# Patient Record
Sex: FEMALE | Race: WHITE | Employment: UNEMPLOYED | URBAN - METROPOLITAN AREA
[De-identification: names, ages, dates, MRNs, and addresses within clinical notes are randomized per-mention and may not be internally consistent; named-entity substitution may affect disease eponyms.]

---

## 2017-01-16 ENCOUNTER — HOSPITAL ENCOUNTER (OUTPATIENT)
Dept: GENERAL RADIOLOGY | Age: 67
Discharge: HOME | End: 2017-01-16
Payer: MEDICARE

## 2017-01-16 DIAGNOSIS — R06.2 WHEEZING: ICD-10-CM

## 2017-01-16 PROCEDURE — 71020 XR CHEST PA LAT: CPT

## 2018-03-03 ENCOUNTER — APPOINTMENT (OUTPATIENT)
Dept: CT IMAGING | Age: 68
DRG: 871 | End: 2018-03-03
Attending: EMERGENCY MEDICINE
Payer: MEDICARE

## 2018-03-03 ENCOUNTER — APPOINTMENT (OUTPATIENT)
Dept: GENERAL RADIOLOGY | Age: 68
DRG: 871 | End: 2018-03-03
Attending: EMERGENCY MEDICINE
Payer: MEDICARE

## 2018-03-03 ENCOUNTER — HOSPITAL ENCOUNTER (INPATIENT)
Age: 68
LOS: 5 days | Discharge: HOME HEALTH | DRG: 871 | End: 2018-03-08
Attending: EMERGENCY MEDICINE | Admitting: HOSPITALIST
Payer: MEDICARE

## 2018-03-03 DIAGNOSIS — J96.22 ACUTE ON CHRONIC RESPIRATORY FAILURE WITH HYPERCAPNIA (HCC): Primary | ICD-10-CM

## 2018-03-03 DIAGNOSIS — G93.40 ACUTE ENCEPHALOPATHY: ICD-10-CM

## 2018-03-03 LAB
ALBUMIN SERPL-MCNC: 3.4 G/DL (ref 3.5–5)
ALBUMIN/GLOB SERPL: 0.7 {RATIO} (ref 1.1–2.2)
ALP SERPL-CCNC: 128 U/L (ref 45–117)
ALT SERPL-CCNC: 16 U/L (ref 12–78)
ANION GAP SERPL CALC-SCNC: 4 MMOL/L (ref 5–15)
ANION GAP SERPL CALC-SCNC: 6 MMOL/L (ref 5–15)
APPEARANCE UR: CLEAR
ARTERIAL PATENCY WRIST A: YES
AST SERPL-CCNC: 15 U/L (ref 15–37)
BACTERIA URNS QL MICRO: ABNORMAL /HPF
BASE DEFICIT BLDA-SCNC: 0.6 MMOL/L
BASE DEFICIT BLDA-SCNC: 5.5 MMOL/L
BASE EXCESS BLDA CALC-SCNC: 2.1 MMOL/L
BASOPHILS # BLD: 0.1 K/UL (ref 0–0.1)
BASOPHILS NFR BLD: 1 % (ref 0–1)
BDY SITE: ABNORMAL
BILIRUB SERPL-MCNC: 0.4 MG/DL (ref 0.2–1)
BILIRUB UR QL: NEGATIVE
BNP SERPL-MCNC: 234 PG/ML (ref 0–125)
BUN SERPL-MCNC: 10 MG/DL (ref 6–20)
BUN SERPL-MCNC: 7 MG/DL (ref 6–20)
BUN/CREAT SERPL: 11 (ref 12–20)
BUN/CREAT SERPL: 14 (ref 12–20)
CALCIUM SERPL-MCNC: 8.3 MG/DL (ref 8.5–10.1)
CALCIUM SERPL-MCNC: 8.9 MG/DL (ref 8.5–10.1)
CHLORIDE SERPL-SCNC: 95 MMOL/L (ref 97–108)
CHLORIDE SERPL-SCNC: 98 MMOL/L (ref 97–108)
CK MB CFR SERPL CALC: 2.6 % (ref 0–2.5)
CK MB SERPL-MCNC: 2.8 NG/ML (ref 5–25)
CK SERPL-CCNC: 106 U/L (ref 26–192)
CO2 SERPL-SCNC: 30 MMOL/L (ref 21–32)
CO2 SERPL-SCNC: 31 MMOL/L (ref 21–32)
COLOR UR: ABNORMAL
CREAT SERPL-MCNC: 0.66 MG/DL (ref 0.55–1.02)
CREAT SERPL-MCNC: 0.7 MG/DL (ref 0.55–1.02)
DIFFERENTIAL METHOD BLD: ABNORMAL
EOSINOPHIL # BLD: 0.2 K/UL (ref 0–0.4)
EOSINOPHIL NFR BLD: 1 % (ref 0–7)
EPAP/CPAP/PEEP, PAPEEP: 6
EPAP/CPAP/PEEP, PAPEEP: 7
EPAP/CPAP/PEEP, PAPEEP: 7
EPITH CASTS URNS QL MICRO: ABNORMAL /LPF
ERYTHROCYTE [DISTWIDTH] IN BLOOD BY AUTOMATED COUNT: 13 % (ref 11.5–14.5)
FIO2 ON VENT: 60 %
FIO2 ON VENT: 60 %
FIO2 ON VENT: 80 %
FLUAV AG NPH QL IA: NEGATIVE
FLUBV AG NOSE QL IA: NEGATIVE
GAS FLOW.O2 SETTING OXYMISER: 16 L/MIN
GLOBULIN SER CALC-MCNC: 5 G/DL (ref 2–4)
GLUCOSE BLD STRIP.AUTO-MCNC: 181 MG/DL (ref 65–100)
GLUCOSE SERPL-MCNC: 132 MG/DL (ref 65–100)
GLUCOSE SERPL-MCNC: 181 MG/DL (ref 65–100)
GLUCOSE UR STRIP.AUTO-MCNC: 250 MG/DL
HCO3 BLDA-SCNC: 26 MMOL/L (ref 22–26)
HCO3 BLDA-SCNC: 30 MMOL/L (ref 22–26)
HCO3 BLDA-SCNC: 31 MMOL/L (ref 22–26)
HCT VFR BLD AUTO: 38.9 % (ref 35–47)
HGB BLD-MCNC: 11.9 G/DL (ref 11.5–16)
HGB UR QL STRIP: NEGATIVE
HYALINE CASTS URNS QL MICRO: ABNORMAL /LPF (ref 0–5)
IMM GRANULOCYTES # BLD: 0.1 K/UL (ref 0–0.04)
IMM GRANULOCYTES NFR BLD AUTO: 1 % (ref 0–0.5)
IPAP/PIP, IPAPIP: 14
IPAP/PIP, IPAPIP: 14
KETONES UR QL STRIP.AUTO: NEGATIVE MG/DL
LACTATE SERPL-SCNC: 1.9 MMOL/L (ref 0.4–2)
LEUKOCYTE ESTERASE UR QL STRIP.AUTO: NEGATIVE
LYMPHOCYTES # BLD: 1.4 K/UL (ref 0.8–3.5)
LYMPHOCYTES NFR BLD: 9 % (ref 12–49)
MCH RBC QN AUTO: 30.1 PG (ref 26–34)
MCHC RBC AUTO-ENTMCNC: 30.6 G/DL (ref 30–36.5)
MCV RBC AUTO: 98.2 FL (ref 80–99)
MONOCYTES # BLD: 1.4 K/UL (ref 0–1)
MONOCYTES NFR BLD: 9 % (ref 5–13)
NEUTS SEG # BLD: 12.5 K/UL (ref 1.8–8)
NEUTS SEG NFR BLD: 80 % (ref 32–75)
NITRITE UR QL STRIP.AUTO: NEGATIVE
NRBC # BLD: 0 K/UL (ref 0–0.01)
NRBC BLD-RTO: 0 PER 100 WBC
OSMOLALITY SERPL: 298 MOSM/KG H2O
OSMOLALITY UR: 321 MOSM/KG H2O
PCO2 BLDA: 58 MMHG (ref 35–45)
PCO2 BLDA: 82 MMHG (ref 35–45)
PCO2 BLDA: 89 MMHG (ref 35–45)
PH BLDA: 7.12 [PH] (ref 7.35–7.45)
PH BLDA: 7.16 [PH] (ref 7.35–7.45)
PH BLDA: 7.33 [PH] (ref 7.35–7.45)
PH UR STRIP: 6 [PH] (ref 5–8)
PLATELET # BLD AUTO: 423 K/UL (ref 150–400)
PMV BLD AUTO: 10 FL (ref 8.9–12.9)
PO2 BLDA: 242 MMHG (ref 80–100)
PO2 BLDA: 264 MMHG (ref 80–100)
PO2 BLDA: 428 MMHG (ref 80–100)
POTASSIUM SERPL-SCNC: 3.7 MMOL/L (ref 3.5–5.1)
POTASSIUM SERPL-SCNC: 4.3 MMOL/L (ref 3.5–5.1)
PROT SERPL-MCNC: 8.4 G/DL (ref 6.4–8.2)
PROT UR STRIP-MCNC: 100 MG/DL
RBC # BLD AUTO: 3.96 M/UL (ref 3.8–5.2)
RBC #/AREA URNS HPF: ABNORMAL /HPF (ref 0–5)
SAO2 % BLD: 100 % (ref 92–97)
SAO2 % BLD: 100 % (ref 92–97)
SAO2 % BLD: 99 % (ref 92–97)
SAO2% DEVICE SAO2% SENSOR NAME: ABNORMAL
SERVICE CMNT-IMP: ABNORMAL
SODIUM SERPL-SCNC: 130 MMOL/L (ref 136–145)
SODIUM SERPL-SCNC: 134 MMOL/L (ref 136–145)
SODIUM UR-SCNC: 66 MMOL/L
SP GR UR REFRACTOMETRY: 1.02 (ref 1–1.03)
SPECIMEN SITE: ABNORMAL
TROPONIN I SERPL-MCNC: <0.04 NG/ML
TROPONIN I SERPL-MCNC: <0.04 NG/ML
UA: UC IF INDICATED,UAUC: ABNORMAL
UROBILINOGEN UR QL STRIP.AUTO: 0.2 EU/DL (ref 0.2–1)
VENTILATION MODE VENT: ABNORMAL
VENTILATION MODE VENT: ABNORMAL
VT SETTING VENT: 450 ML
WBC # BLD AUTO: 15.6 K/UL (ref 3.6–11)
WBC URNS QL MICRO: ABNORMAL /HPF (ref 0–4)

## 2018-03-03 PROCEDURE — 94640 AIRWAY INHALATION TREATMENT: CPT

## 2018-03-03 PROCEDURE — 82962 GLUCOSE BLOOD TEST: CPT

## 2018-03-03 PROCEDURE — C1751 CATH, INF, PER/CENT/MIDLINE: HCPCS

## 2018-03-03 PROCEDURE — 77030029684 HC NEB SM VOL KT MONA -A

## 2018-03-03 PROCEDURE — 99285 EMERGENCY DEPT VISIT HI MDM: CPT

## 2018-03-03 PROCEDURE — 96375 TX/PRO/DX INJ NEW DRUG ADDON: CPT

## 2018-03-03 PROCEDURE — 83605 ASSAY OF LACTIC ACID: CPT | Performed by: EMERGENCY MEDICINE

## 2018-03-03 PROCEDURE — 77030013140 HC MSK NEB VYRM -A

## 2018-03-03 PROCEDURE — 74011250637 HC RX REV CODE- 250/637: Performed by: INTERNAL MEDICINE

## 2018-03-03 PROCEDURE — 80048 BASIC METABOLIC PNL TOTAL CA: CPT | Performed by: HOSPITALIST

## 2018-03-03 PROCEDURE — 87040 BLOOD CULTURE FOR BACTERIA: CPT | Performed by: EMERGENCY MEDICINE

## 2018-03-03 PROCEDURE — 71045 X-RAY EXAM CHEST 1 VIEW: CPT

## 2018-03-03 PROCEDURE — 36415 COLL VENOUS BLD VENIPUNCTURE: CPT | Performed by: EMERGENCY MEDICINE

## 2018-03-03 PROCEDURE — 87186 SC STD MICRODIL/AGAR DIL: CPT | Performed by: INTERNAL MEDICINE

## 2018-03-03 PROCEDURE — 5A1945Z RESPIRATORY VENTILATION, 24-96 CONSECUTIVE HOURS: ICD-10-PCS | Performed by: HOSPITALIST

## 2018-03-03 PROCEDURE — 94660 CPAP INITIATION&MGMT: CPT

## 2018-03-03 PROCEDURE — 74011250636 HC RX REV CODE- 250/636: Performed by: HOSPITALIST

## 2018-03-03 PROCEDURE — 84300 ASSAY OF URINE SODIUM: CPT | Performed by: HOSPITALIST

## 2018-03-03 PROCEDURE — 74011000250 HC RX REV CODE- 250: Performed by: HOSPITALIST

## 2018-03-03 PROCEDURE — 74011000250 HC RX REV CODE- 250: Performed by: EMERGENCY MEDICINE

## 2018-03-03 PROCEDURE — 82553 CREATINE MB FRACTION: CPT | Performed by: HOSPITALIST

## 2018-03-03 PROCEDURE — 36600 WITHDRAWAL OF ARTERIAL BLOOD: CPT | Performed by: EMERGENCY MEDICINE

## 2018-03-03 PROCEDURE — 94761 N-INVAS EAR/PLS OXIMETRY MLT: CPT

## 2018-03-03 PROCEDURE — 82803 BLOOD GASES ANY COMBINATION: CPT | Performed by: EMERGENCY MEDICINE

## 2018-03-03 PROCEDURE — 74011636320 HC RX REV CODE- 636/320: Performed by: EMERGENCY MEDICINE

## 2018-03-03 PROCEDURE — 0BH17EZ INSERTION OF ENDOTRACHEAL AIRWAY INTO TRACHEA, VIA NATURAL OR ARTIFICIAL OPENING: ICD-10-PCS | Performed by: EMERGENCY MEDICINE

## 2018-03-03 PROCEDURE — 87086 URINE CULTURE/COLONY COUNT: CPT | Performed by: EMERGENCY MEDICINE

## 2018-03-03 PROCEDURE — 87077 CULTURE AEROBIC IDENTIFY: CPT | Performed by: INTERNAL MEDICINE

## 2018-03-03 PROCEDURE — 77030013033 HC MSK BPAP/CPAP MMKA -B

## 2018-03-03 PROCEDURE — 83880 ASSAY OF NATRIURETIC PEPTIDE: CPT | Performed by: HOSPITALIST

## 2018-03-03 PROCEDURE — 74011250637 HC RX REV CODE- 250/637: Performed by: HOSPITALIST

## 2018-03-03 PROCEDURE — 77030010547 HC BG URIN W/UMETER -A

## 2018-03-03 PROCEDURE — 77030005514 HC CATH URETH FOL14 BARD -A

## 2018-03-03 PROCEDURE — 77030008683 HC TU ET CUF COVD -A

## 2018-03-03 PROCEDURE — 75810000137 HC PLCMT CENT VENOUS CATH

## 2018-03-03 PROCEDURE — 71275 CT ANGIOGRAPHY CHEST: CPT

## 2018-03-03 PROCEDURE — 93005 ELECTROCARDIOGRAM TRACING: CPT

## 2018-03-03 PROCEDURE — 85025 COMPLETE CBC W/AUTO DIFF WBC: CPT | Performed by: EMERGENCY MEDICINE

## 2018-03-03 PROCEDURE — 83930 ASSAY OF BLOOD OSMOLALITY: CPT | Performed by: HOSPITALIST

## 2018-03-03 PROCEDURE — 74011250636 HC RX REV CODE- 250/636: Performed by: EMERGENCY MEDICINE

## 2018-03-03 PROCEDURE — 94002 VENT MGMT INPAT INIT DAY: CPT

## 2018-03-03 PROCEDURE — 65620000000 HC RM CCU GENERAL

## 2018-03-03 PROCEDURE — 74011250637 HC RX REV CODE- 250/637: Performed by: EMERGENCY MEDICINE

## 2018-03-03 PROCEDURE — 83935 ASSAY OF URINE OSMOLALITY: CPT | Performed by: HOSPITALIST

## 2018-03-03 PROCEDURE — 96376 TX/PRO/DX INJ SAME DRUG ADON: CPT

## 2018-03-03 PROCEDURE — 87804 INFLUENZA ASSAY W/OPTIC: CPT | Performed by: HOSPITALIST

## 2018-03-03 PROCEDURE — 96367 TX/PROPH/DG ADDL SEQ IV INF: CPT

## 2018-03-03 PROCEDURE — 84484 ASSAY OF TROPONIN QUANT: CPT | Performed by: EMERGENCY MEDICINE

## 2018-03-03 PROCEDURE — 81001 URINALYSIS AUTO W/SCOPE: CPT | Performed by: EMERGENCY MEDICINE

## 2018-03-03 PROCEDURE — 36600 WITHDRAWAL OF ARTERIAL BLOOD: CPT | Performed by: INTERNAL MEDICINE

## 2018-03-03 PROCEDURE — 96365 THER/PROPH/DIAG IV INF INIT: CPT

## 2018-03-03 PROCEDURE — 87070 CULTURE OTHR SPECIMN AEROBIC: CPT | Performed by: INTERNAL MEDICINE

## 2018-03-03 PROCEDURE — 02HV33Z INSERTION OF INFUSION DEVICE INTO SUPERIOR VENA CAVA, PERCUTANEOUS APPROACH: ICD-10-PCS | Performed by: EMERGENCY MEDICINE

## 2018-03-03 PROCEDURE — 31500 INSERT EMERGENCY AIRWAY: CPT

## 2018-03-03 PROCEDURE — 80053 COMPREHEN METABOLIC PANEL: CPT | Performed by: EMERGENCY MEDICINE

## 2018-03-03 RX ORDER — LORAZEPAM 2 MG/ML
2 INJECTION INTRAMUSCULAR
Status: COMPLETED | OUTPATIENT
Start: 2018-03-03 | End: 2018-03-03

## 2018-03-03 RX ORDER — SODIUM CHLORIDE 9 MG/ML
100 INJECTION, SOLUTION INTRAVENOUS CONTINUOUS
Status: DISCONTINUED | OUTPATIENT
Start: 2018-03-03 | End: 2018-03-04

## 2018-03-03 RX ORDER — ALBUTEROL SULFATE 2.5 MG/.5ML
10 SOLUTION RESPIRATORY (INHALATION)
Status: DISCONTINUED | OUTPATIENT
Start: 2018-03-03 | End: 2018-03-03

## 2018-03-03 RX ORDER — METHOCARBAMOL 750 MG/1
750 TABLET, FILM COATED ORAL 4 TIMES DAILY
Status: DISCONTINUED | OUTPATIENT
Start: 2018-03-03 | End: 2018-03-03

## 2018-03-03 RX ORDER — SODIUM CHLORIDE 9 MG/ML
50 INJECTION, SOLUTION INTRAVENOUS
Status: COMPLETED | OUTPATIENT
Start: 2018-03-03 | End: 2018-03-03

## 2018-03-03 RX ORDER — FAMOTIDINE 10 MG/ML
20 INJECTION INTRAVENOUS EVERY 12 HOURS
Status: DISCONTINUED | OUTPATIENT
Start: 2018-03-03 | End: 2018-03-05

## 2018-03-03 RX ORDER — IPRATROPIUM BROMIDE AND ALBUTEROL SULFATE 2.5; .5 MG/3ML; MG/3ML
3 SOLUTION RESPIRATORY (INHALATION)
COMMUNITY

## 2018-03-03 RX ORDER — OMEPRAZOLE 40 MG/1
40 CAPSULE, DELAYED RELEASE ORAL DAILY
COMMUNITY

## 2018-03-03 RX ORDER — CHLORHEXIDINE GLUCONATE 1.2 MG/ML
15 RINSE ORAL EVERY 12 HOURS
Status: DISCONTINUED | OUTPATIENT
Start: 2018-03-03 | End: 2018-03-05

## 2018-03-03 RX ORDER — SUCCINYLCHOLINE CHLORIDE 20 MG/ML
100 INJECTION INTRAMUSCULAR; INTRAVENOUS
Status: COMPLETED | OUTPATIENT
Start: 2018-03-03 | End: 2018-03-03

## 2018-03-03 RX ORDER — LORAZEPAM 2 MG/ML
1 INJECTION INTRAMUSCULAR ONCE
Status: COMPLETED | OUTPATIENT
Start: 2018-03-03 | End: 2018-03-03

## 2018-03-03 RX ORDER — SODIUM CHLORIDE 9 MG/ML
1000 INJECTION, SOLUTION INTRAVENOUS ONCE
Status: COMPLETED | OUTPATIENT
Start: 2018-03-03 | End: 2018-03-03

## 2018-03-03 RX ORDER — METHOCARBAMOL 750 MG/1
750 TABLET, FILM COATED ORAL 4 TIMES DAILY
COMMUNITY

## 2018-03-03 RX ORDER — AZITHROMYCIN 250 MG/1
500 TABLET, FILM COATED ORAL
Status: COMPLETED | OUTPATIENT
Start: 2018-03-03 | End: 2018-03-03

## 2018-03-03 RX ORDER — ENOXAPARIN SODIUM 100 MG/ML
40 INJECTION SUBCUTANEOUS EVERY 24 HOURS
Status: DISCONTINUED | OUTPATIENT
Start: 2018-03-03 | End: 2018-03-08 | Stop reason: HOSPADM

## 2018-03-03 RX ORDER — PANTOPRAZOLE SODIUM 40 MG/1
40 TABLET, DELAYED RELEASE ORAL
Status: DISCONTINUED | OUTPATIENT
Start: 2018-03-04 | End: 2018-03-03

## 2018-03-03 RX ORDER — SODIUM CHLORIDE 0.9 % (FLUSH) 0.9 %
5-10 SYRINGE (ML) INJECTION AS NEEDED
Status: DISCONTINUED | OUTPATIENT
Start: 2018-03-03 | End: 2018-03-08 | Stop reason: HOSPADM

## 2018-03-03 RX ORDER — INSULIN LISPRO 100 [IU]/ML
INJECTION, SOLUTION INTRAVENOUS; SUBCUTANEOUS EVERY 6 HOURS
Status: DISCONTINUED | OUTPATIENT
Start: 2018-03-03 | End: 2018-03-06

## 2018-03-03 RX ORDER — LEVETIRACETAM 5 MG/ML
500 INJECTION INTRAVASCULAR EVERY 12 HOURS
Status: DISCONTINUED | OUTPATIENT
Start: 2018-03-03 | End: 2018-03-05

## 2018-03-03 RX ORDER — SODIUM CHLORIDE 0.9 % (FLUSH) 0.9 %
10 SYRINGE (ML) INJECTION
Status: COMPLETED | OUTPATIENT
Start: 2018-03-03 | End: 2018-03-03

## 2018-03-03 RX ORDER — MAGNESIUM SULFATE 100 %
4 CRYSTALS MISCELLANEOUS AS NEEDED
Status: DISCONTINUED | OUTPATIENT
Start: 2018-03-03 | End: 2018-03-06

## 2018-03-03 RX ORDER — IBUPROFEN 200 MG
1 TABLET ORAL EVERY 24 HOURS
Status: DISCONTINUED | OUTPATIENT
Start: 2018-03-03 | End: 2018-03-08 | Stop reason: HOSPADM

## 2018-03-03 RX ORDER — ETOMIDATE 2 MG/ML
20 INJECTION INTRAVENOUS ONCE
Status: COMPLETED | OUTPATIENT
Start: 2018-03-03 | End: 2018-03-03

## 2018-03-03 RX ORDER — AMLODIPINE BESYLATE 5 MG/1
10 TABLET ORAL DAILY
Status: DISCONTINUED | OUTPATIENT
Start: 2018-03-04 | End: 2018-03-03

## 2018-03-03 RX ORDER — AMLODIPINE BESYLATE 10 MG/1
10 TABLET ORAL DAILY
COMMUNITY

## 2018-03-03 RX ORDER — FACIAL-BODY WIPES
10 EACH TOPICAL DAILY PRN
Status: DISCONTINUED | OUTPATIENT
Start: 2018-03-03 | End: 2018-03-08 | Stop reason: HOSPADM

## 2018-03-03 RX ORDER — PROPOFOL 10 MG/ML
0-50 VIAL (ML) INTRAVENOUS
Status: DISCONTINUED | OUTPATIENT
Start: 2018-03-03 | End: 2018-03-05

## 2018-03-03 RX ORDER — KETAMINE HYDROCHLORIDE 50 MG/ML
2 INJECTION, SOLUTION INTRAMUSCULAR; INTRAVENOUS
Status: DISCONTINUED | OUTPATIENT
Start: 2018-03-03 | End: 2018-03-03

## 2018-03-03 RX ORDER — SODIUM CHLORIDE 0.9 % (FLUSH) 0.9 %
5-10 SYRINGE (ML) INJECTION EVERY 8 HOURS
Status: DISCONTINUED | OUTPATIENT
Start: 2018-03-03 | End: 2018-03-08 | Stop reason: HOSPADM

## 2018-03-03 RX ORDER — ACETAMINOPHEN 650 MG/1
650 SUPPOSITORY RECTAL
Status: DISCONTINUED | OUTPATIENT
Start: 2018-03-03 | End: 2018-03-05

## 2018-03-03 RX ORDER — DEXTROSE 50 % IN WATER (D50W) INTRAVENOUS SYRINGE
12.5-25 AS NEEDED
Status: DISCONTINUED | OUTPATIENT
Start: 2018-03-03 | End: 2018-03-06

## 2018-03-03 RX ORDER — ASPIRIN 300 MG/1
300 SUPPOSITORY RECTAL DAILY
Status: DISCONTINUED | OUTPATIENT
Start: 2018-03-03 | End: 2018-03-05

## 2018-03-03 RX ORDER — MUPIROCIN 20 MG/G
OINTMENT TOPICAL EVERY 12 HOURS
Status: DISCONTINUED | OUTPATIENT
Start: 2018-03-03 | End: 2018-03-08 | Stop reason: HOSPADM

## 2018-03-03 RX ORDER — IPRATROPIUM BROMIDE AND ALBUTEROL SULFATE 2.5; .5 MG/3ML; MG/3ML
3 SOLUTION RESPIRATORY (INHALATION)
Status: COMPLETED | OUTPATIENT
Start: 2018-03-03 | End: 2018-03-03

## 2018-03-03 RX ORDER — LEVALBUTEROL INHALATION SOLUTION 0.63 MG/3ML
0.63 SOLUTION RESPIRATORY (INHALATION)
Status: COMPLETED | OUTPATIENT
Start: 2018-03-03 | End: 2018-03-03

## 2018-03-03 RX ORDER — MAGNESIUM SULFATE HEPTAHYDRATE 40 MG/ML
2 INJECTION, SOLUTION INTRAVENOUS ONCE
Status: COMPLETED | OUTPATIENT
Start: 2018-03-03 | End: 2018-03-03

## 2018-03-03 RX ORDER — IPRATROPIUM BROMIDE AND ALBUTEROL SULFATE 2.5; .5 MG/3ML; MG/3ML
3 SOLUTION RESPIRATORY (INHALATION)
Status: DISCONTINUED | OUTPATIENT
Start: 2018-03-03 | End: 2018-03-07

## 2018-03-03 RX ORDER — DULOXETIN HYDROCHLORIDE 20 MG/1
40 CAPSULE, DELAYED RELEASE ORAL DAILY
Status: DISCONTINUED | OUTPATIENT
Start: 2018-03-04 | End: 2018-03-03

## 2018-03-03 RX ORDER — ONDANSETRON 2 MG/ML
4 INJECTION INTRAMUSCULAR; INTRAVENOUS
Status: DISCONTINUED | OUTPATIENT
Start: 2018-03-03 | End: 2018-03-06

## 2018-03-03 RX ORDER — IPRATROPIUM BROMIDE AND ALBUTEROL SULFATE 2.5; .5 MG/3ML; MG/3ML
SOLUTION RESPIRATORY (INHALATION)
Status: DISCONTINUED
Start: 2018-03-03 | End: 2018-03-03

## 2018-03-03 RX ADMIN — IPRATROPIUM BROMIDE AND ALBUTEROL SULFATE 3 ML: .5; 3 SOLUTION RESPIRATORY (INHALATION) at 19:46

## 2018-03-03 RX ADMIN — PROPOFOL 10 MCG/KG/MIN: 10 INJECTION, EMULSION INTRAVENOUS at 17:31

## 2018-03-03 RX ADMIN — SODIUM CHLORIDE 50 ML/HR: 900 INJECTION, SOLUTION INTRAVENOUS at 19:03

## 2018-03-03 RX ADMIN — Medication 10 ML: at 23:48

## 2018-03-03 RX ADMIN — PROPOFOL 50 MCG/KG/MIN: 10 INJECTION, EMULSION INTRAVENOUS at 21:16

## 2018-03-03 RX ADMIN — Medication 10 ML: at 19:03

## 2018-03-03 RX ADMIN — SUCCINYLCHOLINE CHLORIDE 100 MG: 20 INJECTION, SOLUTION INTRAMUSCULAR; INTRAVENOUS at 16:44

## 2018-03-03 RX ADMIN — ETOMIDATE 20 MG: 40 INJECTION, SOLUTION INTRAVENOUS at 17:34

## 2018-03-03 RX ADMIN — LORAZEPAM 2 MG: 2 INJECTION INTRAMUSCULAR; INTRAVENOUS at 13:42

## 2018-03-03 RX ADMIN — IPRATROPIUM BROMIDE AND ALBUTEROL SULFATE 3 ML: .5; 3 SOLUTION RESPIRATORY (INHALATION) at 16:45

## 2018-03-03 RX ADMIN — AZITHROMYCIN 500 MG: 250 TABLET, FILM COATED ORAL at 15:07

## 2018-03-03 RX ADMIN — Medication 10 ML: at 13:50

## 2018-03-03 RX ADMIN — IPRATROPIUM BROMIDE AND ALBUTEROL SULFATE 3 ML: .5; 3 SOLUTION RESPIRATORY (INHALATION) at 12:45

## 2018-03-03 RX ADMIN — MUPIROCIN: 20 OINTMENT TOPICAL at 20:18

## 2018-03-03 RX ADMIN — CHLORHEXIDINE GLUCONATE 15 ML: 1.2 RINSE ORAL at 20:18

## 2018-03-03 RX ADMIN — ASPIRIN 300 MG: 300 SUPPOSITORY RECTAL at 16:45

## 2018-03-03 RX ADMIN — IPRATROPIUM BROMIDE AND ALBUTEROL SULFATE 3 ML: .5; 3 SOLUTION RESPIRATORY (INHALATION) at 23:38

## 2018-03-03 RX ADMIN — LEVALBUTEROL HYDROCHLORIDE 0.63 MG: 0.63 SOLUTION RESPIRATORY (INHALATION) at 14:44

## 2018-03-03 RX ADMIN — MAGNESIUM SULFATE HEPTAHYDRATE 2 G: 40 INJECTION, SOLUTION INTRAVENOUS at 12:56

## 2018-03-03 RX ADMIN — Medication 10 ML: at 20:19

## 2018-03-03 RX ADMIN — IOPAMIDOL 80 ML: 755 INJECTION, SOLUTION INTRAVENOUS at 19:03

## 2018-03-03 RX ADMIN — FAMOTIDINE 20 MG: 10 INJECTION INTRAVENOUS at 20:17

## 2018-03-03 RX ADMIN — SODIUM CHLORIDE 1000 ML: 900 INJECTION, SOLUTION INTRAVENOUS at 12:56

## 2018-03-03 RX ADMIN — LEVETIRACETAM 500 MG: 5 INJECTION INTRAVENOUS at 18:42

## 2018-03-03 RX ADMIN — Medication 10 ML: at 15:53

## 2018-03-03 RX ADMIN — ENOXAPARIN SODIUM 40 MG: 40 INJECTION SUBCUTANEOUS at 16:45

## 2018-03-03 RX ADMIN — LORAZEPAM 1 MG: 2 INJECTION INTRAMUSCULAR; INTRAVENOUS at 12:48

## 2018-03-03 RX ADMIN — SODIUM CHLORIDE 100 ML/HR: 900 INJECTION, SOLUTION INTRAVENOUS at 18:42

## 2018-03-03 RX ADMIN — METHYLPREDNISOLONE SODIUM SUCCINATE 40 MG: 40 INJECTION, POWDER, FOR SOLUTION INTRAMUSCULAR; INTRAVENOUS at 17:30

## 2018-03-03 RX ADMIN — CEFTRIAXONE SODIUM 1 G: 1 INJECTION, POWDER, FOR SOLUTION INTRAMUSCULAR; INTRAVENOUS at 15:01

## 2018-03-03 NOTE — ED NOTES
MD Wilson made aware of patient's ABG results. pH 7.12  pC02 81.9  HCO3 26    MD Wilson states repeat ABGs will be done in about 1 hour.

## 2018-03-03 NOTE — ED NOTES
Respiratory paged for abg and bipap    1410 MD requesting that prior to putting patient on BiPap, patient get a continuous nebulizer and have ABGs done.

## 2018-03-03 NOTE — ED NOTES
MD At bedside to place central line. Propofol increased to 40mcg/kg/min at the request of MD Wilson.

## 2018-03-03 NOTE — ED NOTES
Patient's saturations maintaining in the mid 80's after deep breathing techniques and placing patient on nonrebreather. MD Wilson made aware.

## 2018-03-03 NOTE — ED NOTES
TRANSFER - OUT REPORT:    Verbal report given to Gloria Gray RN (name) on Cheryl Slot  being transferred to CCU (unit) for routine progression of care       Report consisted of patients Situation, Background, Assessment and   Recommendations(SBAR). Information from the following report(s) SBAR, Kardex, ED Summary, Intake/Output, Recent Results, Med Rec Status and Cardiac Rhythm SVT was reviewed with the receiving nurse. Lines:   Peripheral IV 03/03/18 Left Hand (Active)   Site Assessment Clean, dry, & intact 3/3/2018  5:59 PM   Phlebitis Assessment 0 3/3/2018  5:59 PM   Infiltration Assessment 0 3/3/2018  5:59 PM   Dressing Status Clean, dry, & intact 3/3/2018  5:59 PM   Dressing Type Tape;Transparent 3/3/2018  5:59 PM   Hub Color/Line Status Blue;Flushed 3/3/2018  5:59 PM        Opportunity for questions and clarification was provided. Patient transported with:   Monitor  Patients medications from home  Registered Nurse  Tech (Respiratory)  Patient intubated.

## 2018-03-03 NOTE — ED NOTES
RN called CT to have patient's CT completed, however, both CT tables are being used at this time. Celeste Lance, CCU RN called and updated her on patient's current status. RN states she is in route to get the patient.

## 2018-03-03 NOTE — IP AVS SNAPSHOT
[address] 
[phone] Patient: Ary Cee MRN: [id number] KI About your hospitalization You were admitted on:  March 3, 2018 You last received care in the:  [facility] 2 GENERAL SURGERY You were discharged on:  2018 Why you were hospitalized Your primary diagnosis was:  Copd Exacerbation (Hcc) Your diagnoses also included:  Acute On Chronic Respiratory Failure With Hypercapnia (Hcc), Acute Encephalopathy, Hyponatremia Follow-up Information Follow up With Details Comments Contact Info King Rob MD Schedule an appointment as soon as possible for a visit in 1 week Call Sooner, As needed, If symptoms worsen [place] [address] [address] [place] [address] 
[phone] 
  
 your psychiatrist - you call to set up appointment Schedule an appointment as soon as possible for a visit in 1 week Call Sooner, As needed, If symptoms worsen Alejo Le NP On 3/16/2018 3;00pm   Please arrive 15min ealry. Call Sooner, As needed, If symptoms worsen [address] [facility] 
[phone] [facility]  This is your home health care provider. If you dont hear from them within 24hrs of discharge, please call them [address] [facility] [address] [phone] Discharge Orders None A check sherman indicates which time of day the medication should be taken. My Medications START taking these medications Instructions Each Dose to Equal  
 Morning Noon Evening Bedtime  
 predniSONE 20 mg tablet Commonly known as:  Collette Shaper Your last dose was: Your next dose is: Take 2 tablets (40mg) daily for 3 days, then 20mg (1 tab) daily for 3 days, then 10mg (1/2 tab) daily for 2 days CHANGE how you take these medications Instructions Each Dose to Equal  
 Morning Noon Evening Bedtime  
 ondansetron 4 mg disintegrating tablet Commonly known as:  ZOFRAN ODT What changed:  when to take this Your last dose was: Your next dose is: Take 1 Tab by mouth every eight (8) hours as needed. 4 mg CONTINUE taking these medications Instructions Each Dose to Equal  
 Morning Noon Evening Bedtime  
 albuterol 2.5 mg /3 mL (0.083 %) nebulizer solution Commonly known as:  PROVENTIL VENTOLIN Your last dose was: Your next dose is:    
   
   
 3 mL by Nebulization route every four (4) hours as needed. 2.5 mg  
    
   
   
   
  
 albuterol-ipratropium 2.5 mg-0.5 mg/3 ml Nebu Commonly known as:  Lenon Saint Your last dose was: Your next dose is:    
   
   
 3 mL by Nebulization route four (4) times daily as needed. 3 mL  
    
   
   
   
  
 amLODIPine 10 mg tablet Commonly known as:  Fanta Hair Your last dose was: Your next dose is: Take 10 mg by mouth daily. 10 mg DULoxetine 20 mg capsule Commonly known as:  CYMBALTA Your last dose was: Your next dose is: Take 40 mg by mouth daily. 40 mg  
    
   
   
   
  
 fluticasone-salmeterol 250-50 mcg/dose diskus inhaler Commonly known as:  ADVAIR Your last dose was: Your next dose is: Take 1 Puff by inhalation two (2) times a day. 1 Puff  
    
   
   
   
  
 levETIRAcetam 500 mg tablet Commonly known as:  KEPPRA Your last dose was: Your next dose is: Take 1 Tab by mouth two (2) times a day. 500 mg  
    
   
   
   
  
 methocarbamol 750 mg tablet Commonly known as:  ROBAXIN Your last dose was: Your next dose is: Take 750-1,500 mg by mouth two (2) times a day.   
 750-1500 mg  
    
   
   
 metoprolol tartrate 50 mg tablet Commonly known as:  LOPRESSOR Your last dose was: Your next dose is: Take 1 Tab by mouth two (2) times a day. 50 mg  
    
   
   
   
  
 omeprazole 40 mg capsule Commonly known as:  PRILOSEC Your last dose was: Your next dose is: Take 40 mg by mouth daily. 40 mg  
    
   
   
   
  
 tiotropium 18 mcg inhalation capsule Commonly known as:  Deannaella Howard Your last dose was: Your next dose is: Take 1 Cap by inhalation daily. Indications: BRONCHIAL ASTHMA  
 1 Cap Where to Get Your Medications Information on where to get these meds will be given to you by the nurse or doctor. ! Ask your nurse or doctor about these medications  
  predniSONE 20 mg tablet Discharge Instructions DISCHARGE DIAGNOSIS: 
Acute on chronic hypercapnic respiratory failure s/p extubation today Acute COPD exacerbation Hyponatremia, resolving Severe anxiety/depression Leukocytosis, resolved Acute encephalopathy likely related to hypercapnia resolved Hypertension History of seizure disorder Hypophosphatemia MEDICATIONS: 
· It is important that you take the medication exactly as they are prescribed. · Keep your medication in the bottles provided by the pharmacist and keep a list of the medication names, dosages, and times to be taken in your wallet. · Do not take other medications without consulting your doctor. Pain Management: per above medications What to do at [facility] Recommended diet:  Resume previous diet Recommended activity: Activity as tolerated If you have questions regarding the hospital related prescriptions or hospital related issues please call [address] [place] [address] 190 at .  You can always direct your questions to your primary care doctor if you are unable to reach your hospital physician; your PCP works as an extension of your hospital doctor just like your hospital doctor is an extension of your PCP for your time at the hospital [facility], [facility]). If you experience any of the following symptoms then please call your primary care physician or return to the emergency room if you cannot get hold of your doctor: 
Fever, chills, nausea, vomiting, diarrhea, change in mentation, falling, bleeding, shortness of breath Primary care doctor: Dr Sanjiv Prater [employer] Announcement We are excited to announce that we are making your provider's discharge notes available to you in [employer]. You will see these notes when they are completed and signed by the physician that discharged you from your recent hospital stay. If you have any questions or concerns about any information you see in [employer], please call the Health Information Department where you were seen or reach out to your Primary Care Provider for more information about your plan of care. Introducing [facility]! [facility] introduces [employer] patient portal. Now you can access parts of your medical record, email your doctor's office, and request medication refills online. 1. In your internet browser, go to [email or web address] 2. Click on the First Time User? Click Here link in the Sign In box. You will see the New Member Sign Up page. 3. Enter your [employer] Access Code exactly as it appears below. You will not need to use this code after youve completed the sign-up process. If you do not sign up before the expiration date, you must request a new code. · [employer] Access Code: [email or web address] Expires: 6/6/2018  1:45 PM 
 
4. Enter the last four digits of your Social Security Number (xxxx) and Date of Birth (mm/dd/yyyy) as indicated and click Submit. You will be taken to the next sign-up page. 5. Create a [employer] ID. This will be your [employer] login ID and cannot be changed, so think of one that is secure and easy to remember. 6. Create a [employer] password. You can change your password at any time. 7. Enter your Password Reset Question and Answer. This can be used at a later time if you forget your password. 8. Enter your e-mail address. You will receive e-mail notification when new information is available in [address] 9. Click Sign Up. You can now view and download portions of your medical record. 10. Click the Download Summary menu link to download a portable copy of your medical information. If you have questions, please visit the Frequently Asked Questions section of the [employer] website. Remember, [employer] is NOT to be used for urgent needs. For medical emergencies, dial 911. Now available from your iPhone and Android! Providers Seen During Your Hospitalization Provider Specialty Primary office phone Chapo Tavarez DO Emergency Medicine [phone] Bea Hough MD Internal Medicine [phone] Leonardo Steen MD Internal Medicine [phone] Myriam Quick MD Internal Medicine [phone] Your Primary Care Physician (PCP) Primary Care Physician Office Phone Office Fax Raciel Carvajal [phone] You are allergic to the following Allergen Reactions Codeine Other (comments) Erythromycin Nausea Only Other Medication Other (comments) No sedative or narcotic per son due to hx addiction Recent Documentation Height Weight Breastfeeding? BMI OB Status Smoking Status 1.575 m 71.1 kg No 28.67 kg/m2 Hysterectomy Current Every Day Smoker Emergency Contacts Name Discharge Info Relation Home Work Mobile [address] [facility] CAREGIVER [3] Son [22] [phone] Patient Belongings The following personal items are in your possession at time of discharge: Dental Appliances: None  Visual Aid: None      Home Medications: Kept at bedside   Jewelry: None  Clothing: None    Other Valuables: None Please provide this summary of care documentation to your next provider. Signatures-by signing, you are acknowledging that this After Visit Summary has been reviewed with you and you have received a copy. Patient Signature:  ____________________________________________________________ Date:  ____________________________________________________________  
  
Shea Moritz Provider Signature:  ____________________________________________________________ Date:  ____________________________________________________________

## 2018-03-03 NOTE — ED NOTES
Patient reports to ED with complaints of SOB that is worse during exertion which started today. When it first began, patient began self medicating with nebulizer treatments and reports little relief. Patient had 1 duoneb, one albuterol nebulizer treatment, 125mg of solumedrol in route by EMS. When EMS arrived, patient was in a tripod position and was unable to catch her breath. Patient has a hx of COPD and HTN. Patient on the monitor x3, call bell within reach. Side rails x2.

## 2018-03-03 NOTE — PROGRESS NOTES
TRANSFER - IN REPORT:    Verbal report received from King (name) on Ary Cee  being received from ED (unit) for routine progression of care      Report consisted of patients Situation, Background, Assessment and   Recommendations(SBAR). Information from the following report(s) SBAR, Kardex and MAR was reviewed with the receiving nurse. Opportunity for questions and clarification was provided. Assessment completed upon patients arrival to unit and care assumed.

## 2018-03-03 NOTE — ED NOTES
Patient's son, Ian Richard called, and at patient's request, son was updated on patient's status. Patient lives with son, who takes care of her medical visits and information. Son states he has a sick 2yo at home and is attempting to avoid the hospital.    Son states to call him for anything at [phone]. Son requesting that patient is not given any opioids because of a past addiction.

## 2018-03-03 NOTE — PROGRESS NOTES
[facility]  Pulmonary, Critical Care, and Sleep Medicine    Name: Aide Arevalo MRN: [id number]   : 1950 Hospital: [facility]   Date: 3/3/2018        Critical Care Initial Patient Consult    PCP: Aquiles Lundberg  Pulm: Giulia Marks    Pt was seen in ER at 615pm.   IMPRESSION:   · Acute Hypercarbic and Hypoxic Respiratory Failure, on 2L oxygen at home. Was tried on 10%NRB, BIPAP in ER, despite these measures pt seemed to worsen. About 210pm pt was place on bipap and continuous albuterol nebs. Appears to have very limited cardiopulmonary reserve. · COPD with acute exacerbation, required intubation for refractory hypercarbia. · Tachycardia with HR in 140s. · Acute Encephalopathy, may be due to increase co2 and meds, Was given 3 mg of ativan in ER and became lethargic. Now on sedation. · Acute Hyponatremia, may be hypovolemia. Was given 1L of NS in ER. Will need to follow. · Risk of harming herself, Will need restraints to prevent accidental extubation or removal of CVC. · Anxiety, has anxiety at her baseline. · Depression  · Baseline Hypertension, on amlodipine, holding beta blocker for now. · Hx of seizures. · Hx of ischemic and C Diff colitis. · Hx of prior opioid addiction and to avoid opioids if at all possible. · Has ET tube, CVC placed in ER of her Right groin. · Her son Adia is her surrogate decision Maker: pH: [phone]  · Pt is critically ill, moderate to high risk of decompensation. Multiple organ failure. Will need ICU care and monitoring. Monitoring of heart rhythm. 40 min CC, EOP. RECOMMENDATIONS:   · Vent support, will need to recheck a post intubation ABG  · ON empiric Abx with Ceftriaxone and Azithromycin  · Solumedrol IV. · Frequent Nebs  · Will need ongoing ABGs to assess oxygen and co2 elimination.    · Flu test  · For Ct of chest, will eval for PE and pneumonia  · Sedation, target RASS of zero to minus 1  · GI proph  · DVT prophylaxis  · CHG for oral care  · DVT prophylaxis  · Will need an OG tube. Subjective/History: This patient has been seen and evaluated at the request of Dr. Ton Starkey for above. Patient is a 74 y.o. female who presents for increased shortness of breath, severe dyspnea with any exertion. She was taking her nebs without any relief. ON arrival to ER pt was in tripod position, unable to catch her breath. Was given nebs and solumedrol in transit by EMS, (12noon today). Pt is on home oxygen. Was seen in office by Dr. Richard Shore was week and appeared to be doing pretty well. She has increased shortness of breath for the last several days. Even at rest today she was unable to catch her breath. Pt has a chronic dry cough. NO fevers, Denies sinus disease, no recent travels. Last hospitalization was in . Pt was given ativan for anxiety and placed on bipap, then was given additional ativan. Pt was seen in ER,  with heart rate in 130s. BP: 183/90,   On Vent: rate 16, vt: 450, fio2 of 60 and PEEP of 6. The patient is critically ill and can not provide additional history due to Unconsciousness, Ventilated and Unable to speak. Past Medical History:   Diagnosis Date    Arthritis     Asthma     COPD     2 L NC    Hypertension     [facility]schemic colitis ([facility] [address] 2012    Migraines     Neurological disorder     migraines    Psychiatric disorder     depression    Seizures ([facility] [address]     Last seizure 4 years ago    Vaginal candidiasis 2012      Past Surgical History:   Procedure Laterality Date    HX APPENDECTOMY      HX  SECTION      HX CHOLECYSTECTOMY      HX HYSTERECTOMY      HX ORTHOPAEDIC      lumbar disc sx    HX ORTHOPAEDIC      left knee sx    HX OTHER SURGICAL      colonoscopy-recent colitis    [facility] COLONOSCOPY W/BIOPSY SINGLE/MULTIPLE  3/1/2012           Prior to Admission medications    Medication Sig Start Date End Date Taking?  Authorizing Provider   albuterol-ipratropium (DUO-NEB) 2.5 mg-0.5 mg/3 ml nebu 3 mL by Nebulization route four (4) times daily as needed. Yes Heri Rosenberg MD   methocarbamol (ROBAXIN) 750 mg tablet Take 750-1,500 mg by mouth two (2) times a day. Yes Heri Rosenberg MD   amLODIPine (NORVASC) 10 mg tablet Take 10 mg by mouth daily. Yes Heri Rosenberg MD   omeprazole (PRILOSEC) 40 mg capsule Take 40 mg by mouth daily. Yes Historical Provider   ondansetron (ZOFRAN ODT) 4 mg disintegrating tablet Take 1 Tab by mouth every eight (8) hours as needed. Patient taking differently: Take 4 mg by mouth three (3) times daily. 6/30/16  Yes Timo Roberts MD   albuterol (PROVENTIL VENTOLIN) 2.5 mg /3 mL (0.083 %) nebulizer solution 3 mL by Nebulization route every four (4) hours as needed. 12/23/15  Yes Glo Carrington MD   tiotropium [facility]) 18 mcg inhalation capsule Take 1 Cap by inhalation daily. Indications: BRONCHIAL ASTHMA 12/23/15  Yes Glo Carrington MD   levETIRAcetam (KEPPRA) 500 mg tablet Take 1 Tab by mouth two (2) times a day. 12/23/15  Yes lGo Carrington MD   metoprolol (LOPRESSOR) 50 mg tablet Take 1 Tab by mouth two (2) times a day. 12/23/15  Yes Glo Carrington MD   DULoxetine (CYMBALTA) 20 mg capsule Take 40 mg by mouth daily. Yes Heri Rosenberg MD   fluticasone-salmeterol (ADVAIR) 250-50 mcg/dose diskus inhaler Take 1 Puff by inhalation two (2) times a day.  12/23/15   Glo Carrington MD     Current Facility-Administered Medications   Medication Dose Route Frequency    albuterol-ipratropium (DUO-NEB) 2.5 mg-0.5 mg/3 ml nebulizer solution        0.9% sodium chloride infusion  50 mL/hr IntraVENous RAD ONCE    iopamidol (ISOVUE-370) 76 % injection 100 mL  100 mL IntraVENous RAD ONCE    sodium chloride (NS) flush 10 mL  10 mL IntraVENous RAD ONCE    albuterol CONCENTRATE 2.5mg/0.5 mL neb soln  10 mg/hr Nebulization NOW    sodium chloride (NS) flush 5-10 mL  5-10 mL IntraVENous Q8H    0.9% sodium chloride infusion  100 mL/hr IntraVENous CONTINUOUS    nicotine (NICODERM CQ) 14 mg/24 hr patch 1 Patch  1 Patch TransDERmal Q24H    enoxaparin (LOVENOX) injection 40 mg  40 mg SubCUTAneous Q24H    albuterol-ipratropium (DUO-NEB) 2.5 MG-0.5 MG/3 ML  3 mL Nebulization Q4H RT    methylPREDNISolone (PF) (SOLU-MEDROL) injection 40 mg  40 mg IntraVENous Q6H    [START ON 3/4/2018] cefTRIAXone (ROCEPHIN) 1 g/50 mL NS IVPB  1 g IntraVENous Q24H    [START ON 3/4/2018] azithromycin (ZITHROMAX) 500 mg in 0.9% sodium chloride 250 mL IVPB  500 mg IntraVENous Q24H    aspirin (ASA) suppository 300 mg  300 mg Rectal DAILY    [START ON 3/4/2018] umeclidinium (INCRUSE ELLIPTA) 62.5 mcg/actuation  1 Puff Inhalation DAILY    ketamine (KETALAR) 50 mg/mL injection 152.5 mg  2 mg/kg IntraVENous NOW    succinylcholine (ANECTINE) injection 100 mg  100 mg IntraVENous NOW    levETIRAcetam (KEPPRA) 500 mg in saline (iso-osm) 100 ml IVPB  500 mg IntraVENous Q12H     Allergies   Allergen Reactions    Codeine Other (comments)    Erythromycin Nausea Only    Other Medication Other (comments)     No sedative or narcotic per son due to hx addiction      Social History   Substance Use Topics    Smoking status: Current Every Day Smoker     Packs/day: 0.25     Years: 30.00    Smokeless tobacco: Never Used    Alcohol use No      Family History   Problem Relation Age of Onset    Stroke Mother     Heart Disease Mother     Lung Disease Father      copd        Review of Systems:  Review of systems not obtained due to patient factors.     Objective:   Vital Signs:    Visit Vitals    /84    Pulse (!) 140    Temp 98.4 °F (36.9 °C)    Resp 27    Ht 5' 2\" (1.575 m)    Wt 76.2 kg (168 lb)    SpO2 99%    BMI 30.73 kg/m2       O2 Device: BIPAP       Temp (24hrs), Av.4 °F (36.9 °C), Min:98.4 °F (36.9 °C), Max:98.4 °F (36.9 °C)       Intake/Output:   Last shift:         Last 3 shifts:    No intake or output data in the 24 hours ending 18 1714  Hemodynamics:   PAP:   CO:     Wedge:   CI: CVP:    SVR:       PVR:       Ventilator Settings:  Mode Rate Tidal Volume Pressure FiO2 PEEP            60 %       Peak airway pressure:      Minute ventilation: 12.9 l/min      Physical Exam:    General:  Intubated and sedated, on mechanical vent support. no distress, appears stated age. Head:  Normocephalic, without obvious abnormality, atraumatic. Eyes:  Conjunctivae/corneas clear. PERRL, EOMs intact. Nose: Nares normal. Septum midline. Mucosa normal. No drainage or sinus tenderness. Throat: Lips, mucosa, and tongue normal. Teeth and gums normal.   Neck: Supple, symmetrical, trachea midline, no adenopathy, thyroid: no enlargment/tenderness/nodules, no carotid bruit and no JVD. Back:   Not able to assess due to her medical condition. Lungs:   Decreased air movement. Wheezing bilaterally, no crackles. Some diminished BS in bases. Chest wall:  No tenderness or deformity. Heart:  Regular rate and rhythm, S1, S2 normal, no murmur, click, rub or gallop. Abdomen:   Soft, non-tender. Bowel sounds normal. No masses,  No organomegaly. Extremities: Extremities normal, atraumatic, no cyanosis or edema. Pulses: 2+ and symmetric all extremities. Skin: Skin color, texture, turgor normal. No rashes or lesions   Lymph nodes: Cervical, supraclavicular nodes are normal.   Neurologic: Pt is intubated and sedated. Psych: not able to be assessed due to on sedation. STEVEN has a singh.         Data:     Recent Results (from the past 24 hour(s))   EKG, 12 LEAD, INITIAL    Collection Time: 03/03/18 12:41 PM   Result Value Ref Range    Ventricular Rate 151 BPM    Atrial Rate 151 BPM    P-R Interval 124 ms    QRS Duration 80 ms    Q-T Interval 266 ms    QTC Calculation (Bezet) 421 ms    Calculated P Axis 83 degrees    Calculated R Axis 63 degrees    Calculated T Axis 56 degrees    Diagnosis       Sinus tachycardia  Inferior infarct (cited on or before 17-DEC-2015)  Cannot rule out Anterior infarct , age undetermined  Abnormal ECG  When compared with ECG of 10-JUL-2016 11:22,  Questionable change in QRS axis     CBC WITH AUTOMATED DIFF    Collection Time: 03/03/18 12:51 PM   Result Value Ref Range    WBC 15.6 (H) 3.6 - 11.0 K/uL    RBC 3.96 3.80 - 5.20 M/uL    HGB 11.9 11.5 - 16.0 g/dL    HCT 38.9 35.0 - 47.0 %    MCV 98.2 80.0 - 99.0 FL    MCH 30.1 26.0 - 34.0 PG    MCHC 30.6 30.0 - 36.5 g/dL    RDW 13.0 11.5 - 14.5 %    PLATELET 188 (H) 732 - 400 K/uL    MPV 10.0 8.9 - 12.9 FL    NRBC 0.0 0  WBC    ABSOLUTE NRBC 0.00 0.00 - 0.01 K/uL    NEUTROPHILS 80 (H) 32 - 75 %    LYMPHOCYTES 9 (L) 12 - 49 %    MONOCYTES 9 5 - 13 %    EOSINOPHILS 1 0 - 7 %    BASOPHILS 1 0 - 1 %    IMMATURE GRANULOCYTES 1 (H) 0.0 - 0.5 %    ABS. NEUTROPHILS 12.5 (H) 1.8 - 8.0 K/UL    ABS. LYMPHOCYTES 1.4 0.8 - 3.5 K/UL    ABS. MONOCYTES 1.4 (H) 0.0 - 1.0 K/UL    ABS. EOSINOPHILS 0.2 0.0 - 0.4 K/UL    ABS. BASOPHILS 0.1 0.0 - 0.1 K/UL    ABS. IMM. GRANS. 0.1 (H) 0.00 - 0.04 K/UL    DF AUTOMATED     METABOLIC PANEL, COMPREHENSIVE    Collection Time: 03/03/18 12:51 PM   Result Value Ref Range    Sodium 130 (L) 136 - 145 mmol/L    Potassium 3.7 3.5 - 5.1 mmol/L    Chloride 95 (L) 97 - 108 mmol/L    CO2 31 21 - 32 mmol/L    Anion gap 4 (L) 5 - 15 mmol/L    Glucose 132 (H) 65 - 100 mg/dL    BUN 7 6 - 20 MG/DL    Creatinine 0.66 0.55 - 1.02 MG/DL    BUN/Creatinine ratio 11 (L) 12 - 20      GFR est AA >60 >60 ml/min/1.73m2    GFR est non-AA >60 >60 ml/min/1.73m2    Calcium 8.9 8.5 - 10.1 MG/DL    Bilirubin, total 0.4 0.2 - 1.0 MG/DL    ALT (SGPT) 16 12 - 78 U/L    AST (SGOT) 15 15 - 37 U/L    Alk.  phosphatase 128 (H) 45 - 117 U/L    Protein, total 8.4 (H) 6.4 - 8.2 g/dL    Albumin 3.4 (L) 3.5 - 5.0 g/dL    Globulin 5.0 (H) 2.0 - 4.0 g/dL    A-G Ratio 0.7 (L) 1.1 - 2.2     TROPONIN I    Collection Time: 03/03/18 12:51 PM   Result Value Ref Range    Troponin-I, Qt. <0.04 <0.05 ng/mL   NT-PRO BNP    Collection Time: 03/03/18 12:51 PM   Result Value Ref Range    NT pro- (H) 0 - 125 PG/ML   LACTIC ACID    Collection Time: 03/03/18  1:05 PM   Result Value Ref Range    Lactic acid 1.9 0.4 - 2.0 MMOL/L   BLOOD GAS, ARTERIAL    Collection Time: 03/03/18  2:19 PM   Result Value Ref Range    pH 7.12 (LL) 7.35 - 7.45      PCO2 82 (H) 35.0 - 45.0 mmHg    PO2 428 (H) 80 - 100 mmHg    O2  (H) 92 - 97 %    BICARBONATE 26 22 - 26 mmol/L    BASE DEFICIT 5.5 mmol/L    O2 METHOD BIPAP      FIO2 80 %    IPAP/PIP 14.0      EPAP/CPAP/PEEP 7.0      Sample source ARTERIAL      SITE RIGHT RADIAL      SKYE'S TEST YES      Critical value read back Troy RN    URINALYSIS W/ REFLEX CULTURE    Collection Time: 03/03/18  3:13 PM   Result Value Ref Range    Color YELLOW/STRAW      Appearance CLEAR CLEAR      Specific gravity 1.020 1.003 - 1.030      pH (UA) 6.0 5.0 - 8.0      Protein 100 (A) NEG mg/dL    Glucose 250 (A) NEG mg/dL    Ketone NEGATIVE  NEG mg/dL    Bilirubin NEGATIVE  NEG      Blood NEGATIVE  NEG      Urobilinogen 0.2 0.2 - 1.0 EU/dL    Nitrites NEGATIVE  NEG      Leukocyte Esterase NEGATIVE  NEG      WBC 5-10 0 - 4 /hpf    RBC 0-5 0 - 5 /hpf    Epithelial cells FEW FEW /lpf    Bacteria 1+ (A) NEG /hpf    UA:UC IF INDICATED URINE CULTURE ORDERED (A) CNI      Hyaline cast 2-5 0 - 5 /lpf   INFLUENZA A & B AG (RAPID TEST)    Collection Time: 03/03/18  3:45 PM   Result Value Ref Range    Influenza A Antigen NEGATIVE  NEG      Influenza B Antigen NEGATIVE  NEG     BLOOD GAS, ARTERIAL    Collection Time: 03/03/18  3:55 PM   Result Value Ref Range    pH 7.16 (LL) 7.35 - 7.45      PCO2 89 (H) 35.0 - 45.0 mmHg    PO2 242 (H) 80 - 100 mmHg    O2 SAT 99 (H) 92 - 97 %    BICARBONATE 31 (H) 22 - 26 mmol/L    BASE DEFICIT 0.6 mmol/L    O2 METHOD BIPAP      FIO2 60 %    MODE BIPAP      IPAP/PIP 14.0      EPAP/CPAP/PEEP 7.0      Sample source ARTERIAL      SITE RIGHT RADIAL      SKYE'S TEST YES      Critical value read back Troy RN Telemetry:ST: Hr in 130s. Sinus Tach, Right atrial enlargement. Has QTc of 436. Imaging:  I have personally reviewed the patients radiographs and have reviewed the reports:  3-3-18: CXR; FINDINGS: A single frontal view of the chest at 1322 hours shows mild  interstitial prominence, increased since the prior study, but with no handy CHF  pattern, focal infiltrate or effusion. .  Lung volumes are slightly less than on  the prior study. The heart, mediastinum and pulmonary vasculature are stable .    The bony thorax is unremarkable for age            Total critical care time exclusive of procedures: 40 minutes  Sandra Vidal MD

## 2018-03-03 NOTE — ED NOTES
Patient calling out to get on bedpan. Patient assisted on bedpan by staff. After getting patient on bedpan and removing her, patient became increasingly SOB and RR increased to 36. Primary RN went to patient's bedside with other staff and patient was gray and becoming disoriented. Respiratory at bedside. MD Arlene Markham called by primary RN to inform him that patient was going on BiPap and the continuous neb could not be done on BiPap. Albuterol not given because of patient's heart rate. MD states medication can be switched to xopenex.

## 2018-03-03 NOTE — ED PROVIDER NOTES
EMERGENCY DEPARTMENT HISTORY AND PHYSICAL EXAM      Date: 3/3/2018  Patient Name: Anali Fields    History of Presenting Illness     Chief Complaint   Patient presents with    Respiratory Distress     Patient tachypneic and extremely SOB. x2 days. History Provided By: Patient    HPI: Anali Fields, 74 y.o. female with PMHx significant for HTN, asthma, depression, COPD, migraines, seizures, and ischemic colitis, presents via EMS to the ED with cc of an acute on chronic exacerbation of SOB progressively worsening since yesterday. The pt reports associated sx of anxiety and right upper back pain x yesterday as well. She expresses that she is on 2L of oxygen via nasal cannula at home and at the onset of her sx she endorses taking 4 breathing treatments at home [facility] leading her to call EMS. Upon EMS arrival the pt was given 125mg Solumedrol, a duonebulizer, and albuterol inhaler treatment with only minimal relief in her sx. The pt denies any recent hospitalizations or any h/o DVT/PE. She denies any fevers, chills, abdominal pain, nausea, vomiting, or diarrhea. PCP: Charly Jeffrey MD    There are no other complaints, changes, or physical findings at this time.     Current Facility-Administered Medications   Medication Dose Route Frequency Provider Last Rate Last Dose    dexmedeTOMidine (PRECEDEX) 400 mcg in 0.9% sodium chloride 100 mL infusion  0.2-1.4 mcg/kg/hr IntraVENous TITRATE Leslee Mcardle, MD 11.5 mL/hr at 03/04/18 1837 0.6 mcg/kg/hr at 03/04/18 1837    0.9% sodium chloride infusion  100 mL/hr IntraVENous CONTINUOUS Gem Mosher  mL/hr at 03/04/18 1509 100 mL/hr at 03/04/18 1509    sodium chloride (NS) flush 5-10 mL  5-10 mL IntraVENous Q8H Gem Mosher MD   10 mL at 03/04/18 2100    sodium chloride (NS) flush 5-10 mL  5-10 mL IntraVENous PRN Gem Mosher MD   10 mL at 03/03/18 2019    acetaminophen (TYLENOL) suppository 650 mg  650 mg Rectal Q6H PRN MD Jenniffer Argueta ondansetron (ZOFRAN) injection 4 mg  4 mg IntraVENous Q6H PRN Monalisa Whaley MD        bisacodyl (DULCOLAX) suppository 10 mg  10 mg Rectal DAILY PRN Monalisa Whaley MD        nicotine (NICODERM CQ) 14 mg/24 hr patch 1 Patch  1 Patch TransDERmal Q24H Monalisa Whaley MD   1 Patch at 03/04/18 1508    enoxaparin (LOVENOX) injection 40 mg  40 mg SubCUTAneous Q24H Monalisa Whaley MD   40 mg at 03/04/18 1510    albuterol-ipratropium (DUO-NEB) 2.5 MG-0.5 MG/3 ML  3 mL Nebulization Q4H RT Monalisa Whaley MD   3 mL at 03/04/18 1927    methylPREDNISolone (PF) (SOLU-MEDROL) injection 40 mg  40 mg IntraVENous Q6H Monalisa Whaley MD   40 mg at 03/04/18 1856    cefTRIAXone (ROCEPHIN) 1 g/50 mL NS IVPB  1 g IntraVENous Q24H Monalisa Whaley MD   1 g at 03/04/18 1510    aspirin (ASA) suppository 300 mg  300 mg Rectal DAILY Monalisa Whaley MD   300 mg at 03/04/18 0850    levETIRAcetam (KEPPRA) 500 mg in saline (iso-osm) 100 ml IVPB  500 mg IntraVENous Q12H Monalisa Whaley MD   500 mg at 03/04/18 1605    famotidine (PF) (PEPCID) injection 20 mg  20 mg IntraVENous Q12H Monalisa Whaley MD   20 mg at 03/04/18 2100    insulin lispro (HUMALOG) injection   SubCUTAneous Q6H Monalisa Whaley MD   Stopped at 03/03/18 1800    glucose chewable tablet 16 g  4 Tab Oral PRN Monalisa Whaley MD        dextrose (D50W) injection syrg 12.5-25 g  12.5-25 g IntraVENous PRN Monalisa Whaley MD        glucagon [facility]) injection 1 mg  1 mg IntraMUSCular PRN Monalisa Whaley MD        propofol (DIPRIVAN) infusion  0-50 mcg/kg/min IntraVENous TITRATE Mimi Boss, DO 22.9 mL/hr at 03/04/18 1807 50 mcg/kg/min at 03/04/18 1807    mupirocin (BACTROBAN) 2 % ointment   Both Nostrils Q12H Joselyn Duverney, MD        chlorhexidine (PERIDEX) 0.12 % mouthwash 15 mL  15 mL Oral Q12H Joselyn Duverney, MD   15 mL at 03/04/18 2100    azithromycin (ZITHROMAX) 500 mg in 0.9% sodium chloride (MBP/ADV) 250 mL  500 mg IntraVENous Q24H Neda Camargo  mL/hr at 03/04/18 0811 103 mg at 18 1653       Past History     Past Medical History:  Past Medical History:   Diagnosis Date    Arthritis     Asthma     COPD     2 L NC    Hypertension     Ischemic colitis ([facility] [address] 2012    Migraines     Neurological disorder     migraines    Psychiatric disorder     depression    Seizures ([facility] [address]     Last seizure 4 years ago    Vaginal candidiasis 2012       Past Surgical History:  Past Surgical History:   Procedure Laterality Date    HX APPENDECTOMY      HX  SECTION      HX CHOLECYSTECTOMY      HX HYSTERECTOMY      HX ORTHOPAEDIC      lumbar disc sx    HX ORTHOPAEDIC      left knee sx    HX OTHER SURGICAL      colonoscopy-recent colitis    [place] W/BIOPSY SINGLE/MULTIPLE  3/1/2012            Family History:  Family History   Problem Relation Age of Onset    Stroke Mother     Heart Disease Mother     Lung Disease Father      copd       Social History:  Social History   Substance Use Topics    Smoking status: Current Every Day Smoker     Packs/day: 0.25     Years: 30.00    Smokeless tobacco: Never Used    Alcohol use No       Allergies: Allergies   Allergen Reactions    Codeine Other (comments)    Erythromycin Nausea Only    Other Medication Other (comments)     No sedative or narcotic per son due to hx addiction         Review of Systems   Review of Systems   Constitutional: Negative for chills and fever. HENT: Negative for congestion and sore throat. Eyes: Negative for visual disturbance. Respiratory: Positive for shortness of breath. Negative for cough. Cardiovascular: Negative for chest pain and leg swelling. Gastrointestinal: Negative for abdominal pain, blood in stool, diarrhea, nausea and vomiting. Endocrine: Negative for polyuria. Genitourinary: Negative for dysuria, flank pain, vaginal bleeding and vaginal discharge. Musculoskeletal: Positive for back pain (right upper). Negative for myalgias. Skin: Negative for rash. Allergic/Immunologic: Negative for immunocompromised state. Neurological: Negative for weakness and headaches. Psychiatric/Behavioral: Negative for confusion. The patient is nervous/anxious. Physical Exam   Physical Exam   Constitutional: She is oriented to person, place, and time. She appears well-developed and well-nourished. HENT:   Head: Normocephalic and atraumatic. Mouth/Throat: Mucous membranes are dry. Eyes: Conjunctivae are normal. Pupils are equal, round, and reactive to light. Right eye exhibits no discharge. Left eye exhibits no discharge. Neck: Normal range of motion. Neck supple. No tracheal deviation present. Cardiovascular: Regular rhythm and normal heart sounds. Tachycardia present. No murmur heard. Pulmonary/Chest: Tachypnea noted. She is in respiratory distress (mild ). She has wheezes (BL ). She has no rales. Abdominal: Soft. Bowel sounds are normal. There is no tenderness. There is no rebound and no guarding. Musculoskeletal: Normal range of motion. She exhibits no edema, tenderness or deformity. Neurological: She is alert and oriented to person, place, and time. Skin: Skin is warm and dry. No rash noted. No erythema. Psychiatric: Her behavior is normal. Her mood appears anxious. Nursing note and vitals reviewed.         Diagnostic Study Results     Labs -     Recent Results (from the past 12 hour(s))   GLUCOSE, POC    Collection Time: 03/04/18 11:35 AM   Result Value Ref Range    Glucose (POC) 154 (H) 65 - 100 mg/dL    Performed by [employer], URINE    Collection Time: 03/04/18 11:47 AM   Result Value Ref Range    AMPHETAMINES NEGATIVE  NEG      BARBITURATES NEGATIVE  NEG      BENZODIAZEPINES NEGATIVE  NEG      COCAINE NEGATIVE  NEG      METHADONE NEGATIVE  NEG      OPIATES NEGATIVE  NEG      PCP(PHENCYCLIDINE) NEGATIVE  NEG      THC (TH-CANNABINOL) NEGATIVE  NEG      Drug screen comment (NOTE)    GLUCOSE, POC    Collection Time: 03/04/18 5:35 PM   Result Value Ref Range    Glucose (POC) 158 (H) 65 - 100 mg/dL    Performed by Lela Gary        Radiologic Studies -   CT Results  (Last 48 hours)               03/03/18 1911  CTA CHEST W OR W WO CONT Final result    Impression:  IMPRESSION:    1. No Pulmonary Embolus. 2. Emphysema. 3. Coronary artery calcification. Narrative:  INDICATION: Chest pain, acute, pulmonary embolism (PE) suspected        EXAM: CT Angio Chest:       TECHNIQUE: Unenhanced localizing CT imaging of the pulmonary arteries is   followed by bolus injection of 80 mL Isovue 370 contrast IV, with thin section   axial Chest CT obtained and 3D image post processing performed including coronal   MIPS. CT dose reduction was achieved through use of a standardized protocol   tailored for this examination and automatic exposure control for dose   modulation. FINDINGS: There is no pulmonary embolism. There is no apparent aortic dissection   or aneurysm. Lungs are emphysematous but free of infiltrates, edema, nodules   and masses. There is no pneumothorax. There is no pleural or significant   pericardial effusion. There is dense coronary artery calcification. There is no   significant adenopathy. CXR Results  (Last 48 hours)               03/04/18 0431  XR CHEST PORT Final result    Impression:  Impression: Stable left basilar atelectasis. Narrative: Indication: Follow-up abnormal chest x-ray       Comparison to 3/3/2018. Portable exam obtained at 427 demonstrates little change   in the left basilar atelectasis compared to prior examination. ET tube is in   satisfactory position. NG tube tip is not visualized. 03/03/18 1841  XR CHEST PORT Final result    Narrative:  Medical indication: ET tube placement. Portable supine view of the chest obtained compared to earlier examination same   day. The ET tube tip is above the daniel. No other changes.  INTERPRETATION   PROVIDED FOR COMPLIANCE ONLY AT NO CHARGE        03/03/18 1326  XR CHEST SNGL V Final result    Impression:  IMPRESSION:   Mild interstitial prominence. .  . Narrative:  INDICATION:  dyspnea        EXAM: Chest single view. COMPARISON: 1/16/2017. FINDINGS: A single frontal view of the chest at 1322 hours shows mild   interstitial prominence, increased since the prior study, but with no handy CHF   pattern, focal infiltrate or effusion. .  Lung volumes are slightly less than on   the prior study. The heart, mediastinum and pulmonary vasculature are stable . The bony thorax is unremarkable for age. .                   Medical Decision Making   I am the first provider for this patient. I reviewed the vital signs, available nursing notes, past medical history, past surgical history, family history and social history. Vital Signs-Reviewed the patient's vital signs. Patient Vitals for the past 12 hrs:   Temp Pulse Resp BP SpO2   03/04/18 2100 - (!) 108 16 118/59 97 %   03/04/18 2000 98.1 °F (36.7 °C) (!) 113 16 113/52 97 %   03/04/18 1927 - (!) 104 16 - 98 %   03/04/18 1900 - (!) 107 16 107/52 98 %   03/04/18 1800 - 95 16 122/85 98 %   03/04/18 1750 - - - - 98 %   03/04/18 1700 - (!) 101 16 124/65 98 %   03/04/18 1655 - (!) 101 16 - 98 %   03/04/18 1600 97.8 °F (36.6 °C) (!) 101 16 131/67 98 %   03/04/18 1500 - (!) 103 16 129/65 97 %   03/04/18 1400 - (!) 108 16 127/62 97 %   03/04/18 1315 - - - - 96 %   03/04/18 1300 - (!) 108 16 132/64 97 %   03/04/18 1250 - - - - 96 %   03/04/18 1242 - (!) 107 16 - 96 %   03/04/18 1200 98.6 °F (37 °C) (!) 109 16 126/61 96 %   03/04/18 1100 - (!) 120 18 131/67 96 %   03/04/18 1000 - (!) 130 16 116/49 97 %       Pulse Oximetry Analysis - 92% on 2L via nasal cannula    Cardiac Monitor:   Rate: 150 bpm  Rhythm: Sinus Tachycardia      EKG interpretation: (Preliminary)  1257  Rhythm: sinus tachycardia; and regular .  Rate (approx.): 144; Axis: normal; [place] interval: normal; QRS interval: 86; ST/T wave: normal; QT/QTc: 282/436; Other findings: significant artifact, non-ischemic. Written by Eder Freeman, ED Scribe, as dictated by Rafi Del Valle DO. Records Reviewed: Nursing Notes, Old Medical Records, Previous electrocardiograms, Ambulance Run Sheet, Previous Radiology Studies and Previous Laboratory Studies    Provider Notes (Medical Decision Making):     DDx: COPD, PNA, Pneumothorax. Doubt PE.     ED Course:   Initial assessment performed. The patients presenting problems have been discussed, and they are in agreement with the care plan formulated and outlined with them. I have encouraged them to ask questions as they arise throughout their visit. Progress Notes:    1:27 PM  The pt has been re-evaluated. She is requesting more pain medication at this time but the pt has lost her IV. Will replace IV and medicate pt.     2:02 PM  The pt has been re-evaluated. She has become more SOB will place pt on BiPAP and continue to monitor. 2:33 PM  The pt has become more confused and agitated. She was placed on BiPAP and ABG is pending. 2:38 PM  The pt has been re-evaluated. The pt is now talking and following commands. She appears to be improving. Will admit the pt to the hospitalist.     CONSULT NOTE:   2:58 PM  Rafi Del Valle DO spoke with Dr. Kacie Sykes,   Specialty: Hospitalist  Discussed pt's hx, disposition, and available diagnostic and imaging results. Reviewed care plans. Consultant will evaluate pt for admission. Written by Eder Freeman, ERINN Scribe, as dictated by Rafi Del Valle DO. Procedure Note - Orotracheal Intubation:   5:12 PM  Performed by: Rafi Del Valle DO   Indication for procedure: respiratory failure and airway compromise  RSI performed. The patient was sedated with 20mg of etomidate and 100 mg of Succinylcholine and orotracheally intubated with a 7.5 cuffed [place]ian endotracheal tube using a glidescope blade with direct visualization. Tube was advanced to 22 cm at the lips. ETT location confirmed by bilateral, symmetric breath sounds. Number of attempts: 1  Complications: none  The procedure took 16-30 minutes, and pt tolerated well. Written by Brit Vasquez ED Scribe, as dictated by Jean-Claude Isbell DO. Procedure Note - Central Line Placement:   6:14 PM  Performed by: Jean-Claude Isbell DO    Immediately prior to the procedure, the patient was reevaluated and found suitable for the planned procedure and any planned medications. Immediately prior to the procedure a time out was called to verify the correct patient, procedure, equipment, staff, and marking as appropriate. Area was cleansed with Chlorprep and anesthetized with 5mLs of 1% lidocaine. Prepped and draped in sterile fashion. Landmarks identified. 18 gauge needle with triple lumen catheter was inserted into pt's Right, Femoral Vein with ultrasound guidance. Line sutured in place; sterile dressing applied. Position: Trendelenburg  Number of attempts: 1  Estimated blood loss: less than 5mL  The procedure took 16-30 minutes, and pt tolerated well. Tobacco Counseling  Discussed the risks of smoking and the benefits of smoking cessation as well as the long term sequelae of smoking with the patient. The patient verbalized their understanding. Critical Care Time:   CRITICAL CARE NOTE :    2:01 PM  IMPENDING DETERIORATION -Airway, Respiratory and Metabolic  ASSOCIATED RISK FACTORS - Hypoxia, Metabolic changes and CNS Decompensation  MANAGEMENT- Bedside Assessment and Supervision of Care  INTERPRETATION -  Xrays, Blood Gases and Blood Pressure  INTERVENTIONS - hemodynamic mngmt, vent mngmt and Metobolic interventions  CASE REVIEW - Hospitalist  TREATMENT RESPONSE -Stable  PERFORMED BY - Self  NOTES   :  I have spent 73 minutes of critical care time involved in lab review, consultations with specialist, family decision- making, bedside attention and documentation.  During this entire length of time I was immediately available to the patient . Written by Jim Chopra, ED Scribe as dictated by Lisa Carver DO    Disposition:  Admit Note:  2:59 PM  Patient is being admitted to the hospital by Dr. Misty Irving. The results of their tests and reasons for their admission have been discussed with the patient and/or available family. They convey their agreement and understanding for the need to be admitted and for their admission diagnosis. Written by Jim Chopra, ED Scribe, as dictated by Lisa Carver DO. PLAN:  1. Admission     Diagnosis     Clinical Impression:   1. Acute on chronic respiratory failure with hypercapnia (HCC)    2. Acute encephalopathy        Attestations:    Attestation: This note is prepared by Kade Patton. Nav, acting as Scribe for Lisa Carver DO. Lisa Carver DO: The scribe's documentation has been prepared under my direction and personally reviewed by me in its entirety. I confirm that the note above accurately reflects all work, treatment, procedures, and medical decision making performed by me.

## 2018-03-03 NOTE — H&P
Hospitalist Admission Note    NAME: Dwaine Palomo   :  1950   MRN:  [id number]     Date/Time:  3/3/2018 3:15 PM    Patient PCP: Melanie Millan MD   Pulmonologist:  Dr. Tirso Qureshi  ______________________________________________________________________   Assessment & Plan:  Acute on chronic respiratory failure with hypercapnia  COPD with acute exacerbation  Sepsis, POA   --WBC 15.6, tachycardic HR 140s  --on 2L O2 at home, in respiratory distress RR 32 in ER, started on bipap. Hypercapnia may partly be iatrogenic (given ativan total 3mg in ER and became lethargic). ABG 7.12/82/428/26 99% on bipap FIO2 80%. FIO2 decreased to 60% and awaiting repeat ABG. Clinically suspect will need intubation since she is unable to tolerate bipap, trying to pull off mask and partly lucid. --continue with empiric abx rocephin and azithromycin. Solumedrol 40mg q6h, duoneb  --CXR with mild interstitial prominence likely from low lung volume, not clinically fluid overload. ER ordered CTA chest  --check for flu, blood cultures, UA  --lungs tight and diffuse wheezing.    --npo for now    Acute encephalopathy  --due to hypercapnia. Per son, baseline, oriented x 3, anxious, frequent complaint of pain but that she is NOT to have any addictive meds ie. Klonopin, lorazepam or narcotics as has hx of abuse. --patient oriented to self, place, year, follows commands, can be oriented, no focal deficit. But when left unstimulated ambling speech to herself, pulling off bipap mask. Acute hyponatremia Na 130  --Suspect hypovolemia. NS 1L given in ER. Continue with IVF NS @100ml/hr  --check urine osmol, urine Na, serum osmol. HTN  --continue amlodipine. Hold metoprolol due to wheezing    Hx seizure  --continue keppra    Depression  --continue cymbalta    Hx ischemic colitis and C.  Diff colitis  Hx opiate and psychiatric med abuse causing recurrent fall and encephalopathy  Hx E. Coli bacteremia 12/15. Body mass index is 30.73 kg/(m^2). Code: discussed with patient and luciano Cheek, Full  DVT prophylaxis: lovenox  Surrogate decision maker:  Luciano Cheek [phone]    Addendum:  Repeat ABG no better:  7.16//242/31 FIO2 60%. Discussed with Dr. Christiano Bailey about intubation. Discontinue PO meds. Change Keppra to IV. UA c/w with possible uti, POA. Urine culture pending. Subjective:   CHIEF COMPLAINT:  SOB    HISTORY OF PRESENT ILLNESS:     Joaquim Ramirez is a 74 y.o.  female with copd on 2L, hx of depression with psych meds/opiate abuse, HTN, ischemic colitis, seizure brought to ER by EMS for respiratory distress. Patient reports SOB for a few days, unable to catch her breath today, no relief with neb. Per son has chronic cough that is unchanged, no fever, no travel. No hospitalization since 2016. Just saw Dr. Zachery Eli last week and was told her breathing was doing well. She was given flu vaccine (which son was not happy about). Per ER physician, patient initially alert, given ativan for anxiety, became confused and desatted, placed on bipap, given additional ativan. We were asked to admit for work up and evaluation of the above problems.      Past Medical History:   Diagnosis Date    Arthritis     Asthma     COPD     2 L NC    Hypertension     Ischemic colitis ([facility] [address] 2012    Migraines     Neurological disorder     migraines    Psychiatric disorder     depression    Seizures ([facility] [address]     Last seizure 4 years ago    Vaginal candidiasis 2012      Past Surgical History:   Procedure Laterality Date    HX APPENDECTOMY      HX  SECTION      HX CHOLECYSTECTOMY      HX HYSTERECTOMY      HX ORTHOPAEDIC      lumbar disc sx    HX ORTHOPAEDIC      left knee sx    HX OTHER SURGICAL      colonoscopy-recent colitis    RI COLONOSCOPY W/BIOPSY SINGLE/MULTIPLE  3/1/2012          Social History   Substance Use Topics    Smoking status: Current Every Day Smoker Packs/day: 0.25     Years: 30.00    Smokeless tobacco: Never Used    Alcohol use No    Drug use:  None  Lives with son Adia    Family History   Problem Relation Age of Onset    Stroke Mother     Heart Disease Mother     Lung Disease Father      copd     Allergies   Allergen Reactions    Codeine Other (comments)    Erythromycin Nausea Only        Prior to Admission medications    Medication Sig Start Date End Date Taking? Authorizing Provider   albuterol-ipratropium (DUO-NEB) 2.5 mg-0.5 mg/3 ml nebu 3 mL by Nebulization route four (4) times daily as needed. Yes Heri Rosenberg MD   methocarbamol (ROBAXIN) 750 mg tablet Take 750-1,500 mg by mouth two (2) times a day. Yes Heri Rosenberg MD   amLODIPine (NORVASC) 10 mg tablet Take 10 mg by mouth daily. Yes Heri Rosenberg MD   omeprazole (PRILOSEC) 40 mg capsule Take 40 mg by mouth daily. Yes Historical Provider   ondansetron (ZOFRAN ODT) 4 mg disintegrating tablet Take 1 Tab by mouth every eight (8) hours as needed. Patient taking differently: Take 4 mg by mouth three (3) times daily. 6/30/16  Yes Nato Mulligan MD   albuterol (PROVENTIL VENTOLIN) 2.5 mg /3 mL (0.083 %) nebulizer solution 3 mL by Nebulization route every four (4) hours as needed. 12/23/15  Yes Noemi Sanchez MD   tiotropium [facility]) 18 mcg inhalation capsule Take 1 Cap by inhalation daily. Indications: BRONCHIAL ASTHMA 12/23/15  Yes Noemi Sanchez MD   levETIRAcetam (KEPPRA) 500 mg tablet Take 1 Tab by mouth two (2) times a day. 12/23/15  Yes Noemi Sanchez MD   metoprolol (LOPRESSOR) 50 mg tablet Take 1 Tab by mouth two (2) times a day. 12/23/15  Yes Noemi Sanchez MD   DULoxetine (CYMBALTA) 20 mg capsule Take 40 mg by mouth daily. Yes Heri Rosenberg MD   fluticasone-salmeterol (ADVAIR) 250-50 mcg/dose diskus inhaler Take 1 Puff by inhalation two (2) times a day. 12/23/15   Noemi Sanchez MD     REVIEW OF SYSTEMS:  POSITIVE= Bold.   Negative = normal text  General:  fever, chills, sweats, generalized weakness, weight loss/gain, loss of appetite  Eyes:  blurred vision, eye pain, loss of vision, diplopia  Ear Nose and Throat:  rhinorrhea, pharyngitis  Respiratory:   cough, sputum production, SOB, wheezing, DÍAZ, pleuritic pain  Cardiology:  chest pain, palpitations, orthopnea, PND, edema, syncope   Gastrointestinal:  abdominal pain, N/V, dysphagia, diarrhea, constipation, bleeding  Genitourinary:  frequency, urgency, dysuria, hematuria, incontinence  Muskuloskeletal :  arthralgia, myalgia--right shoulder pain  Hematology:  easy bruising, bleeding, lymphadenopathy  Dermatological:  rash, ulceration, pruritis  Endocrine:  hot flashes or polydipsia  Neurological:  headache, dizziness, confusion, focal weakness, paresthesia, memory loss, gait disturbance  Psychological: anxiety, depression, agitation      Objective:   VITALS:    Visit Vitals    /89    Pulse (!) 139    Temp 98.4 °F (36.9 °C)    Resp 30    Ht 5' 2\" (1.575 m)    Wt 76.2 kg (168 lb)    SpO2 91%    BMI 30.73 kg/m2     Temp (24hrs), Av.4 °F (36.9 °C), Min:98.4 °F (36.9 °C), Max:98.4 °F (36.9 °C)    Body mass index is 30.73 kg/(m^2). PHYSICAL EXAM:    General:    Lethargic but arousable, oriented to self, year. On bipap but still in respiratory distress RR 28-30 with abdominal thoraco dysynchrony, appears older than stated age. HEENT: Atraumatic, anicteric sclerae, pink conjunctivae     No oral ulcers, mucosa dry, throat clear. Hearing intact. Neck:  Supple, symmetrical,  thyroid: non tender. No JVD  Lungs:   Tight, decreased BS throughout with mild wheezing, some rhonchi. No rales   Chest wall:  No tenderness  No Accessory muscle use. Heart:   Regular  rhythm,  Tachycardic HR 140s on tele, No  murmur   No gallop. No edema. Abdomen:   Soft, non-tender. Not distended. Bowel sounds normal. No masses  Extremities: No cyanosis. No clubbing  Skin:     Not pale Not Jaundiced  No rashes   Psych:  Not depressed.   Not anxious or agitated. Neurologic: EOMs intact. No aphasia or slurred speech. Symmetrical strength 5/5 arms, 4/5 legs,  oriented X self, place, year but not month. Peripheral pulse: Right, Radial, 1+  Capillary refill:  normal    IMAGING RESULTS:   [x]       I have personally reviewed the actual   [x]     CXR  []     CT scan  CXR: normal heart size, no focal infiltrate, mild b/l interstitial prominence  CT :  EKG:  Sinus tach 144, peak T waves anteriorly   ________________________________________________________________________  Care Plan discussed with:    Comments   Patient y    Family  y Son Adia over phone   RN y    Care Manager                    Consultant:  dayanara Corey Lopez   ________________________________________________________________________  Prophylaxis:  GI pepcid   DVT lovenox   ________________________________________________________________________  Recommended Disposition: TBD  ________________________________________________________________________  Code Status:  Full Code y   DNR/DNI    ________________________________________________________________________  TOTAL TIME: 40 minutes critical care      Comments    y Reviewed previous records   >50% of visit spent in counseling and coordination of care y Discussion with patient and/or family and questions answered         ______________________________________________________________________  Rafat Saenz MD      Procedures: see electronic medical records for all procedures/Xrays and details which were not copied into this note but were reviewed prior to creation of Plan.     LAB DATA REVIEWED:    Recent Results (from the past 24 hour(s))   EKG, 12 LEAD, INITIAL    Collection Time: 03/03/18 12:41 PM   Result Value Ref Range    Ventricular Rate 151 BPM    Atrial Rate 151 BPM    P-R Interval 124 ms    QRS Duration 80 ms    Q-T Interval 266 ms    QTC Calculation (Bezet) 421 ms    Calculated P Axis 83 degrees    Calculated R Axis 63 degrees    Calculated T Axis 56 degrees    Diagnosis       Sinus tachycardia  Inferior infarct (cited on or before 17-DEC-2015)  Cannot rule out Anterior infarct , age undetermined  Abnormal ECG  When compared with ECG of 10-JUL-2016 11:22,  Questionable change in QRS axis     CBC WITH AUTOMATED DIFF    Collection Time: 03/03/18 12:51 PM   Result Value Ref Range    WBC 15.6 (H) 3.6 - 11.0 K/uL    RBC 3.96 3.80 - 5.20 M/uL    HGB 11.9 11.5 - 16.0 g/dL    HCT 38.9 35.0 - 47.0 %    MCV 98.2 80.0 - 99.0 FL    MCH 30.1 26.0 - 34.0 PG    MCHC 30.6 30.0 - 36.5 g/dL    RDW 13.0 11.5 - 14.5 %    PLATELET 723 (H) 977 - 400 K/uL    MPV 10.0 8.9 - 12.9 FL    NRBC 0.0 0  WBC    ABSOLUTE NRBC 0.00 0.00 - 0.01 K/uL    NEUTROPHILS 80 (H) 32 - 75 %    LYMPHOCYTES 9 (L) 12 - 49 %    MONOCYTES 9 5 - 13 %    EOSINOPHILS 1 0 - 7 %    BASOPHILS 1 0 - 1 %    IMMATURE GRANULOCYTES 1 (H) 0.0 - 0.5 %    ABS. NEUTROPHILS 12.5 (H) 1.8 - 8.0 K/UL    ABS. LYMPHOCYTES 1.4 0.8 - 3.5 K/UL    ABS. MONOCYTES 1.4 (H) 0.0 - 1.0 K/UL    ABS. EOSINOPHILS 0.2 0.0 - 0.4 K/UL    ABS. BASOPHILS 0.1 0.0 - 0.1 K/UL    ABS. IMM. GRANS. 0.1 (H) 0.00 - 0.04 K/UL    DF AUTOMATED     METABOLIC PANEL, COMPREHENSIVE    Collection Time: 03/03/18 12:51 PM   Result Value Ref Range    Sodium 130 (L) 136 - 145 mmol/L    Potassium 3.7 3.5 - 5.1 mmol/L    Chloride 95 (L) 97 - 108 mmol/L    CO2 31 21 - 32 mmol/L    Anion gap 4 (L) 5 - 15 mmol/L    Glucose 132 (H) 65 - 100 mg/dL    BUN 7 6 - 20 MG/DL    Creatinine 0.66 0.55 - 1.02 MG/DL    BUN/Creatinine ratio 11 (L) 12 - 20      GFR est AA >60 >60 ml/min/1.73m2    GFR est non-AA >60 >60 ml/min/1.73m2    Calcium 8.9 8.5 - 10.1 MG/DL    Bilirubin, total 0.4 0.2 - 1.0 MG/DL    ALT (SGPT) 16 12 - 78 U/L    AST (SGOT) 15 15 - 37 U/L    Alk.  phosphatase 128 (H) 45 - 117 U/L    Protein, total 8.4 (H) 6.4 - 8.2 g/dL    Albumin 3.4 (L) 3.5 - 5.0 g/dL    Globulin 5.0 (H) 2.0 - 4.0 g/dL    A-G Ratio 0.7 (L) 1.1 - 2.2     TROPONIN I    Collection Time: 03/03/18 12:51 PM   Result Value Ref Range    Troponin-I, Qt. <0.04 <0.05 ng/mL   LACTIC ACID    Collection Time: 03/03/18  1:05 PM   Result Value Ref Range    Lactic acid 1.9 0.4 - 2.0 MMOL/L

## 2018-03-03 NOTE — ED NOTES
MD & Respiratory at bedside to evaluate patient. Respiratory states that patient needs to be intubated because of snoring respirations. MD States that he wants to place patient on BiPap prior to intubation. After placing patient on BiPap, she began to become alert and color returned to baseline.

## 2018-03-03 NOTE — ED NOTES
1742 Patient's Right Arm PIV blew while in CT. PCT from ED in route to CT to attempt at new access. 1750 Patient's new PIV access blew. MD Wilson aware and will place central line.

## 2018-03-04 ENCOUNTER — APPOINTMENT (OUTPATIENT)
Dept: GENERAL RADIOLOGY | Age: 68
DRG: 871 | End: 2018-03-04
Attending: INTERNAL MEDICINE
Payer: MEDICARE

## 2018-03-04 PROBLEM — G93.40 ACUTE ENCEPHALOPATHY: Status: ACTIVE | Noted: 2018-03-04

## 2018-03-04 LAB
AMPHET UR QL SCN: NEGATIVE
ANION GAP SERPL CALC-SCNC: 9 MMOL/L (ref 5–15)
ATRIAL RATE: 144 BPM
BARBITURATES UR QL SCN: NEGATIVE
BASOPHILS # BLD: 0 K/UL (ref 0–0.1)
BASOPHILS NFR BLD: 0 % (ref 0–1)
BENZODIAZ UR QL: NEGATIVE
BUN SERPL-MCNC: 9 MG/DL (ref 6–20)
BUN/CREAT SERPL: 18 (ref 12–20)
CALCIUM SERPL-MCNC: 8.5 MG/DL (ref 8.5–10.1)
CALCULATED P AXIS, ECG09: 85 DEGREES
CALCULATED R AXIS, ECG10: 68 DEGREES
CALCULATED T AXIS, ECG11: 57 DEGREES
CANNABINOIDS UR QL SCN: NEGATIVE
CHLORIDE SERPL-SCNC: 100 MMOL/L (ref 97–108)
CK MB CFR SERPL CALC: 2.1 % (ref 0–2.5)
CK MB SERPL-MCNC: 2.2 NG/ML (ref 5–25)
CK SERPL-CCNC: 107 U/L (ref 26–192)
CO2 SERPL-SCNC: 27 MMOL/L (ref 21–32)
COCAINE UR QL SCN: NEGATIVE
CREAT SERPL-MCNC: 0.49 MG/DL (ref 0.55–1.02)
DIAGNOSIS, 93000: NORMAL
DIFFERENTIAL METHOD BLD: ABNORMAL
DRUG SCRN COMMENT,DRGCM: NORMAL
EOSINOPHIL # BLD: 0 K/UL (ref 0–0.4)
EOSINOPHIL NFR BLD: 0 % (ref 0–7)
ERYTHROCYTE [DISTWIDTH] IN BLOOD BY AUTOMATED COUNT: 13.1 % (ref 11.5–14.5)
GLUCOSE BLD STRIP.AUTO-MCNC: 154 MG/DL (ref 65–100)
GLUCOSE BLD STRIP.AUTO-MCNC: 154 MG/DL (ref 65–100)
GLUCOSE BLD STRIP.AUTO-MCNC: 158 MG/DL (ref 65–100)
GLUCOSE BLD STRIP.AUTO-MCNC: 161 MG/DL (ref 65–100)
GLUCOSE BLD STRIP.AUTO-MCNC: 174 MG/DL (ref 65–100)
GLUCOSE SERPL-MCNC: 152 MG/DL (ref 65–100)
HCT VFR BLD AUTO: 31 % (ref 35–47)
HGB BLD-MCNC: 9.6 G/DL (ref 11.5–16)
IMM GRANULOCYTES # BLD: 0.1 K/UL (ref 0–0.04)
IMM GRANULOCYTES NFR BLD AUTO: 1 % (ref 0–0.5)
LYMPHOCYTES # BLD: 0.6 K/UL (ref 0.8–3.5)
LYMPHOCYTES NFR BLD: 5 % (ref 12–49)
MAGNESIUM SERPL-MCNC: 2.4 MG/DL (ref 1.6–2.4)
MCH RBC QN AUTO: 30.4 PG (ref 26–34)
MCHC RBC AUTO-ENTMCNC: 31 G/DL (ref 30–36.5)
MCV RBC AUTO: 98.1 FL (ref 80–99)
METHADONE UR QL: NEGATIVE
MONOCYTES # BLD: 0.4 K/UL (ref 0–1)
MONOCYTES NFR BLD: 3 % (ref 5–13)
NEUTS SEG # BLD: 10.9 K/UL (ref 1.8–8)
NEUTS SEG NFR BLD: 91 % (ref 32–75)
NRBC # BLD: 0 K/UL (ref 0–0.01)
NRBC BLD-RTO: 0 PER 100 WBC
OPIATES UR QL: NEGATIVE
P-R INTERVAL, ECG05: 140 MS
PCP UR QL: NEGATIVE
PHOSPHATE SERPL-MCNC: 1.7 MG/DL (ref 2.6–4.7)
PLATELET # BLD AUTO: 316 K/UL (ref 150–400)
PMV BLD AUTO: 10.1 FL (ref 8.9–12.9)
POTASSIUM SERPL-SCNC: 4.2 MMOL/L (ref 3.5–5.1)
Q-T INTERVAL, ECG07: 282 MS
QRS DURATION, ECG06: 86 MS
QTC CALCULATION (BEZET), ECG08: 436 MS
RBC # BLD AUTO: 3.16 M/UL (ref 3.8–5.2)
RBC MORPH BLD: ABNORMAL
SERVICE CMNT-IMP: ABNORMAL
SODIUM SERPL-SCNC: 136 MMOL/L (ref 136–145)
TROPONIN I SERPL-MCNC: <0.04 NG/ML
VENTRICULAR RATE, ECG03: 144 BPM
WBC # BLD AUTO: 12 K/UL (ref 3.6–11)

## 2018-03-04 PROCEDURE — 36415 COLL VENOUS BLD VENIPUNCTURE: CPT | Performed by: HOSPITALIST

## 2018-03-04 PROCEDURE — 74011250636 HC RX REV CODE- 250/636: Performed by: HOSPITALIST

## 2018-03-04 PROCEDURE — 80048 BASIC METABOLIC PNL TOTAL CA: CPT | Performed by: HOSPITALIST

## 2018-03-04 PROCEDURE — 94640 AIRWAY INHALATION TREATMENT: CPT

## 2018-03-04 PROCEDURE — 74011250637 HC RX REV CODE- 250/637: Performed by: HOSPITALIST

## 2018-03-04 PROCEDURE — 74011000250 HC RX REV CODE- 250: Performed by: HOSPITALIST

## 2018-03-04 PROCEDURE — 84100 ASSAY OF PHOSPHORUS: CPT | Performed by: HOSPITALIST

## 2018-03-04 PROCEDURE — 80307 DRUG TEST PRSMV CHEM ANLYZR: CPT | Performed by: INTERNAL MEDICINE

## 2018-03-04 PROCEDURE — 83735 ASSAY OF MAGNESIUM: CPT | Performed by: HOSPITALIST

## 2018-03-04 PROCEDURE — 74011250636 HC RX REV CODE- 250/636: Performed by: INTERNAL MEDICINE

## 2018-03-04 PROCEDURE — 74011000258 HC RX REV CODE- 258: Performed by: INTERNAL MEDICINE

## 2018-03-04 PROCEDURE — 84484 ASSAY OF TROPONIN QUANT: CPT | Performed by: HOSPITALIST

## 2018-03-04 PROCEDURE — 94003 VENT MGMT INPAT SUBQ DAY: CPT

## 2018-03-04 PROCEDURE — 71045 X-RAY EXAM CHEST 1 VIEW: CPT

## 2018-03-04 PROCEDURE — 65620000000 HC RM CCU GENERAL

## 2018-03-04 PROCEDURE — 74018 RADEX ABDOMEN 1 VIEW: CPT

## 2018-03-04 PROCEDURE — 85025 COMPLETE CBC W/AUTO DIFF WBC: CPT | Performed by: HOSPITALIST

## 2018-03-04 PROCEDURE — 82550 ASSAY OF CK (CPK): CPT | Performed by: HOSPITALIST

## 2018-03-04 PROCEDURE — 74011000250 HC RX REV CODE- 250: Performed by: INTERNAL MEDICINE

## 2018-03-04 PROCEDURE — 74011250636 HC RX REV CODE- 250/636: Performed by: EMERGENCY MEDICINE

## 2018-03-04 RX ORDER — SODIUM CHLORIDE 9 MG/ML
50 INJECTION, SOLUTION INTRAVENOUS CONTINUOUS
Status: DISCONTINUED | OUTPATIENT
Start: 2018-03-04 | End: 2018-03-05

## 2018-03-04 RX ORDER — SODIUM CHLORIDE 450 MG/100ML
100 INJECTION, SOLUTION INTRAVENOUS CONTINUOUS
Status: DISCONTINUED | OUTPATIENT
Start: 2018-03-04 | End: 2018-03-04

## 2018-03-04 RX ADMIN — CHLORHEXIDINE GLUCONATE 15 ML: 1.2 RINSE ORAL at 08:50

## 2018-03-04 RX ADMIN — CHLORHEXIDINE GLUCONATE 15 ML: 1.2 RINSE ORAL at 21:00

## 2018-03-04 RX ADMIN — SODIUM CHLORIDE 100 ML/HR: 900 INJECTION, SOLUTION INTRAVENOUS at 04:45

## 2018-03-04 RX ADMIN — METHYLPREDNISOLONE SODIUM SUCCINATE 40 MG: 40 INJECTION, POWDER, FOR SOLUTION INTRAMUSCULAR; INTRAVENOUS at 00:00

## 2018-03-04 RX ADMIN — IPRATROPIUM BROMIDE AND ALBUTEROL SULFATE 3 ML: .5; 3 SOLUTION RESPIRATORY (INHALATION) at 08:00

## 2018-03-04 RX ADMIN — Medication 10 ML: at 05:56

## 2018-03-04 RX ADMIN — SODIUM CHLORIDE 0.2 MCG/KG/HR: 900 INJECTION, SOLUTION INTRAVENOUS at 08:46

## 2018-03-04 RX ADMIN — Medication 10 ML: at 13:24

## 2018-03-04 RX ADMIN — METHYLPREDNISOLONE SODIUM SUCCINATE 40 MG: 40 INJECTION, POWDER, FOR SOLUTION INTRAMUSCULAR; INTRAVENOUS at 23:53

## 2018-03-04 RX ADMIN — IPRATROPIUM BROMIDE AND ALBUTEROL SULFATE 3 ML: .5; 3 SOLUTION RESPIRATORY (INHALATION) at 19:27

## 2018-03-04 RX ADMIN — PROPOFOL 50 MCG/KG/MIN: 10 INJECTION, EMULSION INTRAVENOUS at 22:56

## 2018-03-04 RX ADMIN — FAMOTIDINE 20 MG: 10 INJECTION INTRAVENOUS at 08:50

## 2018-03-04 RX ADMIN — LEVETIRACETAM 500 MG: 5 INJECTION INTRAVENOUS at 16:05

## 2018-03-04 RX ADMIN — ENOXAPARIN SODIUM 40 MG: 40 INJECTION SUBCUTANEOUS at 15:10

## 2018-03-04 RX ADMIN — IPRATROPIUM BROMIDE AND ALBUTEROL SULFATE 3 ML: .5; 3 SOLUTION RESPIRATORY (INHALATION) at 23:15

## 2018-03-04 RX ADMIN — PROPOFOL 50 MCG/KG/MIN: 10 INJECTION, EMULSION INTRAVENOUS at 04:14

## 2018-03-04 RX ADMIN — METHYLPREDNISOLONE SODIUM SUCCINATE 40 MG: 40 INJECTION, POWDER, FOR SOLUTION INTRAMUSCULAR; INTRAVENOUS at 05:40

## 2018-03-04 RX ADMIN — PROPOFOL 50 MCG/KG/MIN: 10 INJECTION, EMULSION INTRAVENOUS at 10:46

## 2018-03-04 RX ADMIN — CEFTRIAXONE SODIUM 1 G: 1 INJECTION, POWDER, FOR SOLUTION INTRAMUSCULAR; INTRAVENOUS at 15:10

## 2018-03-04 RX ADMIN — IPRATROPIUM BROMIDE AND ALBUTEROL SULFATE 3 ML: .5; 3 SOLUTION RESPIRATORY (INHALATION) at 03:43

## 2018-03-04 RX ADMIN — MUPIROCIN: 20 OINTMENT TOPICAL at 21:00

## 2018-03-04 RX ADMIN — SODIUM CHLORIDE 100 ML/HR: 900 INJECTION, SOLUTION INTRAVENOUS at 15:09

## 2018-03-04 RX ADMIN — PROPOFOL 50 MCG/KG/MIN: 10 INJECTION, EMULSION INTRAVENOUS at 01:13

## 2018-03-04 RX ADMIN — SODIUM CHLORIDE 0.4 MCG/KG/HR: 900 INJECTION, SOLUTION INTRAVENOUS at 18:08

## 2018-03-04 RX ADMIN — PROPOFOL 50 MCG/KG/MIN: 10 INJECTION, EMULSION INTRAVENOUS at 14:01

## 2018-03-04 RX ADMIN — ASPIRIN 300 MG: 300 SUPPOSITORY RECTAL at 08:50

## 2018-03-04 RX ADMIN — LEVETIRACETAM 500 MG: 5 INJECTION INTRAVENOUS at 04:39

## 2018-03-04 RX ADMIN — Medication 10 ML: at 21:00

## 2018-03-04 RX ADMIN — IPRATROPIUM BROMIDE AND ALBUTEROL SULFATE 3 ML: .5; 3 SOLUTION RESPIRATORY (INHALATION) at 12:50

## 2018-03-04 RX ADMIN — PROPOFOL 50 MCG/KG/MIN: 10 INJECTION, EMULSION INTRAVENOUS at 06:58

## 2018-03-04 RX ADMIN — FAMOTIDINE 20 MG: 10 INJECTION INTRAVENOUS at 21:00

## 2018-03-04 RX ADMIN — SODIUM CHLORIDE 100 ML/HR: 900 INJECTION, SOLUTION INTRAVENOUS at 08:48

## 2018-03-04 RX ADMIN — PROPOFOL 50 MCG/KG/MIN: 10 INJECTION, EMULSION INTRAVENOUS at 18:07

## 2018-03-04 RX ADMIN — METHYLPREDNISOLONE SODIUM SUCCINATE 40 MG: 40 INJECTION, POWDER, FOR SOLUTION INTRAMUSCULAR; INTRAVENOUS at 18:56

## 2018-03-04 RX ADMIN — METHYLPREDNISOLONE SODIUM SUCCINATE 40 MG: 40 INJECTION, POWDER, FOR SOLUTION INTRAMUSCULAR; INTRAVENOUS at 11:41

## 2018-03-04 RX ADMIN — AZITHROMYCIN MONOHYDRATE 500 MG: 500 INJECTION, POWDER, LYOPHILIZED, FOR SOLUTION INTRAVENOUS at 16:53

## 2018-03-04 RX ADMIN — MUPIROCIN: 20 OINTMENT TOPICAL at 08:50

## 2018-03-04 NOTE — PROGRESS NOTES
Hospitalist Progress Note    NAME: Dwaine Palomo   :  1950   MRN:  [id number]       Assessment / Plan:  Acute on chronic hypercapnic respiratory failure  Acute COPD exacerbation  Patient had to be intubated in the emergency room secondary to severe respiratory distress and no improvement on BiPAP. Continue mechanical ventilation.  On ventilator bundle. Daily ABGs.  Chest x-ray in a.m. Cont solumedrol, Duo neb. Appreciate help from pulmonary. Continue Precedex and propofol for sedation. Daily weaning trials. Continue empiric ceftriaxone and azithromycin. CT chest does not show definite evidence of pneumonia. Acute encephalopathy likely related to hypercapnia  Currently patient is intubated and sedated. She withdraws to pain. We will need detailed evaluation once she is extubated. UA appears clean. Hyponatremia  Sodium is currently improved and it is 136. Hypertension  On home amlodipine.  Metoprolol held due to wheezing. History of seizure disorder on Keppra    History of depression  Cymbalta    History of ischemic and C. difficile colitis    History of past opiate psychiatric medication abuse causing encephalopathy. Body mass index is 30.89 kg/(m^2). Code status: Full  Prophylaxis: Lovenox  Recommended Disposition: SNF/LTC     Subjective:     Chief Complaint / Reason for Physician Visit  Remains intubated and sedated. On propofol and precedex due to agitation. No fever    Review of Systems:  Symptom Y/N Comments  Symptom Y/N Comments   Fever/Chills    Chest Pain     Poor Appetite    Edema     Cough    Abdominal Pain     Sputum    Joint Pain     SOB/DÍAZ    Pruritis/Rash     Nausea/vomit    Tolerating PT/OT     Diarrhea    Tolerating Diet     Constipation    Other       Could NOT obtain due to: intubated     Objective:     VITALS:   Last 24hrs VS reviewed since prior progress note.  Most recent are:  Patient Vitals for the past 24 hrs:   Temp Pulse Resp BP SpO2   18 1100 - (!) 120 18 131/67 96 %   03/04/18 1000 - (!) 130 16 116/49 97 %   03/04/18 0900 - (!) 143 23 124/56 95 %   03/04/18 0800 100 °F (37.8 °C) (!) 134 23 147/63 97 %   03/04/18 0700 - (!) 133 18 147/64 96 %   03/04/18 0600 - (!) 136 20 152/60 96 %   03/04/18 0500 - (!) 138 22 142/62 96 %   03/04/18 0400 97 °F (36.1 °C) (!) 136 21 136/49 96 %   03/04/18 0343 - (!) 131 20 - 98 %   03/04/18 0300 - (!) 128 19 146/59 96 %   03/04/18 0200 - (!) 128 23 147/66 95 %   03/04/18 0100 - (!) 134 17 147/64 99 %   03/04/18 0000 97.8 °F (36.6 °C) (!) 129 19 151/67 99 %   03/03/18 2338 - (!) 129 18 - 99 %   03/03/18 2300 - (!) 111 16 125/61 99 %   03/03/18 2200 - (!) 108 16 115/56 97 %   03/03/18 2100 - (!) 112 17 106/50 96 %   03/03/18 2000 98.1 °F (36.7 °C) (!) 107 16 106/47 99 %   03/03/18 1946 - (!) 108 16 - 99 %   03/03/18 1923 - (!) 113 25 140/68 100 %   03/03/18 1802 - - - 183/90 -   03/03/18 1745 - (!) 140 25 - 99 %   03/03/18 1630 - (!) 140 27 170/84 -   03/03/18 1627 - (!) 139 (!) 31 - 99 %   03/03/18 1600 - (!) 142 25 (!) 149/96 -   03/03/18 1530 - (!) 139 29 (!) 141/95 -   03/03/18 1529 - (!) 138 27 - 92 %   03/03/18 1447 - - - - 91 %   03/03/18 1444 - (!) 139 - 175/89 -   03/03/18 1430 - (!) 143 30 175/89 99 %   03/03/18 1342 - (!) 143 30 150/70 91 %   03/03/18 1300 - (!) 145 30 145/50 95 %   03/03/18 1246 98.4 °F (36.9 °C) (!) 150 (!) 32 150/79 92 %       Intake/Output Summary (Last 24 hours) at 03/04/18 1220  Last data filed at 03/04/18 1100   Gross per 24 hour   Intake          1744.42 ml   Output             1510 ml   Net           234.42 ml        PHYSICAL EXAM:  General: no acute distress    EENT:  Anicteric sclerae. MMM  Resp:  Poor air entry, wheezing +, no crackles  CV:  Regular  rhythm,  No edema  GI:  Soft, Non distended, Non tender.  +Bowel sounds  Neurologic:  intubated  Psych:   deferred  Skin:  No rashes.   No jaundice    Reviewed most current lab test results and cultures  YES  Reviewed most current radiology test results   YES  Review and summation of old records today    NO  Reviewed patient's current orders and MAR    YES  PMH/SH reviewed - no change compared to H&P          Current Facility-Administered Medications:     dexmedeTOMidine (PRECEDEX) 400 mcg in 0.9% sodium chloride 100 mL infusion, 0.2-1.4 mcg/kg/hr, IntraVENous, TITRATE, Celsa Sands MD, Last Rate: 11.5 mL/hr at 03/04/18 0935, 0.6 mcg/kg/hr at 03/04/18 0935    0.9% sodium chloride infusion, 100 mL/hr, IntraVENous, CONTINUOUS, Teresita Gonzalez MD, Last Rate: 100 mL/hr at 03/04/18 0848, 100 mL/hr at 03/04/18 0848    sodium chloride (NS) flush 5-10 mL, 5-10 mL, IntraVENous, Q8H, Teresita Gonzalez MD, 10 mL at 03/04/18 0556    sodium chloride (NS) flush 5-10 mL, 5-10 mL, IntraVENous, PRN, Teresita Gonzalez MD, 10 mL at 03/03/18 2019    acetaminophen (TYLENOL) suppository 650 mg, 650 mg, Rectal, Q6H PRN, Teresita Gonzalez MD    ondansetron [facility]) injection 4 mg, 4 mg, IntraVENous, Q6H PRN, Teresita Gonzalez MD    bisacodyl (DULCOLAX) suppository 10 mg, 10 mg, Rectal, DAILY PRN, Teresita Gonzalez MD    nicotine (NICODERM CQ) 14 mg/24 hr patch 1 Patch, 1 Patch, TransDERmal, Q24H, Teresita Gonzalez MD, 1 Patch at 03/03/18 1645    enoxaparin (LOVENOX) injection 40 mg, 40 mg, SubCUTAneous, Q24H, Teresita Gonzalez MD, 40 mg at 03/03/18 1645    albuterol-ipratropium (DUO-NEB) 2.5 MG-0.5 MG/3 ML, 3 mL, Nebulization, Q4H RT, Teresita Gonzalez MD, 3 mL at 03/04/18 0800    methylPREDNISolone (PF) (SOLU-MEDROL) injection 40 mg, 40 mg, IntraVENous, Q6H, Teresita Gonzalez MD, 40 mg at 03/04/18 1141    cefTRIAXone (ROCEPHIN) 1 g/50 mL NS IVPB, 1 g, IntraVENous, Q24H, Teresita Gonzalez MD    aspirin (ASA) suppository 300 mg, 300 mg, Rectal, DAILY, Teresita Gonzalez MD, 300 mg at 03/04/18 0850    levETIRAcetam (KEPPRA) 500 mg in saline (iso-osm) 100 ml IVPB, 500 mg, IntraVENous, Q12H, Teresita Gonzalez MD, 500 mg at 03/04/18 0439    famotidine (PF) (PEPCID) injection 20 mg, 20 mg, IntraVENous, Q12H, Norbert Mejias MD, 20 mg at 03/04/18 0850    insulin lispro (HUMALOG) injection, , SubCUTAneous, Q6H, Norbert Mejias MD, Stopped at 03/03/18 1800    glucose chewable tablet 16 g, 4 Tab, Oral, PRN, Norebrt Mejias MD    dextrose (D50W) injection syrg 12.5-25 g, 12.5-25 g, IntraVENous, PRN, Norbert Mejias MD    glucagon [facility]) injection 1 mg, 1 mg, IntraMUSCular, PRN, Norbert Mejias MD    propofol (DIPRIVAN) infusion, 0-50 mcg/kg/min, IntraVENous, TITRATE, [employer], DO, Last Rate: 22.9 mL/hr at 03/04/18 1046, 50 mcg/kg/min at 03/04/18 1046    mupirocin (BACTROBAN) 2 % ointment, , Both Nostrils, Q12H, Francis Adkins MD    chlorhexidine (PERIDEX) 0.12 % mouthwash 15 mL, 15 mL, Oral, Q12H, Francis Adkins MD, 15 mL at 03/04/18 0850    azithromycin (ZITHROMAX) 500 mg in 0.9% sodium chloride (MBP/ADV) 250 mL, 500 mg, IntraVENous, Q24H, Iza Virgen MD    [START ON 3/6/2018] influenza vaccine 2017-18 (3 yrs+)(PF) (FLUZONE QUAD/FLUARIX QUAD) injection 0.5 mL, 0.5 mL, IntraMUSCular, PRIOR TO DISCHARGE, Iza Virgen MD  ________________________________________________________________________  Care Plan discussed with:    Comments   Patient y    Family      RN y    Care Manager     Consultant  y                      Multidiciplinary team rounds were held today with , nursing, pharmacist and clinical coordinator. Patient's plan of care was discussed; medications were reviewed and discharge planning was addressed.      ________________________________________________________________________  Total NON critical care TIME:  35   Minutes    Total CRITICAL CARE TIME Spent:   Minutes non procedure based      Comments   >50% of visit spent in counseling and coordination of care     ________________________________________________________________________  Iza Virgen MD     Procedures: see electronic medical records for all procedures/Xrays and details which were not copied into this note but were reviewed prior to creation of Plan. LABS:  I reviewed today's most current labs and imaging studies.   Pertinent labs include:  Recent Labs      03/04/18 0334 03/03/18   1251   WBC  12.0*  15.6*   HGB  9.6*  11.9   HCT  31.0*  38.9   PLT  316  423*     Recent Labs      03/04/18   0334 03/03/18 2033 03/03/18   1251   NA  136  134*  130*   K  4.2  4.3  3.7   CL  100  98  95*   CO2  27  30  31   GLU  152*  181*  132*   BUN  9  10  7   CREA  0.49*  0.70  0.66   CA  8.5  8.3*  8.9   MG  2.4   --    --    PHOS  1.7*   --    --    ALB   --    --   3.4*   TBILI   --    --   0.4   SGOT   --    --   15   ALT   --    --   16       Signed: Amanda Card MD

## 2018-03-04 NOTE — PROGRESS NOTES
SAT failed due to very agitated, HR, BP elevated. Patient needs more sedation due to Propofol tolerance and has history of Opioid abuse.

## 2018-03-04 NOTE — PROGRESS NOTES
Problem: Falls - Risk of  Goal: *Absence of Falls  Document Mayco Fall Risk and appropriate interventions in the flowsheet.    Outcome: Progressing Towards Goal  Fall Risk Interventions:  Mobility Interventions: Bed/chair exit alarm, Communicate number of staff needed for ambulation/transfer    Medication Interventions: Bed/chair exit alarm, Evaluate medications/consider consulting pharmacy    Elimination Interventions: Bed/chair exit alarm, Call light in reach

## 2018-03-04 NOTE — PROGRESS NOTES
Problem: Falls - Risk of  Goal: *Absence of Falls  Document Mayco Fall Risk and appropriate interventions in the flowsheet.    Outcome: Progressing Towards Goal  Fall Risk Interventions:                               Comments: Bed alarm

## 2018-03-04 NOTE — PROGRESS NOTES
03/04/18 0636   ABCDEF Bundle   SBT Safety Screen Passed No   SBT Screen Reason for Failure Agitation

## 2018-03-04 NOTE — PROGRESS NOTES
200- Received call from [facility], [address] [place] [address]. Patient is ready to come to the CCU. Update on report provided earlier was received. 1850- Arrived to ED to transport the patient. Patient transported to CT for a chest CT.  1911- CT scan completed. Patient transported upstairs. 0667- Arrived to CCU Bed 2548. Patient placed on the cardiac monitor. Primary Nurse Jose Daniel Dawson RN and Nirali Samuels RN performed a dual skin assessment on this patient No impairment noted. 1940- Report given to Nirali Samuels RN.

## 2018-03-04 NOTE — PROGRESS NOTES
[facility]  Pulmonary, Critical Care, and Sleep Medicine    Name: Prosper Constantino MRN: [id number]   : 1950 Hospital: [facility]   Date: 3/4/2018        Critical Care Patient Consult    PCP: Rohan Maza  Pulm: [facility]      IMPRESSION:   · Acute Hypercarbic and Hypoxic Respiratory Failure, on 2L oxygen at home. Pt required intubation and vent support on 3/3/18 in ER. Appears to have very limited cardiopulmonary reserve. Was seen in office on Wednesday and then on Thursday became ill, Had a flu shot in office. Still has moderate wheezing. · COPD with acute exacerbation, required intubation for refractory hypercarbia. · Tachycardia with HR in 140s. · Acute Encephalopathy, seems better on sedation. · Acute Hyponatremia, may be hypovolemia. Was given 1L of NS in ER. Will need to follow. · Risk of harming herself, Will need restraints to prevent accidental extubation or removal of CVC. · Anxiety, has anxiety at her baseline. · Depression  · Baseline Hypertension, on amlodipine, holding beta blocker for now. · Hx of seizures. · Hx of ischemic and C Diff colitis. · Hx of prior opioid addiction and to avoid opioids if at all possible per her sons request. She has a chronic opioid addiction and still seeks opioids per son. · Has ET tube, CVC placed in ER of her Right groin on 3/4/18. · Her son Adia is her surrogate decision Maker: pH: [phone], I met with him and his wife this am. He desires to be here in am and further discuss his mother's care. Spent 15 min discussing care with her son. We discussed adva  · Pt is critically ill, moderate to high risk of decompensation. Multiple organ failure. Will need ICU care and monitoring. Monitoring of heart rhythm. Over 35 min CC, EOP. Discussed advanced directives, at this point he desires his mother to be full code.        RECOMMENDATIONS:   · Vent support, did not passed SAT, SBT this am.   · Sedation, target RASS of zero to minus 1; Moderate to severe anxiety despite sedation with diprivan. Will add precedex. · Can use tylenol for pain. · ON empiric Abx with Ceftriaxone and Azithromycin  · Solumedrol IV. · Frequent Nebs  · Will need ongoing ABGs to assess oxygen and co2 elimination. Will recheck ABG in am.   · GI proph  · DVT prophylaxis  · CHG for oral care  · DVT prophylaxis  · Will recheck labs, CXR in am.      Subjective/History:     Last 24 hrs: Events in ER noted. I had opp to talk to her son and daughter in law. Pt was doing well on WEdnesday, Saw Celia Fraser had the flu shot then on Thursday started feeling worse, increased coughing, wheezing, dyspnea. Friday was much worse and the presented to ER yesterday am because she could not breathe at rest. Denies taking anything. Her son states maybe she took some OTC med for a cold. She had an  increased cough. Per nursing she is very anxious, report she does have some pain. Not able to easily qualify where and how severe. Not having much secretions. Rest of HPI and ROS is not obtainable due to her acute medical condition. This patient has been seen and evaluated at the request of Dr. Maik Vela for above. Patient is a 74 y.o. female who presents for increased shortness of breath, severe dyspnea with any exertion. She was taking her nebs without any relief. ON arrival to ER pt was in tripod position, unable to catch her breath. Was given nebs and solumedrol in transit by EMS, (12noon today). Pt is on home oxygen. Was seen in office by Dr. Celia Fraser was week and appeared to be doing pretty well. She has increased shortness of breath for the last several days. Even at rest today she was unable to catch her breath. Pt has a chronic dry cough. NO fevers, Denies sinus disease, no recent travels. Last hospitalization was in 2016. Pt was given ativan for anxiety and placed on bipap, then was given additional ativan.      Pt was seen in ER,  with heart rate in 130s. BP: 183/90,   On Vent: rate 16, vt: 450, fio2 of 60 and PEEP of 6. The patient is critically ill and can not provide additional history due to Unconsciousness, Ventilated and Unable to speak. Past Medical History:   Diagnosis Date    Arthritis     Asthma     COPD     2 L NC    Hypertension     Ischemic colitis ([facility] [address] 2012    Migraines     Neurological disorder     migraines    Psychiatric disorder     depression    Seizures ([facility] [address]     Last seizure 4 years ago    Vaginal candidiasis 2012      Past Surgical History:   Procedure Laterality Date    HX APPENDECTOMY      HX  SECTION      HX CHOLECYSTECTOMY      HX HYSTERECTOMY      HX ORTHOPAEDIC      lumbar disc sx    HX ORTHOPAEDIC      left knee sx    HX OTHER SURGICAL      colonoscopy-recent colitis    IA COLONOSCOPY W/BIOPSY SINGLE/MULTIPLE  3/1/2012           Prior to Admission medications    Medication Sig Start Date End Date Taking? Authorizing Provider   albuterol-ipratropium (DUO-NEB) 2.5 mg-0.5 mg/3 ml nebu 3 mL by Nebulization route four (4) times daily as needed. Yes Heri Rosenberg MD   methocarbamol (ROBAXIN) 750 mg tablet Take 750-1,500 mg by mouth two (2) times a day. Yes Heri Rosenberg MD   amLODIPine (NORVASC) 10 mg tablet Take 10 mg by mouth daily. Yes Heri Rosenberg MD   omeprazole (PRILOSEC) 40 mg capsule Take 40 mg by mouth daily. Yes Historical Provider   ondansetron (ZOFRAN ODT) 4 mg disintegrating tablet Take 1 Tab by mouth every eight (8) hours as needed. Patient taking differently: Take 4 mg by mouth three (3) times daily. 16  Yes Reena Uriostegui MD   albuterol (PROVENTIL VENTOLIN) 2.5 mg /3 mL (0.083 %) nebulizer solution 3 mL by Nebulization route every four (4) hours as needed. 12/23/15  Yes Keila Pastor MD   tiotropium [facility]) 18 mcg inhalation capsule Take 1 Cap by inhalation daily.  Indications: BRONCHIAL ASTHMA 12/23/15  Yes Keila Pastor MD   levETIRAcetam (KEPPRA) 500 mg tablet Take 1 Tab by mouth two (2) times a day. 12/23/15  Yes Judith Vela MD   metoprolol (LOPRESSOR) 50 mg tablet Take 1 Tab by mouth two (2) times a day. 12/23/15  Yes Judith Vela MD   DULoxetine (CYMBALTA) 20 mg capsule Take 40 mg by mouth daily. Yes Heri Rosenberg MD   fluticasone-salmeterol (ADVAIR) 250-50 mcg/dose diskus inhaler Take 1 Puff by inhalation two (2) times a day.  12/23/15   Judith Vela MD     Current Facility-Administered Medications   Medication Dose Route Frequency    dexmedeTOMidine (PRECEDEX) 400 mcg in 0.9% sodium chloride 100 mL infusion  0.2-1.4 mcg/kg/hr IntraVENous TITRATE    0.9% sodium chloride infusion  100 mL/hr IntraVENous CONTINUOUS    sodium chloride (NS) flush 5-10 mL  5-10 mL IntraVENous Q8H    nicotine (NICODERM CQ) 14 mg/24 hr patch 1 Patch  1 Patch TransDERmal Q24H    enoxaparin (LOVENOX) injection 40 mg  40 mg SubCUTAneous Q24H    albuterol-ipratropium (DUO-NEB) 2.5 MG-0.5 MG/3 ML  3 mL Nebulization Q4H RT    methylPREDNISolone (PF) (SOLU-MEDROL) injection 40 mg  40 mg IntraVENous Q6H    cefTRIAXone (ROCEPHIN) 1 g/50 mL NS IVPB  1 g IntraVENous Q24H    aspirin (ASA) suppository 300 mg  300 mg Rectal DAILY    levETIRAcetam (KEPPRA) 500 mg in saline (iso-osm) 100 ml IVPB  500 mg IntraVENous Q12H    famotidine (PF) (PEPCID) injection 20 mg  20 mg IntraVENous Q12H    insulin lispro (HUMALOG) injection   SubCUTAneous Q6H    propofol (DIPRIVAN) infusion  0-50 mcg/kg/min IntraVENous TITRATE    mupirocin (BACTROBAN) 2 % ointment   Both Nostrils Q12H    chlorhexidine (PERIDEX) 0.12 % mouthwash 15 mL  15 mL Oral Q12H    azithromycin (ZITHROMAX) 500 mg in 0.9% sodium chloride (MBP/ADV) 250 mL  500 mg IntraVENous Q24H    [START ON 3/6/2018] influenza vaccine 2017-18 (3 yrs+)(PF) (FLUZONE QUAD/FLUARIX QUAD) injection 0.5 mL  0.5 mL IntraMUSCular PRIOR TO DISCHARGE     Allergies   Allergen Reactions    Codeine Other (comments)    Erythromycin Nausea Only    Other Medication Other (comments)     No sedative or narcotic per son due to hx addiction      Social History   Substance Use Topics    Smoking status: Current Every Day Smoker     Packs/day: 0.25     Years: 30.00    Smokeless tobacco: Never Used    Alcohol use No      Family History   Problem Relation Age of Onset    Stroke Mother     Heart Disease Mother     Lung Disease Father      copd        Review of Systems:  Review of systems not obtained due to patient factors. Objective:   Vital Signs:    Visit Vitals    /56    Pulse (!) 143    Temp 100 °F (37.8 °C)    Resp 23    Ht 5' 2\" (1.575 m)    Wt 76.6 kg (168 lb 14 oz)    SpO2 95%    Breastfeeding No    BMI 30.89 kg/m2       O2 Device: Endotracheal tube, Heated, Ventilator       Temp (24hrs), Av.3 °F (36.8 °C), Min:97 °F (36.1 °C), Max:100 °F (37.8 °C)       Intake/Output:   Last shift:         Last 3 shifts:  1901 -  0700  In: 1243.1 [I.V.:1243.1]  Out: 1080 [Urine:910]    Intake/Output Summary (Last 24 hours) at 18 0915  Last data filed at 18 0600   Gross per 24 hour   Intake           1243.1 ml   Output             1080 ml   Net            163.1 ml     Hemodynamics:   PAP:   CO:     Wedge:   CI:     CVP:    SVR:       PVR:       Ventilator Settings:  Mode Rate Tidal Volume Pressure FiO2 PEEP   Assist control   450 ml    40 % 6 cm H20     Peak airway pressure: 45 cm H2O    Minute ventilation: 10.3 l/min      Physical Exam:    General:  Intubated and sedated, on mechanical vent support. no distress, appears stated age. Able to interact. Has some pain in throat. Head:  Normocephalic, without obvious abnormality, atraumatic. Eyes:  Conjunctivae/corneas clear. PERRL, EOMs intact. Nose: Nares normal. Septum midline. Mucosa normal. No drainage or sinus tenderness.    Throat: Lips, mucosa, and tongue normal. Teeth and gums normal.   Neck: Supple, symmetrical, trachea midline, no adenopathy, thyroid: no enlargment/tenderness/nodules, no carotid bruit and no JVD. Back:   Not able to assess due to her medical condition. Lungs:   Decreased air movement. Wheezing bilaterally, has a prolonged expiratory phase when seen this am. Coughing. Chest wall:  No tenderness or deformity. Heart:  Regular rate and rhythm, S1, S2 normal, no murmur, click, rub or gallop. Abdomen:   Soft, non-tender. Bowel sounds normal. No masses,  No organomegaly. Extremities: Extremities normal, atraumatic, no cyanosis or edema. Pulses: 2+ and symmetric all extremities. Skin: Skin color, texture, turgor normal. No rashes or lesions   Lymph nodes: Cervical, supraclavicular nodes are normal.   Neurologic: Pt is intubated and sedated, she is moving all extremities,  Psych: not able to be assessed due to on sedation.  has a singh.         Data:     Recent Results (from the past 24 hour(s))   EKG, 12 LEAD, INITIAL    Collection Time: 03/03/18 12:41 PM   Result Value Ref Range    Ventricular Rate 151 BPM    Atrial Rate 151 BPM    P-R Interval 124 ms    QRS Duration 80 ms    Q-T Interval 266 ms    QTC Calculation (Bezet) 421 ms    Calculated P Axis 83 degrees    Calculated R Axis 63 degrees    Calculated T Axis 56 degrees    Diagnosis       Sinus tachycardia  Inferior infarct (cited on or before 17-DEC-2015)  Cannot rule out Anterior infarct , age undetermined  Abnormal ECG  When compared with ECG of 10-JUL-2016 11:22,  Questionable change in QRS axis     CBC WITH AUTOMATED DIFF    Collection Time: 03/03/18 12:51 PM   Result Value Ref Range    WBC 15.6 (H) 3.6 - 11.0 K/uL    RBC 3.96 3.80 - 5.20 M/uL    HGB 11.9 11.5 - 16.0 g/dL    HCT 38.9 35.0 - 47.0 %    MCV 98.2 80.0 - 99.0 FL    MCH 30.1 26.0 - 34.0 PG    MCHC 30.6 30.0 - 36.5 g/dL    RDW 13.0 11.5 - 14.5 %    PLATELET 930 (H) 273 - 400 K/uL    MPV 10.0 8.9 - 12.9 FL    NRBC 0.0 0  WBC    ABSOLUTE NRBC 0.00 0.00 - 0.01 K/uL    NEUTROPHILS 80 (H) 32 - 75 %    LYMPHOCYTES 9 (L) 12 - 49 %    MONOCYTES 9 5 - 13 %    EOSINOPHILS 1 0 - 7 %    BASOPHILS 1 0 - 1 %    IMMATURE GRANULOCYTES 1 (H) 0.0 - 0.5 %    ABS. NEUTROPHILS 12.5 (H) 1.8 - 8.0 K/UL    ABS. LYMPHOCYTES 1.4 0.8 - 3.5 K/UL    ABS. MONOCYTES 1.4 (H) 0.0 - 1.0 K/UL    ABS. EOSINOPHILS 0.2 0.0 - 0.4 K/UL    ABS. BASOPHILS 0.1 0.0 - 0.1 K/UL    ABS. IMM. GRANS. 0.1 (H) 0.00 - 0.04 K/UL    DF AUTOMATED     METABOLIC PANEL, COMPREHENSIVE    Collection Time: 03/03/18 12:51 PM   Result Value Ref Range    Sodium 130 (L) 136 - 145 mmol/L    Potassium 3.7 3.5 - 5.1 mmol/L    Chloride 95 (L) 97 - 108 mmol/L    CO2 31 21 - 32 mmol/L    Anion gap 4 (L) 5 - 15 mmol/L    Glucose 132 (H) 65 - 100 mg/dL    BUN 7 6 - 20 MG/DL    Creatinine 0.66 0.55 - 1.02 MG/DL    BUN/Creatinine ratio 11 (L) 12 - 20      GFR est AA >60 >60 ml/min/1.73m2    GFR est non-AA >60 >60 ml/min/1.73m2    Calcium 8.9 8.5 - 10.1 MG/DL    Bilirubin, total 0.4 0.2 - 1.0 MG/DL    ALT (SGPT) 16 12 - 78 U/L    AST (SGOT) 15 15 - 37 U/L    Alk.  phosphatase 128 (H) 45 - 117 U/L    Protein, total 8.4 (H) 6.4 - 8.2 g/dL    Albumin 3.4 (L) 3.5 - 5.0 g/dL    Globulin 5.0 (H) 2.0 - 4.0 g/dL    A-G Ratio 0.7 (L) 1.1 - 2.2     TROPONIN I    Collection Time: 03/03/18 12:51 PM   Result Value Ref Range    Troponin-I, Qt. <0.04 <0.05 ng/mL   NT-PRO BNP    Collection Time: 03/03/18 12:51 PM   Result Value Ref Range    NT pro- (H) 0 - 125 PG/ML   CULTURE, BLOOD, PAIRED    Collection Time: 03/03/18  1:05 PM   Result Value Ref Range    Special Requests: NO SPECIAL REQUESTS      Culture result: NO GROWTH AFTER 17 HOURS     LACTIC ACID    Collection Time: 03/03/18  1:05 PM   Result Value Ref Range    Lactic acid 1.9 0.4 - 2.0 MMOL/L   BLOOD GAS, ARTERIAL    Collection Time: 03/03/18  2:19 PM   Result Value Ref Range    pH 7.12 (LL) 7.35 - 7.45      PCO2 82 (H) 35.0 - 45.0 mmHg    PO2 428 (H) 80 - 100 mmHg    O2  (H) 92 - 97 %    BICARBONATE 26 22 - 26 mmol/L    BASE DEFICIT 5.5 mmol/L    O2 METHOD BIPAP      FIO2 80 %    IPAP/PIP 14.0      EPAP/CPAP/PEEP 7.0      Sample source ARTERIAL      SITE RIGHT RADIAL      SKYE'S TEST YES      Critical value read back Troy RN    URINALYSIS W/ REFLEX CULTURE    Collection Time: 03/03/18  3:13 PM   Result Value Ref Range    Color YELLOW/STRAW      Appearance CLEAR CLEAR      Specific gravity 1.020 1.003 - 1.030      pH (UA) 6.0 5.0 - 8.0      Protein 100 (A) NEG mg/dL    Glucose 250 (A) NEG mg/dL    Ketone NEGATIVE  NEG mg/dL    Bilirubin NEGATIVE  NEG      Blood NEGATIVE  NEG      Urobilinogen 0.2 0.2 - 1.0 EU/dL    Nitrites NEGATIVE  NEG      Leukocyte Esterase NEGATIVE  NEG      WBC 5-10 0 - 4 /hpf    RBC 0-5 0 - 5 /hpf    Epithelial cells FEW FEW /lpf    Bacteria 1+ (A) NEG /hpf    UA:UC IF INDICATED URINE CULTURE ORDERED (A) CNI      Hyaline cast 2-5 0 - 5 /lpf   INFLUENZA A & B AG (RAPID TEST)    Collection Time: 03/03/18  3:45 PM   Result Value Ref Range    Influenza A Antigen NEGATIVE  NEG      Influenza B Antigen NEGATIVE  NEG     OSMOLALITY, UR    Collection Time: 03/03/18  3:45 PM   Result Value Ref Range    Osmolality,urine 321 MOSM/kg H2O   SODIUM, UR, RANDOM    Collection Time: 03/03/18  3:45 PM   Result Value Ref Range    Sodium urine, random 66 MMOL/L   BLOOD GAS, ARTERIAL    Collection Time: 03/03/18  3:55 PM   Result Value Ref Range    pH 7.16 (LL) 7.35 - 7.45      PCO2 89 (H) 35.0 - 45.0 mmHg    PO2 242 (H) 80 - 100 mmHg    O2 SAT 99 (H) 92 - 97 %    BICARBONATE 31 (H) 22 - 26 mmol/L    BASE DEFICIT 0.6 mmol/L    O2 METHOD BIPAP      FIO2 60 %    MODE BIPAP      IPAP/PIP 14.0      EPAP/CPAP/PEEP 7.0      Sample source ARTERIAL      SITE RIGHT RADIAL      SKYE'S TEST YES      Critical value read back Troy RN    OSMOLALITY, SERUM/PLASMA    Collection Time: 03/03/18  4:48 PM   Result Value Ref Range    Osmolality, serum/plasma 298 mOsm/kg H2O   GLUCOSE, POC    Collection Time: 03/03/18  6:48 PM   Result Value Ref Range    Glucose (POC) 181 (H) 65 - 100 mg/dL    Performed by Mayur Senegageetha    BLOOD GAS, ARTERIAL    Collection Time: 03/03/18  7:55 PM   Result Value Ref Range    pH 7.33 (L) 7.35 - 7.45      PCO2 58 (H) 35.0 - 45.0 mmHg    PO2 264 (H) 80 - 100 mmHg    O2  (H) 92 - 97 %    BICARBONATE 30 (H) 22 - 26 mmol/L    BASE EXCESS 2.1 mmol/L    O2 METHOD VENTILATOR      FIO2 60 %    MODE A/C      Tidal volume 450      SET RATE 16      EPAP/CPAP/PEEP 6.0      Sample source ARTERIAL      SITE RIGHT RADIAL      SKYE'S TEST YES     CK W/ CKMB & INDEX    Collection Time: 03/03/18  8:33 PM   Result Value Ref Range     26 - 192 U/L    CK - MB 2.8 <3.6 NG/ML    CK-MB Index 2.6 (H) 0 - 2.5     TROPONIN I    Collection Time: 03/03/18  8:33 PM   Result Value Ref Range    Troponin-I, Qt. <0.04 <2.90 ng/mL   METABOLIC PANEL, BASIC    Collection Time: 03/03/18  8:33 PM   Result Value Ref Range    Sodium 134 (L) 136 - 145 mmol/L    Potassium 4.3 3.5 - 5.1 mmol/L    Chloride 98 97 - 108 mmol/L    CO2 30 21 - 32 mmol/L    Anion gap 6 5 - 15 mmol/L    Glucose 181 (H) 65 - 100 mg/dL    BUN 10 6 - 20 MG/DL    Creatinine 0.70 0.55 - 1.02 MG/DL    BUN/Creatinine ratio 14 12 - 20      GFR est AA >60 >60 ml/min/1.73m2    GFR est non-AA >60 >60 ml/min/1.73m2    Calcium 8.3 (L) 8.5 - 10.1 MG/DL   CULTURE, RESPIRATORY/SPUTUM/BRONCH W GRAM STAIN    Collection Time: 03/03/18  8:39 PM   Result Value Ref Range    Special Requests: NO SPECIAL REQUESTS      GRAM STAIN 2+ WBCS SEEN      GRAM STAIN RARE EPITHELIAL CELLS SEEN      GRAM STAIN RARE GRAM NEGATIVE DIPLOCOCCI      GRAM STAIN RARE GRAM POSITIVE COCCI IN PAIRS      Culture result: PENDING    GLUCOSE, POC    Collection Time: 03/03/18 11:54 PM   Result Value Ref Range    Glucose (POC) 154 (H) 65 - 100 mg/dL    Performed by Deonte Plasencia    METABOLIC PANEL, BASIC    Collection Time: 03/04/18  3:34 AM   Result Value Ref Range    Sodium 136 136 - 145 mmol/L    Potassium 4.2 3.5 - 5.1 mmol/L Chloride 100 97 - 108 mmol/L    CO2 27 21 - 32 mmol/L    Anion gap 9 5 - 15 mmol/L    Glucose 152 (H) 65 - 100 mg/dL    BUN 9 6 - 20 MG/DL    Creatinine 0.49 (L) 0.55 - 1.02 MG/DL    BUN/Creatinine ratio 18 12 - 20      GFR est AA >60 >60 ml/min/1.73m2    GFR est non-AA >60 >60 ml/min/1.73m2    Calcium 8.5 8.5 - 10.1 MG/DL   CBC WITH AUTOMATED DIFF    Collection Time: 03/04/18  3:34 AM   Result Value Ref Range    WBC 12.0 (H) 3.6 - 11.0 K/uL    RBC 3.16 (L) 3.80 - 5.20 M/uL    HGB 9.6 (L) 11.5 - 16.0 g/dL    HCT 31.0 (L) 35.0 - 47.0 %    MCV 98.1 80.0 - 99.0 FL    MCH 30.4 26.0 - 34.0 PG    MCHC 31.0 30.0 - 36.5 g/dL    RDW 13.1 11.5 - 14.5 %    PLATELET 684 448 - 671 K/uL    MPV 10.1 8.9 - 12.9 FL    NRBC 0.0 0  WBC    ABSOLUTE NRBC 0.00 0.00 - 0.01 K/uL    NEUTROPHILS 91 (H) 32 - 75 %    LYMPHOCYTES 5 (L) 12 - 49 %    MONOCYTES 3 (L) 5 - 13 %    EOSINOPHILS 0 0 - 7 %    BASOPHILS 0 0 - 1 %    IMMATURE GRANULOCYTES 1 (H) 0.0 - 0.5 %    ABS. NEUTROPHILS 10.9 (H) 1.8 - 8.0 K/UL    ABS. LYMPHOCYTES 0.6 (L) 0.8 - 3.5 K/UL    ABS. MONOCYTES 0.4 0.0 - 1.0 K/UL    ABS. EOSINOPHILS 0.0 0.0 - 0.4 K/UL    ABS. BASOPHILS 0.0 0.0 - 0.1 K/UL    ABS. IMM. GRANS. 0.1 (H) 0.00 - 0.04 K/UL    DF AUTOMATED      RBC COMMENTS NORMOCYTIC, NORMOCHROMIC     MAGNESIUM    Collection Time: 03/04/18  3:34 AM   Result Value Ref Range    Magnesium 2.4 1.6 - 2.4 mg/dL   PHOSPHORUS    Collection Time: 03/04/18  3:34 AM   Result Value Ref Range    Phosphorus 1.7 (L) 2.6 - 4.7 MG/DL   CK W/ CKMB & INDEX    Collection Time: 03/04/18  3:34 AM   Result Value Ref Range     26 - 192 U/L    CK - MB 2.2 <3.6 NG/ML    CK-MB Index 2.1 0 - 2.5     TROPONIN I    Collection Time: 03/04/18  3:34 AM   Result Value Ref Range    Troponin-I, Qt. <0.04 <0.05 ng/mL   GLUCOSE, POC    Collection Time: 03/04/18  5:37 AM   Result Value Ref Range    Glucose (POC) 161 (H) 65 - 100 mg/dL    Performed by Malvin Newberry              Telemetry:ST: Hr in 130s. Sinus Tach, Right atrial enlargement. Has QTc of 436. Imaging:  I have personally reviewed the patients radiographs and have reviewed the reports:  3-3-18: CXR; FINDINGS: A single frontal view of the chest at 1322 hours shows mild  interstitial prominence, increased since the prior study, but with no handy CHF  pattern, focal infiltrate or effusion. .  Lung volumes are slightly less than on  the prior study. The heart, mediastinum and pulmonary vasculature are stable .    The bony thorax is unremarkable for age            Total critical care time exclusive of procedures: 40 minutes  Amalia Hauser MD

## 2018-03-04 NOTE — PROGRESS NOTES
0700 Bedside and Verbal shift change report received from [employer], [place] (offgoing nurse). Report included the following information SBAR, Kardex, Intake/Output, MAR, Accordion and Recent Results. 0725 Patient continues to be restless and agitated even with propofol gtt maxed out. HR sustaining in 130s-140s. Dr. Morris García made aware. New orders received. 0800 Assessment complete. See flowsheet for details. 5204 Patient remains maxed out on propofol gtt. Precedex started per MD order. Patient much calmer, VSS. Will monitor closely. 1200 Reassessment complete. See flowsheet for details. Patient resting comfortably. Will continue to monitor. 1500 Patient's son, Adia, calling for an update. Patient's son stating that he does not want ANY visitors to come through until he returns in the morning to speak with the doctor. 1600 Reassessment unchanged. Will continue to monitor. [address] Patient's sister calling for an update. 1900 Bedside and Verbal shift change report given to Mary Cummings, RN (oncoming nurse). Report included the following information SBAR, Kardex, Intake/Output, MAR, Accordion and Recent Results.

## 2018-03-05 ENCOUNTER — APPOINTMENT (OUTPATIENT)
Dept: GENERAL RADIOLOGY | Age: 68
DRG: 871 | End: 2018-03-05
Attending: INTERNAL MEDICINE
Payer: MEDICARE

## 2018-03-05 LAB
ALBUMIN SERPL-MCNC: 2.6 G/DL (ref 3.5–5)
ALBUMIN/GLOB SERPL: 0.7 {RATIO} (ref 1.1–2.2)
ALP SERPL-CCNC: 85 U/L (ref 45–117)
ALT SERPL-CCNC: 16 U/L (ref 12–78)
ANION GAP SERPL CALC-SCNC: 6 MMOL/L (ref 5–15)
ARTERIAL PATENCY WRIST A: YES
AST SERPL-CCNC: 15 U/L (ref 15–37)
BACTERIA SPEC CULT: NORMAL
BASE EXCESS BLDA CALC-SCNC: 5.3 MMOL/L
BASOPHILS # BLD: 0 K/UL (ref 0–0.1)
BASOPHILS NFR BLD: 0 % (ref 0–1)
BDY SITE: ABNORMAL
BILIRUB SERPL-MCNC: 0.4 MG/DL (ref 0.2–1)
BUN SERPL-MCNC: 10 MG/DL (ref 6–20)
BUN/CREAT SERPL: 28 (ref 12–20)
CALCIUM SERPL-MCNC: 8.3 MG/DL (ref 8.5–10.1)
CC UR VC: NORMAL
CHLORIDE SERPL-SCNC: 101 MMOL/L (ref 97–108)
CO2 SERPL-SCNC: 31 MMOL/L (ref 21–32)
CREAT SERPL-MCNC: 0.36 MG/DL (ref 0.55–1.02)
DIFFERENTIAL METHOD BLD: ABNORMAL
EOSINOPHIL # BLD: 0 K/UL (ref 0–0.4)
EOSINOPHIL NFR BLD: 0 % (ref 0–7)
EPAP/CPAP/PEEP, PAPEEP: 6
ERYTHROCYTE [DISTWIDTH] IN BLOOD BY AUTOMATED COUNT: 13.2 % (ref 11.5–14.5)
FIO2 ON VENT: 40 %
GAS FLOW.O2 SETTING OXYMISER: 16 L/MIN
GLOBULIN SER CALC-MCNC: 3.5 G/DL (ref 2–4)
GLUCOSE BLD STRIP.AUTO-MCNC: 144 MG/DL (ref 65–100)
GLUCOSE BLD STRIP.AUTO-MCNC: 156 MG/DL (ref 65–100)
GLUCOSE BLD STRIP.AUTO-MCNC: 167 MG/DL (ref 65–100)
GLUCOSE BLD STRIP.AUTO-MCNC: 193 MG/DL (ref 65–100)
GLUCOSE SERPL-MCNC: 155 MG/DL (ref 65–100)
HCO3 BLDA-SCNC: 30 MMOL/L (ref 22–26)
HCT VFR BLD AUTO: 29.1 % (ref 35–47)
HGB BLD-MCNC: 9.2 G/DL (ref 11.5–16)
IMM GRANULOCYTES # BLD: 0.1 K/UL (ref 0–0.04)
IMM GRANULOCYTES NFR BLD AUTO: 1 % (ref 0–0.5)
LYMPHOCYTES # BLD: 0.4 K/UL (ref 0.8–3.5)
LYMPHOCYTES NFR BLD: 4 % (ref 12–49)
MAGNESIUM SERPL-MCNC: 2.2 MG/DL (ref 1.6–2.4)
MCH RBC QN AUTO: 30.7 PG (ref 26–34)
MCHC RBC AUTO-ENTMCNC: 31.6 G/DL (ref 30–36.5)
MCV RBC AUTO: 97 FL (ref 80–99)
MONOCYTES # BLD: 0.4 K/UL (ref 0–1)
MONOCYTES NFR BLD: 4 % (ref 5–13)
NEUTS SEG # BLD: 8.5 K/UL (ref 1.8–8)
NEUTS SEG NFR BLD: 91 % (ref 32–75)
NRBC # BLD: 0 K/UL (ref 0–0.01)
NRBC BLD-RTO: 0 PER 100 WBC
PCO2 BLDA: 45 MMHG (ref 35–45)
PH BLDA: 7.45 [PH] (ref 7.35–7.45)
PHOSPHATE SERPL-MCNC: 1.6 MG/DL (ref 2.6–4.7)
PLATELET # BLD AUTO: 306 K/UL (ref 150–400)
PMV BLD AUTO: 10 FL (ref 8.9–12.9)
PO2 BLDA: 104 MMHG (ref 80–100)
POTASSIUM SERPL-SCNC: 3.8 MMOL/L (ref 3.5–5.1)
PROT SERPL-MCNC: 6.1 G/DL (ref 6.4–8.2)
RBC # BLD AUTO: 3 M/UL (ref 3.8–5.2)
RBC MORPH BLD: ABNORMAL
SAO2 % BLD: 98 % (ref 92–97)
SAO2% DEVICE SAO2% SENSOR NAME: ABNORMAL
SERVICE CMNT-IMP: ABNORMAL
SERVICE CMNT-IMP: NORMAL
SODIUM SERPL-SCNC: 138 MMOL/L (ref 136–145)
SPECIMEN SITE: ABNORMAL
VENTILATION MODE VENT: ABNORMAL
VT SETTING VENT: 450 ML
WBC # BLD AUTO: 9.4 K/UL (ref 3.6–11)

## 2018-03-05 PROCEDURE — 80053 COMPREHEN METABOLIC PANEL: CPT | Performed by: INTERNAL MEDICINE

## 2018-03-05 PROCEDURE — 74011250636 HC RX REV CODE- 250/636: Performed by: HOSPITALIST

## 2018-03-05 PROCEDURE — 74011250636 HC RX REV CODE- 250/636: Performed by: EMERGENCY MEDICINE

## 2018-03-05 PROCEDURE — 94640 AIRWAY INHALATION TREATMENT: CPT

## 2018-03-05 PROCEDURE — 82962 GLUCOSE BLOOD TEST: CPT

## 2018-03-05 PROCEDURE — 74011250636 HC RX REV CODE- 250/636: Performed by: INTERNAL MEDICINE

## 2018-03-05 PROCEDURE — 83735 ASSAY OF MAGNESIUM: CPT | Performed by: INTERNAL MEDICINE

## 2018-03-05 PROCEDURE — 77010033678 HC OXYGEN DAILY

## 2018-03-05 PROCEDURE — 65620000000 HC RM CCU GENERAL

## 2018-03-05 PROCEDURE — 36415 COLL VENOUS BLD VENIPUNCTURE: CPT | Performed by: INTERNAL MEDICINE

## 2018-03-05 PROCEDURE — 74011250637 HC RX REV CODE- 250/637: Performed by: HOSPITALIST

## 2018-03-05 PROCEDURE — 71045 X-RAY EXAM CHEST 1 VIEW: CPT

## 2018-03-05 PROCEDURE — 74011000250 HC RX REV CODE- 250: Performed by: INTERNAL MEDICINE

## 2018-03-05 PROCEDURE — 74011000258 HC RX REV CODE- 258: Performed by: INTERNAL MEDICINE

## 2018-03-05 PROCEDURE — 85025 COMPLETE CBC W/AUTO DIFF WBC: CPT | Performed by: INTERNAL MEDICINE

## 2018-03-05 PROCEDURE — 82803 BLOOD GASES ANY COMBINATION: CPT | Performed by: INTERNAL MEDICINE

## 2018-03-05 PROCEDURE — 74011000250 HC RX REV CODE- 250: Performed by: HOSPITALIST

## 2018-03-05 PROCEDURE — 36600 WITHDRAWAL OF ARTERIAL BLOOD: CPT | Performed by: INTERNAL MEDICINE

## 2018-03-05 PROCEDURE — 74011250637 HC RX REV CODE- 250/637: Performed by: INTERNAL MEDICINE

## 2018-03-05 PROCEDURE — 84100 ASSAY OF PHOSPHORUS: CPT | Performed by: INTERNAL MEDICINE

## 2018-03-05 PROCEDURE — 94003 VENT MGMT INPAT SUBQ DAY: CPT

## 2018-03-05 PROCEDURE — 77030038269 HC DRN EXT URIN PURWCK BARD -A

## 2018-03-05 RX ORDER — ACETAMINOPHEN 325 MG/1
650 TABLET ORAL
Status: DISCONTINUED | OUTPATIENT
Start: 2018-03-05 | End: 2018-03-08 | Stop reason: HOSPADM

## 2018-03-05 RX ORDER — IBUPROFEN 400 MG/1
800 TABLET ORAL
Status: DISCONTINUED | OUTPATIENT
Start: 2018-03-05 | End: 2018-03-08 | Stop reason: HOSPADM

## 2018-03-05 RX ORDER — SODIUM,POTASSIUM PHOSPHATES 280-250MG
1 POWDER IN PACKET (EA) ORAL 2 TIMES DAILY
Status: DISCONTINUED | OUTPATIENT
Start: 2018-03-05 | End: 2018-03-08 | Stop reason: HOSPADM

## 2018-03-05 RX ORDER — DULOXETIN HYDROCHLORIDE 20 MG/1
40 CAPSULE, DELAYED RELEASE ORAL DAILY
Status: DISCONTINUED | OUTPATIENT
Start: 2018-03-05 | End: 2018-03-08 | Stop reason: HOSPADM

## 2018-03-05 RX ORDER — METOPROLOL TARTRATE 25 MG/1
12.5 TABLET, FILM COATED ORAL EVERY 12 HOURS
Status: DISCONTINUED | OUTPATIENT
Start: 2018-03-05 | End: 2018-03-05

## 2018-03-05 RX ORDER — METOPROLOL TARTRATE 25 MG/1
25 TABLET, FILM COATED ORAL EVERY 12 HOURS
Status: DISCONTINUED | OUTPATIENT
Start: 2018-03-05 | End: 2018-03-06

## 2018-03-05 RX ORDER — LANOLIN ALCOHOL/MO/W.PET/CERES
9 CREAM (GRAM) TOPICAL
Status: DISCONTINUED | OUTPATIENT
Start: 2018-03-05 | End: 2018-03-08 | Stop reason: HOSPADM

## 2018-03-05 RX ORDER — PANTOPRAZOLE SODIUM 40 MG/1
40 TABLET, DELAYED RELEASE ORAL
Status: DISCONTINUED | OUTPATIENT
Start: 2018-03-06 | End: 2018-03-08 | Stop reason: HOSPADM

## 2018-03-05 RX ORDER — AMLODIPINE BESYLATE 5 MG/1
10 TABLET ORAL DAILY
Status: DISCONTINUED | OUTPATIENT
Start: 2018-03-05 | End: 2018-03-08 | Stop reason: HOSPADM

## 2018-03-05 RX ORDER — PANTOPRAZOLE SODIUM 40 MG/1
40 TABLET, DELAYED RELEASE ORAL
Status: DISCONTINUED | OUTPATIENT
Start: 2018-03-05 | End: 2018-03-05

## 2018-03-05 RX ORDER — GUAIFENESIN 100 MG/5ML
81 LIQUID (ML) ORAL DAILY
Status: DISCONTINUED | OUTPATIENT
Start: 2018-03-05 | End: 2018-03-08 | Stop reason: HOSPADM

## 2018-03-05 RX ORDER — IBUPROFEN 400 MG/1
800 TABLET ORAL
Status: DISCONTINUED | OUTPATIENT
Start: 2018-03-05 | End: 2018-03-05 | Stop reason: SDUPTHER

## 2018-03-05 RX ORDER — LEVETIRACETAM 500 MG/1
500 TABLET ORAL 2 TIMES DAILY
Status: DISCONTINUED | OUTPATIENT
Start: 2018-03-05 | End: 2018-03-08 | Stop reason: HOSPADM

## 2018-03-05 RX ORDER — FLUTICASONE FUROATE AND VILANTEROL 100; 25 UG/1; UG/1
1 POWDER RESPIRATORY (INHALATION) DAILY
Status: DISCONTINUED | OUTPATIENT
Start: 2018-03-05 | End: 2018-03-08 | Stop reason: HOSPADM

## 2018-03-05 RX ADMIN — Medication 10 ML: at 21:12

## 2018-03-05 RX ADMIN — ONDANSETRON HYDROCHLORIDE 4 MG: 2 INJECTION, SOLUTION INTRAMUSCULAR; INTRAVENOUS at 22:17

## 2018-03-05 RX ADMIN — UMECLIDINIUM 1 PUFF: 62.5 AEROSOL, POWDER ORAL at 09:09

## 2018-03-05 RX ADMIN — IPRATROPIUM BROMIDE AND ALBUTEROL SULFATE 3 ML: .5; 3 SOLUTION RESPIRATORY (INHALATION) at 15:53

## 2018-03-05 RX ADMIN — SODIUM CHLORIDE 1 MCG/KG/HR: 900 INJECTION, SOLUTION INTRAVENOUS at 07:07

## 2018-03-05 RX ADMIN — POTASSIUM & SODIUM PHOSPHATES POWDER PACK 280-160-250 MG 1 PACKET: 280-160-250 PACK at 10:08

## 2018-03-05 RX ADMIN — ONDANSETRON HYDROCHLORIDE 4 MG: 2 INJECTION, SOLUTION INTRAMUSCULAR; INTRAVENOUS at 16:55

## 2018-03-05 RX ADMIN — ONDANSETRON HYDROCHLORIDE 4 MG: 2 INJECTION, SOLUTION INTRAMUSCULAR; INTRAVENOUS at 08:53

## 2018-03-05 RX ADMIN — AZITHROMYCIN MONOHYDRATE 500 MG: 500 INJECTION, POWDER, LYOPHILIZED, FOR SOLUTION INTRAVENOUS at 16:49

## 2018-03-05 RX ADMIN — BISACODYL 10 MG: 10 SUPPOSITORY RECTAL at 11:52

## 2018-03-05 RX ADMIN — AMLODIPINE BESYLATE 10 MG: 5 TABLET ORAL at 16:05

## 2018-03-05 RX ADMIN — IPRATROPIUM BROMIDE AND ALBUTEROL SULFATE 3 ML: .5; 3 SOLUTION RESPIRATORY (INHALATION) at 19:23

## 2018-03-05 RX ADMIN — METHYLPREDNISOLONE SODIUM SUCCINATE 40 MG: 40 INJECTION, POWDER, FOR SOLUTION INTRAMUSCULAR; INTRAVENOUS at 15:06

## 2018-03-05 RX ADMIN — IBUPROFEN 800 MG: 400 TABLET, FILM COATED ORAL at 19:50

## 2018-03-05 RX ADMIN — LEVETIRACETAM 500 MG: 5 INJECTION INTRAVENOUS at 05:07

## 2018-03-05 RX ADMIN — IBUPROFEN 800 MG: 400 TABLET, FILM COATED ORAL at 10:36

## 2018-03-05 RX ADMIN — IPRATROPIUM BROMIDE AND ALBUTEROL SULFATE 3 ML: .5; 3 SOLUTION RESPIRATORY (INHALATION) at 23:57

## 2018-03-05 RX ADMIN — FLUTICASONE FUROATE AND VILANTEROL TRIFENATATE 1 PUFF: 100; 25 POWDER RESPIRATORY (INHALATION) at 09:09

## 2018-03-05 RX ADMIN — ACETAMINOPHEN 650 MG: 325 TABLET ORAL at 08:51

## 2018-03-05 RX ADMIN — PROPOFOL 50 MCG/KG/MIN: 10 INJECTION, EMULSION INTRAVENOUS at 04:08

## 2018-03-05 RX ADMIN — ACETAMINOPHEN 650 MG: 325 TABLET ORAL at 16:05

## 2018-03-05 RX ADMIN — LEVETIRACETAM 500 MG: 500 TABLET ORAL at 18:15

## 2018-03-05 RX ADMIN — MELATONIN 3 MG ORAL TABLET 9 MG: 3 TABLET ORAL at 21:12

## 2018-03-05 RX ADMIN — METOPROLOL TARTRATE 25 MG: 25 TABLET ORAL at 10:07

## 2018-03-05 RX ADMIN — IPRATROPIUM BROMIDE AND ALBUTEROL SULFATE 3 ML: .5; 3 SOLUTION RESPIRATORY (INHALATION) at 08:19

## 2018-03-05 RX ADMIN — ENOXAPARIN SODIUM 40 MG: 40 INJECTION SUBCUTANEOUS at 16:05

## 2018-03-05 RX ADMIN — SODIUM CHLORIDE 0.8 MCG/KG/HR: 900 INJECTION, SOLUTION INTRAVENOUS at 02:04

## 2018-03-05 RX ADMIN — DULOXETINE HYDROCHLORIDE 40 MG: 20 CAPSULE, DELAYED RELEASE ORAL at 10:37

## 2018-03-05 RX ADMIN — Medication 10 ML: at 06:23

## 2018-03-05 RX ADMIN — POTASSIUM & SODIUM PHOSPHATES POWDER PACK 280-160-250 MG 1 PACKET: 280-160-250 PACK at 18:15

## 2018-03-05 RX ADMIN — METHYLPREDNISOLONE SODIUM SUCCINATE 40 MG: 40 INJECTION, POWDER, FOR SOLUTION INTRAMUSCULAR; INTRAVENOUS at 06:25

## 2018-03-05 RX ADMIN — METOPROLOL TARTRATE 25 MG: 25 TABLET ORAL at 21:12

## 2018-03-05 RX ADMIN — SODIUM CHLORIDE 100 ML/HR: 900 INJECTION, SOLUTION INTRAVENOUS at 02:51

## 2018-03-05 RX ADMIN — IPRATROPIUM BROMIDE AND ALBUTEROL SULFATE 3 ML: .5; 3 SOLUTION RESPIRATORY (INHALATION) at 03:34

## 2018-03-05 RX ADMIN — ASPIRIN 81 MG 81 MG: 81 TABLET ORAL at 08:53

## 2018-03-05 RX ADMIN — Medication 10 ML: at 13:01

## 2018-03-05 RX ADMIN — FAMOTIDINE 20 MG: 10 INJECTION INTRAVENOUS at 09:10

## 2018-03-05 RX ADMIN — MUPIROCIN: 20 OINTMENT TOPICAL at 21:15

## 2018-03-05 RX ADMIN — IPRATROPIUM BROMIDE AND ALBUTEROL SULFATE 3 ML: .5; 3 SOLUTION RESPIRATORY (INHALATION) at 11:21

## 2018-03-05 RX ADMIN — SODIUM CHLORIDE 100 ML/HR: 900 INJECTION, SOLUTION INTRAVENOUS at 13:02

## 2018-03-05 RX ADMIN — METHYLPREDNISOLONE SODIUM SUCCINATE 40 MG: 40 INJECTION, POWDER, FOR SOLUTION INTRAMUSCULAR; INTRAVENOUS at 21:12

## 2018-03-05 RX ADMIN — MUPIROCIN: 20 OINTMENT TOPICAL at 08:54

## 2018-03-05 NOTE — PROGRESS NOTES
0700 Bedside and Verbal shift change report received from [place], [address] [facility] (offgoing nurse). Report included the following information SBAR, Kardex, Intake/Output, MAR, Accordion and Recent Results. 0730 Patient's son at bedside. 0800 Assessment complete. See flowsheet for details. 4415 Dr. Miracle Wiseman at bedside, planning to extubate patient today. 8659 Patient extubated with RT and RN at bedside. Patient placed on 4L. VSS. Will monitor closely. 0830 STAND tool complete. Patient displaying no swallowing difficulties. Clear liquid diet ordered. 1030 Patient's head continuing to hurt despite being given Tylenol. Patient specifically requesting Fioricet, but given patient's history of drug abuse this RN contacting son to explore options for pain management. Patient's son would like to avoid Fioricet as it \"makes patient loopy and not right\". Patient's son stating that the patient has taken hydrocodone in the past for pain but still would like to avoid narcotics if at all possible. MD notified. New orders received. 1200 Reassessment complete. See flowsheet for details. Will continue to monitor. 1330 Spoke with PICC team regarding insertion of a midline. 1515 Moon catheter removed. Patient understands she will need to void within 6 hours to avoid reinsertion of catheter.  MD aware of patient's elevated BP. New orders received. 1545 PICC team at bedside. 1600 Reassessment unchanged. Will continue to monitor closely. 1900 Bedside and Verbal shift change report given to Rick Salomon RN (oncoming nurse). Report included the following information SBAR, Kardex, Intake/Output, MAR, Accordion and Recent Results.

## 2018-03-05 NOTE — PROGRESS NOTES
03/05/18 0616   ABCDEF Bundle   SBT Safety Screen Passed No   SBT Screen Reason for Failure Agitation

## 2018-03-05 NOTE — PROGRESS NOTES
Hospitalist Progress Note    NAME: Dwaine Palomo   :  1950   MRN:  [id number]       Assessment / Plan:  Acute on chronic hypercapnic respiratory failure s/p extubation today  Acute COPD exacerbation  Extubated today and doing well on nasal cannula  Good cough reflex and is wide awake and alert  Cont solumedrol, Duo neb. Continue empiric  Azithromycin. Cont Breo  CT chest does not show definite evidence of pneumonia. Stop iv fluids once oral intake is better  Consult pt/ot    Acute encephalopathy likely related to hypercapnia resolved  She is back to her base line  UA appears clean. Hyponatremia  Sodium is currently improved and it is 138 today     Hypertension  On home amlodipine and metoprolol    History of seizure disorder  on Keppra    Hypophosphatemia  Replaced and recheck    History of depression  Cymbalta    History of ischemic and C. difficile colitis    History of past opiate psychiatric medication abuse causing encephalopathy. Body mass index is 30.89 kg/(m^2). Code status: Full  Prophylaxis: Lovenox  Recommended Disposition: SNF/LTC     Subjective:     Chief Complaint / Reason for Physician Visit  Extubated this am and doing well on nasal cannula  Reports headache now  Mild cough with small amount of phlegm. No chest pain. Review of Systems:  Symptom Y/N Comments  Symptom Y/N Comments   Fever/Chills n   Chest Pain n    Poor Appetite    Edema     Cough y   Abdominal Pain n    Sputum y   Joint Pain     SOB/DÍAZ y   Pruritis/Rash     Nausea/vomit    Tolerating PT/OT     Diarrhea    Tolerating Diet     Constipation    Other       Could NOT obtain due to:      Objective:     VITALS:   Last 24hrs VS reviewed since prior progress note.  Most recent are:  Patient Vitals for the past 24 hrs:   Temp Pulse Resp BP SpO2   18 1300 - (!) 102 26 165/83 99 %   18 1200 98 °F (36.7 °C) (!) 104 26 150/79 98 %   18 1121 - - - - 100 %   18 1100 - 97 23 148/76 99 %   18 1000 - (!) 107 25 131/73 91 %   03/05/18 0900 - (!) 106 29 161/69 96 %   03/05/18 0823 - - - - 97 %   03/05/18 0819 - - - - 96 %   03/05/18 0800 99.2 °F (37.3 °C) (!) 105 21 154/73 98 %   03/05/18 0700 - (!) 104 17 163/78 98 %   03/05/18 0600 - (!) 110 17 160/78 98 %   03/05/18 0500 - (!) 116 17 158/73 98 %   03/05/18 0400 98.4 °F (36.9 °C) (!) 118 17 148/65 99 %   03/05/18 0334 - (!) 107 16 - 99 %   03/05/18 0300 - (!) 109 16 155/71 98 %   03/05/18 0200 - (!) 111 17 154/70 98 %   03/05/18 0100 - (!) 114 19 145/63 98 %   03/05/18 0000 98 °F (36.7 °C) (!) 116 18 132/54 98 %   03/04/18 2315 - (!) 106 16 - 100 %   03/04/18 2300 - (!) 107 17 141/65 100 %   03/04/18 2200 - (!) 107 17 125/57 96 %   03/04/18 2100 - (!) 108 16 118/59 97 %   03/04/18 2000 98.1 °F (36.7 °C) (!) 113 16 113/52 97 %   03/04/18 1927 - (!) 104 16 - 98 %   03/04/18 1900 - (!) 107 16 107/52 98 %   03/04/18 1800 - 95 16 122/85 98 %   03/04/18 1750 - - - - 98 %   03/04/18 1700 - (!) 101 16 124/65 98 %   03/04/18 1655 - (!) 101 16 - 98 %   03/04/18 1600 97.8 °F (36.6 °C) (!) 101 16 131/67 98 %   03/04/18 1500 - (!) 103 16 129/65 97 %   03/04/18 1400 - (!) 108 16 127/62 97 %       Intake/Output Summary (Last 24 hours) at 03/05/18 1322  Last data filed at 03/05/18 1300   Gross per 24 hour   Intake          3890.15 ml   Output             1630 ml   Net          2260.15 ml        PHYSICAL EXAM:  General: no acute distress    EENT:  Anicteric sclerae. MMM  Resp:  Poor air entry, wheezing +, no crackles  CV:  Regular  rhythm,  No edema  GI:  Soft, Non distended, Non tender.  +Bowel sounds  Neurologic:  Alert and awake  Psych:   deferred  Skin:  No rashes.   No jaundice    Reviewed most current lab test results and cultures  YES  Reviewed most current radiology test results   YES  Review and summation of old records today    NO  Reviewed patient's current orders and MAR    YES  PMH/SH reviewed - no change compared to H&P          Current Facility-Administered Medications:     methylPREDNISolone (PF) (SOLU-MEDROL) injection 40 mg, 40 mg, IntraVENous, Q8H, Saniya Macdonald MD    umeclidinium (INCRUSE ELLIPTA) 62.5 mcg/actuation, 1 Puff, Inhalation, DAILY, Saniya Macdonald MD, 1 Puff at 03/05/18 0909    fluticasone-vilanterol (BREO ELLIPTA) 100mcg-25mcg/puff, 1 Puff, Inhalation, DAILY, Saniya Macdonald MD, 1 Puff at 03/05/18 0909    aspirin chewable tablet 81 mg, 81 mg, Oral, DAILY, Saniya Macdonald MD, 81 mg at 03/05/18 0853    acetaminophen (TYLENOL) tablet 650 mg, 650 mg, Oral, Q4H PRN, Saniya Macdonald MD, 650 mg at 03/05/18 0851    metoprolol tartrate (LOPRESSOR) tablet 25 mg, 25 mg, Oral, Q12H, Margarita Ramirez MD, 25 mg at 03/05/18 1007    levETIRAcetam (KEPPRA) tablet 500 mg, 500 mg, Oral, BID, Margarita Ramirez MD    DULoxetine (CYMBALTA) capsule 40 mg, 40 mg, Oral, DAILY, Margarita Ramirez MD, 40 mg at 03/05/18 1037    potassium, sodium phosphates (NEUTRA-PHOS) packet 1 Packet, 1 Packet, Oral, BID, Margarita Ramirez MD, 1 Packet at 03/05/18 1008    [START ON 3/6/2018] pantoprazole (PROTONIX) tablet 40 mg, 40 mg, Oral, ACB, Margarita Ramirez MD    ibuprofen (MOTRIN) tablet 800 mg, 800 mg, Oral, Q6H PRN, Saniya Macdonald MD, 800 mg at 03/05/18 1036    0.9% sodium chloride infusion, 100 mL/hr, IntraVENous, CONTINUOUS, Gem Mosher MD, Last Rate: 100 mL/hr at 03/05/18 1302, 100 mL/hr at 03/05/18 1302    sodium chloride (NS) flush 5-10 mL, 5-10 mL, IntraVENous, Q8H, Gem Mosher MD, 10 mL at 03/05/18 1301    sodium chloride (NS) flush 5-10 mL, 5-10 mL, IntraVENous, PRN, Gem Mosher MD, 10 mL at 03/03/18 2019    ondansetron [facility]) injection 4 mg, 4 mg, IntraVENous, Q6H PRN, Gem Mosher MD, 4 mg at 03/05/18 0853    bisacodyl (DULCOLAX) suppository 10 mg, 10 mg, Rectal, DAILY PRN, Gem Mosher MD, 10 mg at 03/05/18 1152    nicotine (NICODERM CQ) 14 mg/24 hr patch 1 Patch, 1 Patch, TransDERmal, Q24H, Nasima Cooper Jatin William MD, 1 Patch at 03/04/18 1508    enoxaparin (LOVENOX) injection 40 mg, 40 mg, SubCUTAneous, Q24H, Chery Mccormick MD, 40 mg at 03/04/18 1510    albuterol-ipratropium (DUO-NEB) 2.5 MG-0.5 MG/3 ML, 3 mL, Nebulization, Q4H RT, Chery Mccormick MD, 3 mL at 03/05/18 1121    insulin lispro (HUMALOG) injection, , SubCUTAneous, Q6H, Chery Mccormick MD, Stopped at 03/03/18 1800    glucose chewable tablet 16 g, 4 Tab, Oral, PRN, Chery Mccormick MD    dextrose (D50W) injection syrg 12.5-25 g, 12.5-25 g, IntraVENous, PRN, Chery Mccormick MD    glucagon [facility]) injection 1 mg, 1 mg, IntraMUSCular, PRN, Chery Mccormick MD    [facility]) 2 % ointment, , Both Nostrils, Q12H, Carlos Eduardo Waters MD    azithromycin (ZITHROMAX) 500 mg in 0.9% sodium chloride (MBP/ADV) 250 mL, 500 mg, IntraVENous, Q24H, Reg Galeano MD, Last Rate: 250 mL/hr at 03/04/18 1653, 500 mg at 03/04/18 1653  ________________________________________________________________________  Care Plan discussed with:    Comments   Patient y    Family      RN y    Care Manager     Consultant  y                      Multidiciplinary team rounds were held today with , nursing, pharmacist and clinical coordinator. Patient's plan of care was discussed; medications were reviewed and discharge planning was addressed. ________________________________________________________________________  Total NON critical care TIME:  35   Minutes    Total CRITICAL CARE TIME Spent:   Minutes non procedure based      Comments   >50% of visit spent in counseling and coordination of care     ________________________________________________________________________  Reg Galeano MD     Procedures: see electronic medical records for all procedures/Xrays and details which were not copied into this note but were reviewed prior to creation of Plan. LABS:  I reviewed today's most current labs and imaging studies.   Pertinent labs include:  Recent Labs 03/05/18   0341 03/04/18   0334  03/03/18   1251   WBC  9.4  12.0*  15.6*   HGB  9.2*  9.6*  11.9   HCT  29.1*  31.0*  38.9   PLT  306  316  423*     Recent Labs      03/05/18   0341 03/04/18   0334  03/03/18 2033 03/03/18   1251   NA  138  136  134*  130*   K  3.8  4.2  4.3  3.7   CL  101  100  98  95*   CO2  31  27  30  31   GLU  155*  152*  181*  132*   BUN  10  9  10  7   CREA  0.36*  0.49*  0.70  0.66   CA  8.3*  8.5  8.3*  8.9   MG  2.2  2.4   --    --    PHOS  1.6*  1.7*   --    --    ALB  2.6*   --    --   3.4*   TBILI  0.4   --    --   0.4   SGOT  15   --    --   15   ALT  16   --    --   16       Signed: Angelic Andrade MD

## 2018-03-05 NOTE — INTERDISCIPLINARY ROUNDS
Interdisciplinary team rounds were held 3/5/2018 with the following team members:Care Management, Diabetes Treatment Specialist, Nursing, Nutrition, Pharmacy, Physician and Respiratory Therapy. Plan of care discussed. See clinical pathway and/or care plan for interventions and desired outcomes.

## 2018-03-05 NOTE — PROGRESS NOTES
[facility]  Pulmonary, Critical Care, and Sleep Medicine    Name: Deyanira Soto MRN: [id number]   : 1950 Hospital: [facility]   Date: 3/5/2018          PCP: Mary Ellen Buck      IMPRESSION:   · Acute Hypercarbic and Hypoxic Respiratory Failure, on 2L oxygen at home. · COPD with acute exacerbation. · Tachycardia with HR in 140s. · Acute Encephalopathy    · Acute Hyponatremia, may be hypovolemia. · Risk of harming herself, Will need restraints to prevent accidental extubation or removal of CVC. · Anxiety, has anxiety at her baseline. · Depression  · Baseline Hypertension, on amlodipine, holding beta blocker for now. · Hx of seizures. · Hx of ischemic and C Diff colitis. · Hx of prior opioid addiction and to avoid opioids if at all possible per her sons request. She has a chronic opioid addiction and still seeks opioids per son. · Has ET tube, CVC placed in ER of her Right groin on 3/4/18. · Her son Adia is her surrogate decision Maker: pH: [phone], I met with him and his wife this am. He desires to be here in am and further discuss his mother's care. Spent 15 min discussing care with her son. We discussed adva  · Pt is critically ill, moderate to high risk of decompensation. Multiple organ failure. Will need ICU care and monitoring. Monitoring of heart rhythm. Discussed advanced directives, at this point he desires his mother to be full code. RECOMMENDATIONS:   · Vent support, tolerating SBT, extubate this am   · DC Sedation. · Can use tylenol for pain.    · On empiric Abx with Ceftriaxone and Azithromycin  · Taper Solumedrol   · Jet Nebs   · GI proph  · DVT prophylaxis  · CHG for oral care  · DVT prophylaxis      Subjective/History:     Awake, alert, following commands, tolerating SBT      Current Facility-Administered Medications   Medication Dose Route Frequency    0.9% sodium chloride infusion  100 mL/hr IntraVENous CONTINUOUS    sodium chloride (NS) flush 5-10 mL  5-10 mL IntraVENous Q8H    nicotine (NICODERM CQ) 14 mg/24 hr patch 1 Patch  1 Patch TransDERmal Q24H    enoxaparin (LOVENOX) injection 40 mg  40 mg SubCUTAneous Q24H    albuterol-ipratropium (DUO-NEB) 2.5 MG-0.5 MG/3 ML  3 mL Nebulization Q4H RT    methylPREDNISolone (PF) (SOLU-MEDROL) injection 40 mg  40 mg IntraVENous Q6H    cefTRIAXone (ROCEPHIN) 1 g/50 mL NS IVPB  1 g IntraVENous Q24H    aspirin (ASA) suppository 300 mg  300 mg Rectal DAILY    levETIRAcetam (KEPPRA) 500 mg in saline (iso-osm) 100 ml IVPB  500 mg IntraVENous Q12H    famotidine (PF) (PEPCID) injection 20 mg  20 mg IntraVENous Q12H    insulin lispro (HUMALOG) injection   SubCUTAneous Q6H    propofol (DIPRIVAN) infusion  0-50 mcg/kg/min IntraVENous TITRATE    mupirocin (BACTROBAN) 2 % ointment   Both Nostrils Q12H    chlorhexidine (PERIDEX) 0.12 % mouthwash 15 mL  15 mL Oral Q12H    azithromycin (ZITHROMAX) 500 mg in 0.9% sodium chloride (MBP/ADV) 250 mL  500 mg IntraVENous Q24H          Review of Systems:  Review of systems not obtained due to patient factors.     Objective:   Vital Signs:    Visit Vitals    /73    Pulse (!) 105    Temp 98.4 °F (36.9 °C)    Resp 21    Ht 5' 2\" (1.575 m)    Wt 76.6 kg (168 lb 14 oz)    SpO2 98%    Breastfeeding No    BMI 30.89 kg/m2       O2 Device: Endotracheal tube, Ventilator       Temp (24hrs), Av.2 °F (36.8 °C), Min:97.8 °F (36.6 °C), Max:98.6 °F (37 °C)       Intake/Output:   Last shift:      701 - 1900  In: -   Out: 150   Last 3 shifts: 1901 - 700  In: 1776.0 [I.V.:4866.2]  Out: 3075 [Urine:2725]    Intake/Output Summary (Last 24 hours) at 18 0808  Last data filed at 18 0707   Gross per 24 hour   Intake          3531.51 ml   Output             2015 ml   Net          1516.51 ml     Hemodynamics:   PAP:   CO:     Wedge:   CI:     CVP:    SVR:       PVR:       Ventilator Settings:  Mode Rate Tidal Volume Pressure FiO2 PEEP   Assist control   450 ml    40 % 6 cm H20     Peak airway pressure: 25 cm H2O    Minute ventilation: 7.84 l/min      Physical Exam:    General:  Intubated and awake, on mechanical vent support. no distress, appears stated age. Able to interact. Has some pain in throat. Head:  Normocephalic, without obvious abnormality, atraumatic. Eyes:  Conjunctivae/corneas clear. PERRL, EOMs intact. Nose: Nares normal. Septum midline. Mucosa normal. No drainage or sinus tenderness. Throat: Lips, mucosa, and tongue normal. Teeth and gums normal.   Neck: Supple, symmetrical, trachea midline, no adenopathy, thyroid: no enlargment/tenderness/nodules, no carotid bruit and no JVD. Back:   Not able to assess due to her medical condition. Lungs:   Decreased air movement. No Wheezing     Chest wall:  No tenderness or deformity. Heart:  Regular rate and rhythm, S1, S2 normal, no murmur, click, rub or gallop. Abdomen:   Soft, non-tender. Bowel sounds normal. No masses,  No organomegaly. Extremities: Extremities normal, atraumatic, no cyanosis or edema. Pulses: 2+ and symmetric all extremities.    Skin: Skin color, texture, turgor normal. No rashes or lesions   Lymph nodes: Cervical, supraclavicular nodes are normal.   Neurologic: Awake, non focal       Data:     Recent Results (from the past 24 hour(s))   GLUCOSE, POC    Collection Time: 03/04/18 11:35 AM   Result Value Ref Range    Glucose (POC) 154 (H) 65 - 100 mg/dL    Performed by [address] [place], [place] [address], URINE    Collection Time: 03/04/18 11:47 AM   Result Value Ref Range    AMPHETAMINES NEGATIVE  NEG      BARBITURATES NEGATIVE  NEG      BENZODIAZEPINES NEGATIVE  NEG      COCAINE NEGATIVE  NEG      METHADONE NEGATIVE  NEG      OPIATES NEGATIVE  NEG      PCP(PHENCYCLIDINE) NEGATIVE  NEG      THC (TH-CANNABINOL) NEGATIVE  NEG      Drug screen comment (NOTE)    GLUCOSE, POC    Collection Time: 03/04/18  5:35 PM   Result Value Ref Range    Glucose (POC) 158 (H) 65 - 100 mg/dL    Performed by Savana Betts    GLUCOSE, POC    Collection Time: 03/04/18 11:50 PM   Result Value Ref Range    Glucose (POC) 174 (H) 65 - 100 mg/dL    Performed by Silvio Qureshi    CBC WITH AUTOMATED DIFF    Collection Time: 03/05/18  3:41 AM   Result Value Ref Range    WBC 9.4 3.6 - 11.0 K/uL    RBC 3.00 (L) 3.80 - 5.20 M/uL    HGB 9.2 (L) 11.5 - 16.0 g/dL    HCT 29.1 (L) 35.0 - 47.0 %    MCV 97.0 80.0 - 99.0 FL    MCH 30.7 26.0 - 34.0 PG    MCHC 31.6 30.0 - 36.5 g/dL    RDW 13.2 11.5 - 14.5 %    PLATELET 820 171 - 994 K/uL    MPV 10.0 8.9 - 12.9 FL    NRBC 0.0 0  WBC    ABSOLUTE NRBC 0.00 0.00 - 0.01 K/uL    NEUTROPHILS 91 (H) 32 - 75 %    LYMPHOCYTES 4 (L) 12 - 49 %    MONOCYTES 4 (L) 5 - 13 %    EOSINOPHILS 0 0 - 7 %    BASOPHILS 0 0 - 1 %    IMMATURE GRANULOCYTES 1 (H) 0.0 - 0.5 %    ABS. NEUTROPHILS 8.5 (H) 1.8 - 8.0 K/UL    ABS. LYMPHOCYTES 0.4 (L) 0.8 - 3.5 K/UL    ABS. MONOCYTES 0.4 0.0 - 1.0 K/UL    ABS. EOSINOPHILS 0.0 0.0 - 0.4 K/UL    ABS. BASOPHILS 0.0 0.0 - 0.1 K/UL    ABS. IMM. GRANS. 0.1 (H) 0.00 - 0.04 K/UL    DF AUTOMATED      RBC COMMENTS NORMOCYTIC, NORMOCHROMIC     METABOLIC PANEL, COMPREHENSIVE    Collection Time: 03/05/18  3:41 AM   Result Value Ref Range    Sodium 138 136 - 145 mmol/L    Potassium 3.8 3.5 - 5.1 mmol/L    Chloride 101 97 - 108 mmol/L    CO2 31 21 - 32 mmol/L    Anion gap 6 5 - 15 mmol/L    Glucose 155 (H) 65 - 100 mg/dL    BUN 10 6 - 20 MG/DL    Creatinine 0.36 (L) 0.55 - 1.02 MG/DL    BUN/Creatinine ratio 28 (H) 12 - 20      GFR est AA >60 >60 ml/min/1.73m2    GFR est non-AA >60 >60 ml/min/1.73m2    Calcium 8.3 (L) 8.5 - 10.1 MG/DL    Bilirubin, total 0.4 0.2 - 1.0 MG/DL    ALT (SGPT) 16 12 - 78 U/L    AST (SGOT) 15 15 - 37 U/L    Alk.  phosphatase 85 45 - 117 U/L    Protein, total 6.1 (L) 6.4 - 8.2 g/dL    Albumin 2.6 (L) 3.5 - 5.0 g/dL    Globulin 3.5 2.0 - 4.0 g/dL    A-G Ratio 0.7 (L) 1.1 - 2.2     MAGNESIUM Collection Time: 03/05/18  3:41 AM   Result Value Ref Range    Magnesium 2.2 1.6 - 2.4 mg/dL   PHOSPHORUS    Collection Time: 03/05/18  3:41 AM   Result Value Ref Range    Phosphorus 1.6 (L) 2.6 - 4.7 MG/DL   BLOOD GAS, ARTERIAL    Collection Time: 03/05/18  6:15 AM   Result Value Ref Range    pH 7.45 7.35 - 7.45      PCO2 45 35.0 - 45.0 mmHg    PO2 104 (H) 80 - 100 mmHg    O2 SAT 98 (H) 92 - 97 %    BICARBONATE 30 (H) 22 - 26 mmol/L    BASE EXCESS 5.3 mmol/L    O2 METHOD VENTILATOR      FIO2 40 %    MODE A/C      Tidal volume 450      SET RATE 16      EPAP/CPAP/PEEP 6.0      Sample source ARTERIAL      SITE RIGHT RADIAL      SKYE'S TEST YES     GLUCOSE, POC    Collection Time: 03/05/18  6:22 AM   Result Value Ref Range    Glucose (POC) 167 (H) 65 - 100 mg/dL    Performed by [facility]              Telemetry: NSR    Imaging:  I have personally reviewed the patients radiographs and have reviewed the reports:  CXR: clear        Total critical care time exclusive of procedures:  minutes  Radha Browne MD

## 2018-03-05 NOTE — PROGRESS NOTES
SAT failed due to severe agitation. Put back to previous sedation medication. Difficult to sedate due to history of drug abuse.

## 2018-03-06 LAB
ANION GAP SERPL CALC-SCNC: 6 MMOL/L (ref 5–15)
BACTERIA SPEC CULT: ABNORMAL
BACTERIA SPEC CULT: ABNORMAL
BUN SERPL-MCNC: 8 MG/DL (ref 6–20)
BUN/CREAT SERPL: 19 (ref 12–20)
CALCIUM SERPL-MCNC: 9.1 MG/DL (ref 8.5–10.1)
CHLORIDE SERPL-SCNC: 92 MMOL/L (ref 97–108)
CO2 SERPL-SCNC: 36 MMOL/L (ref 21–32)
CREAT SERPL-MCNC: 0.42 MG/DL (ref 0.55–1.02)
ERYTHROCYTE [DISTWIDTH] IN BLOOD BY AUTOMATED COUNT: 13 % (ref 11.5–14.5)
GLUCOSE BLD STRIP.AUTO-MCNC: 134 MG/DL (ref 65–100)
GLUCOSE SERPL-MCNC: 135 MG/DL (ref 65–100)
GRAM STN SPEC: ABNORMAL
HCT VFR BLD AUTO: 35.8 % (ref 35–47)
HGB BLD-MCNC: 11.6 G/DL (ref 11.5–16)
MCH RBC QN AUTO: 30.9 PG (ref 26–34)
MCHC RBC AUTO-ENTMCNC: 32.4 G/DL (ref 30–36.5)
MCV RBC AUTO: 95.5 FL (ref 80–99)
NRBC # BLD: 0 K/UL (ref 0–0.01)
NRBC BLD-RTO: 0 PER 100 WBC
PHOSPHATE SERPL-MCNC: 2.2 MG/DL (ref 2.6–4.7)
PLATELET # BLD AUTO: 399 K/UL (ref 150–400)
PMV BLD AUTO: 9.7 FL (ref 8.9–12.9)
POTASSIUM SERPL-SCNC: 3.3 MMOL/L (ref 3.5–5.1)
RBC # BLD AUTO: 3.75 M/UL (ref 3.8–5.2)
SERVICE CMNT-IMP: ABNORMAL
SERVICE CMNT-IMP: ABNORMAL
SODIUM SERPL-SCNC: 134 MMOL/L (ref 136–145)
WBC # BLD AUTO: 14.1 K/UL (ref 3.6–11)

## 2018-03-06 PROCEDURE — 77030032490 HC SLV COMPR SCD KNE COVD -B

## 2018-03-06 PROCEDURE — 74011250636 HC RX REV CODE- 250/636: Performed by: INTERNAL MEDICINE

## 2018-03-06 PROCEDURE — 74011250637 HC RX REV CODE- 250/637: Performed by: INTERNAL MEDICINE

## 2018-03-06 PROCEDURE — 84100 ASSAY OF PHOSPHORUS: CPT | Performed by: INTERNAL MEDICINE

## 2018-03-06 PROCEDURE — 74011000250 HC RX REV CODE- 250: Performed by: HOSPITALIST

## 2018-03-06 PROCEDURE — G8978 MOBILITY CURRENT STATUS: HCPCS

## 2018-03-06 PROCEDURE — 80048 BASIC METABOLIC PNL TOTAL CA: CPT | Performed by: INTERNAL MEDICINE

## 2018-03-06 PROCEDURE — 85027 COMPLETE CBC AUTOMATED: CPT | Performed by: INTERNAL MEDICINE

## 2018-03-06 PROCEDURE — 77010033678 HC OXYGEN DAILY

## 2018-03-06 PROCEDURE — 36415 COLL VENOUS BLD VENIPUNCTURE: CPT | Performed by: INTERNAL MEDICINE

## 2018-03-06 PROCEDURE — 74011250637 HC RX REV CODE- 250/637: Performed by: HOSPITALIST

## 2018-03-06 PROCEDURE — 77030038269 HC DRN EXT URIN PURWCK BARD -A

## 2018-03-06 PROCEDURE — G8979 MOBILITY GOAL STATUS: HCPCS

## 2018-03-06 PROCEDURE — 65270000029 HC RM PRIVATE

## 2018-03-06 PROCEDURE — 74011250636 HC RX REV CODE- 250/636: Performed by: HOSPITALIST

## 2018-03-06 PROCEDURE — 94640 AIRWAY INHALATION TREATMENT: CPT

## 2018-03-06 PROCEDURE — 97161 PT EVAL LOW COMPLEX 20 MIN: CPT

## 2018-03-06 PROCEDURE — 82962 GLUCOSE BLOOD TEST: CPT

## 2018-03-06 PROCEDURE — 74011636637 HC RX REV CODE- 636/637: Performed by: INTERNAL MEDICINE

## 2018-03-06 PROCEDURE — 97116 GAIT TRAINING THERAPY: CPT

## 2018-03-06 RX ORDER — METOPROLOL TARTRATE 50 MG/1
50 TABLET ORAL EVERY 12 HOURS
Status: DISCONTINUED | OUTPATIENT
Start: 2018-03-06 | End: 2018-03-08 | Stop reason: HOSPADM

## 2018-03-06 RX ORDER — ONDANSETRON 2 MG/ML
4 INJECTION INTRAMUSCULAR; INTRAVENOUS
Status: DISCONTINUED | OUTPATIENT
Start: 2018-03-06 | End: 2018-03-06

## 2018-03-06 RX ORDER — AZITHROMYCIN 250 MG/1
250 TABLET, FILM COATED ORAL DAILY
Status: DISCONTINUED | OUTPATIENT
Start: 2018-03-06 | End: 2018-03-08

## 2018-03-06 RX ORDER — ONDANSETRON 4 MG/1
8 TABLET, ORALLY DISINTEGRATING ORAL
Status: DISCONTINUED | OUTPATIENT
Start: 2018-03-06 | End: 2018-03-08 | Stop reason: HOSPADM

## 2018-03-06 RX ORDER — LANOLIN ALCOHOL/MO/W.PET/CERES
3 CREAM (GRAM) TOPICAL
Status: DISCONTINUED | OUTPATIENT
Start: 2018-03-06 | End: 2018-03-07

## 2018-03-06 RX ORDER — POTASSIUM CHLORIDE 750 MG/1
40 TABLET, FILM COATED, EXTENDED RELEASE ORAL
Status: COMPLETED | OUTPATIENT
Start: 2018-03-06 | End: 2018-03-06

## 2018-03-06 RX ORDER — TOPIRAMATE 25 MG/1
50 TABLET ORAL
Status: DISCONTINUED | OUTPATIENT
Start: 2018-03-06 | End: 2018-03-08 | Stop reason: HOSPADM

## 2018-03-06 RX ORDER — PREDNISONE 20 MG/1
40 TABLET ORAL
Status: DISCONTINUED | OUTPATIENT
Start: 2018-03-06 | End: 2018-03-08 | Stop reason: HOSPADM

## 2018-03-06 RX ADMIN — PANTOPRAZOLE SODIUM 40 MG: 40 TABLET, DELAYED RELEASE ORAL at 07:34

## 2018-03-06 RX ADMIN — ONDANSETRON 8 MG: 4 TABLET, ORALLY DISINTEGRATING ORAL at 11:11

## 2018-03-06 RX ADMIN — IPRATROPIUM BROMIDE AND ALBUTEROL SULFATE 3 ML: .5; 3 SOLUTION RESPIRATORY (INHALATION) at 15:18

## 2018-03-06 RX ADMIN — Medication 10 ML: at 22:02

## 2018-03-06 RX ADMIN — METHYLPREDNISOLONE SODIUM SUCCINATE 40 MG: 40 INJECTION, POWDER, FOR SOLUTION INTRAMUSCULAR; INTRAVENOUS at 05:45

## 2018-03-06 RX ADMIN — POTASSIUM & SODIUM PHOSPHATES POWDER PACK 280-160-250 MG 1 PACKET: 280-160-250 PACK at 08:17

## 2018-03-06 RX ADMIN — DULOXETINE HYDROCHLORIDE 40 MG: 20 CAPSULE, DELAYED RELEASE ORAL at 08:17

## 2018-03-06 RX ADMIN — AMLODIPINE BESYLATE 10 MG: 5 TABLET ORAL at 08:17

## 2018-03-06 RX ADMIN — ONDANSETRON HYDROCHLORIDE 4 MG: 2 INJECTION, SOLUTION INTRAMUSCULAR; INTRAVENOUS at 05:45

## 2018-03-06 RX ADMIN — UMECLIDINIUM 1 PUFF: 62.5 AEROSOL, POWDER ORAL at 08:18

## 2018-03-06 RX ADMIN — IBUPROFEN 800 MG: 400 TABLET, FILM COATED ORAL at 01:27

## 2018-03-06 RX ADMIN — Medication 10 ML: at 05:45

## 2018-03-06 RX ADMIN — POTASSIUM & SODIUM PHOSPHATES POWDER PACK 280-160-250 MG 1 PACKET: 280-160-250 PACK at 18:32

## 2018-03-06 RX ADMIN — ENOXAPARIN SODIUM 40 MG: 40 INJECTION SUBCUTANEOUS at 16:38

## 2018-03-06 RX ADMIN — MUPIROCIN: 20 OINTMENT TOPICAL at 22:04

## 2018-03-06 RX ADMIN — LEVETIRACETAM 500 MG: 500 TABLET ORAL at 18:32

## 2018-03-06 RX ADMIN — POTASSIUM CHLORIDE 40 MEQ: 750 TABLET, FILM COATED, EXTENDED RELEASE ORAL at 08:17

## 2018-03-06 RX ADMIN — ONDANSETRON 8 MG: 4 TABLET, ORALLY DISINTEGRATING ORAL at 16:38

## 2018-03-06 RX ADMIN — IBUPROFEN 800 MG: 400 TABLET, FILM COATED ORAL at 16:38

## 2018-03-06 RX ADMIN — TOPIRAMATE 50 MG: 25 TABLET, FILM COATED ORAL at 08:17

## 2018-03-06 RX ADMIN — MUPIROCIN: 20 OINTMENT TOPICAL at 08:18

## 2018-03-06 RX ADMIN — IPRATROPIUM BROMIDE AND ALBUTEROL SULFATE 3 ML: .5; 3 SOLUTION RESPIRATORY (INHALATION) at 04:47

## 2018-03-06 RX ADMIN — PREDNISONE 40 MG: 20 TABLET ORAL at 11:11

## 2018-03-06 RX ADMIN — ASPIRIN 81 MG 81 MG: 81 TABLET ORAL at 08:17

## 2018-03-06 RX ADMIN — ONDANSETRON 8 MG: 4 TABLET, ORALLY DISINTEGRATING ORAL at 22:01

## 2018-03-06 RX ADMIN — IBUPROFEN 800 MG: 400 TABLET, FILM COATED ORAL at 22:01

## 2018-03-06 RX ADMIN — FLUTICASONE FUROATE AND VILANTEROL TRIFENATATE 1 PUFF: 100; 25 POWDER RESPIRATORY (INHALATION) at 08:18

## 2018-03-06 RX ADMIN — IPRATROPIUM BROMIDE AND ALBUTEROL SULFATE 3 ML: .5; 3 SOLUTION RESPIRATORY (INHALATION) at 19:48

## 2018-03-06 RX ADMIN — METOPROLOL TARTRATE 25 MG: 25 TABLET ORAL at 08:17

## 2018-03-06 RX ADMIN — AZITHROMYCIN 250 MG: 250 TABLET, FILM COATED ORAL at 16:38

## 2018-03-06 RX ADMIN — IPRATROPIUM BROMIDE AND ALBUTEROL SULFATE 3 ML: .5; 3 SOLUTION RESPIRATORY (INHALATION) at 08:57

## 2018-03-06 RX ADMIN — LEVETIRACETAM 500 MG: 500 TABLET ORAL at 08:17

## 2018-03-06 RX ADMIN — IBUPROFEN 800 MG: 400 TABLET, FILM COATED ORAL at 11:14

## 2018-03-06 RX ADMIN — IPRATROPIUM BROMIDE AND ALBUTEROL SULFATE 3 ML: .5; 3 SOLUTION RESPIRATORY (INHALATION) at 11:26

## 2018-03-06 RX ADMIN — MELATONIN 3 MG ORAL TABLET 9 MG: 3 TABLET ORAL at 22:00

## 2018-03-06 RX ADMIN — METOPROLOL TARTRATE 50 MG: 50 TABLET ORAL at 22:01

## 2018-03-06 RX ADMIN — Medication 10 ML: at 13:27

## 2018-03-06 NOTE — PROGRESS NOTES
Hospitalist Progress Note    NAME: Kath Bowen   :  1950   MRN:  [id number]       Assessment / Plan:  Severe anxiety/depression  -do not feel it safe to give short acting or long acting benzodiazepines. Discussed with Dr Jarrod Alegria pulmonary, whom sees patietn OP and does not recommend either  -patietn refuses to use wellbutrin, paxil, celexa - says \"they dont work\". -will give melatonin for sleep tonight  -suspect the steroids are playing a role here as well    Acute on chronic hypercapnic respiratory failure s/p extubation today  Acute COPD exacerbation  leukocytosis  -remain on o2 weaned as able  -remain on steroids  -empiric azithromycin  -remain on nebs  -breo to remain as was on  -tolerated extubation  -Consult pt/ot  -steroid induced leukocytosis    Acute encephalopathy likely related to hypercapnia resolved  -resolved    Hyponatremia  -down to 134 - suspect will continue to drop - check in AM  -could be pain induced, hypovolemia - will follow    Hypertension  -remain on ccb bb for now    History of seizure disorder  -remains on Keppra    Hypophosphatemia  -replaced    History of ischemic and C. difficile colitis    History of past opiate psychiatric medication abuse causing encephalopathy. Body mass index is 30.89 kg/(m^2). Code status: Full  Prophylaxis: Lovenox  Recommended Disposition: SNF/LTC     Subjective:     Chief Complaint / Reason for Physician Visit  \"i am anxious and need a medicine to work now\" patient was not anxious until transfer out of ICU. Not hypoxic above her ICU sats, not tachycardic above her vs in ICU. Just received information her daughter may be caught doing drugs, again.      Review of Systems:  Symptom Y/N Comments  Symptom Y/N Comments   Fever/Chills n   Chest Pain n    Poor Appetite n   Edema n    Cough    Abdominal Pain n    Sputum y   Joint Pain     SOB/DÍAZ y   Pruritis/Rash     Nausea/vomit    Tolerating PT/OT     Diarrhea n   Tolerating Diet y Constipation n   Other       Could NOT obtain due to:      Objective:     VITALS:   Last 24hrs VS reviewed since prior progress note. Most recent are:  Patient Vitals for the past 24 hrs:   Temp Pulse Resp BP SpO2   03/06/18 1100 - 94 22 139/88 92 %   03/06/18 1000 - 84 26 143/81 95 %   03/06/18 0900 - (!) 115 23 (!) 147/92 94 %   03/06/18 0857 - - - - 93 %   03/06/18 0800 97.8 °F (36.6 °C) (!) 104 24 155/82 95 %   03/06/18 0700 - (!) 102 25 149/81 96 %   03/06/18 0600 - (!) 107 26 142/76 95 %   03/06/18 0500 - (!) 101 23 136/76 97 %   03/06/18 0445 - - - - 95 %   03/06/18 0400 98 °F (36.7 °C) (!) 101 25 141/71 91 %   03/06/18 0300 - 100 25 135/72 95 %   03/06/18 0200 - 100 24 162/82 93 %   03/06/18 0100 - (!) 106 21 152/80 91 %   03/06/18 0001 97.7 °F (36.5 °C) 99 23 145/76 94 %   03/05/18 2356 - - - - 96 %   03/05/18 2300 - (!) 103 25 157/86 95 %   03/05/18 2200 - (!) 117 26 158/80 93 %   03/05/18 2100 - (!) 124 23 151/76 96 %   03/05/18 2000 97.7 °F (36.5 °C) (!) 127 28 159/82 93 %   03/05/18 1921 - - - - 95 %   03/05/18 1900 - (!) 121 28 153/75 92 %   03/05/18 1800 - (!) 117 27 (!) 158/96 96 %   03/05/18 1700 - (!) 116 27 161/89 97 %   03/05/18 1600 99.6 °F (37.6 °C) (!) 107 28 160/89 97 %   03/05/18 1553 - - - - 97 %   03/05/18 1500 - (!) 109 30 162/75 98 %   03/05/18 1400 - (!) 111 26 167/89 96 %   03/05/18 1300 - (!) 102 26 165/83 99 %   03/05/18 1200 98 °F (36.7 °C) (!) 104 26 150/79 98 %   03/05/18 1121 - - - - 100 %       Intake/Output Summary (Last 24 hours) at 03/06/18 1117  Last data filed at 03/06/18 0900   Gross per 24 hour   Intake              930 ml   Output             3320 ml   Net            -2390 ml        PHYSICAL EXAM:  General: +mod resp distress. Nc/at.     EENT:  Anicteric sclerae.  MM dry  Resp:  Remains with Poor air entry, wheezing +, +mild crackles  CV:  Regular  rhythm,  No edema  GI:  Soft, Non distended, Non tender.  +Bowel sounds  Neurologic:  Alert and awake  Psych:   Depressed, anxious. No HI/SI  Skin:  No rashes. No jaundice    Reviewed most current lab test results and cultures  YES  Reviewed most current radiology test results   YES  Review and summation of old records today    NO  Reviewed patient's current orders and MAR    YES  PMH/SH reviewed - no change compared to H&P    ________________________________________________________________________  Care Plan discussed with:    Comments   Patient y discused with aptient in room. Questions answered. 13   Family      RN y 5   Care Manager     Consultant  y Pulmonary 5                     Multidiciplinary team rounds were held today with , nursing, pharmacist and clinical coordinator. Patient's plan of care was discussed; medications were reviewed and discharge planning was addressed. ________________________________________________________________________  Total NON critical care TIME:  35   Minutes    Total CRITICAL CARE TIME Spent:   Minutes non procedure based      Comments   >50% of visit spent in counseling and coordination of care x See above   ________________________________________________________________________  Myriam Quick MD     Procedures: see electronic medical records for all procedures/Xrays and details which were not copied into this note but were reviewed prior to creation of Plan. LABS:  I reviewed today's most current labs and imaging studies.   Pertinent labs include:  Recent Labs      03/06/18   0354  03/05/18   0341  03/04/18   0334   WBC  14.1*  9.4  12.0*   HGB  11.6  9.2*  9.6*   HCT  35.8  29.1*  31.0*   PLT  399  306  316     Recent Labs      03/06/18   0354  03/05/18   0341  03/04/18   0334   03/03/18   1251   NA  134*  138  136   < >  130*   K  3.3*  3.8  4.2   < >  3.7   CL  92*  101  100   < >  95*   CO2  36*  31  27   < >  31   GLU  135*  155*  152*   < >  132*   BUN  8  10  9   < >  7   CREA  0.42*  0.36*  0.49*   < >  0.66   CA  9.1  8.3*  8.5   < >  8.9   MG   --   2.2  2.4 --    --    PHOS  2.2*  1.6*  1.7*   --    --    ALB   --   2.6*   --    --   3.4*   TBILI   --   0.4   --    --   0.4   SGOT   --   15   --    --   15   ALT   --   16   --    --   16    < > = values in this interval not displayed. Signed:  Leyla Arevalo MD

## 2018-03-06 NOTE — PROGRESS NOTES
Primary Nurse Jenny Washington RN and Lynette Waters RN performed a dual skin assessment on this patient No impairment noted  Stevie score is 20.

## 2018-03-06 NOTE — PROGRESS NOTES
TRANSFER - IN REPORT:    Verbal report received from Dee Sanon RN(name) on Ary Cee  being received from CCU(unit) for routine progression of care      Report consisted of patients Situation, Background, Assessment and   Recommendations(SBAR). Information from the following report(s) SBAR, Kardex, Procedure Summary, Intake/Output and MAR was reviewed with the receiving nurse. Opportunity for questions and clarification was provided. Assessment completed upon patients arrival to unit and care assumed.

## 2018-03-06 NOTE — PROGRESS NOTES
0700 Bedside and Verbal shift change report received from [address] (offgoing nurse). Report included the following information SBAR, Kardex, Intake/Output, MAR, Accordion and Recent Results. 8712 Dr. Lonita Frankel at bedside, planning to transfer patient to floor today. 0800 Assessment complete. See flowsheet for details. 1145 PT at bedside. 1200 Reassessment unchanged. Will continue to monitor. 1240 TRANSFER - OUT REPORT:    Verbal report given to Chantel Gonzalez RN (name) on Keerthi Ch being transferred to General Surgery (unit) for routine progression of care. Report consisted of patients Situation, Background, Assessment and   Recommendations(SBAR). Information from the following report(s) SBAR, Kardex, Intake/Output, MAR, Accordion and Recent Results was reviewed with the receiving nurse. Lines:   Peripheral IV 03/05/18 Left Forearm (Active)   Site Assessment Clean, dry, & intact 3/6/2018 12:00 PM   Phlebitis Assessment 0 3/6/2018 12:00 PM   Infiltration Assessment 0 3/6/2018 12:00 PM   Dressing Status Clean, dry, & intact 3/6/2018 12:00 PM   Dressing Type Tape;Transparent 3/6/2018 12:00 PM   Hub Color/Line Status Blue;Flushed 3/6/2018 12:00 PM        Opportunity for questions and clarification was provided. Patient transported with patient belongings and RN. 12 Patient's son called and updated on patient's transfer. Patient's son expressing concern regarding patient's need for a portable oxygen tank for the patient upon discharge. Will relay information to receiving RN.

## 2018-03-06 NOTE — PROGRESS NOTES
General Surgery End of Shift Nursing Note    Bedside shift change report given to Carmelo Patrick (oncoming nurse) by [employer] Obi Delvalle RN (offgoing nurse). Report included the following information SBAR, Kardex, Intake/Output, MAR and Recent Results. Shift worked:   7a to 3p   Summary of shift:    New patient to floor   Issues for physician to address:   none     Number times ambulated in hallway past shift: in room    Number of times OOB to chair past shift: 0    Pain Management:  Current medication: none  Patient states pain is manageable on current pain medication: YES    GI:    Current diet:  DIET DENTAL SOFT (SOFT SOLID)    Tolerating current diet: YES  Passing flatus: YES  Last Bowel Movement: today   Appearance: soft    Respiratory:    Incentive Spirometer at bedside: YES  Patient instructed on use: YES    Patient Safety:    Falls Score: 3  Bed Alarm On? Yes, first 24 hrs CCU transfer   Sitter?  No    Brianna Lott RN

## 2018-03-06 NOTE — PROGRESS NOTES
Interdisciplinary team rounds were held  3/6/2018  with the following team members:Care Management, Nursing, Nutrition, Pharmacy, Physical Therapy, Physician and Clinical Coordinator. Plan of care discussed. Goal: Adjust medications, continue to monitor and support. See MD orders and progress notes for further  interventions and desired outcomes.

## 2018-03-06 NOTE — PROGRESS NOTES
Problem: Mobility Impaired (Adult and Pediatric)  Goal: *Acute Goals and Plan of Care (Insert Text)  Physical Therapy Goals  Initiated 3/6/2018  1. Patient will move from supine to sit and sit to supine , scoot up and down and roll side to side in bed with supervision/set-up within 7 day(s). 2.  Patient will transfer from bed to chair and chair to bed with supervision/set-up using the least restrictive device within 7 day(s). 3.  Patient will perform sit to stand with supervision/set-up within 7 day(s). 4.  Patient will ambulate with supervision/set-up for 150 feet with the least restrictive device within 7 day(s). physical Therapy EVALUATION  Patient: Mel Ards (56 y.o. female)  Date: 3/6/2018  Primary Diagnosis: Acute on chronic respiratory failure with hypercapnia ([facility] [address]        Precautions:        ASSESSMENT :  Based on the objective data described below, the patient presents with generalized weakness, impaired balance, decreased endurance/activity tolerance, poor motivation, decreased insight and overall impaired functional mobility. Pt received supine in bed on 3 LPM O2, agreeable to participation with therapy. Pt required additional time and encouragement however assumed seated position EOB at supervision level. Remaining functional mobility occurred with CGA including sit<>stand transfer and ambulation trial with support of cardiac cart. Gait stability fair overall with pt exhibiting slow, shuffled gait pattern. Pt ambulated 60ft this date before fatigue and requesting return to supine position in bed. All mobility occurred on 3 LPM O2 with O2 sats 92% post activity however HR elevated to 128 BPM. Recommend continued 1 person assist during all OOB mobility/ambulation as well as use of rolling walker at this time.  Pending further progress with therapy, recommend  PT w/ 24hr supervision/assist at discharge (pt stated this date that she wanted to return home vs rehab)    Patient will benefit from skilled intervention to address the above impairments. Patients rehabilitation potential is considered to be Good  Factors which may influence rehabilitation potential include:   []         None noted  []         Mental ability/status  []         Medical condition  []         Home/family situation and support systems  []         Safety awareness  []         Pain tolerance/management  []         Other:      PLAN :  Recommendations and Planned Interventions:  [x]           Bed Mobility Training             []    Neuromuscular Re-Education  [x]           Transfer Training                   []    Orthotic/Prosthetic Training  [x]           Gait Training                         []    Modalities  [x]           Therapeutic Exercises           []    Edema Management/Control  [x]           Therapeutic Activities            [x]    Patient and Family Training/Education  []           Other (comment):    Frequency/Duration: Patient will be followed by physical therapy  4 times a week to address goals. Discharge Recommendations: [facility] PT w/ 24hr supervision of family  Further Equipment Recommendations for Discharge: Owns necessary equipment     SUBJECTIVE:   Patient stated I am apprehensive about sitting in the chair.     OBJECTIVE DATA SUMMARY:   HISTORY:    Past Medical History:   Diagnosis Date    Arthritis     Asthma     COPD     2 L NC    Hypertension     Ischemic colitis ([facility] [address] 2012    Migraines     Neurological disorder     migraines    Psychiatric disorder     depression    Seizures ([facility] [address]     Last seizure 4 years ago    Vaginal candidiasis 2012     Past Surgical History:   Procedure Laterality Date    HX APPENDECTOMY      HX  SECTION      HX CHOLECYSTECTOMY      HX HYSTERECTOMY      HX ORTHOPAEDIC      lumbar disc sx    HX ORTHOPAEDIC      left knee sx    HX OTHER SURGICAL      colonoscopy-recent colitis    PA COLONOSCOPY W/BIOPSY SINGLE/MULTIPLE  3/1/2012          Prior Level of Function/Home Situation: Pt lives at home w/ son, daughter-in-law, and multiple other family members and states that she has near 24hr supervision/assist (states her son works from home and assists her with mobility and ADLs as needed). Reports independence w/ household ambulation and use of SPC during community distance ambulation. History of 1 fall over previous 6 months. Wears 2 LPM home O2 at baseline. Does not drive. Personal factors and/or comorbidities impacting plan of care:     Home Situation  Home Environment: Private residence  # Steps to Enter: 4  Rails to Enter: Yes  Hand Rails : Right  Wheelchair Ramp: Yes  One/Two Story Residence: One story  Living Alone: No  Support Systems: Child(rex), Family member(s)  Patient Expects to be Discharged to[de-identified] Private residence  Current DME Used/Available at Home: Kell Halo, straight, Walker, rolling    EXAMINATION/PRESENTATION/DECISION MAKING:   Critical Behavior:  Neurologic State: Alert  Orientation Level: Oriented X4  Cognition: Follows commands     Hearing:   Auditory  Auditory Impairment: Hard of hearing, bilateral  Skin:  Intact  Edema:  None noted   Range Of Motion:  AROM: Generally decreased, functional                       Strength:    Strength: Generally decreased, functional                    Tone & Sensation:   Tone: Normal                              Coordination:  Coordination: Within functional limits  Vision:      Functional Mobility:  Bed Mobility:  Rolling: Supervision  Supine to Sit: Supervision  Sit to Supine: Supervision  Scooting: Supervision  Transfers:  Sit to Stand: Contact guard assistance  Stand to Sit: Contact guard assistance                       Balance:   Sitting: Intact  Standing: Impaired (impaired with and without support)  Standing - Static: Fair  Standing - Dynamic : Fair  Ambulation/Gait Training:  Distance (ft): 50 Feet (ft)  Assistive Device: Gait belt (cardiac cart)  Ambulation - Level of Assistance: Contact guard assistance Gait Abnormalities: Decreased step clearance;Shuffling gait        Base of Support: Widened     Speed/Rosario: Shuffled; Slow  Step Length: Left shortened;Right shortened                      Functional Measure:  Barthel Index:    Bathin  Bladder: 5  Bowels: 5  Groomin  Dressin  Feeding: 10  Mobility: 0  Stairs: 0  Toilet Use: 5  Transfer (Bed to Chair and Back): 10  Total: 40       Barthel and G-code impairment scale:  Percentage of impairment CH  0% CI  1-19% CJ  20-39% CK  40-59% CL  60-79% CM  80-99% CN  100%   Barthel Score 0-100 100 99-80 79-60 59-40 20-39 1-19   0   Barthel Score 0-20 20 17-19 13-16 9-12 5-8 1-4 0      The Barthel ADL Index: Guidelines  1. The index should be used as a record of what a patient does, not as a record of what a patient could do. 2. The main aim is to establish degree of independence from any help, physical or verbal, however minor and for whatever reason. 3. The need for supervision renders the patient not independent. 4. A patient's performance should be established using the best available evidence. Asking the patient, friends/relatives and nurses are the usual sources, but direct observation and common sense are also important. However direct testing is not needed. 5. Usually the patient's performance over the preceding 24-48 hours is important, but occasionally longer periods will be relevant. 6. Middle categories imply that the patient supplies over 50 per cent of the effort. 7. Use of aids to be independent is allowed. Jaz Uriostegui., Barthel DAkiWAki (3922). Functional evaluation: the Barthel Index. [address] [facility] (14)2. DENISHA George, Darek Ricketts., Mell Puente., Bonilla Vidal, [address] [place] [address] (). Measuring the change indisability after inpatient rehabilitation; comparison of the responsiveness of the Barthel Index and Functional [place] Measure. Journal of Neurology, Neurosurgery, and Psychiatry, 66(4), 583-512.   Kristin Burr, N.JWOJCIECH, Sonia op BRETT Joaquin, Stacie Eubanks M.A. (2004.) Assessment of post-stroke quality of life in cost-effectiveness studies: The usefulness of the Barthel Index and the EuroQoL-5D. Quality of Life Research, 13, 280-48       G codes: In compliance with CMSs Claims Based Outcome Reporting, the following G-code set was chosen for this patient based on their primary functional limitation being treated: The outcome measure chosen to determine the severity of the functional limitation was the Barthel Index with a score of 40/100 which was correlated with the impairment scale. ? Mobility - Walking and Moving Around:     - CURRENT STATUS: CK - 40%-59% impaired, limited or restricted    - GOAL STATUS: CI - 1%-19% impaired, limited or restricted    - D/C STATUS:  ---------------To be determined---------------      Physical Therapy Evaluation Charge Determination   History Examination Presentation Decision-Making   MEDIUM  Complexity : 1-2 comorbidities / personal factors will impact the outcome/ POC  MEDIUM Complexity : 3 Standardized tests and measures addressing body structure, function, activity limitation and / or participation in recreation  MEDIUM Complexity : Evolving with changing characteristics  MEDIUM Complexity : FOTO score of 26-74      Based on the above components, the patient evaluation is determined to be of the following complexity level: MEDIUM    Pain:  Pain Scale 1: Numeric (0 - 10)  Pain Intensity 1: 4  Pain Location 1: Head  Pain Orientation 1: Anterior  Pain Description 1: Aching  Pain Intervention(s) 1: Medication (see MAR)  Activity Tolerance: Tachycardic to 128 BPM with activity, O2 sats 92% on 3 LPM O2, all other VSS  Please refer to the flowsheet for vital signs taken during this treatment.   After treatment:   []         Patient left in no apparent distress sitting up in chair  [x]         Patient left in no apparent distress in bed  [x]         Call bell left within reach  [x] Nursing notified  []         Caregiver present  [x]         Bed alarm activated    COMMUNICATION/EDUCATION:   The patients plan of care was discussed with: Registered Nurse. [x]         Fall prevention education was provided and the patient/caregiver indicated understanding. [x]         Patient/family have participated as able in goal setting and plan of care. [x]         Patient/family agree to work toward stated goals and plan of care. []         Patient understands intent and goals of therapy, but is neutral about his/her participation. []         Patient is unable to participate in goal setting and plan of care.     Thank you for this referral.  Lady Antonio, PT, DPT   Time Calculation: 20 mins

## 2018-03-06 NOTE — PROGRESS NOTES
7:30 PM  Report received from Bre, [facility].    8:00 PM  Assessment completed. Pt A&OX4, follows commands, complains of 6/10 head pain, PRN Ibuprofen given. VSS, will continue to monitor. 12:15 AM  Reassessment completed, no changes noted. Will continue to monitor. 4:30 AM  Reassessment completed, no changes noted, will continue to monitor. Bedside shift change report given to [facility] (oncoming nurse) by [address] [facility] (offgoing nurse). Report included the following information SBAR, Kardex, ED Summary, Intake/Output, MAR, Accordion, Recent Results and Med Rec Status.

## 2018-03-06 NOTE — PROGRESS NOTES
[facility]  Pulmonary, Critical Care, and Sleep Medicine    Name: Douglas Dior MRN: [id number]   : 1950 Hospital: [address] [facility]   Date: 3/6/2018          PCP: Roya Mark Sleeper      IMPRESSION:   · Acute Hypercarbic and Hypoxic Respiratory Failure, on 2L oxygen at home. · COPD with acute exacerbation. · Tachycardia with HR in 140s. · Acute Encephalopathy    · Acute Hyponatremia, may be hypovolemia. · Risk of harming herself, Will need restraints to prevent accidental extubation or removal of CVC. · Anxiety, has anxiety at her baseline. · Depression  · Baseline Hypertension, on amlodipine, holding beta blocker for now. · Hx of seizures. · Hx of ischemic and C Diff colitis. · Hx of prior opioid addiction and to avoid opioids if at all possible per her sons request. She has a chronic opioid addiction and still seeks opioids per son. · Has ET tube, CVC placed in ER of her Right groin on 3/4/18. · Her son Adia is her surrogate decision Maker: pH: [phone], I met with him and his wife this am. He desires to be here in am and further discuss his mother's care. Spent 15 min discussing care with her son. We discussed adva  · Pt is critically ill, moderate to high risk of decompensation. Multiple organ failure. Will need ICU care and monitoring. Monitoring of heart rhythm. Discussed advanced directives, at this point he desires his mother to be full code. RECOMMENDATIONS:   · Wean O2   · Can use tylenol for pain.    · Add topamax for migraines  · On empiric Abx   · Taper steroids  · Replete K  · Jet Nebs   · GI proph  · DVT prophylaxis  · Advance diet  · Mobilize  · Transfer to floor today      Subjective/History:     Awake, alert, following commands, no acute complaints  No acute distress      Current Facility-Administered Medications   Medication Dose Route Frequency    potassium chloride SR (KLOR-CON 10) tablet 40 mEq  40 mEq Oral NOW  topiramate (TOPAMAX) tablet 50 mg  50 mg Oral DAILY WITH BREAKFAST    umeclidinium (INCRUSE ELLIPTA) 62.5 mcg/actuation  1 Puff Inhalation DAILY    fluticasone-vilanterol (BREO ELLIPTA) 100mcg-25mcg/puff  1 Puff Inhalation DAILY    aspirin chewable tablet 81 mg  81 mg Oral DAILY    metoprolol tartrate (LOPRESSOR) tablet 25 mg  25 mg Oral Q12H    levETIRAcetam (KEPPRA) tablet 500 mg  500 mg Oral BID    DULoxetine (CYMBALTA) capsule 40 mg  40 mg Oral DAILY    potassium, sodium phosphates (NEUTRA-PHOS) packet 1 Packet  1 Packet Oral BID    pantoprazole (PROTONIX) tablet 40 mg  40 mg Oral ACB    melatonin tablet 9 mg  9 mg Oral QHS    amLODIPine (NORVASC) tablet 10 mg  10 mg Oral DAILY    sodium chloride (NS) flush 5-10 mL  5-10 mL IntraVENous Q8H    nicotine (NICODERM CQ) 14 mg/24 hr patch 1 Patch  1 Patch TransDERmal Q24H    enoxaparin (LOVENOX) injection 40 mg  40 mg SubCUTAneous Q24H    albuterol-ipratropium (DUO-NEB) 2.5 MG-0.5 MG/3 ML  3 mL Nebulization Q4H RT    insulin lispro (HUMALOG) injection   SubCUTAneous Q6H    mupirocin (BACTROBAN) 2 % ointment   Both Nostrils Q12H    azithromycin (ZITHROMAX) 500 mg in 0.9% sodium chloride (MBP/ADV) 250 mL  500 mg IntraVENous Q24H          Review of Systems:  Review of systems not obtained due to patient factors.     Objective:   Vital Signs:    Visit Vitals    /81    Pulse (!) 102    Temp 98 °F (36.7 °C)    Resp 25    Ht 5' 2\" (1.575 m)    Wt 76.6 kg (168 lb 14 oz)    SpO2 96%    Breastfeeding No    BMI 30.89 kg/m2       O2 Device: Nasal cannula   O2 Flow Rate (L/min): 3 l/min   Temp (24hrs), Av.4 °F (36.9 °C), Min:97.7 °F (36.5 °C), Max:99.6 °F (37.6 °C)       Intake/Output:   Last shift:         Last 3 shifts:  1901 -  0700  In: 3046.9 [P.O.:360; I.V.:2686.9]  Out: 0224 [Urine:4395]    Intake/Output Summary (Last 24 hours) at 18 0742  Last data filed at 18 0408   Gross per 24 hour   Intake          1287.16 ml Output             3545 ml   Net         -2257.84 ml     Hemodynamics:   PAP:   CO:     Wedge:   CI:     CVP:    SVR:       PVR:       Ventilator Settings:  Mode Rate Tidal Volume Pressure FiO2 PEEP   Assist control   450 ml    40 % 6 cm H20     Peak airway pressure: 25 cm H2O    Minute ventilation: 7.84 l/min      Physical Exam:    General:  Awake. No distress, appears stated age. Head:  Normocephalic, without obvious abnormality, atraumatic. Eyes:  Conjunctivae/corneas clear. PERRL, EOMs intact. Nose: Nares normal. Septum midline. Mucosa normal. No drainage or sinus tenderness. Throat: Lips, mucosa, and tongue normal. Teeth and gums normal.   Neck: Supple, symmetrical, trachea midline, no adenopathy, thyroid: no enlargment/tenderness/nodules, no carotid bruit and no JVD. Back:   Not able to assess due to her medical condition. Lungs:   Decreased air movement. No Wheezing     Chest wall:  No tenderness or deformity. Heart:  Regular rate and rhythm, S1, S2 normal, no murmur, click, rub or gallop. Abdomen:   Soft, non-tender. Bowel sounds normal. No masses,  No organomegaly. Extremities: Extremities normal, atraumatic, no cyanosis or edema. Pulses: 2+ and symmetric all extremities.    Skin: Skin color, texture, turgor normal. No rashes or lesions   Lymph nodes: Cervical, supraclavicular nodes are normal.   Neurologic: Awake, non focal       Data:     Recent Results (from the past 24 hour(s))   GLUCOSE, POC    Collection Time: 03/05/18 12:11 PM   Result Value Ref Range    Glucose (POC) 156 (H) 65 - 100 mg/dL    Performed by Jonathan Puckett    GLUCOSE, POC    Collection Time: 03/05/18  5:39 PM   Result Value Ref Range    Glucose (POC) 193 (H) 65 - 100 mg/dL    Performed by Jonathan Puckett    GLUCOSE, POC    Collection Time: 03/05/18 11:04 PM   Result Value Ref Range    Glucose (POC) 144 (H) 65 - 100 mg/dL    Performed by [address], BASIC    Collection Time: 03/06/18  3:54 AM Result Value Ref Range    Sodium 134 (L) 136 - 145 mmol/L    Potassium 3.3 (L) 3.5 - 5.1 mmol/L    Chloride 92 (L) 97 - 108 mmol/L    CO2 36 (H) 21 - 32 mmol/L    Anion gap 6 5 - 15 mmol/L    Glucose 135 (H) 65 - 100 mg/dL    BUN 8 6 - 20 MG/DL    Creatinine 0.42 (L) 0.55 - 1.02 MG/DL    BUN/Creatinine ratio 19 12 - 20      GFR est AA >60 >60 ml/min/1.73m2    GFR est non-AA >60 >60 ml/min/1.73m2    Calcium 9.1 8.5 - 10.1 MG/DL   CBC W/O DIFF    Collection Time: 03/06/18  3:54 AM   Result Value Ref Range    WBC 14.1 (H) 3.6 - 11.0 K/uL    RBC 3.75 (L) 3.80 - 5.20 M/uL    HGB 11.6 11.5 - 16.0 g/dL    HCT 35.8 35.0 - 47.0 %    MCV 95.5 80.0 - 99.0 FL    MCH 30.9 26.0 - 34.0 PG    MCHC 32.4 30.0 - 36.5 g/dL    RDW 13.0 11.5 - 14.5 %    PLATELET 568 255 - 131 K/uL    MPV 9.7 8.9 - 12.9 FL    NRBC 0.0 0  WBC    ABSOLUTE NRBC 0.00 0.00 - 0.01 K/uL   PHOSPHORUS    Collection Time: 03/06/18  3:54 AM   Result Value Ref Range    Phosphorus 2.2 (L) 2.6 - 4.7 MG/DL   GLUCOSE, POC    Collection Time: 03/06/18  5:44 AM   Result Value Ref Range    Glucose (POC) 134 (H) 65 - 100 mg/dL    Performed by Venus Patton              Telemetry: NSR    Imaging:  I have personally reviewed the patients radiographs and have reviewed the reports:           Total critical care time exclusive of procedures:  minutes  Radha Browne MD

## 2018-03-07 LAB
ANION GAP SERPL CALC-SCNC: 7 MMOL/L (ref 5–15)
BASOPHILS # BLD: 0 K/UL (ref 0–0.1)
BASOPHILS NFR BLD: 0 % (ref 0–1)
BUN SERPL-MCNC: 18 MG/DL (ref 6–20)
BUN/CREAT SERPL: 28 (ref 12–20)
CALCIUM SERPL-MCNC: 8.8 MG/DL (ref 8.5–10.1)
CHLORIDE SERPL-SCNC: 94 MMOL/L (ref 97–108)
CO2 SERPL-SCNC: 30 MMOL/L (ref 21–32)
CREAT SERPL-MCNC: 0.65 MG/DL (ref 0.55–1.02)
DIFFERENTIAL METHOD BLD: ABNORMAL
EOSINOPHIL # BLD: 0.4 K/UL (ref 0–0.4)
EOSINOPHIL NFR BLD: 3 % (ref 0–7)
ERYTHROCYTE [DISTWIDTH] IN BLOOD BY AUTOMATED COUNT: 12.9 % (ref 11.5–14.5)
GLUCOSE SERPL-MCNC: 106 MG/DL (ref 65–100)
HCT VFR BLD AUTO: 37.4 % (ref 35–47)
HGB BLD-MCNC: 12 G/DL (ref 11.5–16)
IMM GRANULOCYTES # BLD: 0 K/UL (ref 0–0.04)
IMM GRANULOCYTES NFR BLD AUTO: 0 % (ref 0–0.5)
LYMPHOCYTES # BLD: 2.8 K/UL (ref 0.8–3.5)
LYMPHOCYTES NFR BLD: 23 % (ref 12–49)
MAGNESIUM SERPL-MCNC: 2.1 MG/DL (ref 1.6–2.4)
MCH RBC QN AUTO: 30.4 PG (ref 26–34)
MCHC RBC AUTO-ENTMCNC: 32.1 G/DL (ref 30–36.5)
MCV RBC AUTO: 94.7 FL (ref 80–99)
METAMYELOCYTES NFR BLD MANUAL: 2 %
MONOCYTES # BLD: 1.2 K/UL (ref 0–1)
MONOCYTES NFR BLD: 10 % (ref 5–13)
NEUTS SEG # BLD: 7.4 K/UL (ref 1.8–8)
NEUTS SEG NFR BLD: 62 % (ref 32–75)
NRBC # BLD: 0 K/UL (ref 0–0.01)
NRBC BLD-RTO: 0 PER 100 WBC
PHOSPHATE SERPL-MCNC: 3.3 MG/DL (ref 2.6–4.7)
PLATELET # BLD AUTO: 391 K/UL (ref 150–400)
PMV BLD AUTO: 9.5 FL (ref 8.9–12.9)
POTASSIUM SERPL-SCNC: 3.9 MMOL/L (ref 3.5–5.1)
RBC # BLD AUTO: 3.95 M/UL (ref 3.8–5.2)
RBC MORPH BLD: ABNORMAL
SODIUM SERPL-SCNC: 131 MMOL/L (ref 136–145)
WBC # BLD AUTO: 12 K/UL (ref 3.6–11)

## 2018-03-07 PROCEDURE — 85025 COMPLETE CBC W/AUTO DIFF WBC: CPT | Performed by: INTERNAL MEDICINE

## 2018-03-07 PROCEDURE — 74011000250 HC RX REV CODE- 250: Performed by: INTERNAL MEDICINE

## 2018-03-07 PROCEDURE — 94640 AIRWAY INHALATION TREATMENT: CPT

## 2018-03-07 PROCEDURE — 83735 ASSAY OF MAGNESIUM: CPT | Performed by: INTERNAL MEDICINE

## 2018-03-07 PROCEDURE — 74011000250 HC RX REV CODE- 250: Performed by: HOSPITALIST

## 2018-03-07 PROCEDURE — 36415 COLL VENOUS BLD VENIPUNCTURE: CPT | Performed by: INTERNAL MEDICINE

## 2018-03-07 PROCEDURE — 77030038269 HC DRN EXT URIN PURWCK BARD -A

## 2018-03-07 PROCEDURE — 74011636637 HC RX REV CODE- 636/637: Performed by: INTERNAL MEDICINE

## 2018-03-07 PROCEDURE — 80048 BASIC METABOLIC PNL TOTAL CA: CPT | Performed by: INTERNAL MEDICINE

## 2018-03-07 PROCEDURE — 97116 GAIT TRAINING THERAPY: CPT | Performed by: PHYSICAL THERAPIST

## 2018-03-07 PROCEDURE — 77010033678 HC OXYGEN DAILY

## 2018-03-07 PROCEDURE — 97530 THERAPEUTIC ACTIVITIES: CPT | Performed by: PHYSICAL THERAPIST

## 2018-03-07 PROCEDURE — 74011250637 HC RX REV CODE- 250/637: Performed by: INTERNAL MEDICINE

## 2018-03-07 PROCEDURE — 74011250637 HC RX REV CODE- 250/637: Performed by: HOSPITALIST

## 2018-03-07 PROCEDURE — 74011250636 HC RX REV CODE- 250/636: Performed by: HOSPITALIST

## 2018-03-07 PROCEDURE — 84100 ASSAY OF PHOSPHORUS: CPT | Performed by: INTERNAL MEDICINE

## 2018-03-07 PROCEDURE — 65270000029 HC RM PRIVATE

## 2018-03-07 RX ORDER — IPRATROPIUM BROMIDE AND ALBUTEROL SULFATE 2.5; .5 MG/3ML; MG/3ML
3 SOLUTION RESPIRATORY (INHALATION)
Status: DISCONTINUED | OUTPATIENT
Start: 2018-03-07 | End: 2018-03-08 | Stop reason: HOSPADM

## 2018-03-07 RX ADMIN — ONDANSETRON 8 MG: 4 TABLET, ORALLY DISINTEGRATING ORAL at 21:49

## 2018-03-07 RX ADMIN — TOPIRAMATE 50 MG: 25 TABLET, FILM COATED ORAL at 08:38

## 2018-03-07 RX ADMIN — FLUTICASONE FUROATE AND VILANTEROL TRIFENATATE 1 PUFF: 100; 25 POWDER RESPIRATORY (INHALATION) at 15:51

## 2018-03-07 RX ADMIN — ONDANSETRON 8 MG: 4 TABLET, ORALLY DISINTEGRATING ORAL at 04:25

## 2018-03-07 RX ADMIN — IPRATROPIUM BROMIDE AND ALBUTEROL SULFATE 3 ML: .5; 3 SOLUTION RESPIRATORY (INHALATION) at 08:24

## 2018-03-07 RX ADMIN — LEVETIRACETAM 500 MG: 500 TABLET ORAL at 08:39

## 2018-03-07 RX ADMIN — BISACODYL 10 MG: 10 SUPPOSITORY RECTAL at 15:47

## 2018-03-07 RX ADMIN — METOPROLOL TARTRATE 50 MG: 50 TABLET ORAL at 08:38

## 2018-03-07 RX ADMIN — POTASSIUM & SODIUM PHOSPHATES POWDER PACK 280-160-250 MG 1 PACKET: 280-160-250 PACK at 08:40

## 2018-03-07 RX ADMIN — PREDNISONE 40 MG: 20 TABLET ORAL at 08:39

## 2018-03-07 RX ADMIN — Medication: at 21:35

## 2018-03-07 RX ADMIN — METOPROLOL TARTRATE 50 MG: 50 TABLET ORAL at 21:34

## 2018-03-07 RX ADMIN — AMLODIPINE BESYLATE 10 MG: 5 TABLET ORAL at 08:39

## 2018-03-07 RX ADMIN — IPRATROPIUM BROMIDE AND ALBUTEROL SULFATE 3 ML: .5; 3 SOLUTION RESPIRATORY (INHALATION) at 19:24

## 2018-03-07 RX ADMIN — Medication 10 ML: at 21:35

## 2018-03-07 RX ADMIN — ASPIRIN 81 MG 81 MG: 81 TABLET ORAL at 08:39

## 2018-03-07 RX ADMIN — MUPIROCIN: 20 OINTMENT TOPICAL at 21:34

## 2018-03-07 RX ADMIN — IPRATROPIUM BROMIDE AND ALBUTEROL SULFATE 3 ML: .5; 3 SOLUTION RESPIRATORY (INHALATION) at 00:49

## 2018-03-07 RX ADMIN — UMECLIDINIUM 1 PUFF: 62.5 AEROSOL, POWDER ORAL at 15:51

## 2018-03-07 RX ADMIN — AZITHROMYCIN 250 MG: 250 TABLET, FILM COATED ORAL at 15:47

## 2018-03-07 RX ADMIN — ENOXAPARIN SODIUM 40 MG: 40 INJECTION SUBCUTANEOUS at 15:53

## 2018-03-07 RX ADMIN — IPRATROPIUM BROMIDE AND ALBUTEROL SULFATE 3 ML: .5; 3 SOLUTION RESPIRATORY (INHALATION) at 14:30

## 2018-03-07 RX ADMIN — IBUPROFEN 800 MG: 400 TABLET, FILM COATED ORAL at 16:15

## 2018-03-07 RX ADMIN — PANTOPRAZOLE SODIUM 40 MG: 40 TABLET, DELAYED RELEASE ORAL at 06:47

## 2018-03-07 RX ADMIN — DULOXETINE HYDROCHLORIDE 40 MG: 20 CAPSULE, DELAYED RELEASE ORAL at 08:38

## 2018-03-07 RX ADMIN — MELATONIN 3 MG ORAL TABLET 9 MG: 3 TABLET ORAL at 21:34

## 2018-03-07 RX ADMIN — LEVETIRACETAM 500 MG: 500 TABLET ORAL at 16:15

## 2018-03-07 RX ADMIN — ONDANSETRON 8 MG: 4 TABLET, ORALLY DISINTEGRATING ORAL at 10:18

## 2018-03-07 RX ADMIN — Medication 10 ML: at 04:33

## 2018-03-07 RX ADMIN — IBUPROFEN 800 MG: 400 TABLET, FILM COATED ORAL at 04:25

## 2018-03-07 NOTE — PROGRESS NOTES
Interdisciplinary Rounds were completed on this patient. Rounds included nursing, clinical care leader, pharmacy, and case management. Patient was doing well without problems. Patient had the following concerns: headache.      Goals for the day will include: continue RX as is, case management to set up home health (patient declines SNF)

## 2018-03-07 NOTE — PROGRESS NOTES
Problem: Mobility Impaired (Adult and Pediatric)  Goal: *Acute Goals and Plan of Care (Insert Text)  Physical Therapy Goals  Initiated 3/6/2018  1. Patient will move from supine to sit and sit to supine , scoot up and down and roll side to side in bed with supervision/set-up within 7 day(s). 2.  Patient will transfer from bed to chair and chair to bed with supervision/set-up using the least restrictive device within 7 day(s). 3.  Patient will perform sit to stand with supervision/set-up within 7 day(s). 4.  Patient will ambulate with supervision/set-up for 150 feet with the least restrictive device within 7 day(s). physical Therapy TREATMENT  Seen 1037 to 1100      Patient: Lilia Paula (76 y.o. female)  Date: 3/7/2018   Diagnosis: Acute on chronic respiratory failure with hypercapnia (HCC) COPD exacerbation ([facility]  Precautions: Falls  Chart, physical therapy assessment, plan of care and goals were reviewed. ASSESSMENT:  Chart reviewed and cleared by nurse to see for PT session. Patient was willing to work with PT. Supervision for bed mobiilty and coming to sit. Good sitting balance at bedside, on BSC and in the chair. Did a few LE exercises prior to getting up OOB. Stated that she needed to use the bathroom. Assisted to the [facility]. Able to void. Had lots of gas, but not BM. Upon standing from the [facility], she stated that she needed to sit down. Assisted to the chair (about 5') with HHA x 2. Slightly SOB once in the chair. Encouraged PLB to catch her breath. At this point she stated that she didn't feel like she could do anymore this morning. VSS. Nurse made aware. Will benefit from SNF at discharge.   Progression toward goals:  []    Improving appropriately and progressing toward goals  [x]    Improving slowly and progressing toward goals  []    Not making progress toward goals and plan of care will be adjusted     PLAN:  Patient continues to benefit from skilled intervention to address the above impairments. Continue treatment per established plan of care. Discharge Recommendations:  Skilled Nursing Facility  Further Equipment Recommendations for Discharge:  Defer to SNF rehab     SUBJECTIVE:   Patient stated I need to go to the bathroom.     OBJECTIVE DATA SUMMARY:   Critical Behavior:  Neurologic State: Alert  Orientation Level: Oriented X4  Cognition: Follows commands     Functional Mobility Training:  Bed Mobility:  Rolling: Supervision  Supine to Sit: Supervision  Scooting: Independent     Transfers:  Sit to Stand: Contact guard assistance  Stand to Sit: Contact guard assistance        Balance:  Sitting: Intact  Standing: Impaired  Standing - Static: Fair  Standing - Dynamic : Fair    Ambulation/Gait Training:  Distance (ft): 5 Feet (ft)  Assistive Device: Gait belt (HHA x 2)  Ambulation - Level of Assistance: Minimal assistance;Assist x2  Gait Abnormalities: Decreased step clearance  Base of Support: Widened  Speed/Rosario: Shuffled  Step Length: Left shortened;Right shortened     Therapeutic Exercises: Toe wiggles, ankle pumps, heel slides, marching and long arc quads. Encouraged to do in the chair also. Pain:  Pain Scale 1: Numeric (0 - 10)  Pain Intensity 1: 5  Pain Location 1: Head    Activity Tolerance: Tolerated PT session fair. Generally weak and with a low tolerance for activity at this time. Please refer to the flowsheet for vital signs taken during this treatment.   After treatment:   [x]    Patient left in no apparent distress sitting up in chair  []    Patient left in no apparent distress in bed  [x]    Call bell left within reach  [x]    Nursing notified  []    Caregiver present  []    Bed alarm activated (in chair, but wasn't connected to anything when in the bed)-nurse aware    COMMUNICATION/COLLABORATION:   The patients plan of care was discussed with: Registered Nurse    Juan Owen, PT  Time Calculation: 23 mins

## 2018-03-07 NOTE — PROGRESS NOTES
This CM talked with patient regarding [facility] with 24/7 supervision and SNF. Patient does not have the resources to cover a home health aid to provide 24/7 care and refuses to go to a SNF. Patient did say that she had [address] before and would like to use them again. CM waiting for today's PT note and will continue to work with patient to provide a safe discharge.     Naz Duran  Ext [phone]

## 2018-03-07 NOTE — PROGRESS NOTES
ADULT PROTOCOL: JET AEROSOL ASSESSMENT    Patient  Cheryl Dempsey     74 y.o.   female     3/7/2018  3:33 AM    Breath Sounds Pre Procedure: Right Breath Sounds: Diminished                               Left Breath Sounds: Diminished    Breath Sounds Post Procedure: Right Breath Sounds: Diminished                                 Left Breath Sounds: Diminished    Breathing pattern: Pre procedure Breathing Pattern: Regular          Post procedure Breathing Pattern: Regular    Heart Rate: Pre procedure Pulse: 67           Post procedure Pulse: 70    Resp Rate: Pre procedure Respirations: 16           Post procedure Respirations: 16    Peak Flow: Pre bronchodilator   n/a          Post bronchodilator   n/a    Incentive Spirometry:   n/a      n/a    Cough: Pre procedure Cough: Non-productive               Post procedure Cough: Non-productive    Sputum: Pre procedure  n/a                 Post procedure  n/a    Oxygen: O2 Device: Nasal cannula   Flow rate (L/min) 3     Changed: NO    SpO2: Pre procedure SpO2: 99 %   with oxygen              Post procedure SpO2: 99 %  with oxygen    Nebulizer Therapy: Current medications Aerosolized Medications: DuoNeb      Changed: YES    Problem List:   Patient Active Problem List   Diagnosis Code    COPD exacerbation ([facility] J44.1    Seizure disorder ([facility] G40.909    Osteopenia M85.80    Fibromyalgia M79.7    Essential hypertension, benign I10    Depression F32.9    Nephrolithiasis N20.0    Sciatica M54.30    Encephalopathy, unspecified G93.40    Acute respiratory failure (HCC) J96.00    Colitis K52.9    [facility]F [facility]acute renal failure) ([facility] N17.9    Hypomagnesemia E83.42    Chronic pain G89.29    Recurrent falls R29.6    Sepsis ([facility]) A41.9    Hyponatremia E87.1    Dilantin toxicity T42.0X1A    Rhabdomyolysis M62.82    Polypharmacy Z79.899    Bacteremia due to Escherichia coli R78.81    Drug overdose T50.901A    Closed right ankle fracture S82.891A    Acute on chronic respiratory failure with hypercapnia ([facility] J96.22    Acute encephalopathy G93.40       Respiratory Therapist: Brandon Antony RT

## 2018-03-07 NOTE — PROGRESS NOTES
Hospitalist Progress Note    NAME: Douglas Dior   :  1950   MRN:  [id number]     Assessment / Plan:  Hyponatremia  -down to 131 from 134 and normal prior to that  -no chagne to medications other than changing to PO from IV steroids  -not on diuretics  -will fluid restrict, I/O show net -2000 loss this admission.  -normal renal function  -UO adequate  -could be element of SIADH. If with fluid restriction she drops further will enlist help of nephrology    Severe anxiety/depression  -discussed with pulmonary - no additional meds at this time - not SI, no HI. She needs to see her therapist OP whom will determine next steps given her \"resistance\" to medicines as well her moderate to severe COPD  -tolerated melatonin overnight  -suspect the steroids are playing a role here as well - psychosis and possibly anxiety  -she slept better overnight seems less anxious today    Acute on chronic hypercapnic respiratory failure s/p extubation today  Acute COPD exacerbation  leukocytosis  -remain on o2 weaned as able  -remain on steroids, change to PO  -empiric azithromycin continues  -remain on nebs  -breo to remain as was on  -working with PT OT today  -steroid induced leukocytosis    Acute encephalopathy likely related to hypercapnia resolved  -resolved    Hypertension  -remain on ccb bb for now    History of seizure disorder  -remains on Keppra    Hypophosphatemia  -replaced    History of ischemic and C. difficile colitis    History of past opiate psychiatric medication abuse causing encephalopathy. Body mass index is 30.89 kg/(m^2). Code status: Full  Prophylaxis: Lovenox  Recommended Disposition: SNF/LTC     Subjective:     Chief Complaint / Reason for Physician Visit  \"i am better today\"  Tolerated melatonin and slept overnight.  Still sob but improved today with increased energy    Review of Systems:  Symptom Y/N Comments  Symptom Y/N Comments   Fever/Chills n   Chest Pain n    Poor Appetite n   Edema n Cough y   Abdominal Pain n    Sputum y   Joint Pain     SOB/DÍAZ y   Pruritis/Rash n    Nausea/vomit n   Tolerating PT/OT     Diarrhea n   Tolerating Diet y    Constipation n   Other       Could NOT obtain due to:      Objective:     VITALS:   Last 24hrs VS reviewed since prior progress note. Most recent are:  Patient Vitals for the past 24 hrs:   Temp Pulse Resp BP SpO2   03/07/18 0350 98 °F (36.7 °C) 67 18 133/72 99 %   03/07/18 0049 - - - - 99 %   03/06/18 2339 98.3 °F (36.8 °C) 72 18 128/76 99 %   03/06/18 1948 - - - - 96 %   03/06/18 1527 - - - - 93 %   03/06/18 1327 97.5 °F (36.4 °C) (!) 104 20 (!) 153/92 93 %   03/06/18 1230 - - - 134/68 -   03/06/18 1200 98 °F (36.7 °C) (!) 101 24 - 95 %   03/06/18 1152 - (!) 104 - 143/81 94 %   03/06/18 1127 - - - - 94 %   03/06/18 1100 - 94 22 139/88 92 %   03/06/18 1000 - 84 26 143/81 95 %   03/06/18 0900 - (!) 115 23 (!) 147/92 94 %   03/06/18 0857 - - - - 93 %   03/06/18 0800 97.8 °F (36.6 °C) (!) 104 24 155/82 95 %       Intake/Output Summary (Last 24 hours) at 03/07/18 0719  Last data filed at 03/07/18 0601   Gross per 24 hour   Intake              720 ml   Output             2150 ml   Net            -1430 ml        PHYSICAL EXAM:  General: Improved resp distress, Nc/at.     EENT:  Anicteric sclerae. MM m  Resp:  Mild wheeze anterior and basilar, improved air entry, +mild crackles  CV:  Regular  rhythm,  No edema  GI:  Soft, Non distended, Non tender.  +Bowel sounds  Neurologic:  Alert and awake  Psych:   Depressed, not anxious. No HI/SI  Skin:  No rashes.   No jaundice    Reviewed most current lab test results and cultures  YES  Reviewed most current radiology test results   YES  Review and summation of old records today    NO  Reviewed patient's current orders and MAR    YES  PMH/ reviewed - no change compared to H&P    ________________________________________________________________________  Care Plan discussed with:    Comments   Patient y discused with aptient in room. Questions answered. 13   Family      RN y 5   Care Manager     Consultant  y Pulmonary 5                     Multidiciplinary team rounds were held today with , nursing, pharmacist and clinical coordinator. Patient's plan of care was discussed; medications were reviewed and discharge planning was addressed. ________________________________________________________________________  Total NON critical care TIME:  35   Minutes    Total CRITICAL CARE TIME Spent:   Minutes non procedure based      Comments   >50% of visit spent in counseling and coordination of care x See above   ________________________________________________________________________  Albania Solomon MD     Procedures: see electronic medical records for all procedures/Xrays and details which were not copied into this note but were reviewed prior to creation of Plan. LABS:  I reviewed today's most current labs and imaging studies. Pertinent labs include:  Recent Labs      03/07/18 0442 03/06/18   0354  03/05/18   0341   WBC  12.0*  14.1*  9.4   HGB  12.0  11.6  9.2*   HCT  37.4  35.8  29.1*   PLT  391  399  306     Recent Labs      03/07/18 0442 03/06/18   0354  03/05/18   0341   NA  131*  134*  138   K  3.9  3.3*  3.8   CL  94*  92*  101   CO2  30  36*  31   GLU  106*  135*  155*   BUN  18  8  10   CREA  0.65  0.42*  0.36*   CA  8.8  9.1  8.3*   MG  2.1   --   2.2   PHOS  3.3  2.2*  1.6*   ALB   --    --   2.6*   TBILI   --    --   0.4   SGOT   --    --   15   ALT   --    --   16       Signed:  Albania Solomon MD

## 2018-03-07 NOTE — PROGRESS NOTES
General Surgery End of Shift Nursing Note    Bedside shift change report given to Remington Eckert RN (oncoming nurse) by Renkua Bowden RN (offgoing nurse). Report included the following information SBAR, Kardex, Intake/Output, MAR and Accordion. Shift worked:   11 pm to 7 am   Summary of shift:    Patient medicated with zofran and motrin once this shift; more talkative and alert later in shift; slept well overnight; no complaints   Issues for physician to address:        Number times ambulated in hallway past shift: 0    Number of times OOB to chair past shift: 0    Pain Management:  Current medication: Motrin  Patient states pain is manageable on current pain medication: YES    GI:    Current diet:  DIET DENTAL SOFT (SOFT SOLID)    Tolerating current diet: YES  Passing flatus: YES  Last Bowel Movement: several days ago   Appearance:     Respiratory:    Incentive Spirometer at bedside: YES  Patient instructed on use: YES    Patient Safety:    Falls Score: 2  Bed Alarm On? No  Sitter?  No    Renuka Bowden RN

## 2018-03-07 NOTE — PROGRESS NOTES
[facility]  Pulmonary, Critical Care, and Sleep Medicine    Name: Halina Barnes MRN: [id number]   : 1950 Hospital: [facility]    Date: 3/7/2018          PCP: Darwin Blanc      IMPRESSION:   · Acute Hypercarbic and Hypoxic Respiratory Failure, on 2L oxygen at home. · COPD with acute exacerbation. · Tachycardia with HR in 140s. · Acute Encephalopathy    · Acute Hyponatremia, may be hypovolemia. · Risk of harming herself, Will need restraints to prevent accidental extubation or removal of CVC. · Anxiety, has anxiety at her baseline. · Depression  · Baseline Hypertension, on amlodipine, holding beta blocker for now. · Hx of seizures. · Hx of ischemic and C Diff colitis. · Hx of prior opioid addiction and to avoid opioids if at all possible per her sons request. She has a chronic opioid addiction and still seeks opioids per son. · Has ET tube, CVC placed in ER of her Right groin on 3/4/18. · Her son Adia is her surrogate decision Maker: pH: [phone], I met with him and his wife this am. He desires to be here in am and further discuss his mother's care. Spent 15 min discussing care with her son. We discussed adva  · Pt is critically ill, moderate to high risk of decompensation. Multiple organ failure. Will need ICU care and monitoring. Monitoring of heart rhythm. Discussed advanced directives, at this point he desires his mother to be full code. RECOMMENDATIONS:   · Wean O2   · Can use tylenol for pain.    · Topamax for migraines  · On empiric Abx   · Taper steroids  · Jet Nebs   · GI proph  · DVT prophylaxis  · Advance diet  · Mobilize  · Home in 1-2 days     Subjective/History:     Awake, alert, following commands, no acute complaints  No acute distress      Current Facility-Administered Medications   Medication Dose Route Frequency    albuterol-ipratropium (DUO-NEB) 2.5 MG-0.5 MG/3 ML  3 mL Nebulization Q6H RT    topiramate (TOPAMAX) tablet 50 mg  50 mg Oral DAILY WITH BREAKFAST    metoprolol tartrate (LOPRESSOR) tablet 50 mg  50 mg Oral Q12H    predniSONE (DELTASONE) tablet 40 mg  40 mg Oral DAILY WITH BREAKFAST    azithromycin (ZITHROMAX) tablet 250 mg  250 mg Oral DAILY    umeclidinium (INCRUSE ELLIPTA) 62.5 mcg/actuation  1 Puff Inhalation DAILY    fluticasone-vilanterol (BREO ELLIPTA) 100mcg-25mcg/puff  1 Puff Inhalation DAILY    aspirin chewable tablet 81 mg  81 mg Oral DAILY    levETIRAcetam (KEPPRA) tablet 500 mg  500 mg Oral BID    DULoxetine (CYMBALTA) capsule 40 mg  40 mg Oral DAILY    potassium, sodium phosphates (NEUTRA-PHOS) packet 1 Packet  1 Packet Oral BID    pantoprazole (PROTONIX) tablet 40 mg  40 mg Oral ACB    melatonin tablet 9 mg  9 mg Oral QHS    amLODIPine (NORVASC) tablet 10 mg  10 mg Oral DAILY    sodium chloride (NS) flush 5-10 mL  5-10 mL IntraVENous Q8H    nicotine (NICODERM CQ) 14 mg/24 hr patch 1 Patch  1 Patch TransDERmal Q24H    enoxaparin (LOVENOX) injection 40 mg  40 mg SubCUTAneous Q24H    mupirocin (BACTROBAN) 2 % ointment   Both Nostrils Q12H          Review of Systems:  No SOB, no CP    Objective:   Vital Signs:    Visit Vitals    /86    Pulse 100    Temp 97.8 °F (36.6 °C)    Resp 18    Ht 5' 2\" (1.575 m)    Wt 76.6 kg (168 lb 14 oz)    SpO2 94%    Breastfeeding No    BMI 30.89 kg/m2       O2 Device: Nasal cannula   O2 Flow Rate (L/min): 2 l/min   Temp (24hrs), Av.9 °F (36.6 °C), Min:97.5 °F (36.4 °C), Max:98.3 °F (36.8 °C)       Intake/Output:   Last shift:         Last 3 shifts: 1901 -  07  In: 720 [P.O.:720]  Out: 3750 [Urine:3750]    Intake/Output Summary (Last 24 hours) at 18 0942  Last data filed at 18 0601   Gross per 24 hour   Intake              240 ml   Output             1900 ml   Net            -1660 ml     Hemodynamics:   PAP:   CO:     Wedge:   CI:     CVP:    SVR:       PVR:       Ventilator Settings:  Mode Rate Tidal Volume Pressure FiO2 PEEP   Assist control   450 ml    40 % 6 cm H20     Peak airway pressure: 25 cm H2O    Minute ventilation: 7.84 l/min      Physical Exam:    General:  Awake. No distress, appears stated age. Head:  Normocephalic, without obvious abnormality, atraumatic. Eyes:  Conjunctivae/corneas clear. PERRL, EOMs intact. Nose: Nares normal. Septum midline. Mucosa normal. No drainage or sinus tenderness. Throat: Lips, mucosa, and tongue normal. Teeth and gums normal.   Neck: Supple, symmetrical, trachea midline, no adenopathy, thyroid: no enlargment/tenderness/nodules, no carotid bruit and no JVD. Back:   Not able to assess due to her medical condition. Lungs:   Decreased air movement. No Wheezing     Chest wall:  No tenderness or deformity. Heart:  Regular rate and rhythm, S1, S2 normal, no murmur, click, rub or gallop. Abdomen:   Soft, non-tender. Bowel sounds normal. No masses,  No organomegaly. Extremities: Extremities normal, atraumatic, no cyanosis or edema. Pulses: 2+ and symmetric all extremities. Skin: Skin color, texture, turgor normal. No rashes or lesions   Lymph nodes: Cervical, supraclavicular nodes are normal.   Neurologic: Awake, non focal       Data:     Recent Results (from the past 24 hour(s))   CBC WITH AUTOMATED DIFF    Collection Time: 03/07/18  4:42 AM   Result Value Ref Range    WBC 12.0 (H) 3.6 - 11.0 K/uL    RBC 3.95 3.80 - 5.20 M/uL    HGB 12.0 11.5 - 16.0 g/dL    HCT 37.4 35.0 - 47.0 %    MCV 94.7 80.0 - 99.0 FL    MCH 30.4 26.0 - 34.0 PG    MCHC 32.1 30.0 - 36.5 g/dL    RDW 12.9 11.5 - 14.5 %    PLATELET 634 170 - 341 K/uL    MPV 9.5 8.9 - 12.9 FL    NRBC 0.0 0  WBC    ABSOLUTE NRBC 0.00 0.00 - 0.01 K/uL    NEUTROPHILS 62 32 - 75 %    LYMPHOCYTES 23 12 - 49 %    MONOCYTES 10 5 - 13 %    EOSINOPHILS 3 0 - 7 %    BASOPHILS 0 0 - 1 %    METAMYELOCYTES 2 %    IMMATURE GRANULOCYTES 0 0.0 - 0.5 %    ABS. NEUTROPHILS 7.4 1.8 - 8.0 K/UL    ABS.  LYMPHOCYTES 2.8 0.8 - 3.5 K/UL    ABS. MONOCYTES 1.2 (H) 0.0 - 1.0 K/UL    ABS. EOSINOPHILS 0.4 0.0 - 0.4 K/UL    ABS. BASOPHILS 0.0 0.0 - 0.1 K/UL    ABS. IMM. GRANS. 0.0 0.00 - 0.04 K/UL    DF AUTOMATED      RBC COMMENTS NORMOCYTIC, NORMOCHROMIC     METABOLIC PANEL, BASIC    Collection Time: 03/07/18  4:42 AM   Result Value Ref Range    Sodium 131 (L) 136 - 145 mmol/L    Potassium 3.9 3.5 - 5.1 mmol/L    Chloride 94 (L) 97 - 108 mmol/L    CO2 30 21 - 32 mmol/L    Anion gap 7 5 - 15 mmol/L    Glucose 106 (H) 65 - 100 mg/dL    BUN 18 6 - 20 MG/DL    Creatinine 0.65 0.55 - 1.02 MG/DL    BUN/Creatinine ratio 28 (H) 12 - 20      GFR est AA >60 >60 ml/min/1.73m2    GFR est non-AA >60 >60 ml/min/1.73m2    Calcium 8.8 8.5 - 10.1 MG/DL   MAGNESIUM    Collection Time: 03/07/18  4:42 AM   Result Value Ref Range    Magnesium 2.1 1.6 - 2.4 mg/dL   PHOSPHORUS    Collection Time: 03/07/18  4:42 AM   Result Value Ref Range    Phosphorus 3.3 2.6 - 4.7 MG/DL             Telemetry: NSR    Imaging:  I have personally reviewed the patients radiographs and have reviewed the reports:           Total critical care time exclusive of procedures:  minutes  Berkley Schilder, MD

## 2018-03-07 NOTE — PROGRESS NOTES
Spiritual Care Partner Volunteer visited patient in [place] on 3/7/18. Documented by:  GAURANG Plasencia

## 2018-03-08 VITALS
WEIGHT: 156.75 LBS | BODY MASS INDEX: 28.84 KG/M2 | HEIGHT: 62 IN | OXYGEN SATURATION: 93 % | RESPIRATION RATE: 18 BRPM | DIASTOLIC BLOOD PRESSURE: 81 MMHG | HEART RATE: 105 BPM | TEMPERATURE: 98.1 F | SYSTOLIC BLOOD PRESSURE: 143 MMHG

## 2018-03-08 LAB
ANION GAP SERPL CALC-SCNC: 6 MMOL/L (ref 5–15)
BACTERIA SPEC CULT: NORMAL
BASOPHILS # BLD: 0.1 K/UL (ref 0–0.1)
BASOPHILS NFR BLD: 1 % (ref 0–1)
BUN SERPL-MCNC: 15 MG/DL (ref 6–20)
BUN/CREAT SERPL: 28 (ref 12–20)
CALCIUM SERPL-MCNC: 8.7 MG/DL (ref 8.5–10.1)
CHLORIDE SERPL-SCNC: 93 MMOL/L (ref 97–108)
CO2 SERPL-SCNC: 34 MMOL/L (ref 21–32)
CREAT SERPL-MCNC: 0.53 MG/DL (ref 0.55–1.02)
DIFFERENTIAL METHOD BLD: ABNORMAL
EOSINOPHIL # BLD: 0.3 K/UL (ref 0–0.4)
EOSINOPHIL NFR BLD: 2 % (ref 0–7)
ERYTHROCYTE [DISTWIDTH] IN BLOOD BY AUTOMATED COUNT: 12.8 % (ref 11.5–14.5)
GLUCOSE SERPL-MCNC: 98 MG/DL (ref 65–100)
HCT VFR BLD AUTO: 38.1 % (ref 35–47)
HGB BLD-MCNC: 12.4 G/DL (ref 11.5–16)
IMM GRANULOCYTES # BLD: 0.5 K/UL (ref 0–0.04)
IMM GRANULOCYTES NFR BLD AUTO: 4 % (ref 0–0.5)
LYMPHOCYTES # BLD: 2.6 K/UL (ref 0.8–3.5)
LYMPHOCYTES NFR BLD: 20 % (ref 12–49)
MAGNESIUM SERPL-MCNC: 1.8 MG/DL (ref 1.6–2.4)
MCH RBC QN AUTO: 30.5 PG (ref 26–34)
MCHC RBC AUTO-ENTMCNC: 32.5 G/DL (ref 30–36.5)
MCV RBC AUTO: 93.8 FL (ref 80–99)
MONOCYTES # BLD: 1.3 K/UL (ref 0–1)
MONOCYTES NFR BLD: 10 % (ref 5–13)
NEUTS SEG # BLD: 8.4 K/UL (ref 1.8–8)
NEUTS SEG NFR BLD: 63 % (ref 32–75)
NRBC # BLD: 0 K/UL (ref 0–0.01)
NRBC BLD-RTO: 0 PER 100 WBC
PHOSPHATE SERPL-MCNC: 2.6 MG/DL (ref 2.6–4.7)
PLATELET # BLD AUTO: 410 K/UL (ref 150–400)
PMV BLD AUTO: 9.9 FL (ref 8.9–12.9)
POTASSIUM SERPL-SCNC: 3.5 MMOL/L (ref 3.5–5.1)
RBC # BLD AUTO: 4.06 M/UL (ref 3.8–5.2)
RBC MORPH BLD: ABNORMAL
SERVICE CMNT-IMP: NORMAL
SODIUM SERPL-SCNC: 133 MMOL/L (ref 136–145)
WBC # BLD AUTO: 13.2 K/UL (ref 3.6–11)

## 2018-03-08 PROCEDURE — 74011250637 HC RX REV CODE- 250/637: Performed by: INTERNAL MEDICINE

## 2018-03-08 PROCEDURE — 77030027138 HC INCENT SPIROMETER -A

## 2018-03-08 PROCEDURE — 84100 ASSAY OF PHOSPHORUS: CPT | Performed by: INTERNAL MEDICINE

## 2018-03-08 PROCEDURE — 77010033678 HC OXYGEN DAILY

## 2018-03-08 PROCEDURE — 74011000250 HC RX REV CODE- 250: Performed by: INTERNAL MEDICINE

## 2018-03-08 PROCEDURE — 83735 ASSAY OF MAGNESIUM: CPT | Performed by: INTERNAL MEDICINE

## 2018-03-08 PROCEDURE — 74011636637 HC RX REV CODE- 636/637: Performed by: INTERNAL MEDICINE

## 2018-03-08 PROCEDURE — 85025 COMPLETE CBC W/AUTO DIFF WBC: CPT | Performed by: INTERNAL MEDICINE

## 2018-03-08 PROCEDURE — 80048 BASIC METABOLIC PNL TOTAL CA: CPT | Performed by: INTERNAL MEDICINE

## 2018-03-08 PROCEDURE — 36415 COLL VENOUS BLD VENIPUNCTURE: CPT | Performed by: INTERNAL MEDICINE

## 2018-03-08 PROCEDURE — 94640 AIRWAY INHALATION TREATMENT: CPT

## 2018-03-08 RX ORDER — AZITHROMYCIN 250 MG/1
250 TABLET, FILM COATED ORAL ONCE
Status: COMPLETED | OUTPATIENT
Start: 2018-03-08 | End: 2018-03-08

## 2018-03-08 RX ORDER — PREDNISONE 20 MG/1
TABLET ORAL
Qty: 10 TAB | Refills: 0 | Status: SHIPPED | OUTPATIENT
Start: 2018-03-08 | End: 2020-01-01

## 2018-03-08 RX ADMIN — PREDNISONE 40 MG: 20 TABLET ORAL at 09:51

## 2018-03-08 RX ADMIN — ACETAMINOPHEN 650 MG: 325 TABLET ORAL at 13:23

## 2018-03-08 RX ADMIN — IPRATROPIUM BROMIDE AND ALBUTEROL SULFATE 3 ML: .5; 3 SOLUTION RESPIRATORY (INHALATION) at 07:28

## 2018-03-08 RX ADMIN — FLUTICASONE FUROATE AND VILANTEROL TRIFENATATE 1 PUFF: 100; 25 POWDER RESPIRATORY (INHALATION) at 09:54

## 2018-03-08 RX ADMIN — PANTOPRAZOLE SODIUM 40 MG: 40 TABLET, DELAYED RELEASE ORAL at 09:50

## 2018-03-08 RX ADMIN — LEVETIRACETAM 500 MG: 500 TABLET ORAL at 09:51

## 2018-03-08 RX ADMIN — ASPIRIN 81 MG 81 MG: 81 TABLET ORAL at 09:50

## 2018-03-08 RX ADMIN — UMECLIDINIUM 1 PUFF: 62.5 AEROSOL, POWDER ORAL at 09:54

## 2018-03-08 RX ADMIN — ONDANSETRON 8 MG: 4 TABLET, ORALLY DISINTEGRATING ORAL at 09:48

## 2018-03-08 RX ADMIN — MUPIROCIN: 20 OINTMENT TOPICAL at 09:54

## 2018-03-08 RX ADMIN — IPRATROPIUM BROMIDE AND ALBUTEROL SULFATE 3 ML: .5; 3 SOLUTION RESPIRATORY (INHALATION) at 13:38

## 2018-03-08 RX ADMIN — ACETAMINOPHEN 650 MG: 325 TABLET ORAL at 01:29

## 2018-03-08 RX ADMIN — DULOXETINE HYDROCHLORIDE 40 MG: 20 CAPSULE, DELAYED RELEASE ORAL at 09:51

## 2018-03-08 RX ADMIN — AMLODIPINE BESYLATE 10 MG: 5 TABLET ORAL at 09:50

## 2018-03-08 RX ADMIN — AZITHROMYCIN 250 MG: 250 TABLET, FILM COATED ORAL at 13:20

## 2018-03-08 RX ADMIN — IPRATROPIUM BROMIDE AND ALBUTEROL SULFATE 3 ML: .5; 3 SOLUTION RESPIRATORY (INHALATION) at 02:52

## 2018-03-08 RX ADMIN — TOPIRAMATE 50 MG: 25 TABLET, FILM COATED ORAL at 09:51

## 2018-03-08 RX ADMIN — METOPROLOL TARTRATE 50 MG: 50 TABLET ORAL at 09:51

## 2018-03-08 RX ADMIN — POTASSIUM & SODIUM PHOSPHATES POWDER PACK 280-160-250 MG 1 PACKET: 280-160-250 PACK at 09:51

## 2018-03-08 NOTE — DISCHARGE INSTRUCTIONS
DISCHARGE DIAGNOSIS:  Acute on chronic hypercapnic respiratory failure s/p extubation today  Acute COPD exacerbation  Hyponatremia, resolving  Severe anxiety/depression  Leukocytosis, resolved  Acute encephalopathy likely related to hypercapnia resolved  Hypertension  History of seizure disorder   Hypophosphatemia    MEDICATIONS:  · It is important that you take the medication exactly as they are prescribed. · Keep your medication in the bottles provided by the pharmacist and keep a list of the medication names, dosages, and times to be taken in your wallet. · Do not take other medications without consulting your doctor. Pain Management: per above medications    What to do at [address]:  Resume previous diet    Recommended activity: Activity as tolerated    If you have questions regarding the hospital related prescriptions or hospital related issues please call [facility] [place] [id number] at . You can always direct your questions to your primary care doctor if you are unable to reach your hospital physician; your PCP works as an extension of your hospital doctor just like your hospital doctor is an extension of your PCP for your time at the hospital [facility], [facility]).     If you experience any of the following symptoms then please call your primary care physician or return to the emergency room if you cannot get hold of your doctor:  Fever, chills, nausea, vomiting, diarrhea, change in mentation, falling, bleeding, shortness of breath    Primary care doctor: Dr Leonardo Saeed

## 2018-03-08 NOTE — DISCHARGE SUMMARY
Hospitalist Discharge Summary    NAME: Jean Pierre Jama   :  1950   MRN:  [id number]     DISCHARGE DIAGNOSIS:  Acute on chronic hypercapnic respiratory failure s/p extubation today  Acute COPD exacerbation  Hyponatremia, resolving  Severe anxiety/depression  Leukocytosis, resolved  Acute encephalopathy likely related to hypercapnia resolved  Hypertension  History of seizure disorder   Hypophosphatemia  Sepsis, poa, resolved    CONSULTATIONS:  Pulmonary/Intensive care    Follow Up: Follow-up Information     Follow up With Details Comments Ignacio Tate MD Schedule an appointment as soon as possible for a visit in 1 week Call Sooner, As needed, If symptoms worsen [address]      Alonzo Morgan MD Schedule an appointment as soon as possible for a visit in 2 weeks Call Sooner, As needed, If symptoms worsen [address] [facility]  [address] [place] [address]  [phone]      your psychiatrist - you call to set up appointment Schedule an appointment as soon as possible for a visit in 1 week Call Sooner, As needed, If symptoms worsen           Procedures: see electronic medical records for all procedures/Xrays and details which were not copied into this note but were reviewed prior to creation of Plan. Please follow-up tests/labs that are still pendin. None     PMH/SH reviewed - no change compared to H&P    DISCHARGE SUMMARY/HOSPITAL COURSE: for full details see H&P, daily progress notes, labs, consult notes. Briefly As Per HPI:   Jean Pierre Jama is a 74 y.o.  female with copd on 2L, hx of depression with psych meds/opiate abuse, HTN, ischemic colitis, seizure brought to ER by EMS for respiratory distress. Patient reports SOB for a few days, unable to catch her breath today, no relief with neb. Per son has chronic cough that is unchanged, no fever, no travel.   No hospitalization since 6/2016. Just saw Dr. Stephanie Covington last week and was told her breathing was doing well. She was given flu vaccine (which son was not happy about).       Per ER physician, patient initially alert, given ativan for anxiety, became confused and desatted, placed on bipap, given additional ativan.      We were asked to admit for work up and evaluation of the above problems.        The patient's hospital course was complicated by:  Acute on chronic hypercapnic hypoxic respiratory failure   Acute COPD exacerbation  Hyponatremia, resolving  Severe anxiety/depression  Leukocytosis, resolved  Acute encephalopathy likely related to hypercapnia resolved  Hypertension  History of seizure disorder   Hypophosphatemia    Patient with complex inpatient course. Please see all notes, labs, vitals, testing and procedures for details, briefly the patient was admitted following presentation to ED with sob found to have copd falre with acute on chronic hypercapnic hypoxic failure required bipap, then intubated, extubated, now has improved. She is completing abx today. She is on steroid PO taper. Seen by Dr Stephanie Covington whom will see in the office. She is racked with psychiatric anguish - to see OP psych. Had hyponatremia from fluid overload, improving. Safe for dc at this time. _______________________________________________________________________   Patient seen and examined by me on day of discharge. Pertinent findings are:  Gen: pleasant cf in nad  HEENT: nc at  Chest: cta b with minimal wheeze much improved over 48hrs  Cv: no r/g  Abd: s/nd/nt +bs  Neuro: cn 2-12 gi    See Discharge Instructions for further details.   _______________________________________________________________________    Medications Reviewed:  Current Discharge Medication List      START taking these medications    Details   predniSONE (DELTASONE) 20 mg tablet Take 2 tablets (40mg) daily for 3 days, then 20mg (1 tab) daily for 3 days, then 10mg (1/2 tab) daily for 2 days  Qty: 10 Tab, Refills: 0         CONTINUE these medications which have NOT CHANGED    Details   albuterol-ipratropium (DUO-NEB) 2.5 mg-0.5 mg/3 ml nebu 3 mL by Nebulization route four (4) times daily as needed. methocarbamol (ROBAXIN) 750 mg tablet Take 750-1,500 mg by mouth two (2) times a day. amLODIPine (NORVASC) 10 mg tablet Take 10 mg by mouth daily. omeprazole (PRILOSEC) 40 mg capsule Take 40 mg by mouth daily. ondansetron (ZOFRAN ODT) 4 mg disintegrating tablet Take 1 Tab by mouth every eight (8) hours as needed. Qty: 15 Tab, Refills: 0      albuterol (PROVENTIL VENTOLIN) 2.5 mg /3 mL (0.083 %) nebulizer solution 3 mL by Nebulization route every four (4) hours as needed. Qty: 50 Each, Refills: 2      tiotropium (SPIRIVA) 18 mcg inhalation capsule Take 1 Cap by inhalation daily. Indications: BRONCHIAL ASTHMA  Qty: 30 Cap, Refills: 1      levETIRAcetam (KEPPRA) 500 mg tablet Take 1 Tab by mouth two (2) times a day. Qty: 60 Tab, Refills: 1      metoprolol (LOPRESSOR) 50 mg tablet Take 1 Tab by mouth two (2) times a day. Qty: 60 Tab, Refills: 1      DULoxetine (CYMBALTA) 20 mg capsule Take 40 mg by mouth daily. fluticasone-salmeterol (ADVAIR) 250-50 mcg/dose diskus inhaler Take 1 Puff by inhalation two (2) times a day.   Qty: 1 Inhaler, Refills: 1           _______________________________________________________________________    Risk of deterioration: Moderate  ________________________________________________________________________    Disposition  Home with family, no needs  ________________________________________________________________________    Care Plan discussed with:   Patient, Family, RN, Care Manager, Consultant  Comment:   ________________________________________________________________________    Code Status: Full Code  ________________________________________________________________________    Total time spent in discharge (min): 45 ________________________________________________________________________    CDMP Checked: Yes    Signed: Colette Rome MD    This note will not be viewable in [address]

## 2018-03-08 NOTE — PROGRESS NOTES
CM has sent referrals to [facility], [facility] and [facility]. [facility] is unable to accept patient at this time. CM still awaiting a [facility]. 12;38pm  [facility] has accepted the patient, but may not be able to see her until Saturday. CM call MD and MD is ok with it as long as family will be with the patient. This CM spoke to patients son, and he assured me that family will be with the patient. FOC has been signed and placed on the patients chart.      Leo Jones  Ext [phone]

## 2018-03-08 NOTE — PROGRESS NOTES
[facility]  Pulmonary, Critical Care, and Sleep Medicine    Name: Lilia Paula MRN: [id number]   : 1950 Hospital: [facility]   Date: 3/8/2018          PCP: Vicki Arteaga      IMPRESSION:   · Acute Hypercarbic and Hypoxic Respiratory Failure, on 2L oxygen at home. · COPD with acute exacerbation. · Tachycardia with HR in 140s. · Acute Encephalopathy    · Acute Hyponatremia, may be hypovolemia. · Risk of harming herself, Will need restraints to prevent accidental extubation or removal of CVC. · Anxiety, has anxiety at her baseline. · Depression  · Baseline Hypertension, on amlodipine, holding beta blocker for now. · Hx of seizures. · Hx of ischemic and C Diff colitis. · Hx of prior opioid addiction and to avoid opioids if at all possible per her sons request. She has a chronic opioid addiction and still seeks opioids per son. · Has ET tube, CVC placed in ER of her Right groin on 3/4/18. · Her son Adia is her surrogate decision Maker: pH: [phone], I met with him and his wife this am. He desires to be here in am and further discuss his mother's care. Spent 15 min discussing care with her son. We discussed adva  · Pt is critically ill, moderate to high risk of decompensation. Multiple organ failure. Will need ICU care and monitoring. Monitoring of heart rhythm. Discussed advanced directives, at this point he desires his mother to be full code. RECOMMENDATIONS:   · Wean O2   · Can use tylenol for pain. · Topamax for migraines  · On empiric Abx   · Taper steroids  · Jet Nebs   · GI proph  · DVT prophylaxis  · Advance diet  · Mobilize  · Home today?      Subjective/History:     Awake, alert, following commands, no acute complaints  No acute distress      Current Facility-Administered Medications   Medication Dose Route Frequency    azithromycin (ZITHROMAX) tablet 250 mg  250 mg Oral ONCE    albuterol-ipratropium (DUO-NEB) 2.5 MG-0.5 MG/3 ML  3 mL Nebulization Q6H RT    topiramate (TOPAMAX) tablet 50 mg  50 mg Oral DAILY WITH BREAKFAST    metoprolol tartrate (LOPRESSOR) tablet 50 mg  50 mg Oral Q12H    predniSONE (DELTASONE) tablet 40 mg  40 mg Oral DAILY WITH BREAKFAST    umeclidinium (INCRUSE ELLIPTA) 62.5 mcg/actuation  1 Puff Inhalation DAILY    fluticasone-vilanterol (BREO ELLIPTA) 100mcg-25mcg/puff  1 Puff Inhalation DAILY    aspirin chewable tablet 81 mg  81 mg Oral DAILY    levETIRAcetam (KEPPRA) tablet 500 mg  500 mg Oral BID    DULoxetine (CYMBALTA) capsule 40 mg  40 mg Oral DAILY    potassium, sodium phosphates (NEUTRA-PHOS) packet 1 Packet  1 Packet Oral BID    pantoprazole (PROTONIX) tablet 40 mg  40 mg Oral ACB    melatonin tablet 9 mg  9 mg Oral QHS    amLODIPine (NORVASC) tablet 10 mg  10 mg Oral DAILY    sodium chloride (NS) flush 5-10 mL  5-10 mL IntraVENous Q8H    nicotine (NICODERM CQ) 14 mg/24 hr patch 1 Patch  1 Patch TransDERmal Q24H    enoxaparin (LOVENOX) injection 40 mg  40 mg SubCUTAneous Q24H    mupirocin (BACTROBAN) 2 % ointment   Both Nostrils Q12H          Review of Systems:  No SOB, no CP    Objective:   Vital Signs:    Visit Vitals    /77 (BP 1 Location: Left arm, BP Patient Position: At rest;Supine)    Pulse 98    Temp 97.7 °F (36.5 °C)    Resp 18    Ht 5' 2\" (1.575 m)    Wt 71.1 kg (156 lb 12 oz)    SpO2 95%    Breastfeeding No    BMI 28.67 kg/m2       O2 Device: Nasal cannula   O2 Flow Rate (L/min): 2 l/min   Temp (24hrs), Av.6 °F (36.4 °C), Min:97.3 °F (36.3 °C), Max:97.8 °F (36.6 °C)       Intake/Output:   Last shift:         Last 3 shifts:  1901 -  0700  In: 1400 [P.O.:1400]  Out: 2650 [Urine:2650]    Intake/Output Summary (Last 24 hours) at 18 0904  Last data filed at 18 2045   Gross per 24 hour   Intake              910 ml   Output             1200 ml   Net             -290 ml     Hemodynamics:   PAP:   CO:     Wedge:   CI:     CVP:    SVR: PVR:       Ventilator Settings:  Mode Rate Tidal Volume Pressure FiO2 PEEP   Assist control   450 ml    40 % 6 cm H20     Peak airway pressure: 25 cm H2O    Minute ventilation: 7.84 l/min      Physical Exam:    General:  Awake. No distress, appears stated age. Head:  Normocephalic, without obvious abnormality, atraumatic. Eyes:  Conjunctivae/corneas clear. PERRL, EOMs intact. Nose: Nares normal. Septum midline. Mucosa normal. No drainage or sinus tenderness. Throat: Lips, mucosa, and tongue normal. Teeth and gums normal.   Neck: Supple, symmetrical, trachea midline, no adenopathy, thyroid: no enlargment/tenderness/nodules, no carotid bruit and no JVD. Back:   Not able to assess due to her medical condition. Lungs:   Decreased air movement. No Wheezing     Chest wall:  No tenderness or deformity. Heart:  Regular rate and rhythm, S1, S2 normal, no murmur, click, rub or gallop. Abdomen:   Soft, non-tender. Bowel sounds normal. No masses,  No organomegaly. Extremities: Extremities normal, atraumatic, no cyanosis or edema. Pulses: 2+ and symmetric all extremities. Skin: Skin color, texture, turgor normal. No rashes or lesions   Lymph nodes: Cervical, supraclavicular nodes are normal.   Neurologic: Awake, non focal       Data:     Recent Results (from the past 24 hour(s))   CBC WITH AUTOMATED DIFF    Collection Time: 03/08/18  4:00 AM   Result Value Ref Range    WBC 13.2 (H) 3.6 - 11.0 K/uL    RBC 4.06 3.80 - 5.20 M/uL    HGB 12.4 11.5 - 16.0 g/dL    HCT 38.1 35.0 - 47.0 %    MCV 93.8 80.0 - 99.0 FL    MCH 30.5 26.0 - 34.0 PG    MCHC 32.5 30.0 - 36.5 g/dL    RDW 12.8 11.5 - 14.5 %    PLATELET 938 (H) 985 - 400 K/uL    MPV 9.9 8.9 - 12.9 FL    NRBC 0.0 0  WBC    ABSOLUTE NRBC 0.00 0.00 - 0.01 K/uL    NEUTROPHILS PENDING %    LYMPHOCYTES PENDING %    MONOCYTES PENDING %    EOSINOPHILS PENDING %    BASOPHILS PENDING %    IMMATURE GRANULOCYTES PENDING %    ABS.  NEUTROPHILS PENDING K/UL ABS. LYMPHOCYTES PENDING K/UL    ABS. MONOCYTES PENDING K/UL    ABS. EOSINOPHILS PENDING K/UL    ABS. BASOPHILS PENDING K/UL    ABS. IMM. GRANS. PENDING K/UL    DF PENDING    MAGNESIUM    Collection Time: 03/08/18  4:00 AM   Result Value Ref Range    Magnesium 1.8 1.6 - 2.4 mg/dL   METABOLIC PANEL, BASIC    Collection Time: 03/08/18  4:00 AM   Result Value Ref Range    Sodium 133 (L) 136 - 145 mmol/L    Potassium 3.5 3.5 - 5.1 mmol/L    Chloride 93 (L) 97 - 108 mmol/L    CO2 34 (H) 21 - 32 mmol/L    Anion gap 6 5 - 15 mmol/L    Glucose 98 65 - 100 mg/dL    BUN 15 6 - 20 MG/DL    Creatinine 0.53 (L) 0.55 - 1.02 MG/DL    BUN/Creatinine ratio 28 (H) 12 - 20      GFR est AA >60 >60 ml/min/1.73m2    GFR est non-AA >60 >60 ml/min/1.73m2    Calcium 8.7 8.5 - 10.1 MG/DL   PHOSPHORUS    Collection Time: 03/08/18  4:00 AM   Result Value Ref Range    Phosphorus 2.6 2.6 - 4.7 MG/DL             Telemetry: NSR    Imaging:  I have personally reviewed the patients radiographs and have reviewed the reports:           Total critical care time exclusive of procedures:  minutes  Jitendra Murry MD

## 2018-03-08 NOTE — PROGRESS NOTES
Educated patient on the importance of her 1200ml daily fluid restriction. Will continue to educate the importance of fluid restriction.

## 2018-05-29 NOTE — PROGRESS NOTES
General Surgery End of Shift Nursing Note    Bedside shift change report given to Edel (oncoming nurse) by Nay Tena (offgoing nurse). Report included the following information SBAR, Kardex, Procedure Summary and Recent Results. Shift worked:      Summary of shift:       Issues for physician to address:        Number times ambulated in hallway past shift: none    Number of times OOB to chair past shift: none    Pain Management:  Current medication: yes  Patient states pain is manageable on current pain medication: YES    GI:    Current diet:  DIET DENTAL SOFT (SOFT SOLID)    Tolerating current diet: YES  Passing flatus: NO  Last Bowel Movement: today   Appearance:     Respiratory:    Incentive Spirometer at bedside: YES  Patient instructed on use: YES    Patient Safety:    Falls Score: 2  Bed Alarm On? No  Sitter?  No    Rodríguez Whipple RN no exercise/no heavy lifting/no sports/gym/until seen and cleared by M.D.

## 2020-01-01 ENCOUNTER — HOSPITAL ENCOUNTER (INPATIENT)
Age: 70
LOS: 17 days | DRG: 870 | End: 2020-11-24
Attending: EMERGENCY MEDICINE | Admitting: STUDENT IN AN ORGANIZED HEALTH CARE EDUCATION/TRAINING PROGRAM
Payer: MEDICARE

## 2020-01-01 ENCOUNTER — APPOINTMENT (OUTPATIENT)
Dept: MRI IMAGING | Age: 70
DRG: 870 | End: 2020-01-01
Attending: INTERNAL MEDICINE
Payer: MEDICARE

## 2020-01-01 ENCOUNTER — HOSPITAL ENCOUNTER (EMERGENCY)
Age: 70
Discharge: HOME | End: 2020-10-26
Attending: EMERGENCY MEDICINE
Payer: MEDICARE

## 2020-01-01 ENCOUNTER — APPOINTMENT (OUTPATIENT)
Dept: NON INVASIVE DIAGNOSTICS | Age: 70
DRG: 870 | End: 2020-01-01
Attending: INTERNAL MEDICINE
Payer: MEDICARE

## 2020-01-01 ENCOUNTER — APPOINTMENT (OUTPATIENT)
Dept: GENERAL RADIOLOGY | Age: 70
DRG: 870 | End: 2020-01-01
Attending: STUDENT IN AN ORGANIZED HEALTH CARE EDUCATION/TRAINING PROGRAM
Payer: MEDICARE

## 2020-01-01 ENCOUNTER — APPOINTMENT (OUTPATIENT)
Dept: GENERAL RADIOLOGY | Age: 70
End: 2020-01-01
Attending: EMERGENCY MEDICINE
Payer: MEDICARE

## 2020-01-01 ENCOUNTER — APPOINTMENT (OUTPATIENT)
Dept: GENERAL RADIOLOGY | Age: 70
DRG: 870 | End: 2020-01-01
Attending: INTERNAL MEDICINE
Payer: MEDICARE

## 2020-01-01 ENCOUNTER — APPOINTMENT (OUTPATIENT)
Dept: CT IMAGING | Age: 70
DRG: 870 | End: 2020-01-01
Attending: INTERNAL MEDICINE
Payer: MEDICARE

## 2020-01-01 ENCOUNTER — APPOINTMENT (OUTPATIENT)
Dept: CT IMAGING | Age: 70
DRG: 870 | End: 2020-01-01
Attending: EMERGENCY MEDICINE
Payer: MEDICARE

## 2020-01-01 ENCOUNTER — APPOINTMENT (OUTPATIENT)
Dept: GENERAL RADIOLOGY | Age: 70
DRG: 870 | End: 2020-01-01
Attending: RADIOLOGY
Payer: MEDICARE

## 2020-01-01 ENCOUNTER — APPOINTMENT (OUTPATIENT)
Dept: GENERAL RADIOLOGY | Age: 70
DRG: 870 | End: 2020-01-01
Attending: PHYSICIAN ASSISTANT
Payer: MEDICARE

## 2020-01-01 ENCOUNTER — APPOINTMENT (OUTPATIENT)
Dept: GENERAL RADIOLOGY | Age: 70
DRG: 870 | End: 2020-01-01
Attending: EMERGENCY MEDICINE
Payer: MEDICARE

## 2020-01-01 VITALS
SYSTOLIC BLOOD PRESSURE: 55 MMHG | RESPIRATION RATE: 16 BRPM | HEIGHT: 62 IN | TEMPERATURE: 98 F | WEIGHT: 174.38 LBS | OXYGEN SATURATION: 38 % | HEART RATE: 69 BPM | BODY MASS INDEX: 32.09 KG/M2 | DIASTOLIC BLOOD PRESSURE: 35 MMHG

## 2020-01-01 VITALS
HEART RATE: 108 BPM | RESPIRATION RATE: 19 BRPM | HEIGHT: 62 IN | OXYGEN SATURATION: 95 % | SYSTOLIC BLOOD PRESSURE: 144 MMHG | DIASTOLIC BLOOD PRESSURE: 88 MMHG | BODY MASS INDEX: 31.28 KG/M2 | WEIGHT: 170 LBS | TEMPERATURE: 98.6 F

## 2020-01-01 DIAGNOSIS — Z71.89 GOALS OF CARE, COUNSELING/DISCUSSION: ICD-10-CM

## 2020-01-01 DIAGNOSIS — Z71.89 DNR (DO NOT RESUSCITATE) DISCUSSION: ICD-10-CM

## 2020-01-01 DIAGNOSIS — J44.1 COPD WITH ACUTE EXACERBATION (HCC): Primary | ICD-10-CM

## 2020-01-01 DIAGNOSIS — F41.9 ANXIETY: ICD-10-CM

## 2020-01-01 DIAGNOSIS — S62.101A CLOSED FRACTURE OF RIGHT WRIST, INITIAL ENCOUNTER: Primary | ICD-10-CM

## 2020-01-01 DIAGNOSIS — R91.8 MASS OF UPPER LOBE OF RIGHT LUNG: ICD-10-CM

## 2020-01-01 DIAGNOSIS — M79.7 FIBROMYALGIA: ICD-10-CM

## 2020-01-01 LAB
ALBUMIN SERPL-MCNC: 1.8 G/DL (ref 3.5–5)
ALBUMIN SERPL-MCNC: 2 G/DL (ref 3.5–5)
ALBUMIN SERPL-MCNC: 2.5 G/DL (ref 3.5–5)
ALBUMIN SERPL-MCNC: 2.7 G/DL (ref 3.5–5)
ALBUMIN/GLOB SERPL: 0.5 {RATIO} (ref 1.1–2.2)
ALBUMIN/GLOB SERPL: 0.6 {RATIO} (ref 1.1–2.2)
ALP SERPL-CCNC: 114 U/L (ref 45–117)
ALP SERPL-CCNC: 122 U/L (ref 45–117)
ALP SERPL-CCNC: 69 U/L (ref 45–117)
ALP SERPL-CCNC: 78 U/L (ref 45–117)
ALT SERPL-CCNC: 12 U/L (ref 12–78)
ALT SERPL-CCNC: 15 U/L (ref 12–78)
ALT SERPL-CCNC: 15 U/L (ref 12–78)
ALT SERPL-CCNC: 19 U/L (ref 12–78)
ANION GAP SERPL CALC-SCNC: 0 MMOL/L (ref 5–15)
ANION GAP SERPL CALC-SCNC: 1 MMOL/L (ref 5–15)
ANION GAP SERPL CALC-SCNC: 2 MMOL/L (ref 5–15)
ANION GAP SERPL CALC-SCNC: 2 MMOL/L (ref 5–15)
ANION GAP SERPL CALC-SCNC: 3 MMOL/L (ref 5–15)
ANION GAP SERPL CALC-SCNC: 4 MMOL/L (ref 5–15)
ANION GAP SERPL CALC-SCNC: 5 MMOL/L (ref 5–15)
ANION GAP SERPL CALC-SCNC: 5 MMOL/L (ref 5–15)
ANION GAP SERPL CALC-SCNC: 6 MMOL/L (ref 5–15)
ANION GAP SERPL CALC-SCNC: 6 MMOL/L (ref 5–15)
ANION GAP SERPL CALC-SCNC: 7 MMOL/L (ref 5–15)
APPEARANCE UR: ABNORMAL
ARTERIAL PATENCY WRIST A: ABNORMAL
ARTERIAL PATENCY WRIST A: YES
AST SERPL-CCNC: 13 U/L (ref 15–37)
AST SERPL-CCNC: 15 U/L (ref 15–37)
AST SERPL-CCNC: 15 U/L (ref 15–37)
AST SERPL-CCNC: 17 U/L (ref 15–37)
ATRIAL RATE: 108 BPM
ATRIAL RATE: 110 BPM
B PERT DNA SPEC QL NAA+PROBE: NOT DETECTED
BACTERIA SPEC CULT: NORMAL
BACTERIA URNS QL MICRO: ABNORMAL /HPF
BASE EXCESS BLD CALC-SCNC: 17 MMOL/L
BASE EXCESS BLD CALC-SCNC: 23 MMOL/L
BASE EXCESS BLDA CALC-SCNC: 12.9 MMOL/L
BASE EXCESS BLDA CALC-SCNC: 17.6 MMOL/L
BASE EXCESS BLDA CALC-SCNC: 7.2 MMOL/L
BASE EXCESS BLDA CALC-SCNC: 8.8 MMOL/L
BASE EXCESS BLDA CALC-SCNC: 9.4 MMOL/L
BASE EXCESS BLDA CALC-SCNC: 9.7 MMOL/L
BASOPHILS # BLD: 0 K/UL (ref 0–0.1)
BASOPHILS # BLD: 0.1 K/UL (ref 0–0.1)
BASOPHILS NFR BLD: 0 % (ref 0–1)
BDY SITE: ABNORMAL
BILIRUB SERPL-MCNC: 0.2 MG/DL (ref 0.2–1)
BILIRUB SERPL-MCNC: 0.3 MG/DL (ref 0.2–1)
BILIRUB SERPL-MCNC: 0.3 MG/DL (ref 0.2–1)
BILIRUB SERPL-MCNC: 0.6 MG/DL (ref 0.2–1)
BILIRUB UR QL: NEGATIVE
BNP SERPL-MCNC: 297 PG/ML
BORDETELLA PARAPERTUSSIS PCR, BORPAR: NOT DETECTED
BREATHS.SPONTANEOUS ON VENT: 18
BREATHS.SPONTANEOUS ON VENT: 26
BUN SERPL-MCNC: 11 MG/DL (ref 6–20)
BUN SERPL-MCNC: 14 MG/DL (ref 6–20)
BUN SERPL-MCNC: 14 MG/DL (ref 6–20)
BUN SERPL-MCNC: 16 MG/DL (ref 6–20)
BUN SERPL-MCNC: 17 MG/DL (ref 6–20)
BUN SERPL-MCNC: 18 MG/DL (ref 6–20)
BUN SERPL-MCNC: 21 MG/DL (ref 6–20)
BUN SERPL-MCNC: 21 MG/DL (ref 6–20)
BUN SERPL-MCNC: 25 MG/DL (ref 6–20)
BUN SERPL-MCNC: 27 MG/DL (ref 6–20)
BUN SERPL-MCNC: 30 MG/DL (ref 6–20)
BUN SERPL-MCNC: 36 MG/DL (ref 6–20)
BUN SERPL-MCNC: 8 MG/DL (ref 6–20)
BUN SERPL-MCNC: 9 MG/DL (ref 6–20)
BUN/CREAT SERPL: 13 (ref 12–20)
BUN/CREAT SERPL: 18 (ref 12–20)
BUN/CREAT SERPL: 19 (ref 12–20)
BUN/CREAT SERPL: 24 (ref 12–20)
BUN/CREAT SERPL: 25 (ref 12–20)
BUN/CREAT SERPL: 30 (ref 12–20)
BUN/CREAT SERPL: 31 (ref 12–20)
BUN/CREAT SERPL: 32 (ref 12–20)
BUN/CREAT SERPL: 41 (ref 12–20)
BUN/CREAT SERPL: 45 (ref 12–20)
BUN/CREAT SERPL: 46 (ref 12–20)
BUN/CREAT SERPL: 49 (ref 12–20)
BUN/CREAT SERPL: 54 (ref 12–20)
BUN/CREAT SERPL: 65 (ref 12–20)
C PNEUM DNA SPEC QL NAA+PROBE: NOT DETECTED
CA-I BLD-SCNC: 1.19 MMOL/L (ref 1.12–1.32)
CA-I BLD-SCNC: 1.25 MMOL/L (ref 1.12–1.32)
CALCIUM SERPL-MCNC: 10.6 MG/DL (ref 8.5–10.1)
CALCIUM SERPL-MCNC: 8.3 MG/DL (ref 8.5–10.1)
CALCIUM SERPL-MCNC: 8.3 MG/DL (ref 8.5–10.1)
CALCIUM SERPL-MCNC: 8.4 MG/DL (ref 8.5–10.1)
CALCIUM SERPL-MCNC: 8.6 MG/DL (ref 8.5–10.1)
CALCIUM SERPL-MCNC: 8.7 MG/DL (ref 8.5–10.1)
CALCIUM SERPL-MCNC: 8.8 MG/DL (ref 8.5–10.1)
CALCIUM SERPL-MCNC: 9.1 MG/DL (ref 8.5–10.1)
CALCIUM SERPL-MCNC: 9.1 MG/DL (ref 8.5–10.1)
CALCIUM SERPL-MCNC: 9.2 MG/DL (ref 8.5–10.1)
CALCIUM SERPL-MCNC: 9.2 MG/DL (ref 8.5–10.1)
CALCIUM SERPL-MCNC: 9.5 MG/DL (ref 8.5–10.1)
CALCULATED P AXIS, ECG09: 76 DEGREES
CALCULATED P AXIS, ECG09: 81 DEGREES
CALCULATED R AXIS, ECG10: 54 DEGREES
CALCULATED R AXIS, ECG10: 58 DEGREES
CALCULATED T AXIS, ECG11: 46 DEGREES
CALCULATED T AXIS, ECG11: 71 DEGREES
CHLORIDE SERPL-SCNC: 100 MMOL/L (ref 97–108)
CHLORIDE SERPL-SCNC: 100 MMOL/L (ref 97–108)
CHLORIDE SERPL-SCNC: 102 MMOL/L (ref 97–108)
CHLORIDE SERPL-SCNC: 104 MMOL/L (ref 97–108)
CHLORIDE SERPL-SCNC: 86 MMOL/L (ref 97–108)
CHLORIDE SERPL-SCNC: 88 MMOL/L (ref 97–108)
CHLORIDE SERPL-SCNC: 89 MMOL/L (ref 97–108)
CHLORIDE SERPL-SCNC: 93 MMOL/L (ref 97–108)
CHLORIDE SERPL-SCNC: 93 MMOL/L (ref 97–108)
CHLORIDE SERPL-SCNC: 95 MMOL/L (ref 97–108)
CHLORIDE SERPL-SCNC: 96 MMOL/L (ref 97–108)
CHLORIDE SERPL-SCNC: 97 MMOL/L (ref 97–108)
CHLORIDE SERPL-SCNC: 98 MMOL/L (ref 97–108)
CHLORIDE SERPL-SCNC: 99 MMOL/L (ref 97–108)
CK SERPL-CCNC: 21 U/L (ref 26–192)
CO2 SERPL-SCNC: 33 MMOL/L (ref 21–32)
CO2 SERPL-SCNC: 34 MMOL/L (ref 21–32)
CO2 SERPL-SCNC: 35 MMOL/L (ref 21–32)
CO2 SERPL-SCNC: 36 MMOL/L (ref 21–32)
CO2 SERPL-SCNC: 38 MMOL/L (ref 21–32)
CO2 SERPL-SCNC: 40 MMOL/L (ref 21–32)
CO2 SERPL-SCNC: 40 MMOL/L (ref 21–32)
CO2 SERPL-SCNC: 41 MMOL/L (ref 21–32)
CO2 SERPL-SCNC: 43 MMOL/L (ref 21–32)
CO2 SERPL-SCNC: 44 MMOL/L (ref 21–32)
CO2 SERPL-SCNC: 44 MMOL/L (ref 21–32)
COLOR UR: ABNORMAL
CREAT SERPL-MCNC: 0.43 MG/DL (ref 0.55–1.02)
CREAT SERPL-MCNC: 0.46 MG/DL (ref 0.55–1.02)
CREAT SERPL-MCNC: 0.47 MG/DL (ref 0.55–1.02)
CREAT SERPL-MCNC: 0.51 MG/DL (ref 0.55–1.02)
CREAT SERPL-MCNC: 0.54 MG/DL (ref 0.55–1.02)
CREAT SERPL-MCNC: 0.55 MG/DL (ref 0.55–1.02)
CREAT SERPL-MCNC: 0.55 MG/DL (ref 0.55–1.02)
CREAT SERPL-MCNC: 0.56 MG/DL (ref 0.55–1.02)
CREAT SERPL-MCNC: 0.58 MG/DL (ref 0.55–1.02)
CREAT SERPL-MCNC: 0.6 MG/DL (ref 0.55–1.02)
CREAT SERPL-MCNC: 0.64 MG/DL (ref 0.55–1.02)
CREAT SERPL-MCNC: 0.68 MG/DL (ref 0.55–1.02)
DATE LAST DOSE: ABNORMAL
DIAGNOSIS, 93000: NORMAL
DIAGNOSIS, 93000: NORMAL
DIFFERENTIAL METHOD BLD: ABNORMAL
ECHO AO ROOT DIAM: 2.94 CM
ECHO AV AREA PEAK VELOCITY: 2.8 CM2
ECHO AV AREA PEAK VELOCITY: 2.83 CM2
ECHO AV AREA PEAK VELOCITY: 2.87 CM2
ECHO AV AREA PEAK VELOCITY: 2.9 CM2
ECHO AV AREA VTI: 3.63 CM2
ECHO AV AREA/BSA VTI: 2 CM2/M2
ECHO AV CUSP MM: 2.12 CM
ECHO AV MEAN GRADIENT: 5.01 MMHG
ECHO AV PEAK GRADIENT: 11.16 MMHG
ECHO AV PEAK GRADIENT: 11.35 MMHG
ECHO AV PEAK VELOCITY: 167.04 CM/S
ECHO AV PEAK VELOCITY: 168.45 CM/S
ECHO AV VTI: 25.11 CM
ECHO EST RA PRESSURE: 10 MMHG
ECHO LA AREA 4C: 16.75 CM2
ECHO LA MAJOR AXIS: 3.06 CM
ECHO LA MINOR AXIS: 1.72 CM
ECHO LA TO AORTIC ROOT RATIO: 1.13
ECHO LA TO AORTIC ROOT RATIO: 1.13
ECHO LA VOL 2C: 68.73 ML (ref 22–52)
ECHO LA VOL 4C: 37.16 ML (ref 22–52)
ECHO LA VOL BP: 53.99 ML (ref 22–52)
ECHO LA VOL/BSA BIPLANE: 30.27 ML/M2 (ref 16–28)
ECHO LA VOLUME INDEX A2C: 38.53 ML/M2 (ref 16–28)
ECHO LA VOLUME INDEX A4C: 20.83 ML/M2 (ref 16–28)
ECHO LV E' LATERAL VELOCITY: 9.45 CM/S
ECHO LV INTERNAL DIMENSION DIASTOLIC: 3.37 CM (ref 3.9–5.3)
ECHO LV INTERNAL DIMENSION SYSTOLIC: 2.18 CM
ECHO LV IVSD: 1.93 CM (ref 0.6–0.9)
ECHO LV IVSS: 2.52 CM
ECHO LV MASS 2D: 264 G (ref 67–162)
ECHO LV MASS INDEX 2D: 148 G/M2 (ref 43–95)
ECHO LV POSTERIOR WALL DIASTOLIC: 1.78 CM (ref 0.6–0.9)
ECHO LV POSTERIOR WALL SYSTOLIC: 2.05 CM
ECHO LVOT CARDIAC OUTPUT: 9.47 LITER/MINUTE
ECHO LVOT DIAM: 2.12 CM
ECHO LVOT PEAK GRADIENT: 7.16 MMHG
ECHO LVOT PEAK GRADIENT: 7.52 MMHG
ECHO LVOT PEAK VELOCITY: 133.76 CM/S
ECHO LVOT PEAK VELOCITY: 137.15 CM/S
ECHO LVOT SV: 91.3 ML
ECHO LVOT VTI: 25.87 CM
ECHO MV A VELOCITY: 141.37 CM/S
ECHO MV E DECELERATION TIME (DT): 115.47 MS
ECHO MV E VELOCITY: 82.77 CM/S
ECHO MV E/A RATIO: 0.59
ECHO MV E/E' LATERAL: 8.76
ECHO PV MAX VELOCITY: 98.99 CM/S
ECHO PV PEAK INSTANTANEOUS GRADIENT SYSTOLIC: 3.92 MMHG
ECHO RIGHT VENTRICULAR SYSTOLIC PRESSURE (RVSP): 30.71 MMHG
ECHO RV INTERNAL DIMENSION: 2.91 CM
ECHO TV REGURGITANT MAX VELOCITY: 227.56 CM/S
ECHO TV REGURGITANT PEAK GRADIENT: 20.71 MMHG
EOSINOPHIL # BLD: 0 K/UL (ref 0–0.4)
EOSINOPHIL # BLD: 0.3 K/UL (ref 0–0.4)
EOSINOPHIL # BLD: 0.5 K/UL (ref 0–0.4)
EOSINOPHIL NFR BLD: 0 % (ref 0–7)
EOSINOPHIL NFR BLD: 2 % (ref 0–7)
EOSINOPHIL NFR BLD: 2 % (ref 0–7)
EPAP/CPAP/PEEP, PAPEEP: 6
EPITH CASTS URNS QL MICRO: ABNORMAL /LPF
ERYTHROCYTE [DISTWIDTH] IN BLOOD BY AUTOMATED COUNT: 13.2 % (ref 11.5–14.5)
ERYTHROCYTE [DISTWIDTH] IN BLOOD BY AUTOMATED COUNT: 13.4 % (ref 11.5–14.5)
ERYTHROCYTE [DISTWIDTH] IN BLOOD BY AUTOMATED COUNT: 13.4 % (ref 11.5–14.5)
ERYTHROCYTE [DISTWIDTH] IN BLOOD BY AUTOMATED COUNT: 13.5 % (ref 11.5–14.5)
ERYTHROCYTE [DISTWIDTH] IN BLOOD BY AUTOMATED COUNT: 13.6 % (ref 11.5–14.5)
ERYTHROCYTE [DISTWIDTH] IN BLOOD BY AUTOMATED COUNT: 13.7 % (ref 11.5–14.5)
ERYTHROCYTE [DISTWIDTH] IN BLOOD BY AUTOMATED COUNT: 13.8 % (ref 11.5–14.5)
ERYTHROCYTE [DISTWIDTH] IN BLOOD BY AUTOMATED COUNT: 13.9 % (ref 11.5–14.5)
ERYTHROCYTE [DISTWIDTH] IN BLOOD BY AUTOMATED COUNT: 13.9 % (ref 11.5–14.5)
ERYTHROCYTE [DISTWIDTH] IN BLOOD BY AUTOMATED COUNT: 14 % (ref 11.5–14.5)
ERYTHROCYTE [DISTWIDTH] IN BLOOD BY AUTOMATED COUNT: 14.1 % (ref 11.5–14.5)
ERYTHROCYTE [DISTWIDTH] IN BLOOD BY AUTOMATED COUNT: 14.2 % (ref 11.5–14.5)
ERYTHROCYTE [DISTWIDTH] IN BLOOD BY AUTOMATED COUNT: 14.2 % (ref 11.5–14.5)
ERYTHROCYTE [DISTWIDTH] IN BLOOD BY AUTOMATED COUNT: 14.4 % (ref 11.5–14.5)
EST. AVERAGE GLUCOSE BLD GHB EST-MCNC: 120 MG/DL
FIO2 ON VENT: 30 %
FIO2 ON VENT: 40 %
FIO2 ON VENT: 50 %
FIO2 ON VENT: 60 %
FLUAV H1 2009 PAND RNA SPEC QL NAA+PROBE: NOT DETECTED
FLUAV H1 RNA SPEC QL NAA+PROBE: NOT DETECTED
FLUAV H3 RNA SPEC QL NAA+PROBE: NOT DETECTED
FLUAV SUBTYP SPEC NAA+PROBE: NOT DETECTED
FLUBV RNA SPEC QL NAA+PROBE: NOT DETECTED
GAS FLOW.O2 O2 DELIVERY SYS: ABNORMAL L/MIN
GAS FLOW.O2 O2 DELIVERY SYS: ABNORMAL L/MIN
GAS FLOW.O2 SETTING OXYMISER: 14 BPM
GAS FLOW.O2 SETTING OXYMISER: 14 L/MIN
GAS FLOW.O2 SETTING OXYMISER: 16 L/MIN
GAS FLOW.O2 SETTING OXYMISER: 18 L/MIN
GAS FLOW.O2 SETTING OXYMISER: 5 L/M
GLOBULIN SER CALC-MCNC: 3.6 G/DL (ref 2–4)
GLOBULIN SER CALC-MCNC: 3.6 G/DL (ref 2–4)
GLOBULIN SER CALC-MCNC: 4 G/DL (ref 2–4)
GLOBULIN SER CALC-MCNC: 4.4 G/DL (ref 2–4)
GLUCOSE BLD STRIP.AUTO-MCNC: 107 MG/DL (ref 65–100)
GLUCOSE BLD STRIP.AUTO-MCNC: 117 MG/DL (ref 65–100)
GLUCOSE BLD STRIP.AUTO-MCNC: 124 MG/DL (ref 65–100)
GLUCOSE BLD STRIP.AUTO-MCNC: 129 MG/DL (ref 65–100)
GLUCOSE BLD STRIP.AUTO-MCNC: 134 MG/DL (ref 65–100)
GLUCOSE BLD STRIP.AUTO-MCNC: 136 MG/DL (ref 65–100)
GLUCOSE BLD STRIP.AUTO-MCNC: 139 MG/DL (ref 65–100)
GLUCOSE BLD STRIP.AUTO-MCNC: 142 MG/DL (ref 65–100)
GLUCOSE BLD STRIP.AUTO-MCNC: 142 MG/DL (ref 65–100)
GLUCOSE BLD STRIP.AUTO-MCNC: 144 MG/DL (ref 65–100)
GLUCOSE BLD STRIP.AUTO-MCNC: 145 MG/DL (ref 65–100)
GLUCOSE BLD STRIP.AUTO-MCNC: 146 MG/DL (ref 65–100)
GLUCOSE BLD STRIP.AUTO-MCNC: 149 MG/DL (ref 65–100)
GLUCOSE BLD STRIP.AUTO-MCNC: 151 MG/DL (ref 65–100)
GLUCOSE BLD STRIP.AUTO-MCNC: 153 MG/DL (ref 65–100)
GLUCOSE BLD STRIP.AUTO-MCNC: 153 MG/DL (ref 65–100)
GLUCOSE BLD STRIP.AUTO-MCNC: 154 MG/DL (ref 65–100)
GLUCOSE BLD STRIP.AUTO-MCNC: 155 MG/DL (ref 65–100)
GLUCOSE BLD STRIP.AUTO-MCNC: 155 MG/DL (ref 65–100)
GLUCOSE BLD STRIP.AUTO-MCNC: 156 MG/DL (ref 65–100)
GLUCOSE BLD STRIP.AUTO-MCNC: 157 MG/DL (ref 65–100)
GLUCOSE BLD STRIP.AUTO-MCNC: 158 MG/DL (ref 65–100)
GLUCOSE BLD STRIP.AUTO-MCNC: 159 MG/DL (ref 65–100)
GLUCOSE BLD STRIP.AUTO-MCNC: 162 MG/DL (ref 65–100)
GLUCOSE BLD STRIP.AUTO-MCNC: 167 MG/DL (ref 65–100)
GLUCOSE BLD STRIP.AUTO-MCNC: 168 MG/DL (ref 65–100)
GLUCOSE BLD STRIP.AUTO-MCNC: 169 MG/DL (ref 65–100)
GLUCOSE BLD STRIP.AUTO-MCNC: 170 MG/DL (ref 65–100)
GLUCOSE BLD STRIP.AUTO-MCNC: 172 MG/DL (ref 65–100)
GLUCOSE BLD STRIP.AUTO-MCNC: 176 MG/DL (ref 65–100)
GLUCOSE BLD STRIP.AUTO-MCNC: 180 MG/DL (ref 65–100)
GLUCOSE BLD STRIP.AUTO-MCNC: 182 MG/DL (ref 65–100)
GLUCOSE BLD STRIP.AUTO-MCNC: 184 MG/DL (ref 65–100)
GLUCOSE BLD STRIP.AUTO-MCNC: 186 MG/DL (ref 65–100)
GLUCOSE BLD STRIP.AUTO-MCNC: 190 MG/DL (ref 65–100)
GLUCOSE BLD STRIP.AUTO-MCNC: 196 MG/DL (ref 65–100)
GLUCOSE BLD STRIP.AUTO-MCNC: 198 MG/DL (ref 65–100)
GLUCOSE BLD STRIP.AUTO-MCNC: 199 MG/DL (ref 65–100)
GLUCOSE BLD STRIP.AUTO-MCNC: 201 MG/DL (ref 65–100)
GLUCOSE BLD STRIP.AUTO-MCNC: 201 MG/DL (ref 65–100)
GLUCOSE BLD STRIP.AUTO-MCNC: 217 MG/DL (ref 65–100)
GLUCOSE BLD STRIP.AUTO-MCNC: 225 MG/DL (ref 65–100)
GLUCOSE SERPL-MCNC: 102 MG/DL (ref 65–100)
GLUCOSE SERPL-MCNC: 129 MG/DL (ref 65–100)
GLUCOSE SERPL-MCNC: 130 MG/DL (ref 65–100)
GLUCOSE SERPL-MCNC: 138 MG/DL (ref 65–100)
GLUCOSE SERPL-MCNC: 139 MG/DL (ref 65–100)
GLUCOSE SERPL-MCNC: 141 MG/DL (ref 65–100)
GLUCOSE SERPL-MCNC: 146 MG/DL (ref 65–100)
GLUCOSE SERPL-MCNC: 166 MG/DL (ref 65–100)
GLUCOSE SERPL-MCNC: 180 MG/DL (ref 65–100)
GLUCOSE SERPL-MCNC: 184 MG/DL (ref 65–100)
GLUCOSE SERPL-MCNC: 188 MG/DL (ref 65–100)
GLUCOSE SERPL-MCNC: 213 MG/DL (ref 65–100)
GLUCOSE UR STRIP.AUTO-MCNC: NEGATIVE MG/DL
GRAN CASTS URNS QL MICRO: ABNORMAL /LPF
HADV DNA SPEC QL NAA+PROBE: NOT DETECTED
HBA1C MFR BLD: 5.8 % (ref 4–5.6)
HCO3 BLD-SCNC: 41.9 MMOL/L (ref 22–26)
HCO3 BLD-SCNC: 45.7 MMOL/L (ref 22–26)
HCO3 BLDA-SCNC: 33 MMOL/L (ref 22–26)
HCO3 BLDA-SCNC: 34 MMOL/L (ref 22–26)
HCO3 BLDA-SCNC: 37 MMOL/L (ref 22–26)
HCO3 BLDA-SCNC: 37 MMOL/L (ref 22–26)
HCO3 BLDA-SCNC: 40 MMOL/L (ref 22–26)
HCO3 BLDA-SCNC: 42 MMOL/L (ref 22–26)
HCOV 229E RNA SPEC QL NAA+PROBE: NOT DETECTED
HCOV HKU1 RNA SPEC QL NAA+PROBE: NOT DETECTED
HCOV NL63 RNA SPEC QL NAA+PROBE: NOT DETECTED
HCOV OC43 RNA SPEC QL NAA+PROBE: NOT DETECTED
HCT VFR BLD AUTO: 26.5 % (ref 35–47)
HCT VFR BLD AUTO: 26.5 % (ref 35–47)
HCT VFR BLD AUTO: 26.6 % (ref 35–47)
HCT VFR BLD AUTO: 26.8 % (ref 35–47)
HCT VFR BLD AUTO: 27.9 % (ref 35–47)
HCT VFR BLD AUTO: 28.1 % (ref 35–47)
HCT VFR BLD AUTO: 30 % (ref 35–47)
HCT VFR BLD AUTO: 30.1 % (ref 35–47)
HCT VFR BLD AUTO: 31.1 % (ref 35–47)
HCT VFR BLD AUTO: 33.8 % (ref 35–47)
HCT VFR BLD AUTO: 34.1 % (ref 35–47)
HCT VFR BLD AUTO: 34.3 % (ref 35–47)
HCT VFR BLD AUTO: 34.7 % (ref 35–47)
HCT VFR BLD AUTO: 36.5 % (ref 35–47)
HGB BLD-MCNC: 10.1 G/DL (ref 11.5–16)
HGB BLD-MCNC: 10.5 G/DL (ref 11.5–16)
HGB BLD-MCNC: 10.6 G/DL (ref 11.5–16)
HGB BLD-MCNC: 10.6 G/DL (ref 11.5–16)
HGB BLD-MCNC: 11.1 G/DL (ref 11.5–16)
HGB BLD-MCNC: 7.8 G/DL (ref 11.5–16)
HGB BLD-MCNC: 7.9 G/DL (ref 11.5–16)
HGB BLD-MCNC: 8 G/DL (ref 11.5–16)
HGB BLD-MCNC: 8.1 G/DL (ref 11.5–16)
HGB BLD-MCNC: 8.5 G/DL (ref 11.5–16)
HGB BLD-MCNC: 8.5 G/DL (ref 11.5–16)
HGB BLD-MCNC: 9 G/DL (ref 11.5–16)
HGB BLD-MCNC: 9.1 G/DL (ref 11.5–16)
HGB BLD-MCNC: 9.1 G/DL (ref 11.5–16)
HGB UR QL STRIP: NEGATIVE
HMPV RNA SPEC QL NAA+PROBE: NOT DETECTED
HPIV1 RNA SPEC QL NAA+PROBE: NOT DETECTED
HPIV2 RNA SPEC QL NAA+PROBE: NOT DETECTED
HPIV3 RNA SPEC QL NAA+PROBE: NOT DETECTED
HPIV4 RNA SPEC QL NAA+PROBE: NOT DETECTED
HYALINE CASTS URNS QL MICRO: ABNORMAL /LPF (ref 0–5)
IMM GRANULOCYTES # BLD AUTO: 0 K/UL (ref 0–0.04)
IMM GRANULOCYTES # BLD AUTO: 0.1 K/UL (ref 0–0.04)
IMM GRANULOCYTES # BLD AUTO: 0.1 K/UL (ref 0–0.04)
IMM GRANULOCYTES # BLD AUTO: 0.2 K/UL (ref 0–0.04)
IMM GRANULOCYTES NFR BLD AUTO: 0 % (ref 0–0.5)
IMM GRANULOCYTES NFR BLD AUTO: 1 % (ref 0–0.5)
INR PPP: 1.1 (ref 0.9–1.1)
KETONES UR QL STRIP.AUTO: NEGATIVE MG/DL
LACTATE SERPL-SCNC: 1.7 MMOL/L (ref 0.4–2)
LEUKOCYTE ESTERASE UR QL STRIP.AUTO: NEGATIVE
LVOT MG: 3.86 MMHG
LYMPHOCYTES # BLD: 0.3 K/UL (ref 0.8–3.5)
LYMPHOCYTES # BLD: 0.4 K/UL (ref 0.8–3.5)
LYMPHOCYTES # BLD: 0.5 K/UL (ref 0.8–3.5)
LYMPHOCYTES # BLD: 0.5 K/UL (ref 0.8–3.5)
LYMPHOCYTES # BLD: 0.6 K/UL (ref 0.8–3.5)
LYMPHOCYTES # BLD: 1.1 K/UL (ref 0.8–3.5)
LYMPHOCYTES # BLD: 1.2 K/UL (ref 0.8–3.5)
LYMPHOCYTES NFR BLD: 2 % (ref 12–49)
LYMPHOCYTES NFR BLD: 3 % (ref 12–49)
LYMPHOCYTES NFR BLD: 5 % (ref 12–49)
LYMPHOCYTES NFR BLD: 9 % (ref 12–49)
M PNEUMO DNA SPEC QL NAA+PROBE: NOT DETECTED
MAGNESIUM SERPL-MCNC: 1.5 MG/DL (ref 1.6–2.4)
MAGNESIUM SERPL-MCNC: 1.8 MG/DL (ref 1.6–2.4)
MAGNESIUM SERPL-MCNC: 1.9 MG/DL (ref 1.6–2.4)
MAGNESIUM SERPL-MCNC: 2 MG/DL (ref 1.6–2.4)
MAGNESIUM SERPL-MCNC: 2.3 MG/DL (ref 1.6–2.4)
MCH RBC QN AUTO: 28.8 PG (ref 26–34)
MCH RBC QN AUTO: 28.8 PG (ref 26–34)
MCH RBC QN AUTO: 28.9 PG (ref 26–34)
MCH RBC QN AUTO: 29 PG (ref 26–34)
MCH RBC QN AUTO: 29 PG (ref 26–34)
MCH RBC QN AUTO: 29.3 PG (ref 26–34)
MCH RBC QN AUTO: 29.3 PG (ref 26–34)
MCH RBC QN AUTO: 29.4 PG (ref 26–34)
MCH RBC QN AUTO: 29.5 PG (ref 26–34)
MCH RBC QN AUTO: 29.7 PG (ref 26–34)
MCH RBC QN AUTO: 29.8 PG (ref 26–34)
MCH RBC QN AUTO: 29.8 PG (ref 26–34)
MCHC RBC AUTO-ENTMCNC: 29.3 G/DL (ref 30–36.5)
MCHC RBC AUTO-ENTMCNC: 29.4 G/DL (ref 30–36.5)
MCHC RBC AUTO-ENTMCNC: 29.5 G/DL (ref 30–36.5)
MCHC RBC AUTO-ENTMCNC: 29.6 G/DL (ref 30–36.5)
MCHC RBC AUTO-ENTMCNC: 30 G/DL (ref 30–36.5)
MCHC RBC AUTO-ENTMCNC: 30.1 G/DL (ref 30–36.5)
MCHC RBC AUTO-ENTMCNC: 30.2 G/DL (ref 30–36.5)
MCHC RBC AUTO-ENTMCNC: 30.2 G/DL (ref 30–36.5)
MCHC RBC AUTO-ENTMCNC: 30.4 G/DL (ref 30–36.5)
MCHC RBC AUTO-ENTMCNC: 30.5 G/DL (ref 30–36.5)
MCHC RBC AUTO-ENTMCNC: 30.5 G/DL (ref 30–36.5)
MCHC RBC AUTO-ENTMCNC: 30.6 G/DL (ref 30–36.5)
MCHC RBC AUTO-ENTMCNC: 30.9 G/DL (ref 30–36.5)
MCHC RBC AUTO-ENTMCNC: 31.1 G/DL (ref 30–36.5)
MCV RBC AUTO: 100.6 FL (ref 80–99)
MCV RBC AUTO: 95.6 FL (ref 80–99)
MCV RBC AUTO: 95.8 FL (ref 80–99)
MCV RBC AUTO: 95.9 FL (ref 80–99)
MCV RBC AUTO: 96 FL (ref 80–99)
MCV RBC AUTO: 96.2 FL (ref 80–99)
MCV RBC AUTO: 96.3 FL (ref 80–99)
MCV RBC AUTO: 96.4 FL (ref 80–99)
MCV RBC AUTO: 96.5 FL (ref 80–99)
MCV RBC AUTO: 97.8 FL (ref 80–99)
MCV RBC AUTO: 97.9 FL (ref 80–99)
MCV RBC AUTO: 98.2 FL (ref 80–99)
MCV RBC AUTO: 98.5 FL (ref 80–99)
MCV RBC AUTO: 98.8 FL (ref 80–99)
METAMYELOCYTES NFR BLD MANUAL: 1 %
METAMYELOCYTES NFR BLD MANUAL: 1 %
MONOCYTES # BLD: 0.2 K/UL (ref 0–1)
MONOCYTES # BLD: 0.5 K/UL (ref 0–1)
MONOCYTES # BLD: 0.5 K/UL (ref 0–1)
MONOCYTES # BLD: 0.6 K/UL (ref 0–1)
MONOCYTES # BLD: 0.9 K/UL (ref 0–1)
MONOCYTES NFR BLD: 1 % (ref 5–13)
MONOCYTES NFR BLD: 3 % (ref 5–13)
MONOCYTES NFR BLD: 3 % (ref 5–13)
MONOCYTES NFR BLD: 4 % (ref 5–13)
MONOCYTES NFR BLD: 4 % (ref 5–13)
MONOCYTES NFR BLD: 5 % (ref 5–13)
MONOCYTES NFR BLD: 8 % (ref 5–13)
MYELOCYTES NFR BLD MANUAL: 2 %
NEUTS BAND NFR BLD MANUAL: 1 %
NEUTS BAND NFR BLD MANUAL: 2 %
NEUTS BAND NFR BLD MANUAL: 2 %
NEUTS BAND NFR BLD MANUAL: 5 %
NEUTS SEG # BLD: 12.5 K/UL (ref 1.8–8)
NEUTS SEG # BLD: 16 K/UL (ref 1.8–8)
NEUTS SEG # BLD: 16.7 K/UL (ref 1.8–8)
NEUTS SEG # BLD: 17.2 K/UL (ref 1.8–8)
NEUTS SEG # BLD: 18.2 K/UL (ref 1.8–8)
NEUTS SEG # BLD: 20.2 K/UL (ref 1.8–8)
NEUTS SEG # BLD: 9.7 K/UL (ref 1.8–8)
NEUTS SEG NFR BLD: 80 % (ref 32–75)
NEUTS SEG NFR BLD: 81 % (ref 32–75)
NEUTS SEG NFR BLD: 91 % (ref 32–75)
NEUTS SEG NFR BLD: 92 % (ref 32–75)
NEUTS SEG NFR BLD: 95 % (ref 32–75)
NITRITE UR QL STRIP.AUTO: NEGATIVE
NRBC # BLD: 0 K/UL (ref 0–0.01)
NRBC # BLD: 0.02 K/UL (ref 0–0.01)
NRBC # BLD: 0.02 K/UL (ref 0–0.01)
NRBC # BLD: 0.03 K/UL (ref 0–0.01)
NRBC # BLD: 0.03 K/UL (ref 0–0.01)
NRBC # BLD: 0.04 K/UL (ref 0–0.01)
NRBC # BLD: 0.04 K/UL (ref 0–0.01)
NRBC # BLD: 0.05 K/UL (ref 0–0.01)
NRBC # BLD: 0.09 K/UL (ref 0–0.01)
NRBC BLD-RTO: 0 PER 100 WBC
NRBC BLD-RTO: 0.1 PER 100 WBC
NRBC BLD-RTO: 0.2 PER 100 WBC
NRBC BLD-RTO: 0.4 PER 100 WBC
O2/TOTAL GAS SETTING VFR VENT: 0.6 %
OTHER,OTHU: ABNORMAL
P-R INTERVAL, ECG05: 116 MS
P-R INTERVAL, ECG05: 136 MS
PATH REV BLD -IMP: ABNORMAL
PCO2 BLD: 56.9 MMHG (ref 35–45)
PCO2 BLD: 71.8 MMHG (ref 35–45)
PCO2 BLDA: 46 MMHG (ref 35–45)
PCO2 BLDA: 48 MMHG (ref 35–45)
PCO2 BLDA: 52 MMHG (ref 35–45)
PCO2 BLDA: 58 MMHG (ref 35–45)
PCO2 BLDA: 60 MMHG (ref 35–45)
PCO2 BLDA: 65 MMHG (ref 35–45)
PEEP RESPIRATORY: 6 CMH2O
PH BLD: 7.37 [PH] (ref 7.35–7.45)
PH BLD: 7.51 [PH] (ref 7.35–7.45)
PH BLDA: 7.37 [PH] (ref 7.35–7.45)
PH BLDA: 7.4 [PH] (ref 7.35–7.45)
PH BLDA: 7.42 [PH] (ref 7.35–7.45)
PH BLDA: 7.45 [PH] (ref 7.35–7.45)
PH BLDA: 7.5 [PH] (ref 7.35–7.45)
PH BLDA: 7.56 [PH] (ref 7.35–7.45)
PH UR STRIP: 6.5 [PH] (ref 5–8)
PHOSPHATE SERPL-MCNC: 3.3 MG/DL (ref 2.6–4.7)
PIP ISTAT,IPIP: 31
PLATELET # BLD AUTO: 260 K/UL (ref 150–400)
PLATELET # BLD AUTO: 277 K/UL (ref 150–400)
PLATELET # BLD AUTO: 312 K/UL (ref 150–400)
PLATELET # BLD AUTO: 355 K/UL (ref 150–400)
PLATELET # BLD AUTO: 359 K/UL (ref 150–400)
PLATELET # BLD AUTO: 389 K/UL (ref 150–400)
PLATELET # BLD AUTO: 391 K/UL (ref 150–400)
PLATELET # BLD AUTO: 413 K/UL (ref 150–400)
PLATELET # BLD AUTO: 427 K/UL (ref 150–400)
PLATELET # BLD AUTO: 440 K/UL (ref 150–400)
PLATELET # BLD AUTO: 441 K/UL (ref 150–400)
PLATELET # BLD AUTO: 456 K/UL (ref 150–400)
PLATELET # BLD AUTO: 483 K/UL (ref 150–400)
PLATELET # BLD AUTO: 506 K/UL (ref 150–400)
PLATELET COMMENTS,PCOM: ABNORMAL
PLATELET COMMENTS,PCOM: ABNORMAL
PMV BLD AUTO: 10.2 FL (ref 8.9–12.9)
PMV BLD AUTO: 10.4 FL (ref 8.9–12.9)
PMV BLD AUTO: 10.5 FL (ref 8.9–12.9)
PMV BLD AUTO: 10.6 FL (ref 8.9–12.9)
PMV BLD AUTO: 10.7 FL (ref 8.9–12.9)
PMV BLD AUTO: 10.7 FL (ref 8.9–12.9)
PMV BLD AUTO: 11 FL (ref 8.9–12.9)
PMV BLD AUTO: 9.5 FL (ref 8.9–12.9)
PMV BLD AUTO: 9.6 FL (ref 8.9–12.9)
PMV BLD AUTO: 9.8 FL (ref 8.9–12.9)
PO2 BLD: 129 MMHG (ref 80–100)
PO2 BLD: 90 MMHG (ref 80–100)
PO2 BLDA: 148 MMHG (ref 80–100)
PO2 BLDA: 60 MMHG (ref 80–100)
PO2 BLDA: 69 MMHG (ref 80–100)
PO2 BLDA: 77 MMHG (ref 80–100)
PO2 BLDA: 83 MMHG (ref 80–100)
PO2 BLDA: 87 MMHG (ref 80–100)
POTASSIUM SERPL-SCNC: 2.7 MMOL/L (ref 3.5–5.1)
POTASSIUM SERPL-SCNC: 2.9 MMOL/L (ref 3.5–5.1)
POTASSIUM SERPL-SCNC: 3 MMOL/L (ref 3.5–5.1)
POTASSIUM SERPL-SCNC: 3 MMOL/L (ref 3.5–5.1)
POTASSIUM SERPL-SCNC: 3.1 MMOL/L (ref 3.5–5.1)
POTASSIUM SERPL-SCNC: 3.2 MMOL/L (ref 3.5–5.1)
POTASSIUM SERPL-SCNC: 3.5 MMOL/L (ref 3.5–5.1)
POTASSIUM SERPL-SCNC: 3.6 MMOL/L (ref 3.5–5.1)
POTASSIUM SERPL-SCNC: 3.7 MMOL/L (ref 3.5–5.1)
POTASSIUM SERPL-SCNC: 3.7 MMOL/L (ref 3.5–5.1)
POTASSIUM SERPL-SCNC: 3.8 MMOL/L (ref 3.5–5.1)
POTASSIUM SERPL-SCNC: 3.9 MMOL/L (ref 3.5–5.1)
POTASSIUM SERPL-SCNC: 3.9 MMOL/L (ref 3.5–5.1)
POTASSIUM SERPL-SCNC: 4.2 MMOL/L (ref 3.5–5.1)
PROT SERPL-MCNC: 5.4 G/DL (ref 6.4–8.2)
PROT SERPL-MCNC: 5.6 G/DL (ref 6.4–8.2)
PROT SERPL-MCNC: 6.5 G/DL (ref 6.4–8.2)
PROT SERPL-MCNC: 7.1 G/DL (ref 6.4–8.2)
PROT UR STRIP-MCNC: 30 MG/DL
PROTHROMBIN TIME: 11.9 SEC (ref 9–11.1)
Q-T INTERVAL, ECG07: 322 MS
Q-T INTERVAL, ECG07: 350 MS
QRS DURATION, ECG06: 82 MS
QRS DURATION, ECG06: 88 MS
QTC CALCULATION (BEZET), ECG08: 431 MS
QTC CALCULATION (BEZET), ECG08: 473 MS
RBC # BLD AUTO: 2.71 M/UL (ref 3.8–5.2)
RBC # BLD AUTO: 2.71 M/UL (ref 3.8–5.2)
RBC # BLD AUTO: 2.72 M/UL (ref 3.8–5.2)
RBC # BLD AUTO: 2.76 M/UL (ref 3.8–5.2)
RBC # BLD AUTO: 2.89 M/UL (ref 3.8–5.2)
RBC # BLD AUTO: 2.94 M/UL (ref 3.8–5.2)
RBC # BLD AUTO: 3.09 M/UL (ref 3.8–5.2)
RBC # BLD AUTO: 3.12 M/UL (ref 3.8–5.2)
RBC # BLD AUTO: 3.14 M/UL (ref 3.8–5.2)
RBC # BLD AUTO: 3.45 M/UL (ref 3.8–5.2)
RBC # BLD AUTO: 3.53 M/UL (ref 3.8–5.2)
RBC # BLD AUTO: 3.56 M/UL (ref 3.8–5.2)
RBC # BLD AUTO: 3.6 M/UL (ref 3.8–5.2)
RBC # BLD AUTO: 3.73 M/UL (ref 3.8–5.2)
RBC #/AREA URNS HPF: ABNORMAL /HPF (ref 0–5)
RBC MORPH BLD: ABNORMAL
REPORTED DOSE,DOSE: ABNORMAL UNITS
REPORTED DOSE/TIME,TMG: ABNORMAL
RSV RNA SPEC QL NAA+PROBE: NOT DETECTED
RV+EV RNA SPEC QL NAA+PROBE: NOT DETECTED
SAO2 % BLD: 92 % (ref 92–97)
SAO2 % BLD: 96 % (ref 92–97)
SAO2 % BLD: 97 % (ref 92–97)
SAO2 % BLD: 99 % (ref 92–97)
SAO2 % BLD: 99 % (ref 92–97)
SAO2% DEVICE SAO2% SENSOR NAME: ABNORMAL
SERVICE CMNT-IMP: ABNORMAL
SERVICE CMNT-IMP: NORMAL
SODIUM SERPL-SCNC: 133 MMOL/L (ref 136–145)
SODIUM SERPL-SCNC: 134 MMOL/L (ref 136–145)
SODIUM SERPL-SCNC: 134 MMOL/L (ref 136–145)
SODIUM SERPL-SCNC: 135 MMOL/L (ref 136–145)
SODIUM SERPL-SCNC: 137 MMOL/L (ref 136–145)
SODIUM SERPL-SCNC: 137 MMOL/L (ref 136–145)
SODIUM SERPL-SCNC: 138 MMOL/L (ref 136–145)
SODIUM SERPL-SCNC: 138 MMOL/L (ref 136–145)
SODIUM SERPL-SCNC: 139 MMOL/L (ref 136–145)
SODIUM SERPL-SCNC: 140 MMOL/L (ref 136–145)
SODIUM SERPL-SCNC: 141 MMOL/L (ref 136–145)
SODIUM SERPL-SCNC: 141 MMOL/L (ref 136–145)
SODIUM SERPL-SCNC: 142 MMOL/L (ref 136–145)
SODIUM SERPL-SCNC: 143 MMOL/L (ref 136–145)
SP GR UR REFRACTOMETRY: 1.01 (ref 1–1.03)
SPECIMEN SITE: ABNORMAL
SPECIMEN TYPE: ABNORMAL
SPECIMEN TYPE: ABNORMAL
T3 SERPL-MCNC: 96 NG/DL (ref 71–180)
T4 FREE SERPL-MCNC: 2 NG/DL (ref 0.8–1.5)
TOTAL RESP. RATE, ITRR: 14
TROPONIN I SERPL-MCNC: <0.05 NG/ML
TROPONIN I SERPL-MCNC: <0.05 NG/ML
TSH SERPL DL<=0.05 MIU/L-ACNC: <0.01 UIU/ML (ref 0.36–3.74)
UA: UC IF INDICATED,UAUC: ABNORMAL
UROBILINOGEN UR QL STRIP.AUTO: 0.2 EU/DL (ref 0.2–1)
VANCOMYCIN TROUGH SERPL-MCNC: 17.7 UG/ML (ref 5–10)
VENTILATION MODE VENT: ABNORMAL
VENTRICULAR RATE, ECG03: 108 BPM
VENTRICULAR RATE, ECG03: 110 BPM
VT SETTING VENT: 400 ML
WBC # BLD AUTO: 12.2 K/UL (ref 3.6–11)
WBC # BLD AUTO: 13.5 K/UL (ref 3.6–11)
WBC # BLD AUTO: 17.4 K/UL (ref 3.6–11)
WBC # BLD AUTO: 17.4 K/UL (ref 3.6–11)
WBC # BLD AUTO: 18.2 K/UL (ref 3.6–11)
WBC # BLD AUTO: 18.9 K/UL (ref 3.6–11)
WBC # BLD AUTO: 19.4 K/UL (ref 3.6–11)
WBC # BLD AUTO: 19.6 K/UL (ref 3.6–11)
WBC # BLD AUTO: 19.9 K/UL (ref 3.6–11)
WBC # BLD AUTO: 21.7 K/UL (ref 3.6–11)
WBC # BLD AUTO: 23.5 K/UL (ref 3.6–11)
WBC # BLD AUTO: 23.5 K/UL (ref 3.6–11)
WBC # BLD AUTO: 24.9 K/UL (ref 3.6–11)
WBC # BLD AUTO: 25 K/UL (ref 3.6–11)
WBC CASTS URNS QL MICRO: ABNORMAL /LPF
WBC MORPH BLD: ABNORMAL
WBC URNS QL MICRO: ABNORMAL /HPF (ref 0–4)

## 2020-01-01 PROCEDURE — 80048 BASIC METABOLIC PNL TOTAL CA: CPT

## 2020-01-01 PROCEDURE — 94640 AIRWAY INHALATION TREATMENT: CPT

## 2020-01-01 PROCEDURE — 74011250636 HC RX REV CODE- 250/636: Performed by: STUDENT IN AN ORGANIZED HEALTH CARE EDUCATION/TRAINING PROGRAM

## 2020-01-01 PROCEDURE — 74011000250 HC RX REV CODE- 250: Performed by: INTERNAL MEDICINE

## 2020-01-01 PROCEDURE — 77010033678 HC OXYGEN DAILY

## 2020-01-01 PROCEDURE — 82550 ASSAY OF CK (CPK): CPT

## 2020-01-01 PROCEDURE — 80053 COMPREHEN METABOLIC PANEL: CPT

## 2020-01-01 PROCEDURE — 74011250637 HC RX REV CODE- 250/637: Performed by: INTERNAL MEDICINE

## 2020-01-01 PROCEDURE — 2709999900 HC NON-CHARGEABLE SUPPLY

## 2020-01-01 PROCEDURE — 94760 N-INVAS EAR/PLS OXIMETRY 1: CPT

## 2020-01-01 PROCEDURE — 65270000029 HC RM PRIVATE

## 2020-01-01 PROCEDURE — 74011250636 HC RX REV CODE- 250/636: Performed by: INTERNAL MEDICINE

## 2020-01-01 PROCEDURE — 94003 VENT MGMT INPAT SUBQ DAY: CPT

## 2020-01-01 PROCEDURE — 74011636637 HC RX REV CODE- 636/637: Performed by: STUDENT IN AN ORGANIZED HEALTH CARE EDUCATION/TRAINING PROGRAM

## 2020-01-01 PROCEDURE — 36600 WITHDRAWAL OF ARTERIAL BLOOD: CPT

## 2020-01-01 PROCEDURE — 74011000250 HC RX REV CODE- 250: Performed by: PHYSICIAN ASSISTANT

## 2020-01-01 PROCEDURE — 80202 ASSAY OF VANCOMYCIN: CPT

## 2020-01-01 PROCEDURE — A9575 INJ GADOTERATE MEGLUMI 0.1ML: HCPCS | Performed by: INTERNAL MEDICINE

## 2020-01-01 PROCEDURE — 83036 HEMOGLOBIN GLYCOSYLATED A1C: CPT

## 2020-01-01 PROCEDURE — 74018 RADEX ABDOMEN 1 VIEW: CPT

## 2020-01-01 PROCEDURE — 36415 COLL VENOUS BLD VENIPUNCTURE: CPT

## 2020-01-01 PROCEDURE — 65620000000 HC RM CCU GENERAL

## 2020-01-01 PROCEDURE — 74011250637 HC RX REV CODE- 250/637: Performed by: EMERGENCY MEDICINE

## 2020-01-01 PROCEDURE — 85025 COMPLETE CBC W/AUTO DIFF WBC: CPT

## 2020-01-01 PROCEDURE — 5A1955Z RESPIRATORY VENTILATION, GREATER THAN 96 CONSECUTIVE HOURS: ICD-10-PCS | Performed by: STUDENT IN AN ORGANIZED HEALTH CARE EDUCATION/TRAINING PROGRAM

## 2020-01-01 PROCEDURE — 99152 MOD SED SAME PHYS/QHP 5/>YRS: CPT

## 2020-01-01 PROCEDURE — 74011250636 HC RX REV CODE- 250/636: Performed by: EMERGENCY MEDICINE

## 2020-01-01 PROCEDURE — 83735 ASSAY OF MAGNESIUM: CPT

## 2020-01-01 PROCEDURE — 88333 PATH CONSLTJ SURG CYTO XM 1: CPT

## 2020-01-01 PROCEDURE — 82962 GLUCOSE BLOOD TEST: CPT

## 2020-01-01 PROCEDURE — 74011000250 HC RX REV CODE- 250: Performed by: STUDENT IN AN ORGANIZED HEALTH CARE EDUCATION/TRAINING PROGRAM

## 2020-01-01 PROCEDURE — 83880 ASSAY OF NATRIURETIC PEPTIDE: CPT

## 2020-01-01 PROCEDURE — 77030018798 HC PMP KT ENTRL FED COVD -A

## 2020-01-01 PROCEDURE — 85027 COMPLETE CBC AUTOMATED: CPT

## 2020-01-01 PROCEDURE — 74011000250 HC RX REV CODE- 250: Performed by: EMERGENCY MEDICINE

## 2020-01-01 PROCEDURE — 81001 URINALYSIS AUTO W/SCOPE: CPT

## 2020-01-01 PROCEDURE — 82803 BLOOD GASES ANY COMBINATION: CPT

## 2020-01-01 PROCEDURE — 97161 PT EVAL LOW COMPLEX 20 MIN: CPT | Performed by: PHYSICAL THERAPIST

## 2020-01-01 PROCEDURE — 74011250637 HC RX REV CODE- 250/637: Performed by: STUDENT IN AN ORGANIZED HEALTH CARE EDUCATION/TRAINING PROGRAM

## 2020-01-01 PROCEDURE — 71045 X-RAY EXAM CHEST 1 VIEW: CPT

## 2020-01-01 PROCEDURE — 93306 TTE W/DOPPLER COMPLETE: CPT

## 2020-01-01 PROCEDURE — 84100 ASSAY OF PHOSPHORUS: CPT

## 2020-01-01 PROCEDURE — 74011250636 HC RX REV CODE- 250/636: Performed by: NURSE PRACTITIONER

## 2020-01-01 PROCEDURE — 93005 ELECTROCARDIOGRAM TRACING: CPT

## 2020-01-01 PROCEDURE — 77030003481 HC NDL BIOP GUN BARD -B

## 2020-01-01 PROCEDURE — 93306 TTE W/DOPPLER COMPLETE: CPT | Performed by: INTERNAL MEDICINE

## 2020-01-01 PROCEDURE — 99284 EMERGENCY DEPT VISIT MOD MDM: CPT

## 2020-01-01 PROCEDURE — 85610 PROTHROMBIN TIME: CPT

## 2020-01-01 PROCEDURE — 74011636637 HC RX REV CODE- 636/637: Performed by: INTERNAL MEDICINE

## 2020-01-01 PROCEDURE — 99285 EMERGENCY DEPT VISIT HI MDM: CPT

## 2020-01-01 PROCEDURE — 74011000258 HC RX REV CODE- 258: Performed by: INTERNAL MEDICINE

## 2020-01-01 PROCEDURE — 94002 VENT MGMT INPAT INIT DAY: CPT

## 2020-01-01 PROCEDURE — 70553 MRI BRAIN STEM W/O & W/DYE: CPT

## 2020-01-01 PROCEDURE — 77030038269 HC DRN EXT URIN PURWCK BARD -A

## 2020-01-01 PROCEDURE — 65610000006 HC RM INTENSIVE CARE

## 2020-01-01 PROCEDURE — 96375 TX/PRO/DX INJ NEW DRUG ADDON: CPT

## 2020-01-01 PROCEDURE — 88341 IMHCHEM/IMCYTCHM EA ADD ANTB: CPT

## 2020-01-01 PROCEDURE — 73110 X-RAY EXAM OF WRIST: CPT

## 2020-01-01 PROCEDURE — 83605 ASSAY OF LACTIC ACID: CPT

## 2020-01-01 PROCEDURE — 70450 CT HEAD/BRAIN W/O DYE: CPT

## 2020-01-01 PROCEDURE — 84484 ASSAY OF TROPONIN QUANT: CPT

## 2020-01-01 PROCEDURE — 74011250636 HC RX REV CODE- 250/636

## 2020-01-01 PROCEDURE — 77030029684 HC NEB SM VOL KT MONA -A

## 2020-01-01 PROCEDURE — 84439 ASSAY OF FREE THYROXINE: CPT

## 2020-01-01 PROCEDURE — 87040 BLOOD CULTURE FOR BACTERIA: CPT

## 2020-01-01 PROCEDURE — 73610 X-RAY EXAM OF ANKLE: CPT

## 2020-01-01 PROCEDURE — 99223 1ST HOSP IP/OBS HIGH 75: CPT | Performed by: INTERNAL MEDICINE

## 2020-01-01 PROCEDURE — 0BBC3ZX EXCISION OF RIGHT UPPER LUNG LOBE, PERCUTANEOUS APPROACH, DIAGNOSTIC: ICD-10-PCS | Performed by: RADIOLOGY

## 2020-01-01 PROCEDURE — 97165 OT EVAL LOW COMPLEX 30 MIN: CPT

## 2020-01-01 PROCEDURE — 77030018781

## 2020-01-01 PROCEDURE — 0BH17EZ INSERTION OF ENDOTRACHEAL AIRWAY INTO TRACHEA, VIA NATURAL OR ARTIFICIAL OPENING: ICD-10-PCS | Performed by: STUDENT IN AN ORGANIZED HEALTH CARE EDUCATION/TRAINING PROGRAM

## 2020-01-01 PROCEDURE — 74011000258 HC RX REV CODE- 258: Performed by: STUDENT IN AN ORGANIZED HEALTH CARE EDUCATION/TRAINING PROGRAM

## 2020-01-01 PROCEDURE — 77030014115

## 2020-01-01 PROCEDURE — 71260 CT THORAX DX C+: CPT

## 2020-01-01 PROCEDURE — 84480 ASSAY TRIIODOTHYRONINE (T3): CPT

## 2020-01-01 PROCEDURE — 36591 DRAW BLOOD OFF VENOUS DEVICE: CPT

## 2020-01-01 PROCEDURE — P9047 ALBUMIN (HUMAN), 25%, 50ML: HCPCS | Performed by: INTERNAL MEDICINE

## 2020-01-01 PROCEDURE — 74011250636 HC RX REV CODE- 250/636: Performed by: RADIOLOGY

## 2020-01-01 PROCEDURE — 84443 ASSAY THYROID STIM HORMONE: CPT

## 2020-01-01 PROCEDURE — 99233 SBSQ HOSP IP/OBS HIGH 50: CPT | Performed by: INTERNAL MEDICINE

## 2020-01-01 PROCEDURE — 96374 THER/PROPH/DIAG INJ IV PUSH: CPT

## 2020-01-01 PROCEDURE — 77030003497 HC NDL BIOP TISS BARD -B

## 2020-01-01 PROCEDURE — 77030003666 HC NDL SPINAL BD -A

## 2020-01-01 PROCEDURE — 88305 TISSUE EXAM BY PATHOLOGIST: CPT

## 2020-01-01 PROCEDURE — 96376 TX/PRO/DX INJ SAME DRUG ADON: CPT

## 2020-01-01 PROCEDURE — 74011000636 HC RX REV CODE- 636: Performed by: EMERGENCY MEDICINE

## 2020-01-01 PROCEDURE — 88342 IMHCHEM/IMCYTCHM 1ST ANTB: CPT

## 2020-01-01 PROCEDURE — 0100U RESPIRATORY PANEL,PCR,NASOPHARYNGEAL: CPT

## 2020-01-01 RX ORDER — POTASSIUM CHLORIDE 29.8 MG/ML
20 INJECTION INTRAVENOUS
Status: COMPLETED | OUTPATIENT
Start: 2020-01-01 | End: 2020-01-01

## 2020-01-01 RX ORDER — ALBUMIN HUMAN 250 G/1000ML
12.5 SOLUTION INTRAVENOUS EVERY 6 HOURS
Status: DISPENSED | OUTPATIENT
Start: 2020-01-01 | End: 2020-01-01

## 2020-01-01 RX ORDER — FENTANYL CITRATE 50 UG/ML
100 INJECTION, SOLUTION INTRAMUSCULAR; INTRAVENOUS
Status: DISCONTINUED | OUTPATIENT
Start: 2020-01-01 | End: 2020-01-01

## 2020-01-01 RX ORDER — ONDANSETRON 2 MG/ML
4 INJECTION INTRAMUSCULAR; INTRAVENOUS
Status: DISCONTINUED | OUTPATIENT
Start: 2020-01-01 | End: 2020-01-01

## 2020-01-01 RX ORDER — PROPOFOL 10 MG/ML
0-50 VIAL (ML) INTRAVENOUS
Status: DISCONTINUED | OUTPATIENT
Start: 2020-01-01 | End: 2020-01-01 | Stop reason: HOSPADM

## 2020-01-01 RX ORDER — GABAPENTIN 300 MG/1
300 CAPSULE ORAL 2 TIMES DAILY
Status: DISCONTINUED | OUTPATIENT
Start: 2020-01-01 | End: 2020-01-01

## 2020-01-01 RX ORDER — INSULIN LISPRO 100 [IU]/ML
INJECTION, SOLUTION INTRAVENOUS; SUBCUTANEOUS
Status: DISCONTINUED | OUTPATIENT
Start: 2020-01-01 | End: 2020-01-01

## 2020-01-01 RX ORDER — MIDAZOLAM HYDROCHLORIDE 5 MG/ML
INJECTION, SOLUTION INTRAMUSCULAR; INTRAVENOUS
Status: COMPLETED
Start: 2020-01-01 | End: 2020-01-01

## 2020-01-01 RX ORDER — PREDNISONE 20 MG/1
40 TABLET ORAL
Status: DISCONTINUED | OUTPATIENT
Start: 2020-01-01 | End: 2020-01-01

## 2020-01-01 RX ORDER — IPRATROPIUM BROMIDE 0.5 MG/2.5ML
0.5 SOLUTION RESPIRATORY (INHALATION)
Status: DISCONTINUED | OUTPATIENT
Start: 2020-01-01 | End: 2020-01-01

## 2020-01-01 RX ORDER — METHOCARBAMOL 750 MG/1
750-1500 TABLET, FILM COATED ORAL 2 TIMES DAILY
Status: DISCONTINUED | OUTPATIENT
Start: 2020-01-01 | End: 2020-01-01

## 2020-01-01 RX ORDER — DULOXETIN HYDROCHLORIDE 20 MG/1
40 CAPSULE, DELAYED RELEASE ORAL DAILY
Status: DISCONTINUED | OUTPATIENT
Start: 2020-01-01 | End: 2020-01-01

## 2020-01-01 RX ORDER — LEVETIRACETAM 5 MG/ML
500 INJECTION INTRAVASCULAR EVERY 12 HOURS
Status: DISCONTINUED | OUTPATIENT
Start: 2020-01-01 | End: 2020-01-01

## 2020-01-01 RX ORDER — IPRATROPIUM BROMIDE AND ALBUTEROL SULFATE 2.5; .5 MG/3ML; MG/3ML
3 SOLUTION RESPIRATORY (INHALATION)
Status: DISCONTINUED | OUTPATIENT
Start: 2020-01-01 | End: 2020-01-01

## 2020-01-01 RX ORDER — LEVETIRACETAM 500 MG/1
500 TABLET ORAL 2 TIMES DAILY
Status: DISCONTINUED | OUTPATIENT
Start: 2020-01-01 | End: 2020-01-01

## 2020-01-01 RX ORDER — MORPHINE SULFATE 2 MG/ML
1 INJECTION, SOLUTION INTRAMUSCULAR; INTRAVENOUS
Status: DISCONTINUED | OUTPATIENT
Start: 2020-01-01 | End: 2020-01-01

## 2020-01-01 RX ORDER — SODIUM CHLORIDE 0.9 % (FLUSH) 0.9 %
5-40 SYRINGE (ML) INJECTION AS NEEDED
Status: DISCONTINUED | OUTPATIENT
Start: 2020-01-01 | End: 2020-01-01

## 2020-01-01 RX ORDER — BACITRACIN 500 UNIT/G
1 PACKET (EA) TOPICAL DAILY
Status: DISCONTINUED | OUTPATIENT
Start: 2020-01-01 | End: 2020-01-01

## 2020-01-01 RX ORDER — LORAZEPAM 2 MG/ML
INJECTION INTRAMUSCULAR
Status: DISPENSED
Start: 2020-01-01 | End: 2020-01-01

## 2020-01-01 RX ORDER — NOREPINEPHRINE BITARTRATE/D5W 8 MG/250ML
.5-16 PLASTIC BAG, INJECTION (ML) INTRAVENOUS
Status: DISCONTINUED | OUTPATIENT
Start: 2020-01-01 | End: 2020-01-01

## 2020-01-01 RX ORDER — BUDESONIDE AND FORMOTEROL FUMARATE DIHYDRATE 160; 4.5 UG/1; UG/1
1 AEROSOL RESPIRATORY (INHALATION) 2 TIMES DAILY
COMMUNITY

## 2020-01-01 RX ORDER — VANCOMYCIN/0.9 % SOD CHLORIDE 1.5G/250ML
1500 PLASTIC BAG, INJECTION (ML) INTRAVENOUS ONCE
Status: COMPLETED | OUTPATIENT
Start: 2020-01-01 | End: 2020-01-01

## 2020-01-01 RX ORDER — ENOXAPARIN SODIUM 100 MG/ML
40 INJECTION SUBCUTANEOUS EVERY 24 HOURS
Status: DISCONTINUED | OUTPATIENT
Start: 2020-01-01 | End: 2020-01-01

## 2020-01-01 RX ORDER — LORAZEPAM 2 MG/ML
4 INJECTION INTRAMUSCULAR ONCE
Status: COMPLETED | OUTPATIENT
Start: 2020-01-01 | End: 2020-01-01

## 2020-01-01 RX ORDER — IPRATROPIUM BROMIDE AND ALBUTEROL SULFATE 2.5; .5 MG/3ML; MG/3ML
3 SOLUTION RESPIRATORY (INHALATION)
Status: COMPLETED | OUTPATIENT
Start: 2020-01-01 | End: 2020-01-01

## 2020-01-01 RX ORDER — METOPROLOL TARTRATE 50 MG/1
50 TABLET ORAL 2 TIMES DAILY
Status: DISCONTINUED | OUTPATIENT
Start: 2020-01-01 | End: 2020-01-01

## 2020-01-01 RX ORDER — MORPHINE SULFATE 2 MG/ML
2 INJECTION, SOLUTION INTRAMUSCULAR; INTRAVENOUS
Status: COMPLETED | OUTPATIENT
Start: 2020-01-01 | End: 2020-01-01

## 2020-01-01 RX ORDER — METOPROLOL TARTRATE 50 MG/1
50 TABLET ORAL DAILY
COMMUNITY

## 2020-01-01 RX ORDER — ONDANSETRON 2 MG/ML
4 INJECTION INTRAMUSCULAR; INTRAVENOUS
Status: COMPLETED | OUTPATIENT
Start: 2020-01-01 | End: 2020-01-01

## 2020-01-01 RX ORDER — ALPRAZOLAM 0.5 MG/1
0.5 TABLET ORAL
Status: DISCONTINUED | OUTPATIENT
Start: 2020-01-01 | End: 2020-01-01

## 2020-01-01 RX ORDER — SODIUM CHLORIDE 9 MG/ML
25 INJECTION, SOLUTION INTRAVENOUS CONTINUOUS
Status: DISCONTINUED | OUTPATIENT
Start: 2020-01-01 | End: 2020-01-01 | Stop reason: HOSPADM

## 2020-01-01 RX ORDER — CHLORHEXIDINE GLUCONATE 0.12 MG/ML
15 RINSE ORAL EVERY 12 HOURS
Status: DISCONTINUED | OUTPATIENT
Start: 2020-01-01 | End: 2020-01-01

## 2020-01-01 RX ORDER — MORPHINE SULFATE 2 MG/ML
2 INJECTION, SOLUTION INTRAMUSCULAR; INTRAVENOUS
Status: DISCONTINUED | OUTPATIENT
Start: 2020-01-01 | End: 2020-01-01

## 2020-01-01 RX ORDER — HYDROCODONE BITARTRATE AND ACETAMINOPHEN 5; 325 MG/1; MG/1
1 TABLET ORAL
Qty: 10 TAB | Refills: 0 | Status: SHIPPED | OUTPATIENT
Start: 2020-01-01 | End: 2020-01-01

## 2020-01-01 RX ORDER — FUROSEMIDE 10 MG/ML
40 INJECTION INTRAMUSCULAR; INTRAVENOUS ONCE
Status: COMPLETED | OUTPATIENT
Start: 2020-01-01 | End: 2020-01-01

## 2020-01-01 RX ORDER — ACETAMINOPHEN 325 MG/1
650 TABLET ORAL
Status: DISCONTINUED | OUTPATIENT
Start: 2020-01-01 | End: 2020-01-01

## 2020-01-01 RX ORDER — HYDROMORPHONE HYDROCHLORIDE 2 MG/ML
0.5 INJECTION, SOLUTION INTRAMUSCULAR; INTRAVENOUS; SUBCUTANEOUS ONCE
Status: DISCONTINUED | OUTPATIENT
Start: 2020-01-01 | End: 2020-01-01

## 2020-01-01 RX ORDER — INSULIN LISPRO 100 [IU]/ML
INJECTION, SOLUTION INTRAVENOUS; SUBCUTANEOUS EVERY 6 HOURS
Status: DISCONTINUED | OUTPATIENT
Start: 2020-01-01 | End: 2020-01-01

## 2020-01-01 RX ORDER — GADOTERATE MEGLUMINE 376.9 MG/ML
15 INJECTION INTRAVENOUS
Status: COMPLETED | OUTPATIENT
Start: 2020-01-01 | End: 2020-01-01

## 2020-01-01 RX ORDER — GLYCOPYRROLATE 0.2 MG/ML
0.2 INJECTION INTRAMUSCULAR; INTRAVENOUS ONCE
Status: COMPLETED | OUTPATIENT
Start: 2020-01-01 | End: 2020-01-01

## 2020-01-01 RX ORDER — HYDROMORPHONE HYDROCHLORIDE 1 MG/ML
1 INJECTION, SOLUTION INTRAMUSCULAR; INTRAVENOUS; SUBCUTANEOUS ONCE
Status: COMPLETED | OUTPATIENT
Start: 2020-01-01 | End: 2020-01-01

## 2020-01-01 RX ORDER — POTASSIUM CHLORIDE 7.45 MG/ML
10 INJECTION INTRAVENOUS
Status: ACTIVE | OUTPATIENT
Start: 2020-01-01 | End: 2020-01-01

## 2020-01-01 RX ORDER — FENTANYL CITRATE 50 UG/ML
50 INJECTION, SOLUTION INTRAMUSCULAR; INTRAVENOUS
Status: COMPLETED | OUTPATIENT
Start: 2020-01-01 | End: 2020-01-01

## 2020-01-01 RX ORDER — LORAZEPAM 2 MG/ML
1 INJECTION INTRAMUSCULAR
Status: DISCONTINUED | OUTPATIENT
Start: 2020-01-01 | End: 2020-01-01 | Stop reason: HOSPADM

## 2020-01-01 RX ORDER — DEXTROSE 50 % IN WATER (D50W) INTRAVENOUS SYRINGE
12.5-25 AS NEEDED
Status: DISCONTINUED | OUTPATIENT
Start: 2020-01-01 | End: 2020-01-01 | Stop reason: HOSPADM

## 2020-01-01 RX ORDER — HYDRALAZINE HYDROCHLORIDE 20 MG/ML
10 INJECTION INTRAMUSCULAR; INTRAVENOUS
Status: DISCONTINUED | OUTPATIENT
Start: 2020-01-01 | End: 2020-01-01

## 2020-01-01 RX ORDER — HYDROCODONE BITARTRATE AND ACETAMINOPHEN 5; 325 MG/1; MG/1
2 TABLET ORAL
Status: DISCONTINUED | OUTPATIENT
Start: 2020-01-01 | End: 2020-01-01

## 2020-01-01 RX ORDER — MICONAZOLE NITRATE 20 MG/G
CREAM TOPICAL 2 TIMES DAILY
Status: DISCONTINUED | OUTPATIENT
Start: 2020-01-01 | End: 2020-01-01

## 2020-01-01 RX ORDER — HYDROCODONE BITARTRATE AND ACETAMINOPHEN 5; 325 MG/1; MG/1
1 TABLET ORAL ONCE
Status: COMPLETED | OUTPATIENT
Start: 2020-01-01 | End: 2020-01-01

## 2020-01-01 RX ORDER — METOPROLOL TARTRATE 50 MG/1
50 TABLET ORAL 3 TIMES DAILY
Status: DISCONTINUED | OUTPATIENT
Start: 2020-01-01 | End: 2020-01-01

## 2020-01-01 RX ORDER — PROPOFOL 10 MG/ML
INJECTION, EMULSION INTRAVENOUS
Status: COMPLETED
Start: 2020-01-01 | End: 2020-01-01

## 2020-01-01 RX ORDER — MIDAZOLAM HYDROCHLORIDE 5 MG/ML
5 INJECTION, SOLUTION INTRAMUSCULAR; INTRAVENOUS ONCE
Status: COMPLETED | OUTPATIENT
Start: 2020-01-01 | End: 2020-01-01

## 2020-01-01 RX ORDER — LORAZEPAM 1 MG/1
1 TABLET ORAL
Status: DISCONTINUED | OUTPATIENT
Start: 2020-01-01 | End: 2020-01-01

## 2020-01-01 RX ORDER — METHIMAZOLE 5 MG/1
10 TABLET ORAL DAILY
Status: DISCONTINUED | OUTPATIENT
Start: 2020-01-01 | End: 2020-01-01

## 2020-01-01 RX ORDER — HYDROCODONE BITARTRATE AND ACETAMINOPHEN 5; 325 MG/1; MG/1
1 TABLET ORAL
Status: DISCONTINUED | OUTPATIENT
Start: 2020-01-01 | End: 2020-01-01

## 2020-01-01 RX ORDER — SODIUM CHLORIDE 9 MG/ML
25 INJECTION, SOLUTION INTRAVENOUS CONTINUOUS
Status: DISCONTINUED | OUTPATIENT
Start: 2020-01-01 | End: 2020-01-01

## 2020-01-01 RX ORDER — GABAPENTIN 300 MG/1
300 CAPSULE ORAL 3 TIMES DAILY
COMMUNITY

## 2020-01-01 RX ORDER — LORAZEPAM 2 MG/ML
INJECTION INTRAMUSCULAR
Status: COMPLETED
Start: 2020-01-01 | End: 2020-01-01

## 2020-01-01 RX ORDER — MIDAZOLAM HYDROCHLORIDE 1 MG/ML
5 INJECTION, SOLUTION INTRAMUSCULAR; INTRAVENOUS
Status: DISCONTINUED | OUTPATIENT
Start: 2020-01-01 | End: 2020-01-01

## 2020-01-01 RX ORDER — OXYMETAZOLINE HCL 0.05 %
2 SPRAY, NON-AEROSOL (ML) NASAL
Status: DISCONTINUED | OUTPATIENT
Start: 2020-01-01 | End: 2020-01-01

## 2020-01-01 RX ORDER — METOPROLOL TARTRATE 50 MG/1
50 TABLET ORAL EVERY 12 HOURS
Status: DISCONTINUED | OUTPATIENT
Start: 2020-01-01 | End: 2020-01-01

## 2020-01-01 RX ORDER — MORPHINE SULFATE 4 MG/ML
4 INJECTION INTRAVENOUS ONCE
Status: DISCONTINUED | OUTPATIENT
Start: 2020-01-01 | End: 2020-01-01

## 2020-01-01 RX ORDER — LORAZEPAM 2 MG/ML
2 INJECTION INTRAMUSCULAR
Status: DISCONTINUED | OUTPATIENT
Start: 2020-01-01 | End: 2020-01-01

## 2020-01-01 RX ORDER — SCOLOPAMINE TRANSDERMAL SYSTEM 1 MG/1
1 PATCH, EXTENDED RELEASE TRANSDERMAL
Status: DISCONTINUED | OUTPATIENT
Start: 2020-01-01 | End: 2020-01-01 | Stop reason: HOSPADM

## 2020-01-01 RX ORDER — LORAZEPAM 1 MG/1
2 TABLET ORAL
Status: DISCONTINUED | OUTPATIENT
Start: 2020-01-01 | End: 2020-01-01

## 2020-01-01 RX ORDER — LEVOFLOXACIN 5 MG/ML
500 INJECTION, SOLUTION INTRAVENOUS ONCE
Status: COMPLETED | OUTPATIENT
Start: 2020-01-01 | End: 2020-01-01

## 2020-01-01 RX ORDER — SODIUM CHLORIDE 0.9 % (FLUSH) 0.9 %
10 SYRINGE (ML) INJECTION
Status: COMPLETED | OUTPATIENT
Start: 2020-01-01 | End: 2020-01-01

## 2020-01-01 RX ORDER — MECLIZINE HYDROCHLORIDE 25 MG/1
25 TABLET ORAL
COMMUNITY

## 2020-01-01 RX ORDER — MAGNESIUM SULFATE 1 G/100ML
1 INJECTION INTRAVENOUS ONCE
Status: COMPLETED | OUTPATIENT
Start: 2020-01-01 | End: 2020-01-01

## 2020-01-01 RX ORDER — GABAPENTIN 300 MG/1
300 CAPSULE ORAL 3 TIMES DAILY
Status: DISCONTINUED | OUTPATIENT
Start: 2020-01-01 | End: 2020-01-01

## 2020-01-01 RX ORDER — MORPHINE SULFATE 10 MG/ML
10 INJECTION, SOLUTION INTRAMUSCULAR; INTRAVENOUS
Status: DISCONTINUED | OUTPATIENT
Start: 2020-01-01 | End: 2020-01-01

## 2020-01-01 RX ORDER — LABETALOL HYDROCHLORIDE 5 MG/ML
10 INJECTION, SOLUTION INTRAVENOUS
Status: DISCONTINUED | OUTPATIENT
Start: 2020-01-01 | End: 2020-01-01

## 2020-01-01 RX ORDER — LEVALBUTEROL INHALATION SOLUTION 1.25 MG/3ML
1.25 SOLUTION RESPIRATORY (INHALATION)
Status: DISCONTINUED | OUTPATIENT
Start: 2020-01-01 | End: 2020-01-01

## 2020-01-01 RX ORDER — LORAZEPAM 2 MG/ML
2 INJECTION INTRAMUSCULAR
Status: COMPLETED | OUTPATIENT
Start: 2020-01-01 | End: 2020-01-01

## 2020-01-01 RX ORDER — HYDROMORPHONE HYDROCHLORIDE 1 MG/ML
0.5 INJECTION, SOLUTION INTRAMUSCULAR; INTRAVENOUS; SUBCUTANEOUS ONCE
Status: COMPLETED | OUTPATIENT
Start: 2020-01-01 | End: 2020-01-01

## 2020-01-01 RX ORDER — AMOXICILLIN AND CLAVULANATE POTASSIUM 875; 125 MG/1; MG/1
1 TABLET, FILM COATED ORAL EVERY 12 HOURS
Status: DISCONTINUED | OUTPATIENT
Start: 2020-01-01 | End: 2020-01-01

## 2020-01-01 RX ORDER — MAGNESIUM SULFATE 100 %
4 CRYSTALS MISCELLANEOUS AS NEEDED
Status: DISCONTINUED | OUTPATIENT
Start: 2020-01-01 | End: 2020-01-01

## 2020-01-01 RX ORDER — MORPHINE SULFATE 4 MG/ML
4 INJECTION INTRAVENOUS
Status: DISCONTINUED | OUTPATIENT
Start: 2020-01-01 | End: 2020-01-01 | Stop reason: HOSPADM

## 2020-01-01 RX ORDER — SODIUM CHLORIDE 0.9 % (FLUSH) 0.9 %
5-40 SYRINGE (ML) INJECTION EVERY 8 HOURS
Status: DISCONTINUED | OUTPATIENT
Start: 2020-01-01 | End: 2020-01-01

## 2020-01-01 RX ORDER — NALOXONE HYDROCHLORIDE 0.4 MG/ML
0.4 INJECTION, SOLUTION INTRAMUSCULAR; INTRAVENOUS; SUBCUTANEOUS AS NEEDED
Status: DISCONTINUED | OUTPATIENT
Start: 2020-01-01 | End: 2020-01-01

## 2020-01-01 RX ORDER — AMLODIPINE BESYLATE 5 MG/1
10 TABLET ORAL DAILY
Status: DISCONTINUED | OUTPATIENT
Start: 2020-01-01 | End: 2020-01-01

## 2020-01-01 RX ORDER — GUAIFENESIN 100 MG/5ML
81 LIQUID (ML) ORAL DAILY
COMMUNITY

## 2020-01-01 RX ORDER — IBUPROFEN 800 MG/1
800 TABLET ORAL
Qty: 20 TAB | Refills: 0 | Status: SHIPPED | OUTPATIENT
Start: 2020-01-01 | End: 2020-01-01

## 2020-01-01 RX ORDER — LEVOFLOXACIN 5 MG/ML
500 INJECTION, SOLUTION INTRAVENOUS EVERY 24 HOURS
Status: DISCONTINUED | OUTPATIENT
Start: 2020-01-01 | End: 2020-01-01

## 2020-01-01 RX ADMIN — Medication: at 08:01

## 2020-01-01 RX ADMIN — ONDANSETRON 4 MG: 2 INJECTION INTRAMUSCULAR; INTRAVENOUS at 17:36

## 2020-01-01 RX ADMIN — LORAZEPAM 2 MG: 2 INJECTION INTRAMUSCULAR; INTRAVENOUS at 06:17

## 2020-01-01 RX ADMIN — DEXMEDETOMIDINE 1.5 MCG/KG/HR: 100 INJECTION, SOLUTION, CONCENTRATE INTRAVENOUS at 02:41

## 2020-01-01 RX ADMIN — PIPERACILLIN AND TAZOBACTAM 3.38 G: 3; .375 INJECTION, POWDER, LYOPHILIZED, FOR SOLUTION INTRAVENOUS at 20:19

## 2020-01-01 RX ADMIN — GABAPENTIN 300 MG: 300 CAPSULE ORAL at 09:38

## 2020-01-01 RX ADMIN — MICONAZOLE NITRATE: 20 CREAM TOPICAL at 15:01

## 2020-01-01 RX ADMIN — SODIUM CHLORIDE 25 ML/HR: 900 INJECTION, SOLUTION INTRAVENOUS at 11:47

## 2020-01-01 RX ADMIN — INSULIN LISPRO 2 UNITS: 100 INJECTION, SOLUTION INTRAVENOUS; SUBCUTANEOUS at 12:00

## 2020-01-01 RX ADMIN — POTASSIUM CHLORIDE 20 MEQ: 400 INJECTION, SOLUTION INTRAVENOUS at 04:17

## 2020-01-01 RX ADMIN — ONDANSETRON 4 MG: 2 INJECTION INTRAMUSCULAR; INTRAVENOUS at 16:05

## 2020-01-01 RX ADMIN — Medication: at 17:58

## 2020-01-01 RX ADMIN — DULOXETINE HYDROCHLORIDE 40 MG: 20 CAPSULE, DELAYED RELEASE ORAL at 09:10

## 2020-01-01 RX ADMIN — PROPOFOL 50 MCG/KG/MIN: 10 INJECTION, EMULSION INTRAVENOUS at 19:03

## 2020-01-01 RX ADMIN — IPRATROPIUM BROMIDE AND ALBUTEROL SULFATE 3 ML: .5; 3 SOLUTION RESPIRATORY (INHALATION) at 11:37

## 2020-01-01 RX ADMIN — ALPRAZOLAM 0.5 MG: 0.5 TABLET ORAL at 14:41

## 2020-01-01 RX ADMIN — PROCHLORPERAZINE EDISYLATE 10 MG: 5 INJECTION INTRAMUSCULAR; INTRAVENOUS at 22:56

## 2020-01-01 RX ADMIN — METHOCARBAMOL TABLETS 1500 MG: 750 TABLET, COATED ORAL at 17:48

## 2020-01-01 RX ADMIN — Medication 150 MCG/HR: at 00:36

## 2020-01-01 RX ADMIN — DEXMEDETOMIDINE 1 MCG/KG/HR: 100 INJECTION, SOLUTION, CONCENTRATE INTRAVENOUS at 07:25

## 2020-01-01 RX ADMIN — LEVALBUTEROL HYDROCHLORIDE 1.25 MG: 1.25 SOLUTION RESPIRATORY (INHALATION) at 14:09

## 2020-01-01 RX ADMIN — CHLORHEXIDINE GLUCONATE 15 ML: 0.12 RINSE ORAL at 21:14

## 2020-01-01 RX ADMIN — GABAPENTIN 300 MG: 300 CAPSULE ORAL at 17:24

## 2020-01-01 RX ADMIN — PROPOFOL 45 MCG/KG/MIN: 10 INJECTION, EMULSION INTRAVENOUS at 09:55

## 2020-01-01 RX ADMIN — IPRATROPIUM BROMIDE AND ALBUTEROL SULFATE 3 ML: .5; 3 SOLUTION RESPIRATORY (INHALATION) at 07:28

## 2020-01-01 RX ADMIN — MICONAZOLE NITRATE: 20 CREAM TOPICAL at 21:50

## 2020-01-01 RX ADMIN — Medication: at 18:09

## 2020-01-01 RX ADMIN — DEXMEDETOMIDINE 1.5 MCG/KG/HR: 100 INJECTION, SOLUTION, CONCENTRATE INTRAVENOUS at 12:33

## 2020-01-01 RX ADMIN — Medication 100 MCG/HR: at 09:39

## 2020-01-01 RX ADMIN — METOPROLOL TARTRATE 50 MG: 50 TABLET, FILM COATED ORAL at 09:56

## 2020-01-01 RX ADMIN — METOPROLOL TARTRATE 50 MG: 50 TABLET, FILM COATED ORAL at 08:13

## 2020-01-01 RX ADMIN — ONDANSETRON 4 MG: 2 INJECTION INTRAMUSCULAR; INTRAVENOUS at 09:09

## 2020-01-01 RX ADMIN — GADOTERATE MEGLUMINE 15 ML: 376.9 INJECTION INTRAVENOUS at 16:54

## 2020-01-01 RX ADMIN — METOPROLOL TARTRATE 50 MG: 50 TABLET, FILM COATED ORAL at 08:57

## 2020-01-01 RX ADMIN — SODIUM CHLORIDE 10 ML: 9 INJECTION, SOLUTION INTRAMUSCULAR; INTRAVENOUS; SUBCUTANEOUS at 14:00

## 2020-01-01 RX ADMIN — METOPROLOL TARTRATE 50 MG: 50 TABLET, FILM COATED ORAL at 21:44

## 2020-01-01 RX ADMIN — IPRATROPIUM BROMIDE AND ALBUTEROL SULFATE 3 ML: .5; 3 SOLUTION RESPIRATORY (INHALATION) at 04:02

## 2020-01-01 RX ADMIN — SODIUM CHLORIDE 10 ML: 9 INJECTION, SOLUTION INTRAMUSCULAR; INTRAVENOUS; SUBCUTANEOUS at 23:19

## 2020-01-01 RX ADMIN — Medication: at 21:25

## 2020-01-01 RX ADMIN — LEVETIRACETAM 500 MG: 500 TABLET ORAL at 09:55

## 2020-01-01 RX ADMIN — METHYLPREDNISOLONE SODIUM SUCCINATE 60 MG: 125 INJECTION, POWDER, FOR SOLUTION INTRAMUSCULAR; INTRAVENOUS at 18:14

## 2020-01-01 RX ADMIN — PROPOFOL 50 MCG/KG/MIN: 10 INJECTION, EMULSION INTRAVENOUS at 20:39

## 2020-01-01 RX ADMIN — METHIMAZOLE 10 MG: 5 TABLET ORAL at 09:11

## 2020-01-01 RX ADMIN — ONDANSETRON 4 MG: 2 INJECTION INTRAMUSCULAR; INTRAVENOUS at 13:04

## 2020-01-01 RX ADMIN — METHIMAZOLE 10 MG: 5 TABLET ORAL at 08:00

## 2020-01-01 RX ADMIN — ARFORMOTEROL TARTRATE: 15 SOLUTION RESPIRATORY (INHALATION) at 20:24

## 2020-01-01 RX ADMIN — Medication 1 AMPULE: at 08:13

## 2020-01-01 RX ADMIN — VANCOMYCIN HYDROCHLORIDE 1000 MG: 1 INJECTION, POWDER, LYOPHILIZED, FOR SOLUTION INTRAVENOUS at 01:00

## 2020-01-01 RX ADMIN — SODIUM CHLORIDE 25 ML/HR: 900 INJECTION, SOLUTION INTRAVENOUS at 17:13

## 2020-01-01 RX ADMIN — IPRATROPIUM BROMIDE AND ALBUTEROL SULFATE 3 ML: .5; 3 SOLUTION RESPIRATORY (INHALATION) at 07:44

## 2020-01-01 RX ADMIN — Medication 1 AMPULE: at 09:27

## 2020-01-01 RX ADMIN — MORPHINE SULFATE 2 MG: 2 INJECTION, SOLUTION INTRAMUSCULAR; INTRAVENOUS at 11:16

## 2020-01-01 RX ADMIN — CHLORHEXIDINE GLUCONATE 15 ML: 0.12 RINSE ORAL at 21:23

## 2020-01-01 RX ADMIN — ONDANSETRON 4 MG: 2 INJECTION INTRAMUSCULAR; INTRAVENOUS at 15:57

## 2020-01-01 RX ADMIN — LORAZEPAM 4 MG: 2 INJECTION INTRAMUSCULAR; INTRAVENOUS at 08:52

## 2020-01-01 RX ADMIN — PROPOFOL 40 MCG/KG/MIN: 10 INJECTION, EMULSION INTRAVENOUS at 06:21

## 2020-01-01 RX ADMIN — Medication: at 17:32

## 2020-01-01 RX ADMIN — HYDROCODONE BITARTRATE AND ACETAMINOPHEN 1 TABLET: 5; 325 TABLET ORAL at 02:42

## 2020-01-01 RX ADMIN — BACITRACIN 1 PACKET: 500 OINTMENT TOPICAL at 17:56

## 2020-01-01 RX ADMIN — ENOXAPARIN SODIUM 40 MG: 40 INJECTION SUBCUTANEOUS at 11:29

## 2020-01-01 RX ADMIN — PREDNISONE 40 MG: 20 TABLET ORAL at 12:52

## 2020-01-01 RX ADMIN — INSULIN LISPRO 3 UNITS: 100 INJECTION, SOLUTION INTRAVENOUS; SUBCUTANEOUS at 17:45

## 2020-01-01 RX ADMIN — METHYLPREDNISOLONE SODIUM SUCCINATE 60 MG: 125 INJECTION, POWDER, FOR SOLUTION INTRAMUSCULAR; INTRAVENOUS at 12:47

## 2020-01-01 RX ADMIN — AMOXICILLIN AND CLAVULANATE POTASSIUM 1 TABLET: 875; 125 TABLET, FILM COATED ORAL at 21:05

## 2020-01-01 RX ADMIN — Medication: at 22:00

## 2020-01-01 RX ADMIN — LEVETIRACETAM 500 MG: 500 TABLET ORAL at 17:24

## 2020-01-01 RX ADMIN — ENOXAPARIN SODIUM 40 MG: 40 INJECTION SUBCUTANEOUS at 12:35

## 2020-01-01 RX ADMIN — Medication: at 17:34

## 2020-01-01 RX ADMIN — IPRATROPIUM BROMIDE 0.5 MG: 0.5 SOLUTION RESPIRATORY (INHALATION) at 13:50

## 2020-01-01 RX ADMIN — HYDROCODONE BITARTRATE AND ACETAMINOPHEN 2 TABLET: 5; 325 TABLET ORAL at 06:48

## 2020-01-01 RX ADMIN — IPRATROPIUM BROMIDE 0.5 MG: 0.5 SOLUTION RESPIRATORY (INHALATION) at 02:24

## 2020-01-01 RX ADMIN — SODIUM CHLORIDE 999 ML/HR: 900 INJECTION, SOLUTION INTRAVENOUS at 09:19

## 2020-01-01 RX ADMIN — PROPOFOL 25 MCG/KG/MIN: 10 INJECTION, EMULSION INTRAVENOUS at 00:35

## 2020-01-01 RX ADMIN — DEXMEDETOMIDINE 1.5 MCG/KG/HR: 100 INJECTION, SOLUTION, CONCENTRATE INTRAVENOUS at 15:35

## 2020-01-01 RX ADMIN — ONDANSETRON 4 MG: 2 INJECTION INTRAMUSCULAR; INTRAVENOUS at 09:39

## 2020-01-01 RX ADMIN — INSULIN LISPRO 2 UNITS: 100 INJECTION, SOLUTION INTRAVENOUS; SUBCUTANEOUS at 06:00

## 2020-01-01 RX ADMIN — PROPOFOL 20 MCG/KG/MIN: 10 INJECTION, EMULSION INTRAVENOUS at 06:11

## 2020-01-01 RX ADMIN — CHLORHEXIDINE GLUCONATE 15 ML: 0.12 RINSE ORAL at 20:43

## 2020-01-01 RX ADMIN — PIPERACILLIN AND TAZOBACTAM 3.38 G: 3; .375 INJECTION, POWDER, LYOPHILIZED, FOR SOLUTION INTRAVENOUS at 02:37

## 2020-01-01 RX ADMIN — INSULIN LISPRO 2 UNITS: 100 INJECTION, SOLUTION INTRAVENOUS; SUBCUTANEOUS at 06:14

## 2020-01-01 RX ADMIN — METHYLPREDNISOLONE SODIUM SUCCINATE 30 MG: 125 INJECTION, POWDER, FOR SOLUTION INTRAMUSCULAR; INTRAVENOUS at 14:20

## 2020-01-01 RX ADMIN — Medication 10 ML: at 01:20

## 2020-01-01 RX ADMIN — CHLORHEXIDINE GLUCONATE 15 ML: 0.12 RINSE ORAL at 20:38

## 2020-01-01 RX ADMIN — INSULIN LISPRO 2 UNITS: 100 INJECTION, SOLUTION INTRAVENOUS; SUBCUTANEOUS at 17:21

## 2020-01-01 RX ADMIN — INSULIN LISPRO 2 UNITS: 100 INJECTION, SOLUTION INTRAVENOUS; SUBCUTANEOUS at 05:47

## 2020-01-01 RX ADMIN — MORPHINE SULFATE 2 MG: 2 INJECTION, SOLUTION INTRAMUSCULAR; INTRAVENOUS at 14:38

## 2020-01-01 RX ADMIN — MICONAZOLE NITRATE: 20 CREAM TOPICAL at 09:58

## 2020-01-01 RX ADMIN — LEVALBUTEROL HYDROCHLORIDE 1.25 MG: 1.25 SOLUTION RESPIRATORY (INHALATION) at 20:54

## 2020-01-01 RX ADMIN — MORPHINE SULFATE 2 MG: 2 INJECTION, SOLUTION INTRAMUSCULAR; INTRAVENOUS at 06:04

## 2020-01-01 RX ADMIN — NOREPINEPHRINE BITARTRATE 10 MCG/MIN: 1 INJECTION, SOLUTION, CONCENTRATE INTRAVENOUS at 09:22

## 2020-01-01 RX ADMIN — NOREPINEPHRINE BITARTRATE 15 MCG/MIN: 1 INJECTION, SOLUTION, CONCENTRATE INTRAVENOUS at 09:45

## 2020-01-01 RX ADMIN — ARFORMOTEROL TARTRATE: 15 SOLUTION RESPIRATORY (INHALATION) at 08:48

## 2020-01-01 RX ADMIN — MORPHINE SULFATE 2 MG: 2 INJECTION, SOLUTION INTRAMUSCULAR; INTRAVENOUS at 23:29

## 2020-01-01 RX ADMIN — FAMOTIDINE 20 MG: 10 INJECTION INTRAVENOUS at 09:27

## 2020-01-01 RX ADMIN — Medication: at 08:57

## 2020-01-01 RX ADMIN — AMLODIPINE BESYLATE 10 MG: 5 TABLET ORAL at 09:11

## 2020-01-01 RX ADMIN — MORPHINE SULFATE 2 MG: 2 INJECTION, SOLUTION INTRAMUSCULAR; INTRAVENOUS at 17:25

## 2020-01-01 RX ADMIN — METOPROLOL TARTRATE 50 MG: 50 TABLET, FILM COATED ORAL at 08:00

## 2020-01-01 RX ADMIN — METHIMAZOLE 10 MG: 5 TABLET ORAL at 09:28

## 2020-01-01 RX ADMIN — IPRATROPIUM BROMIDE 0.5 MG: 0.5 SOLUTION RESPIRATORY (INHALATION) at 20:54

## 2020-01-01 RX ADMIN — PROPOFOL 45 MCG/KG/MIN: 10 INJECTION, EMULSION INTRAVENOUS at 21:19

## 2020-01-01 RX ADMIN — LORAZEPAM 1 MG: 2 INJECTION INTRAMUSCULAR; INTRAVENOUS at 20:57

## 2020-01-01 RX ADMIN — ENOXAPARIN SODIUM 40 MG: 40 INJECTION SUBCUTANEOUS at 11:09

## 2020-01-01 RX ADMIN — METHYLPREDNISOLONE SODIUM SUCCINATE 40 MG: 40 INJECTION, POWDER, FOR SOLUTION INTRAMUSCULAR; INTRAVENOUS at 06:04

## 2020-01-01 RX ADMIN — METHYLPREDNISOLONE SODIUM SUCCINATE 125 MG: 125 INJECTION, POWDER, FOR SOLUTION INTRAMUSCULAR; INTRAVENOUS at 00:48

## 2020-01-01 RX ADMIN — AMOXICILLIN AND CLAVULANATE POTASSIUM 1 TABLET: 875; 125 TABLET, FILM COATED ORAL at 21:44

## 2020-01-01 RX ADMIN — SODIUM CHLORIDE 10 ML: 9 INJECTION, SOLUTION INTRAMUSCULAR; INTRAVENOUS; SUBCUTANEOUS at 05:47

## 2020-01-01 RX ADMIN — VANCOMYCIN HYDROCHLORIDE 1500 MG: 100 INJECTION, POWDER, LYOPHILIZED, FOR SOLUTION INTRAVENOUS at 15:57

## 2020-01-01 RX ADMIN — METHYLPREDNISOLONE SODIUM SUCCINATE 60 MG: 125 INJECTION, POWDER, FOR SOLUTION INTRAMUSCULAR; INTRAVENOUS at 05:37

## 2020-01-01 RX ADMIN — PROPOFOL 50 MCG/KG/MIN: 10 INJECTION, EMULSION INTRAVENOUS at 02:10

## 2020-01-01 RX ADMIN — MORPHINE SULFATE 2 MG: 2 INJECTION, SOLUTION INTRAMUSCULAR; INTRAVENOUS at 02:44

## 2020-01-01 RX ADMIN — INSULIN LISPRO 2 UNITS: 100 INJECTION, SOLUTION INTRAVENOUS; SUBCUTANEOUS at 17:54

## 2020-01-01 RX ADMIN — ALPRAZOLAM 0.5 MG: 0.5 TABLET ORAL at 17:24

## 2020-01-01 RX ADMIN — DEXMEDETOMIDINE 1.5 MCG/KG/HR: 100 INJECTION, SOLUTION, CONCENTRATE INTRAVENOUS at 18:17

## 2020-01-01 RX ADMIN — IPRATROPIUM BROMIDE AND ALBUTEROL SULFATE 3 ML: .5; 3 SOLUTION RESPIRATORY (INHALATION) at 19:57

## 2020-01-01 RX ADMIN — GABAPENTIN 300 MG: 300 CAPSULE ORAL at 17:31

## 2020-01-01 RX ADMIN — FAMOTIDINE 20 MG: 10 INJECTION INTRAVENOUS at 09:56

## 2020-01-01 RX ADMIN — METOPROLOL TARTRATE 50 MG: 50 TABLET, FILM COATED ORAL at 08:01

## 2020-01-01 RX ADMIN — PIPERACILLIN AND TAZOBACTAM 3.38 G: 3; .375 INJECTION, POWDER, LYOPHILIZED, FOR SOLUTION INTRAVENOUS at 03:00

## 2020-01-01 RX ADMIN — PROPOFOL 50 MCG/KG/MIN: 10 INJECTION, EMULSION INTRAVENOUS at 21:32

## 2020-01-01 RX ADMIN — POTASSIUM CHLORIDE 10 MEQ: 10 INJECTION, SOLUTION INTRAVENOUS at 09:37

## 2020-01-01 RX ADMIN — LEVETIRACETAM 500 MG: 5 INJECTION INTRAVENOUS at 09:31

## 2020-01-01 RX ADMIN — ONDANSETRON 4 MG: 2 INJECTION INTRAMUSCULAR; INTRAVENOUS at 01:21

## 2020-01-01 RX ADMIN — PROPOFOL 35 MCG/KG/MIN: 10 INJECTION, EMULSION INTRAVENOUS at 08:35

## 2020-01-01 RX ADMIN — LEVETIRACETAM 500 MG: 500 TABLET ORAL at 17:31

## 2020-01-01 RX ADMIN — PIPERACILLIN AND TAZOBACTAM 3.38 G: 3; .375 INJECTION, POWDER, LYOPHILIZED, FOR SOLUTION INTRAVENOUS at 18:31

## 2020-01-01 RX ADMIN — FAMOTIDINE 20 MG: 10 INJECTION INTRAVENOUS at 21:13

## 2020-01-01 RX ADMIN — SODIUM CHLORIDE 10 ML: 9 INJECTION, SOLUTION INTRAMUSCULAR; INTRAVENOUS; SUBCUTANEOUS at 14:22

## 2020-01-01 RX ADMIN — Medication: at 17:33

## 2020-01-01 RX ADMIN — DEXMEDETOMIDINE 1.5 MCG/KG/HR: 100 INJECTION, SOLUTION, CONCENTRATE INTRAVENOUS at 14:19

## 2020-01-01 RX ADMIN — LEVALBUTEROL HYDROCHLORIDE 1.25 MG: 1.25 SOLUTION RESPIRATORY (INHALATION) at 19:45

## 2020-01-01 RX ADMIN — PIPERACILLIN AND TAZOBACTAM 3.38 G: 3; .375 INJECTION, POWDER, LYOPHILIZED, FOR SOLUTION INTRAVENOUS at 19:00

## 2020-01-01 RX ADMIN — METOPROLOL TARTRATE 50 MG: 50 TABLET, FILM COATED ORAL at 21:38

## 2020-01-01 RX ADMIN — IPRATROPIUM BROMIDE 0.5 MG: 0.5 SOLUTION RESPIRATORY (INHALATION) at 03:03

## 2020-01-01 RX ADMIN — Medication: at 18:00

## 2020-01-01 RX ADMIN — Medication: at 21:05

## 2020-01-01 RX ADMIN — ARFORMOTEROL TARTRATE: 15 SOLUTION RESPIRATORY (INHALATION) at 09:02

## 2020-01-01 RX ADMIN — FAMOTIDINE 20 MG: 10 INJECTION INTRAVENOUS at 20:20

## 2020-01-01 RX ADMIN — METHYLPREDNISOLONE SODIUM SUCCINATE 30 MG: 125 INJECTION, POWDER, FOR SOLUTION INTRAMUSCULAR; INTRAVENOUS at 21:23

## 2020-01-01 RX ADMIN — METOPROLOL TARTRATE 50 MG: 50 TABLET, FILM COATED ORAL at 08:55

## 2020-01-01 RX ADMIN — IPRATROPIUM BROMIDE 0.5 MG: 0.5 SOLUTION RESPIRATORY (INHALATION) at 20:24

## 2020-01-01 RX ADMIN — PIPERACILLIN AND TAZOBACTAM 3.38 G: 3; .375 INJECTION, POWDER, LYOPHILIZED, FOR SOLUTION INTRAVENOUS at 11:43

## 2020-01-01 RX ADMIN — BACITRACIN 1 PACKET: 500 OINTMENT TOPICAL at 17:01

## 2020-01-01 RX ADMIN — INSULIN LISPRO 2 UNITS: 100 INJECTION, SOLUTION INTRAVENOUS; SUBCUTANEOUS at 11:29

## 2020-01-01 RX ADMIN — CHLORHEXIDINE GLUCONATE 15 ML: 0.12 RINSE ORAL at 08:00

## 2020-01-01 RX ADMIN — HYDROMORPHONE HYDROCHLORIDE 0.5 MG: 1 INJECTION, SOLUTION INTRAMUSCULAR; INTRAVENOUS; SUBCUTANEOUS at 09:47

## 2020-01-01 RX ADMIN — PIPERACILLIN AND TAZOBACTAM 3.38 G: 3; .375 INJECTION, POWDER, LYOPHILIZED, FOR SOLUTION INTRAVENOUS at 03:49

## 2020-01-01 RX ADMIN — SODIUM CHLORIDE 10 ML: 9 INJECTION, SOLUTION INTRAMUSCULAR; INTRAVENOUS; SUBCUTANEOUS at 15:35

## 2020-01-01 RX ADMIN — MORPHINE SULFATE 2 MG: 2 INJECTION, SOLUTION INTRAMUSCULAR; INTRAVENOUS at 20:24

## 2020-01-01 RX ADMIN — IPRATROPIUM BROMIDE AND ALBUTEROL SULFATE 3 ML: .5; 3 SOLUTION RESPIRATORY (INHALATION) at 19:19

## 2020-01-01 RX ADMIN — FAMOTIDINE 20 MG: 10 INJECTION INTRAVENOUS at 08:00

## 2020-01-01 RX ADMIN — METOPROLOL TARTRATE 50 MG: 50 TABLET, FILM COATED ORAL at 09:58

## 2020-01-01 RX ADMIN — INSULIN LISPRO 2 UNITS: 100 INJECTION, SOLUTION INTRAVENOUS; SUBCUTANEOUS at 17:30

## 2020-01-01 RX ADMIN — ALPRAZOLAM 0.5 MG: 0.5 TABLET ORAL at 22:56

## 2020-01-01 RX ADMIN — ONDANSETRON 4 MG: 2 INJECTION INTRAMUSCULAR; INTRAVENOUS at 08:06

## 2020-01-01 RX ADMIN — IPRATROPIUM BROMIDE AND ALBUTEROL SULFATE 3 ML: .5; 3 SOLUTION RESPIRATORY (INHALATION) at 03:05

## 2020-01-01 RX ADMIN — Medication 30 ML: at 08:55

## 2020-01-01 RX ADMIN — METHYLPREDNISOLONE SODIUM SUCCINATE 60 MG: 125 INJECTION, POWDER, FOR SOLUTION INTRAMUSCULAR; INTRAVENOUS at 11:14

## 2020-01-01 RX ADMIN — AMLODIPINE BESYLATE 10 MG: 5 TABLET ORAL at 08:06

## 2020-01-01 RX ADMIN — Medication 30 ML: at 09:29

## 2020-01-01 RX ADMIN — IPRATROPIUM BROMIDE AND ALBUTEROL SULFATE 3 ML: .5; 3 SOLUTION RESPIRATORY (INHALATION) at 19:36

## 2020-01-01 RX ADMIN — SODIUM CHLORIDE 10 ML: 9 INJECTION, SOLUTION INTRAMUSCULAR; INTRAVENOUS; SUBCUTANEOUS at 21:06

## 2020-01-01 RX ADMIN — SODIUM CHLORIDE 10 ML: 9 INJECTION, SOLUTION INTRAMUSCULAR; INTRAVENOUS; SUBCUTANEOUS at 21:42

## 2020-01-01 RX ADMIN — FAMOTIDINE 20 MG: 10 INJECTION INTRAVENOUS at 21:22

## 2020-01-01 RX ADMIN — AMLODIPINE BESYLATE 10 MG: 5 TABLET ORAL at 12:53

## 2020-01-01 RX ADMIN — IPRATROPIUM BROMIDE AND ALBUTEROL SULFATE 3 ML: .5; 3 SOLUTION RESPIRATORY (INHALATION) at 15:43

## 2020-01-01 RX ADMIN — PROPOFOL 50 MCG/KG/MIN: 10 INJECTION, EMULSION INTRAVENOUS at 01:16

## 2020-01-01 RX ADMIN — MICONAZOLE NITRATE: 20 CREAM TOPICAL at 20:47

## 2020-01-01 RX ADMIN — IPRATROPIUM BROMIDE AND ALBUTEROL SULFATE 3 ML: .5; 3 SOLUTION RESPIRATORY (INHALATION) at 11:10

## 2020-01-01 RX ADMIN — GABAPENTIN 300 MG: 300 CAPSULE ORAL at 21:26

## 2020-01-01 RX ADMIN — IPRATROPIUM BROMIDE AND ALBUTEROL SULFATE 3 ML: .5; 3 SOLUTION RESPIRATORY (INHALATION) at 15:39

## 2020-01-01 RX ADMIN — SODIUM CHLORIDE 10 ML: 9 INJECTION, SOLUTION INTRAMUSCULAR; INTRAVENOUS; SUBCUTANEOUS at 14:20

## 2020-01-01 RX ADMIN — LEVETIRACETAM 500 MG: 500 TABLET ORAL at 17:32

## 2020-01-01 RX ADMIN — ARFORMOTEROL TARTRATE: 15 SOLUTION RESPIRATORY (INHALATION) at 19:31

## 2020-01-01 RX ADMIN — METOPROLOL TARTRATE 50 MG: 50 TABLET, FILM COATED ORAL at 20:41

## 2020-01-01 RX ADMIN — IPRATROPIUM BROMIDE AND ALBUTEROL SULFATE 3 ML: .5; 3 SOLUTION RESPIRATORY (INHALATION) at 11:41

## 2020-01-01 RX ADMIN — SODIUM CHLORIDE 20 ML: 9 INJECTION, SOLUTION INTRAMUSCULAR; INTRAVENOUS; SUBCUTANEOUS at 22:00

## 2020-01-01 RX ADMIN — BACITRACIN 1 PACKET: 500 OINTMENT TOPICAL at 17:28

## 2020-01-01 RX ADMIN — METHYLPREDNISOLONE SODIUM SUCCINATE 60 MG: 125 INJECTION, POWDER, FOR SOLUTION INTRAMUSCULAR; INTRAVENOUS at 17:01

## 2020-01-01 RX ADMIN — METHYLPREDNISOLONE SODIUM SUCCINATE 30 MG: 125 INJECTION, POWDER, FOR SOLUTION INTRAMUSCULAR; INTRAVENOUS at 08:57

## 2020-01-01 RX ADMIN — METHYLPREDNISOLONE SODIUM SUCCINATE 30 MG: 125 INJECTION, POWDER, FOR SOLUTION INTRAMUSCULAR; INTRAVENOUS at 23:44

## 2020-01-01 RX ADMIN — Medication 1 AMPULE: at 20:09

## 2020-01-01 RX ADMIN — INSULIN LISPRO 2 UNITS: 100 INJECTION, SOLUTION INTRAVENOUS; SUBCUTANEOUS at 17:08

## 2020-01-01 RX ADMIN — METHYLPREDNISOLONE SODIUM SUCCINATE 30 MG: 125 INJECTION, POWDER, FOR SOLUTION INTRAMUSCULAR; INTRAVENOUS at 21:04

## 2020-01-01 RX ADMIN — Medication: at 17:10

## 2020-01-01 RX ADMIN — POTASSIUM CHLORIDE 10 MEQ: 10 INJECTION, SOLUTION INTRAVENOUS at 12:00

## 2020-01-01 RX ADMIN — ARFORMOTEROL TARTRATE: 15 SOLUTION RESPIRATORY (INHALATION) at 10:02

## 2020-01-01 RX ADMIN — INSULIN LISPRO 2 UNITS: 100 INJECTION, SOLUTION INTRAVENOUS; SUBCUTANEOUS at 00:04

## 2020-01-01 RX ADMIN — IPRATROPIUM BROMIDE AND ALBUTEROL SULFATE 3 ML: .5; 3 SOLUTION RESPIRATORY (INHALATION) at 13:53

## 2020-01-01 RX ADMIN — IPRATROPIUM BROMIDE 0.5 MG: 0.5 SOLUTION RESPIRATORY (INHALATION) at 14:50

## 2020-01-01 RX ADMIN — IPRATROPIUM BROMIDE 0.5 MG: 0.5 SOLUTION RESPIRATORY (INHALATION) at 01:54

## 2020-01-01 RX ADMIN — LEVETIRACETAM 500 MG: 500 TABLET ORAL at 17:47

## 2020-01-01 RX ADMIN — BACITRACIN 1 PACKET: 500 OINTMENT TOPICAL at 17:13

## 2020-01-01 RX ADMIN — MICONAZOLE NITRATE: 20 CREAM TOPICAL at 21:14

## 2020-01-01 RX ADMIN — IPRATROPIUM BROMIDE 0.5 MG: 0.5 SOLUTION RESPIRATORY (INHALATION) at 13:43

## 2020-01-01 RX ADMIN — METHYLPREDNISOLONE SODIUM SUCCINATE 30 MG: 125 INJECTION, POWDER, FOR SOLUTION INTRAMUSCULAR; INTRAVENOUS at 14:51

## 2020-01-01 RX ADMIN — SODIUM CHLORIDE 10 ML: 9 INJECTION, SOLUTION INTRAMUSCULAR; INTRAVENOUS; SUBCUTANEOUS at 14:56

## 2020-01-01 RX ADMIN — VANCOMYCIN HYDROCHLORIDE 1000 MG: 1 INJECTION, POWDER, LYOPHILIZED, FOR SOLUTION INTRAVENOUS at 01:09

## 2020-01-01 RX ADMIN — SODIUM CHLORIDE 10 ML: 9 INJECTION, SOLUTION INTRAMUSCULAR; INTRAVENOUS; SUBCUTANEOUS at 14:42

## 2020-01-01 RX ADMIN — METHIMAZOLE 10 MG: 5 TABLET ORAL at 09:56

## 2020-01-01 RX ADMIN — LEVETIRACETAM 500 MG: 5 INJECTION INTRAVENOUS at 22:50

## 2020-01-01 RX ADMIN — METHYLPREDNISOLONE SODIUM SUCCINATE 40 MG: 40 INJECTION, POWDER, FOR SOLUTION INTRAMUSCULAR; INTRAVENOUS at 08:45

## 2020-01-01 RX ADMIN — Medication 150 MCG/HR: at 07:21

## 2020-01-01 RX ADMIN — LORAZEPAM 1 MG: 2 INJECTION INTRAMUSCULAR; INTRAVENOUS at 19:27

## 2020-01-01 RX ADMIN — LEVETIRACETAM 500 MG: 5 INJECTION INTRAVENOUS at 21:50

## 2020-01-01 RX ADMIN — DEXMEDETOMIDINE 1.5 MCG/KG/HR: 100 INJECTION, SOLUTION, CONCENTRATE INTRAVENOUS at 18:53

## 2020-01-01 RX ADMIN — CHLORHEXIDINE GLUCONATE 15 ML: 0.12 RINSE ORAL at 20:20

## 2020-01-01 RX ADMIN — MORPHINE SULFATE 4 MG: 4 INJECTION, SOLUTION INTRAMUSCULAR; INTRAVENOUS at 00:44

## 2020-01-01 RX ADMIN — POTASSIUM CHLORIDE 20 MEQ: 400 INJECTION, SOLUTION INTRAVENOUS at 09:07

## 2020-01-01 RX ADMIN — METHYLPREDNISOLONE SODIUM SUCCINATE 40 MG: 40 INJECTION, POWDER, FOR SOLUTION INTRAMUSCULAR; INTRAVENOUS at 09:37

## 2020-01-01 RX ADMIN — METHYLPREDNISOLONE SODIUM SUCCINATE 30 MG: 125 INJECTION, POWDER, FOR SOLUTION INTRAMUSCULAR; INTRAVENOUS at 22:20

## 2020-01-01 RX ADMIN — LEVALBUTEROL HYDROCHLORIDE 1.25 MG: 1.25 SOLUTION RESPIRATORY (INHALATION) at 08:28

## 2020-01-01 RX ADMIN — FENTANYL CITRATE 100 MCG: 50 INJECTION, SOLUTION INTRAMUSCULAR; INTRAVENOUS at 08:00

## 2020-01-01 RX ADMIN — PROPOFOL 40 MCG/KG/MIN: 10 INJECTION, EMULSION INTRAVENOUS at 12:31

## 2020-01-01 RX ADMIN — LABETALOL HYDROCHLORIDE 10 MG: 5 INJECTION INTRAVENOUS at 02:00

## 2020-01-01 RX ADMIN — PROPOFOL 40 MCG/KG/MIN: 10 INJECTION, EMULSION INTRAVENOUS at 23:41

## 2020-01-01 RX ADMIN — LEVETIRACETAM 500 MG: 500 TABLET ORAL at 09:09

## 2020-01-01 RX ADMIN — PROPOFOL 25 MCG/KG/MIN: 10 INJECTION, EMULSION INTRAVENOUS at 09:26

## 2020-01-01 RX ADMIN — MORPHINE SULFATE 2 MG: 2 INJECTION, SOLUTION INTRAMUSCULAR; INTRAVENOUS at 08:05

## 2020-01-01 RX ADMIN — HYDRALAZINE HYDROCHLORIDE 10 MG: 20 INJECTION, SOLUTION INTRAMUSCULAR; INTRAVENOUS at 02:55

## 2020-01-01 RX ADMIN — LEVALBUTEROL HYDROCHLORIDE 1.25 MG: 1.25 SOLUTION RESPIRATORY (INHALATION) at 01:54

## 2020-01-01 RX ADMIN — IPRATROPIUM BROMIDE AND ALBUTEROL SULFATE 3 ML: .5; 3 SOLUTION RESPIRATORY (INHALATION) at 07:30

## 2020-01-01 RX ADMIN — METHIMAZOLE 10 MG: 5 TABLET ORAL at 09:09

## 2020-01-01 RX ADMIN — FENTANYL CITRATE 100 MCG: 50 INJECTION, SOLUTION INTRAMUSCULAR; INTRAVENOUS at 16:20

## 2020-01-01 RX ADMIN — PROPOFOL 50 MCG/KG/MIN: 10 INJECTION, EMULSION INTRAVENOUS at 03:11

## 2020-01-01 RX ADMIN — IPRATROPIUM BROMIDE AND ALBUTEROL SULFATE 3 ML: .5; 3 SOLUTION RESPIRATORY (INHALATION) at 19:32

## 2020-01-01 RX ADMIN — Medication 1 AMPULE: at 20:43

## 2020-01-01 RX ADMIN — ENOXAPARIN SODIUM 40 MG: 40 INJECTION SUBCUTANEOUS at 13:35

## 2020-01-01 RX ADMIN — MORPHINE SULFATE 4 MG: 4 INJECTION, SOLUTION INTRAMUSCULAR; INTRAVENOUS at 21:35

## 2020-01-01 RX ADMIN — LEVETIRACETAM 500 MG: 5 INJECTION INTRAVENOUS at 08:39

## 2020-01-01 RX ADMIN — MORPHINE SULFATE 2 MG: 2 INJECTION, SOLUTION INTRAMUSCULAR; INTRAVENOUS at 06:27

## 2020-01-01 RX ADMIN — MIDAZOLAM HYDROCHLORIDE 5 MG: 5 INJECTION, SOLUTION INTRAMUSCULAR; INTRAVENOUS at 09:19

## 2020-01-01 RX ADMIN — PROPOFOL 30.05 MCG/KG/MIN: 10 INJECTION, EMULSION INTRAVENOUS at 09:39

## 2020-01-01 RX ADMIN — LEVETIRACETAM 500 MG: 500 SOLUTION ORAL at 08:57

## 2020-01-01 RX ADMIN — ONDANSETRON 4 MG: 2 INJECTION INTRAMUSCULAR; INTRAVENOUS at 16:21

## 2020-01-01 RX ADMIN — SODIUM CHLORIDE 10 ML: 9 INJECTION, SOLUTION INTRAMUSCULAR; INTRAVENOUS; SUBCUTANEOUS at 05:10

## 2020-01-01 RX ADMIN — MICONAZOLE NITRATE: 20 CREAM TOPICAL at 09:00

## 2020-01-01 RX ADMIN — LEVETIRACETAM 500 MG: 5 INJECTION INTRAVENOUS at 09:45

## 2020-01-01 RX ADMIN — ARFORMOTEROL TARTRATE: 15 SOLUTION RESPIRATORY (INHALATION) at 08:02

## 2020-01-01 RX ADMIN — SODIUM CHLORIDE 10 ML: 9 INJECTION, SOLUTION INTRAMUSCULAR; INTRAVENOUS; SUBCUTANEOUS at 13:15

## 2020-01-01 RX ADMIN — LEVALBUTEROL HYDROCHLORIDE 1.25 MG: 1.25 SOLUTION RESPIRATORY (INHALATION) at 03:03

## 2020-01-01 RX ADMIN — LEVETIRACETAM 500 MG: 500 SOLUTION ORAL at 17:45

## 2020-01-01 RX ADMIN — GABAPENTIN 300 MG: 300 CAPSULE ORAL at 17:23

## 2020-01-01 RX ADMIN — Medication 100 MCG/HR: at 01:36

## 2020-01-01 RX ADMIN — METHYLPREDNISOLONE SODIUM SUCCINATE 30 MG: 125 INJECTION, POWDER, FOR SOLUTION INTRAMUSCULAR; INTRAVENOUS at 21:21

## 2020-01-01 RX ADMIN — ONDANSETRON 4 MG: 2 INJECTION INTRAMUSCULAR; INTRAVENOUS at 00:56

## 2020-01-01 RX ADMIN — IPRATROPIUM BROMIDE AND ALBUTEROL SULFATE 3 ML: .5; 3 SOLUTION RESPIRATORY (INHALATION) at 16:17

## 2020-01-01 RX ADMIN — IPRATROPIUM BROMIDE 0.5 MG: 0.5 SOLUTION RESPIRATORY (INHALATION) at 14:09

## 2020-01-01 RX ADMIN — INSULIN LISPRO 2 UNITS: 100 INJECTION, SOLUTION INTRAVENOUS; SUBCUTANEOUS at 17:10

## 2020-01-01 RX ADMIN — PROPOFOL 25 MCG/KG/MIN: 10 INJECTION, EMULSION INTRAVENOUS at 09:19

## 2020-01-01 RX ADMIN — HYDROCODONE BITARTRATE AND ACETAMINOPHEN 1 TABLET: 5; 325 TABLET ORAL at 12:47

## 2020-01-01 RX ADMIN — PROPOFOL 35 MCG/KG/MIN: 10 INJECTION, EMULSION INTRAVENOUS at 11:44

## 2020-01-01 RX ADMIN — DEXMEDETOMIDINE 1.5 MCG/KG/HR: 100 INJECTION, SOLUTION, CONCENTRATE INTRAVENOUS at 10:21

## 2020-01-01 RX ADMIN — MICONAZOLE NITRATE: 20 CREAM TOPICAL at 09:13

## 2020-01-01 RX ADMIN — HYDRALAZINE HYDROCHLORIDE 10 MG: 20 INJECTION, SOLUTION INTRAMUSCULAR; INTRAVENOUS at 23:51

## 2020-01-01 RX ADMIN — HYDROMORPHONE HYDROCHLORIDE 1 MG: 1 INJECTION, SOLUTION INTRAMUSCULAR; INTRAVENOUS; SUBCUTANEOUS at 21:55

## 2020-01-01 RX ADMIN — MICONAZOLE NITRATE: 20 CREAM TOPICAL at 22:20

## 2020-01-01 RX ADMIN — BACITRACIN 1 PACKET: 500 OINTMENT TOPICAL at 18:55

## 2020-01-01 RX ADMIN — IPRATROPIUM BROMIDE AND ALBUTEROL SULFATE 3 ML: .5; 3 SOLUTION RESPIRATORY (INHALATION) at 23:13

## 2020-01-01 RX ADMIN — GABAPENTIN 300 MG: 300 CAPSULE ORAL at 21:24

## 2020-01-01 RX ADMIN — Medication 100 MCG/HR: at 01:57

## 2020-01-01 RX ADMIN — Medication: at 09:58

## 2020-01-01 RX ADMIN — LEVETIRACETAM 500 MG: 500 TABLET ORAL at 18:18

## 2020-01-01 RX ADMIN — Medication: at 23:19

## 2020-01-01 RX ADMIN — FAMOTIDINE 20 MG: 10 INJECTION INTRAVENOUS at 08:55

## 2020-01-01 RX ADMIN — SODIUM CHLORIDE 10 ML: 9 INJECTION, SOLUTION INTRAMUSCULAR; INTRAVENOUS; SUBCUTANEOUS at 06:05

## 2020-01-01 RX ADMIN — PIPERACILLIN AND TAZOBACTAM 3.38 G: 3; .375 INJECTION, POWDER, LYOPHILIZED, FOR SOLUTION INTRAVENOUS at 18:14

## 2020-01-01 RX ADMIN — MICONAZOLE NITRATE: 20 CREAM TOPICAL at 08:01

## 2020-01-01 RX ADMIN — LEVETIRACETAM 500 MG: 500 TABLET ORAL at 08:44

## 2020-01-01 RX ADMIN — Medication 1 AMPULE: at 08:55

## 2020-01-01 RX ADMIN — PROPOFOL 50 MCG/KG/MIN: 10 INJECTION, EMULSION INTRAVENOUS at 09:29

## 2020-01-01 RX ADMIN — HYDROCODONE BITARTRATE AND ACETAMINOPHEN 2 TABLET: 5; 325 TABLET ORAL at 09:39

## 2020-01-01 RX ADMIN — SODIUM CHLORIDE 50 ML/HR: 900 INJECTION, SOLUTION INTRAVENOUS at 02:39

## 2020-01-01 RX ADMIN — Medication: at 09:30

## 2020-01-01 RX ADMIN — METHIMAZOLE 10 MG: 5 TABLET ORAL at 08:13

## 2020-01-01 RX ADMIN — ARFORMOTEROL TARTRATE: 15 SOLUTION RESPIRATORY (INHALATION) at 19:44

## 2020-01-01 RX ADMIN — METHYLPREDNISOLONE SODIUM SUCCINATE 40 MG: 40 INJECTION, POWDER, FOR SOLUTION INTRAMUSCULAR; INTRAVENOUS at 14:38

## 2020-01-01 RX ADMIN — LEVALBUTEROL HYDROCHLORIDE 1.25 MG: 1.25 SOLUTION RESPIRATORY (INHALATION) at 08:35

## 2020-01-01 RX ADMIN — ONDANSETRON 4 MG: 2 INJECTION INTRAMUSCULAR; INTRAVENOUS at 06:04

## 2020-01-01 RX ADMIN — ONDANSETRON 4 MG: 2 INJECTION INTRAMUSCULAR; INTRAVENOUS at 14:42

## 2020-01-01 RX ADMIN — AMOXICILLIN AND CLAVULANATE POTASSIUM 1 TABLET: 875; 125 TABLET, FILM COATED ORAL at 09:10

## 2020-01-01 RX ADMIN — FAMOTIDINE 20 MG: 10 INJECTION INTRAVENOUS at 20:13

## 2020-01-01 RX ADMIN — LEVALBUTEROL HYDROCHLORIDE 1.25 MG: 1.25 SOLUTION RESPIRATORY (INHALATION) at 14:12

## 2020-01-01 RX ADMIN — SODIUM CHLORIDE 10 ML: 9 INJECTION, SOLUTION INTRAMUSCULAR; INTRAVENOUS; SUBCUTANEOUS at 22:21

## 2020-01-01 RX ADMIN — FENTANYL CITRATE 50 MCG: 50 INJECTION, SOLUTION INTRAMUSCULAR; INTRAVENOUS at 14:56

## 2020-01-01 RX ADMIN — IPRATROPIUM BROMIDE AND ALBUTEROL SULFATE 3 ML: .5; 3 SOLUTION RESPIRATORY (INHALATION) at 23:29

## 2020-01-01 RX ADMIN — LORAZEPAM 1 MG: 2 INJECTION INTRAMUSCULAR; INTRAVENOUS at 22:13

## 2020-01-01 RX ADMIN — IPRATROPIUM BROMIDE AND ALBUTEROL SULFATE 3 ML: .5; 3 SOLUTION RESPIRATORY (INHALATION) at 19:58

## 2020-01-01 RX ADMIN — DEXMEDETOMIDINE 1 MCG/KG/HR: 100 INJECTION, SOLUTION, CONCENTRATE INTRAVENOUS at 20:45

## 2020-01-01 RX ADMIN — DEXMEDETOMIDINE 1.5 MCG/KG/HR: 100 INJECTION, SOLUTION, CONCENTRATE INTRAVENOUS at 03:14

## 2020-01-01 RX ADMIN — METHYLPREDNISOLONE SODIUM SUCCINATE 30 MG: 125 INJECTION, POWDER, FOR SOLUTION INTRAMUSCULAR; INTRAVENOUS at 08:55

## 2020-01-01 RX ADMIN — MORPHINE SULFATE 2 MG: 2 INJECTION, SOLUTION INTRAMUSCULAR; INTRAVENOUS at 13:36

## 2020-01-01 RX ADMIN — INSULIN LISPRO 2 UNITS: 100 INJECTION, SOLUTION INTRAVENOUS; SUBCUTANEOUS at 12:02

## 2020-01-01 RX ADMIN — GABAPENTIN 300 MG: 300 CAPSULE ORAL at 09:56

## 2020-01-01 RX ADMIN — METHYLPREDNISOLONE SODIUM SUCCINATE 60 MG: 125 INJECTION, POWDER, FOR SOLUTION INTRAMUSCULAR; INTRAVENOUS at 06:01

## 2020-01-01 RX ADMIN — IPRATROPIUM BROMIDE 0.5 MG: 0.5 SOLUTION RESPIRATORY (INHALATION) at 08:28

## 2020-01-01 RX ADMIN — METHOCARBAMOL TABLETS 1500 MG: 750 TABLET, COATED ORAL at 17:32

## 2020-01-01 RX ADMIN — BACITRACIN 1 PACKET: 500 OINTMENT TOPICAL at 17:00

## 2020-01-01 RX ADMIN — Medication: at 09:00

## 2020-01-01 RX ADMIN — FAMOTIDINE 20 MG: 10 INJECTION INTRAVENOUS at 09:13

## 2020-01-01 RX ADMIN — ONDANSETRON 4 MG: 2 INJECTION INTRAMUSCULAR; INTRAVENOUS at 20:26

## 2020-01-01 RX ADMIN — SODIUM CHLORIDE 10 ML: 9 INJECTION, SOLUTION INTRAMUSCULAR; INTRAVENOUS; SUBCUTANEOUS at 21:05

## 2020-01-01 RX ADMIN — METHYLPREDNISOLONE SODIUM SUCCINATE 30 MG: 125 INJECTION, POWDER, FOR SOLUTION INTRAMUSCULAR; INTRAVENOUS at 21:13

## 2020-01-01 RX ADMIN — GLYCOPYRROLATE 0.2 MG: 0.2 INJECTION, SOLUTION INTRAMUSCULAR; INTRAVENOUS at 16:54

## 2020-01-01 RX ADMIN — SODIUM CHLORIDE 10 ML: 9 INJECTION, SOLUTION INTRAMUSCULAR; INTRAVENOUS; SUBCUTANEOUS at 16:00

## 2020-01-01 RX ADMIN — METHIMAZOLE 10 MG: 5 TABLET ORAL at 14:02

## 2020-01-01 RX ADMIN — MICONAZOLE NITRATE: 20 CREAM TOPICAL at 23:46

## 2020-01-01 RX ADMIN — SODIUM CHLORIDE 10 ML: 9 INJECTION, SOLUTION INTRAMUSCULAR; INTRAVENOUS; SUBCUTANEOUS at 21:24

## 2020-01-01 RX ADMIN — INSULIN LISPRO 2 UNITS: 100 INJECTION, SOLUTION INTRAVENOUS; SUBCUTANEOUS at 11:25

## 2020-01-01 RX ADMIN — LEVALBUTEROL HYDROCHLORIDE 1.25 MG: 1.25 SOLUTION RESPIRATORY (INHALATION) at 14:50

## 2020-01-01 RX ADMIN — IPRATROPIUM BROMIDE AND ALBUTEROL SULFATE 3 ML: .5; 3 SOLUTION RESPIRATORY (INHALATION) at 19:20

## 2020-01-01 RX ADMIN — PIPERACILLIN AND TAZOBACTAM 3.38 G: 3; .375 INJECTION, POWDER, LYOPHILIZED, FOR SOLUTION INTRAVENOUS at 03:12

## 2020-01-01 RX ADMIN — Medication: at 18:18

## 2020-01-01 RX ADMIN — PREDNISONE 40 MG: 20 TABLET ORAL at 09:09

## 2020-01-01 RX ADMIN — Medication: at 17:49

## 2020-01-01 RX ADMIN — IPRATROPIUM BROMIDE AND ALBUTEROL SULFATE 3 ML: .5; 3 SOLUTION RESPIRATORY (INHALATION) at 15:21

## 2020-01-01 RX ADMIN — IPRATROPIUM BROMIDE AND ALBUTEROL SULFATE 3 ML: .5; 3 SOLUTION RESPIRATORY (INHALATION) at 07:41

## 2020-01-01 RX ADMIN — AMOXICILLIN AND CLAVULANATE POTASSIUM 1 TABLET: 875; 125 TABLET, FILM COATED ORAL at 08:06

## 2020-01-01 RX ADMIN — POTASSIUM CHLORIDE 20 MEQ: 400 INJECTION, SOLUTION INTRAVENOUS at 08:13

## 2020-01-01 RX ADMIN — INSULIN LISPRO 2 UNITS: 100 INJECTION, SOLUTION INTRAVENOUS; SUBCUTANEOUS at 11:50

## 2020-01-01 RX ADMIN — Medication 150 MCG/HR: at 17:04

## 2020-01-01 RX ADMIN — PROPOFOL 40 MCG/KG/MIN: 10 INJECTION, EMULSION INTRAVENOUS at 03:35

## 2020-01-01 RX ADMIN — ONDANSETRON 4 MG: 2 INJECTION INTRAMUSCULAR; INTRAVENOUS at 08:54

## 2020-01-01 RX ADMIN — PROPOFOL 45 MCG/KG/MIN: 10 INJECTION, EMULSION INTRAVENOUS at 12:04

## 2020-01-01 RX ADMIN — IPRATROPIUM BROMIDE AND ALBUTEROL SULFATE 3 ML: .5; 3 SOLUTION RESPIRATORY (INHALATION) at 19:43

## 2020-01-01 RX ADMIN — AMOXICILLIN AND CLAVULANATE POTASSIUM 1 TABLET: 875; 125 TABLET, FILM COATED ORAL at 21:24

## 2020-01-01 RX ADMIN — LEVOFLOXACIN 500 MG: 5 INJECTION, SOLUTION INTRAVENOUS at 14:22

## 2020-01-01 RX ADMIN — METHYLPREDNISOLONE SODIUM SUCCINATE 60 MG: 125 INJECTION, POWDER, FOR SOLUTION INTRAMUSCULAR; INTRAVENOUS at 01:09

## 2020-01-01 RX ADMIN — DULOXETINE HYDROCHLORIDE 40 MG: 20 CAPSULE, DELAYED RELEASE ORAL at 09:37

## 2020-01-01 RX ADMIN — PROPOFOL 50 MCG/KG/MIN: 10 INJECTION, EMULSION INTRAVENOUS at 07:54

## 2020-01-01 RX ADMIN — Medication: at 21:27

## 2020-01-01 RX ADMIN — Medication 100 MCG/HR: at 18:01

## 2020-01-01 RX ADMIN — HYDROCODONE BITARTRATE AND ACETAMINOPHEN 1 TABLET: 5; 325 TABLET ORAL at 20:25

## 2020-01-01 RX ADMIN — METHYLPREDNISOLONE SODIUM SUCCINATE 60 MG: 125 INJECTION, POWDER, FOR SOLUTION INTRAMUSCULAR; INTRAVENOUS at 00:05

## 2020-01-01 RX ADMIN — SODIUM CHLORIDE 10 ML: 9 INJECTION, SOLUTION INTRAMUSCULAR; INTRAVENOUS; SUBCUTANEOUS at 23:45

## 2020-01-01 RX ADMIN — METHYLPREDNISOLONE SODIUM SUCCINATE 30 MG: 125 INJECTION, POWDER, FOR SOLUTION INTRAMUSCULAR; INTRAVENOUS at 05:09

## 2020-01-01 RX ADMIN — PROPOFOL 30 MCG/KG/MIN: 10 INJECTION, EMULSION INTRAVENOUS at 08:30

## 2020-01-01 RX ADMIN — METOPROLOL TARTRATE 50 MG: 50 TABLET, FILM COATED ORAL at 17:48

## 2020-01-01 RX ADMIN — INSULIN LISPRO 2 UNITS: 100 INJECTION, SOLUTION INTRAVENOUS; SUBCUTANEOUS at 00:03

## 2020-01-01 RX ADMIN — IPRATROPIUM BROMIDE 0.5 MG: 0.5 SOLUTION RESPIRATORY (INHALATION) at 06:08

## 2020-01-01 RX ADMIN — IPRATROPIUM BROMIDE AND ALBUTEROL SULFATE 3 ML: .5; 3 SOLUTION RESPIRATORY (INHALATION) at 11:29

## 2020-01-01 RX ADMIN — LORAZEPAM 4 MG: 2 INJECTION INTRAMUSCULAR; INTRAVENOUS at 08:43

## 2020-01-01 RX ADMIN — SODIUM CHLORIDE 10 ML: 9 INJECTION, SOLUTION INTRAMUSCULAR; INTRAVENOUS; SUBCUTANEOUS at 09:10

## 2020-01-01 RX ADMIN — Medication: at 21:50

## 2020-01-01 RX ADMIN — SODIUM CHLORIDE 10 ML: 9 INJECTION, SOLUTION INTRAMUSCULAR; INTRAVENOUS; SUBCUTANEOUS at 05:30

## 2020-01-01 RX ADMIN — LORAZEPAM 1 MG: 2 INJECTION INTRAMUSCULAR; INTRAVENOUS at 01:08

## 2020-01-01 RX ADMIN — DULOXETINE HYDROCHLORIDE 40 MG: 20 CAPSULE, DELAYED RELEASE ORAL at 08:44

## 2020-01-01 RX ADMIN — METOPROLOL TARTRATE 50 MG: 50 TABLET, FILM COATED ORAL at 20:21

## 2020-01-01 RX ADMIN — NITROGLYCERIN 1 INCH: 20 OINTMENT TOPICAL at 09:04

## 2020-01-01 RX ADMIN — Medication 10 ML: at 17:29

## 2020-01-01 RX ADMIN — PROPOFOL 50 MCG/KG/MIN: 10 INJECTION, EMULSION INTRAVENOUS at 02:54

## 2020-01-01 RX ADMIN — ARFORMOTEROL TARTRATE: 15 SOLUTION RESPIRATORY (INHALATION) at 20:59

## 2020-01-01 RX ADMIN — METHYLPREDNISOLONE SODIUM SUCCINATE 30 MG: 125 INJECTION, POWDER, FOR SOLUTION INTRAMUSCULAR; INTRAVENOUS at 06:03

## 2020-01-01 RX ADMIN — SODIUM CHLORIDE 25 ML/HR: 900 INJECTION, SOLUTION INTRAVENOUS at 04:50

## 2020-01-01 RX ADMIN — IPRATROPIUM BROMIDE 0.5 MG: 0.5 SOLUTION RESPIRATORY (INHALATION) at 01:18

## 2020-01-01 RX ADMIN — IPRATROPIUM BROMIDE AND ALBUTEROL SULFATE 3 ML: .5; 3 SOLUTION RESPIRATORY (INHALATION) at 12:17

## 2020-01-01 RX ADMIN — FENTANYL CITRATE 25 MCG: 50 INJECTION, SOLUTION INTRAMUSCULAR; INTRAVENOUS at 12:30

## 2020-01-01 RX ADMIN — METHOCARBAMOL TABLETS 750 MG: 750 TABLET, COATED ORAL at 09:09

## 2020-01-01 RX ADMIN — LORAZEPAM 2 MG: 1 TABLET ORAL at 20:26

## 2020-01-01 RX ADMIN — IPRATROPIUM BROMIDE AND ALBUTEROL SULFATE 3 ML: .5; 3 SOLUTION RESPIRATORY (INHALATION) at 23:02

## 2020-01-01 RX ADMIN — DEXMEDETOMIDINE 1.5 MCG/KG/HR: 100 INJECTION, SOLUTION, CONCENTRATE INTRAVENOUS at 10:31

## 2020-01-01 RX ADMIN — SODIUM CHLORIDE 10 ML: 9 INJECTION, SOLUTION INTRAMUSCULAR; INTRAVENOUS; SUBCUTANEOUS at 06:00

## 2020-01-01 RX ADMIN — MORPHINE SULFATE 4 MG: 4 INJECTION, SOLUTION INTRAMUSCULAR; INTRAVENOUS at 20:04

## 2020-01-01 RX ADMIN — ONDANSETRON 4 MG: 2 INJECTION INTRAMUSCULAR; INTRAVENOUS at 02:32

## 2020-01-01 RX ADMIN — METOPROLOL TARTRATE 50 MG: 50 TABLET, FILM COATED ORAL at 21:23

## 2020-01-01 RX ADMIN — PIPERACILLIN AND TAZOBACTAM 3.38 G: 3; .375 INJECTION, POWDER, LYOPHILIZED, FOR SOLUTION INTRAVENOUS at 11:09

## 2020-01-01 RX ADMIN — METHYLPREDNISOLONE SODIUM SUCCINATE 40 MG: 40 INJECTION, POWDER, FOR SOLUTION INTRAMUSCULAR; INTRAVENOUS at 21:05

## 2020-01-01 RX ADMIN — DEXMEDETOMIDINE 1.5 MCG/KG/HR: 100 INJECTION, SOLUTION, CONCENTRATE INTRAVENOUS at 21:45

## 2020-01-01 RX ADMIN — IPRATROPIUM BROMIDE 0.5 MG: 0.5 SOLUTION RESPIRATORY (INHALATION) at 08:48

## 2020-01-01 RX ADMIN — SODIUM CHLORIDE 10 ML: 9 INJECTION, SOLUTION INTRAMUSCULAR; INTRAVENOUS; SUBCUTANEOUS at 05:05

## 2020-01-01 RX ADMIN — SODIUM CHLORIDE 10 ML: 9 INJECTION, SOLUTION INTRAMUSCULAR; INTRAVENOUS; SUBCUTANEOUS at 21:50

## 2020-01-01 RX ADMIN — Medication 1 AMPULE: at 08:00

## 2020-01-01 RX ADMIN — MORPHINE SULFATE 4 MG: 4 INJECTION, SOLUTION INTRAMUSCULAR; INTRAVENOUS at 16:28

## 2020-01-01 RX ADMIN — INSULIN LISPRO 3 UNITS: 100 INJECTION, SOLUTION INTRAVENOUS; SUBCUTANEOUS at 23:20

## 2020-01-01 RX ADMIN — PIPERACILLIN AND TAZOBACTAM 3.38 G: 3; .375 INJECTION, POWDER, LYOPHILIZED, FOR SOLUTION INTRAVENOUS at 18:37

## 2020-01-01 RX ADMIN — Medication 30 ML: at 10:01

## 2020-01-01 RX ADMIN — AMOXICILLIN AND CLAVULANATE POTASSIUM 1 TABLET: 875; 125 TABLET, FILM COATED ORAL at 18:09

## 2020-01-01 RX ADMIN — LEVETIRACETAM 500 MG: 500 TABLET ORAL at 08:06

## 2020-01-01 RX ADMIN — PIPERACILLIN AND TAZOBACTAM 3.38 G: 3; .375 INJECTION, POWDER, LYOPHILIZED, FOR SOLUTION INTRAVENOUS at 11:37

## 2020-01-01 RX ADMIN — MORPHINE SULFATE 2 MG: 2 INJECTION, SOLUTION INTRAMUSCULAR; INTRAVENOUS at 03:33

## 2020-01-01 RX ADMIN — SODIUM CHLORIDE 10 ML: 9 INJECTION, SOLUTION INTRAMUSCULAR; INTRAVENOUS; SUBCUTANEOUS at 05:57

## 2020-01-01 RX ADMIN — PIPERACILLIN AND TAZOBACTAM 3.38 G: 3; .375 INJECTION, POWDER, LYOPHILIZED, FOR SOLUTION INTRAVENOUS at 03:22

## 2020-01-01 RX ADMIN — INSULIN LISPRO 2 UNITS: 100 INJECTION, SOLUTION INTRAVENOUS; SUBCUTANEOUS at 17:50

## 2020-01-01 RX ADMIN — IPRATROPIUM BROMIDE AND ALBUTEROL SULFATE 3 ML: .5; 3 SOLUTION RESPIRATORY (INHALATION) at 12:31

## 2020-01-01 RX ADMIN — Medication 1 AMPULE: at 22:22

## 2020-01-01 RX ADMIN — Medication 1 AMPULE: at 20:20

## 2020-01-01 RX ADMIN — SODIUM CHLORIDE 10 ML: 9 INJECTION, SOLUTION INTRAMUSCULAR; INTRAVENOUS; SUBCUTANEOUS at 14:51

## 2020-01-01 RX ADMIN — ARFORMOTEROL TARTRATE: 15 SOLUTION RESPIRATORY (INHALATION) at 08:38

## 2020-01-01 RX ADMIN — IPRATROPIUM BROMIDE AND ALBUTEROL SULFATE 3 ML: .5; 3 SOLUTION RESPIRATORY (INHALATION) at 00:09

## 2020-01-01 RX ADMIN — IPRATROPIUM BROMIDE 0.5 MG: 0.5 SOLUTION RESPIRATORY (INHALATION) at 19:25

## 2020-01-01 RX ADMIN — METHOCARBAMOL TABLETS 750 MG: 750 TABLET, COATED ORAL at 08:06

## 2020-01-01 RX ADMIN — IPRATROPIUM BROMIDE 0.5 MG: 0.5 SOLUTION RESPIRATORY (INHALATION) at 08:02

## 2020-01-01 RX ADMIN — CHLORHEXIDINE GLUCONATE 15 ML: 0.12 RINSE ORAL at 08:56

## 2020-01-01 RX ADMIN — METHYLPREDNISOLONE SODIUM SUCCINATE 40 MG: 40 INJECTION, POWDER, FOR SOLUTION INTRAMUSCULAR; INTRAVENOUS at 21:56

## 2020-01-01 RX ADMIN — SODIUM CHLORIDE 10 ML: 9 INJECTION, SOLUTION INTRAMUSCULAR; INTRAVENOUS; SUBCUTANEOUS at 21:26

## 2020-01-01 RX ADMIN — METHYLPREDNISOLONE SODIUM SUCCINATE 40 MG: 40 INJECTION, POWDER, FOR SOLUTION INTRAMUSCULAR; INTRAVENOUS at 05:56

## 2020-01-01 RX ADMIN — FAMOTIDINE 20 MG: 10 INJECTION INTRAVENOUS at 08:57

## 2020-01-01 RX ADMIN — Medication 1 AMPULE: at 20:37

## 2020-01-01 RX ADMIN — POTASSIUM CHLORIDE 20 MEQ: 400 INJECTION, SOLUTION INTRAVENOUS at 06:25

## 2020-01-01 RX ADMIN — ENOXAPARIN SODIUM 40 MG: 40 INJECTION SUBCUTANEOUS at 10:04

## 2020-01-01 RX ADMIN — ALBUMIN (HUMAN) 12.5 G: 0.25 INJECTION, SOLUTION INTRAVENOUS at 20:35

## 2020-01-01 RX ADMIN — DULOXETINE HYDROCHLORIDE 40 MG: 20 CAPSULE, DELAYED RELEASE ORAL at 12:53

## 2020-01-01 RX ADMIN — POTASSIUM CHLORIDE 10 MEQ: 10 INJECTION, SOLUTION INTRAVENOUS at 15:01

## 2020-01-01 RX ADMIN — VANCOMYCIN HYDROCHLORIDE 1000 MG: 1 INJECTION, POWDER, LYOPHILIZED, FOR SOLUTION INTRAVENOUS at 12:31

## 2020-01-01 RX ADMIN — SODIUM CHLORIDE 10 ML: 9 INJECTION, SOLUTION INTRAMUSCULAR; INTRAVENOUS; SUBCUTANEOUS at 14:10

## 2020-01-01 RX ADMIN — VANCOMYCIN HYDROCHLORIDE 1000 MG: 1 INJECTION, POWDER, LYOPHILIZED, FOR SOLUTION INTRAVENOUS at 00:07

## 2020-01-01 RX ADMIN — HYDRALAZINE HYDROCHLORIDE 10 MG: 20 INJECTION, SOLUTION INTRAMUSCULAR; INTRAVENOUS at 04:30

## 2020-01-01 RX ADMIN — METHYLPREDNISOLONE SODIUM SUCCINATE 40 MG: 40 INJECTION, POWDER, FOR SOLUTION INTRAMUSCULAR; INTRAVENOUS at 21:38

## 2020-01-01 RX ADMIN — METHOCARBAMOL TABLETS 1500 MG: 750 TABLET, COATED ORAL at 08:44

## 2020-01-01 RX ADMIN — ENOXAPARIN SODIUM 40 MG: 40 INJECTION SUBCUTANEOUS at 12:11

## 2020-01-01 RX ADMIN — POTASSIUM CHLORIDE 20 MEQ: 400 INJECTION, SOLUTION INTRAVENOUS at 12:08

## 2020-01-01 RX ADMIN — FUROSEMIDE 40 MG: 10 INJECTION, SOLUTION INTRAMUSCULAR; INTRAVENOUS at 10:24

## 2020-01-01 RX ADMIN — PROPOFOL 25 MCG/KG/MIN: 10 INJECTION, EMULSION INTRAVENOUS at 00:58

## 2020-01-01 RX ADMIN — PIPERACILLIN AND TAZOBACTAM 3.38 G: 3; .375 INJECTION, POWDER, LYOPHILIZED, FOR SOLUTION INTRAVENOUS at 04:14

## 2020-01-01 RX ADMIN — IPRATROPIUM BROMIDE AND ALBUTEROL SULFATE 3 ML: .5; 3 SOLUTION RESPIRATORY (INHALATION) at 00:24

## 2020-01-01 RX ADMIN — PIPERACILLIN AND TAZOBACTAM 3.38 G: 3; .375 INJECTION, POWDER, LYOPHILIZED, FOR SOLUTION INTRAVENOUS at 10:55

## 2020-01-01 RX ADMIN — PROPOFOL 25 MCG/KG/MIN: 10 INJECTION, EMULSION INTRAVENOUS at 20:01

## 2020-01-01 RX ADMIN — PIPERACILLIN AND TAZOBACTAM 3.38 G: 3; .375 INJECTION, POWDER, LYOPHILIZED, FOR SOLUTION INTRAVENOUS at 18:18

## 2020-01-01 RX ADMIN — Medication: at 22:21

## 2020-01-01 RX ADMIN — DEXMEDETOMIDINE 1.5 MCG/KG/HR: 100 INJECTION, SOLUTION, CONCENTRATE INTRAVENOUS at 06:16

## 2020-01-01 RX ADMIN — IPRATROPIUM BROMIDE 0.5 MG: 0.5 SOLUTION RESPIRATORY (INHALATION) at 19:13

## 2020-01-01 RX ADMIN — LEVETIRACETAM 500 MG: 5 INJECTION INTRAVENOUS at 21:25

## 2020-01-01 RX ADMIN — SODIUM CHLORIDE 75 ML/HR: 900 INJECTION, SOLUTION INTRAVENOUS at 06:25

## 2020-01-01 RX ADMIN — MORPHINE SULFATE 4 MG: 4 INJECTION, SOLUTION INTRAMUSCULAR; INTRAVENOUS at 18:11

## 2020-01-01 RX ADMIN — IPRATROPIUM BROMIDE AND ALBUTEROL SULFATE 3 ML: .5; 3 SOLUTION RESPIRATORY (INHALATION) at 23:42

## 2020-01-01 RX ADMIN — IPRATROPIUM BROMIDE AND ALBUTEROL SULFATE 3 ML: .5; 3 SOLUTION RESPIRATORY (INHALATION) at 07:37

## 2020-01-01 RX ADMIN — INSULIN LISPRO 2 UNITS: 100 INJECTION, SOLUTION INTRAVENOUS; SUBCUTANEOUS at 13:36

## 2020-01-01 RX ADMIN — DEXMEDETOMIDINE 1.5 MCG/KG/HR: 100 INJECTION, SOLUTION, CONCENTRATE INTRAVENOUS at 19:30

## 2020-01-01 RX ADMIN — MICONAZOLE NITRATE: 20 CREAM TOPICAL at 21:22

## 2020-01-01 RX ADMIN — SODIUM CHLORIDE 10 ML: 9 INJECTION, SOLUTION INTRAMUSCULAR; INTRAVENOUS; SUBCUTANEOUS at 02:32

## 2020-01-01 RX ADMIN — IPRATROPIUM BROMIDE AND ALBUTEROL SULFATE 3 ML: .5; 3 SOLUTION RESPIRATORY (INHALATION) at 03:29

## 2020-01-01 RX ADMIN — METHYLPREDNISOLONE SODIUM SUCCINATE 30 MG: 125 INJECTION, POWDER, FOR SOLUTION INTRAMUSCULAR; INTRAVENOUS at 20:20

## 2020-01-01 RX ADMIN — INSULIN LISPRO 2 UNITS: 100 INJECTION, SOLUTION INTRAVENOUS; SUBCUTANEOUS at 01:17

## 2020-01-01 RX ADMIN — METOPROLOL TARTRATE 50 MG: 50 TABLET, FILM COATED ORAL at 17:58

## 2020-01-01 RX ADMIN — SODIUM CHLORIDE 50 ML/HR: 900 INJECTION, SOLUTION INTRAVENOUS at 00:18

## 2020-01-01 RX ADMIN — Medication: at 17:46

## 2020-01-01 RX ADMIN — METOPROLOL TARTRATE 50 MG: 50 TABLET, FILM COATED ORAL at 23:45

## 2020-01-01 RX ADMIN — LEVETIRACETAM 500 MG: 5 INJECTION INTRAVENOUS at 09:10

## 2020-01-01 RX ADMIN — PROCHLORPERAZINE EDISYLATE 10 MG: 5 INJECTION INTRAMUSCULAR; INTRAVENOUS at 13:35

## 2020-01-01 RX ADMIN — METOPROLOL TARTRATE 50 MG: 50 TABLET, FILM COATED ORAL at 21:50

## 2020-01-01 RX ADMIN — SODIUM CHLORIDE 25 ML/HR: 900 INJECTION, SOLUTION INTRAVENOUS at 12:00

## 2020-01-01 RX ADMIN — METHYLPREDNISOLONE SODIUM SUCCINATE 40 MG: 40 INJECTION, POWDER, FOR SOLUTION INTRAMUSCULAR; INTRAVENOUS at 22:52

## 2020-01-01 RX ADMIN — IPRATROPIUM BROMIDE AND ALBUTEROL SULFATE 3 ML: .5; 3 SOLUTION RESPIRATORY (INHALATION) at 07:27

## 2020-01-01 RX ADMIN — IPRATROPIUM BROMIDE AND ALBUTEROL SULFATE 3 ML: .5; 3 SOLUTION RESPIRATORY (INHALATION) at 15:33

## 2020-01-01 RX ADMIN — Medication 1 AMPULE: at 09:56

## 2020-01-01 RX ADMIN — IPRATROPIUM BROMIDE AND ALBUTEROL SULFATE 3 ML: .5; 3 SOLUTION RESPIRATORY (INHALATION) at 15:11

## 2020-01-01 RX ADMIN — HYDROCODONE BITARTRATE AND ACETAMINOPHEN 1 TABLET: 5; 325 TABLET ORAL at 07:03

## 2020-01-01 RX ADMIN — IPRATROPIUM BROMIDE AND ALBUTEROL SULFATE 3 ML: .5; 3 SOLUTION RESPIRATORY (INHALATION) at 19:13

## 2020-01-01 RX ADMIN — BACITRACIN 1 PACKET: 500 OINTMENT TOPICAL at 17:31

## 2020-01-01 RX ADMIN — PIPERACILLIN AND TAZOBACTAM 3.38 G: 3; .375 INJECTION, POWDER, LYOPHILIZED, FOR SOLUTION INTRAVENOUS at 18:25

## 2020-01-01 RX ADMIN — IPRATROPIUM BROMIDE AND ALBUTEROL SULFATE 3 ML: .5; 3 SOLUTION RESPIRATORY (INHALATION) at 03:47

## 2020-01-01 RX ADMIN — HYDROCODONE BITARTRATE AND ACETAMINOPHEN 1 TABLET: 5; 325 TABLET ORAL at 14:39

## 2020-01-01 RX ADMIN — CHLORHEXIDINE GLUCONATE 15 ML: 0.12 RINSE ORAL at 11:41

## 2020-01-01 RX ADMIN — METOPROLOL TARTRATE 50 MG: 50 TABLET, FILM COATED ORAL at 21:25

## 2020-01-01 RX ADMIN — IPRATROPIUM BROMIDE 0.5 MG: 0.5 SOLUTION RESPIRATORY (INHALATION) at 19:41

## 2020-01-01 RX ADMIN — FENTANYL CITRATE 100 MCG: 50 INJECTION, SOLUTION INTRAMUSCULAR; INTRAVENOUS at 01:41

## 2020-01-01 RX ADMIN — Medication: at 09:10

## 2020-01-01 RX ADMIN — AMOXICILLIN AND CLAVULANATE POTASSIUM 1 TABLET: 875; 125 TABLET, FILM COATED ORAL at 09:09

## 2020-01-01 RX ADMIN — Medication 200 MCG/HR: at 00:41

## 2020-01-01 RX ADMIN — PIPERACILLIN AND TAZOBACTAM 3.38 G: 3; .375 INJECTION, POWDER, LYOPHILIZED, FOR SOLUTION INTRAVENOUS at 13:12

## 2020-01-01 RX ADMIN — METOPROLOL TARTRATE 50 MG: 50 TABLET, FILM COATED ORAL at 21:05

## 2020-01-01 RX ADMIN — BACITRACIN 1 PACKET: 500 OINTMENT TOPICAL at 17:33

## 2020-01-01 RX ADMIN — SODIUM CHLORIDE 10 ML: 9 INJECTION, SOLUTION INTRAMUSCULAR; INTRAVENOUS; SUBCUTANEOUS at 14:21

## 2020-01-01 RX ADMIN — ENOXAPARIN SODIUM 40 MG: 40 INJECTION SUBCUTANEOUS at 11:44

## 2020-01-01 RX ADMIN — Medication 50 MCG/HR: at 12:50

## 2020-01-01 RX ADMIN — SODIUM CHLORIDE 30 ML: 9 INJECTION, SOLUTION INTRAMUSCULAR; INTRAVENOUS; SUBCUTANEOUS at 05:48

## 2020-01-01 RX ADMIN — LEVOFLOXACIN 500 MG: 5 INJECTION, SOLUTION INTRAVENOUS at 04:20

## 2020-01-01 RX ADMIN — AMOXICILLIN AND CLAVULANATE POTASSIUM 1 TABLET: 875; 125 TABLET, FILM COATED ORAL at 21:26

## 2020-01-01 RX ADMIN — LEVETIRACETAM 500 MG: 5 INJECTION INTRAVENOUS at 21:18

## 2020-01-01 RX ADMIN — LEVETIRACETAM 500 MG: 500 TABLET ORAL at 09:38

## 2020-01-01 RX ADMIN — IPRATROPIUM BROMIDE AND ALBUTEROL SULFATE 3 ML: .5; 3 SOLUTION RESPIRATORY (INHALATION) at 20:04

## 2020-01-01 RX ADMIN — AMLODIPINE BESYLATE 10 MG: 5 TABLET ORAL at 09:09

## 2020-01-01 RX ADMIN — DEXMEDETOMIDINE 0.7 MCG/KG/HR: 100 INJECTION, SOLUTION, CONCENTRATE INTRAVENOUS at 02:04

## 2020-01-01 RX ADMIN — MICONAZOLE NITRATE: 20 CREAM TOPICAL at 09:11

## 2020-01-01 RX ADMIN — PROPOFOL 50 MCG/KG/MIN: 10 INJECTION, EMULSION INTRAVENOUS at 05:19

## 2020-01-01 RX ADMIN — METHYLPREDNISOLONE SODIUM SUCCINATE 40 MG: 40 INJECTION, POWDER, FOR SOLUTION INTRAMUSCULAR; INTRAVENOUS at 14:22

## 2020-01-01 RX ADMIN — MICONAZOLE NITRATE: 20 CREAM TOPICAL at 09:30

## 2020-01-01 RX ADMIN — METOPROLOL TARTRATE 50 MG: 50 TABLET, FILM COATED ORAL at 22:20

## 2020-01-01 RX ADMIN — SODIUM CHLORIDE 75 ML/HR: 900 INJECTION, SOLUTION INTRAVENOUS at 21:42

## 2020-01-01 RX ADMIN — LEVETIRACETAM 500 MG: 500 SOLUTION ORAL at 17:08

## 2020-01-01 RX ADMIN — METOPROLOL TARTRATE 50 MG: 50 TABLET, FILM COATED ORAL at 21:24

## 2020-01-01 RX ADMIN — ARFORMOTEROL TARTRATE: 15 SOLUTION RESPIRATORY (INHALATION) at 19:46

## 2020-01-01 RX ADMIN — METHYLPREDNISOLONE SODIUM SUCCINATE 40 MG: 40 INJECTION, POWDER, FOR SOLUTION INTRAMUSCULAR; INTRAVENOUS at 14:02

## 2020-01-01 RX ADMIN — HYDROCODONE BITARTRATE AND ACETAMINOPHEN 2 TABLET: 5; 325 TABLET ORAL at 17:31

## 2020-01-01 RX ADMIN — Medication: at 11:20

## 2020-01-01 RX ADMIN — METOPROLOL TARTRATE 50 MG: 50 TABLET, FILM COATED ORAL at 22:53

## 2020-01-01 RX ADMIN — INSULIN LISPRO 2 UNITS: 100 INJECTION, SOLUTION INTRAVENOUS; SUBCUTANEOUS at 12:35

## 2020-01-01 RX ADMIN — SODIUM CHLORIDE 10 ML: 9 INJECTION, SOLUTION INTRAMUSCULAR; INTRAVENOUS; SUBCUTANEOUS at 00:45

## 2020-01-01 RX ADMIN — SODIUM CHLORIDE 10 ML: 9 INJECTION, SOLUTION INTRAMUSCULAR; INTRAVENOUS; SUBCUTANEOUS at 21:44

## 2020-01-01 RX ADMIN — Medication: at 08:07

## 2020-01-01 RX ADMIN — SODIUM CHLORIDE 10 ML: 9 INJECTION, SOLUTION INTRAMUSCULAR; INTRAVENOUS; SUBCUTANEOUS at 14:01

## 2020-01-01 RX ADMIN — GABAPENTIN 300 MG: 300 CAPSULE ORAL at 21:44

## 2020-01-01 RX ADMIN — MORPHINE SULFATE 4 MG: 4 INJECTION, SOLUTION INTRAMUSCULAR; INTRAVENOUS at 22:47

## 2020-01-01 RX ADMIN — LEVALBUTEROL HYDROCHLORIDE 1.25 MG: 1.25 SOLUTION RESPIRATORY (INHALATION) at 08:00

## 2020-01-01 RX ADMIN — LEVETIRACETAM 500 MG: 5 INJECTION INTRAVENOUS at 09:03

## 2020-01-01 RX ADMIN — ALBUMIN (HUMAN) 12.5 G: 0.25 INJECTION, SOLUTION INTRAVENOUS at 14:58

## 2020-01-01 RX ADMIN — IPRATROPIUM BROMIDE AND ALBUTEROL SULFATE 3 ML: .5; 3 SOLUTION RESPIRATORY (INHALATION) at 11:04

## 2020-01-01 RX ADMIN — PROPOFOL 25 MCG/KG/MIN: 10 INJECTION, EMULSION INTRAVENOUS at 14:19

## 2020-01-01 RX ADMIN — Medication: at 09:11

## 2020-01-01 RX ADMIN — SODIUM CHLORIDE 10 ML: 9 INJECTION, SOLUTION INTRAMUSCULAR; INTRAVENOUS; SUBCUTANEOUS at 14:55

## 2020-01-01 RX ADMIN — FENTANYL CITRATE 50 MCG: 50 INJECTION, SOLUTION INTRAMUSCULAR; INTRAVENOUS at 04:19

## 2020-01-01 RX ADMIN — IPRATROPIUM BROMIDE AND ALBUTEROL SULFATE 3 ML: .5; 3 SOLUTION RESPIRATORY (INHALATION) at 23:30

## 2020-01-01 RX ADMIN — PROPOFOL 50 MCG/KG/MIN: 10 INJECTION, EMULSION INTRAVENOUS at 21:48

## 2020-01-01 RX ADMIN — PIPERACILLIN AND TAZOBACTAM 3.38 G: 3; .375 INJECTION, POWDER, LYOPHILIZED, FOR SOLUTION INTRAVENOUS at 03:43

## 2020-01-01 RX ADMIN — LEVETIRACETAM 500 MG: 500 TABLET ORAL at 17:58

## 2020-01-01 RX ADMIN — METHYLPREDNISOLONE SODIUM SUCCINATE 30 MG: 125 INJECTION, POWDER, FOR SOLUTION INTRAMUSCULAR; INTRAVENOUS at 13:14

## 2020-01-01 RX ADMIN — METHOCARBAMOL TABLETS 750 MG: 750 TABLET, COATED ORAL at 09:56

## 2020-01-01 RX ADMIN — SODIUM CHLORIDE 10 ML: 9 INJECTION, SOLUTION INTRAMUSCULAR; INTRAVENOUS; SUBCUTANEOUS at 21:15

## 2020-01-01 RX ADMIN — MORPHINE SULFATE 2 MG: 2 INJECTION, SOLUTION INTRAMUSCULAR; INTRAVENOUS at 23:39

## 2020-01-01 RX ADMIN — METHYLPREDNISOLONE SODIUM SUCCINATE 30 MG: 125 INJECTION, POWDER, FOR SOLUTION INTRAMUSCULAR; INTRAVENOUS at 09:54

## 2020-01-01 RX ADMIN — LORAZEPAM 1 MG: 2 INJECTION INTRAMUSCULAR; INTRAVENOUS at 16:19

## 2020-01-01 RX ADMIN — BACITRACIN 1 PACKET: 500 OINTMENT TOPICAL at 16:43

## 2020-01-01 RX ADMIN — DEXMEDETOMIDINE 0.2 MCG/KG/HR: 100 INJECTION, SOLUTION, CONCENTRATE INTRAVENOUS at 10:25

## 2020-01-01 RX ADMIN — LEVALBUTEROL HYDROCHLORIDE 1.25 MG: 1.25 SOLUTION RESPIRATORY (INHALATION) at 19:13

## 2020-01-01 RX ADMIN — AMLODIPINE BESYLATE 10 MG: 5 TABLET ORAL at 08:45

## 2020-01-01 RX ADMIN — MORPHINE SULFATE 4 MG: 4 INJECTION, SOLUTION INTRAMUSCULAR; INTRAVENOUS at 16:47

## 2020-01-01 RX ADMIN — LEVALBUTEROL HYDROCHLORIDE 1.25 MG: 1.25 SOLUTION RESPIRATORY (INHALATION) at 19:40

## 2020-01-01 RX ADMIN — PROPOFOL 25 MCG/KG/MIN: 10 INJECTION, EMULSION INTRAVENOUS at 18:25

## 2020-01-01 RX ADMIN — METHYLPREDNISOLONE SODIUM SUCCINATE 30 MG: 125 INJECTION, POWDER, FOR SOLUTION INTRAMUSCULAR; INTRAVENOUS at 14:06

## 2020-01-01 RX ADMIN — ONDANSETRON 4 MG: 2 INJECTION INTRAMUSCULAR; INTRAVENOUS at 12:11

## 2020-01-01 RX ADMIN — LORAZEPAM 1 MG: 1 TABLET ORAL at 17:58

## 2020-01-01 RX ADMIN — INSULIN LISPRO 2 UNITS: 100 INJECTION, SOLUTION INTRAVENOUS; SUBCUTANEOUS at 23:34

## 2020-01-01 RX ADMIN — BACITRACIN 1 PACKET: 500 OINTMENT TOPICAL at 17:53

## 2020-01-01 RX ADMIN — PROPOFOL 20 MCG/KG/MIN: 10 INJECTION, EMULSION INTRAVENOUS at 21:00

## 2020-01-01 RX ADMIN — METHYLPREDNISOLONE SODIUM SUCCINATE 30 MG: 125 INJECTION, POWDER, FOR SOLUTION INTRAMUSCULAR; INTRAVENOUS at 05:29

## 2020-01-01 RX ADMIN — GABAPENTIN 300 MG: 300 CAPSULE ORAL at 08:06

## 2020-01-01 RX ADMIN — METHOCARBAMOL TABLETS 750 MG: 750 TABLET, COATED ORAL at 17:24

## 2020-01-01 RX ADMIN — DULOXETINE HYDROCHLORIDE 40 MG: 20 CAPSULE, DELAYED RELEASE ORAL at 09:09

## 2020-01-01 RX ADMIN — POTASSIUM CHLORIDE 20 MEQ: 400 INJECTION, SOLUTION INTRAVENOUS at 10:58

## 2020-01-01 RX ADMIN — INSULIN LISPRO 2 UNITS: 100 INJECTION, SOLUTION INTRAVENOUS; SUBCUTANEOUS at 06:35

## 2020-01-01 RX ADMIN — DEXMEDETOMIDINE 0.7 MCG/KG/HR: 100 INJECTION, SOLUTION, CONCENTRATE INTRAVENOUS at 09:10

## 2020-01-01 RX ADMIN — PROPOFOL 40 MCG/KG/MIN: 10 INJECTION, EMULSION INTRAVENOUS at 13:53

## 2020-01-01 RX ADMIN — LORAZEPAM 4 MG: 2 INJECTION INTRAMUSCULAR at 08:43

## 2020-01-01 RX ADMIN — IPRATROPIUM BROMIDE AND ALBUTEROL SULFATE 3 ML: .5; 3 SOLUTION RESPIRATORY (INHALATION) at 04:00

## 2020-01-01 RX ADMIN — IPRATROPIUM BROMIDE 0.5 MG: 0.5 SOLUTION RESPIRATORY (INHALATION) at 14:11

## 2020-01-01 RX ADMIN — SODIUM CHLORIDE 10 ML: 9 INJECTION, SOLUTION INTRAMUSCULAR; INTRAVENOUS; SUBCUTANEOUS at 16:43

## 2020-01-01 RX ADMIN — Medication: at 21:06

## 2020-01-01 RX ADMIN — SODIUM CHLORIDE 10 ML: 9 INJECTION, SOLUTION INTRAMUSCULAR; INTRAVENOUS; SUBCUTANEOUS at 22:31

## 2020-01-01 RX ADMIN — DEXMEDETOMIDINE 1.5 MCG/KG/HR: 100 INJECTION, SOLUTION, CONCENTRATE INTRAVENOUS at 22:49

## 2020-01-01 RX ADMIN — METHIMAZOLE 10 MG: 5 TABLET ORAL at 12:51

## 2020-01-01 RX ADMIN — MICONAZOLE NITRATE: 20 CREAM TOPICAL at 20:49

## 2020-01-01 RX ADMIN — DEXMEDETOMIDINE 1.5 MCG/KG/HR: 100 INJECTION, SOLUTION, CONCENTRATE INTRAVENOUS at 06:31

## 2020-01-01 RX ADMIN — METOPROLOL TARTRATE 50 MG: 50 TABLET, FILM COATED ORAL at 08:06

## 2020-01-01 RX ADMIN — DEXMEDETOMIDINE 1.5 MCG/KG/HR: 100 INJECTION, SOLUTION, CONCENTRATE INTRAVENOUS at 20:15

## 2020-01-01 RX ADMIN — IPRATROPIUM BROMIDE 0.5 MG: 0.5 SOLUTION RESPIRATORY (INHALATION) at 08:31

## 2020-01-01 RX ADMIN — Medication: at 21:42

## 2020-01-01 RX ADMIN — PREDNISONE 40 MG: 20 TABLET ORAL at 09:11

## 2020-01-01 RX ADMIN — ENOXAPARIN SODIUM 40 MG: 40 INJECTION SUBCUTANEOUS at 11:38

## 2020-01-01 RX ADMIN — Medication 150 MCG/HR: at 03:47

## 2020-01-01 RX ADMIN — IPRATROPIUM BROMIDE 0.5 MG: 0.5 SOLUTION RESPIRATORY (INHALATION) at 14:13

## 2020-01-01 RX ADMIN — IPRATROPIUM BROMIDE 0.5 MG: 0.5 SOLUTION RESPIRATORY (INHALATION) at 19:45

## 2020-01-01 RX ADMIN — INSULIN LISPRO 2 UNITS: 100 INJECTION, SOLUTION INTRAVENOUS; SUBCUTANEOUS at 01:04

## 2020-01-01 RX ADMIN — PROPOFOL 50 MCG/KG/MIN: 10 INJECTION, EMULSION INTRAVENOUS at 05:57

## 2020-01-01 RX ADMIN — LORAZEPAM 1 MG: 2 INJECTION INTRAMUSCULAR; INTRAVENOUS at 16:51

## 2020-01-01 RX ADMIN — METHOCARBAMOL TABLETS 1500 MG: 750 TABLET, COATED ORAL at 17:23

## 2020-01-01 RX ADMIN — DEXMEDETOMIDINE 0.7 MCG/KG/HR: 100 INJECTION, SOLUTION, CONCENTRATE INTRAVENOUS at 05:54

## 2020-01-01 RX ADMIN — DEXMEDETOMIDINE 1.5 MCG/KG/HR: 100 INJECTION, SOLUTION, CONCENTRATE INTRAVENOUS at 01:43

## 2020-01-01 RX ADMIN — ONDANSETRON 4 MG: 2 INJECTION INTRAMUSCULAR; INTRAVENOUS at 23:39

## 2020-01-01 RX ADMIN — INSULIN LISPRO 2 UNITS: 100 INJECTION, SOLUTION INTRAVENOUS; SUBCUTANEOUS at 17:51

## 2020-01-01 RX ADMIN — Medication 150 MCG/HR: at 11:06

## 2020-01-01 RX ADMIN — OLANZAPINE 5 MG: 10 INJECTION, POWDER, FOR SOLUTION INTRAMUSCULAR at 17:36

## 2020-01-01 RX ADMIN — LORAZEPAM 1 MG: 2 INJECTION INTRAMUSCULAR; INTRAVENOUS at 18:36

## 2020-01-01 RX ADMIN — MORPHINE SULFATE 4 MG: 4 INJECTION, SOLUTION INTRAMUSCULAR; INTRAVENOUS at 18:36

## 2020-01-01 RX ADMIN — AMLODIPINE BESYLATE 10 MG: 5 TABLET ORAL at 09:55

## 2020-01-01 RX ADMIN — PROCHLORPERAZINE EDISYLATE 10 MG: 5 INJECTION INTRAMUSCULAR; INTRAVENOUS at 17:24

## 2020-01-01 RX ADMIN — HYDROCODONE BITARTRATE AND ACETAMINOPHEN 2 TABLET: 5; 325 TABLET ORAL at 09:08

## 2020-01-01 RX ADMIN — LEVALBUTEROL HYDROCHLORIDE 1.25 MG: 1.25 SOLUTION RESPIRATORY (INHALATION) at 13:50

## 2020-01-01 RX ADMIN — LORAZEPAM 4 MG: 2 INJECTION INTRAMUSCULAR at 08:52

## 2020-01-01 RX ADMIN — HYDROCODONE BITARTRATE AND ACETAMINOPHEN 1 TABLET: 5; 325 TABLET ORAL at 17:28

## 2020-01-01 RX ADMIN — ONDANSETRON 4 MG: 2 INJECTION INTRAMUSCULAR; INTRAVENOUS at 15:35

## 2020-01-01 RX ADMIN — PROPOFOL 40 MCG/KG/MIN: 10 INJECTION, EMULSION INTRAVENOUS at 16:02

## 2020-01-01 RX ADMIN — Medication 150 MCG/HR: at 21:35

## 2020-01-01 RX ADMIN — MORPHINE SULFATE 2 MG: 2 INJECTION, SOLUTION INTRAMUSCULAR; INTRAVENOUS at 14:01

## 2020-01-01 RX ADMIN — METOPROLOL TARTRATE 50 MG: 50 TABLET, FILM COATED ORAL at 17:31

## 2020-01-01 RX ADMIN — PROPOFOL 30 MCG/KG/MIN: 10 INJECTION, EMULSION INTRAVENOUS at 10:51

## 2020-01-01 RX ADMIN — LEVETIRACETAM 500 MG: 500 SOLUTION ORAL at 08:55

## 2020-01-01 RX ADMIN — IPRATROPIUM BROMIDE AND ALBUTEROL SULFATE 3 ML: .5; 3 SOLUTION RESPIRATORY (INHALATION) at 03:50

## 2020-01-01 RX ADMIN — BACITRACIN 1 PACKET: 500 OINTMENT TOPICAL at 17:58

## 2020-01-01 RX ADMIN — DEXMEDETOMIDINE 1.5 MCG/KG/HR: 100 INJECTION, SOLUTION, CONCENTRATE INTRAVENOUS at 14:58

## 2020-01-01 RX ADMIN — ALPRAZOLAM 0.5 MG: 0.5 TABLET ORAL at 09:09

## 2020-01-01 RX ADMIN — SODIUM CHLORIDE 10 ML: 9 INJECTION, SOLUTION INTRAMUSCULAR; INTRAVENOUS; SUBCUTANEOUS at 21:55

## 2020-01-01 RX ADMIN — ENOXAPARIN SODIUM 40 MG: 40 INJECTION SUBCUTANEOUS at 11:11

## 2020-01-01 RX ADMIN — PROCHLORPERAZINE EDISYLATE 10 MG: 5 INJECTION INTRAMUSCULAR; INTRAVENOUS at 06:26

## 2020-01-01 RX ADMIN — METOPROLOL TARTRATE 50 MG: 50 TABLET, FILM COATED ORAL at 17:24

## 2020-01-01 RX ADMIN — POTASSIUM CHLORIDE 20 MEQ: 400 INJECTION, SOLUTION INTRAVENOUS at 09:56

## 2020-01-01 RX ADMIN — PROPOFOL 35 MCG/KG/MIN: 10 INJECTION, EMULSION INTRAVENOUS at 02:35

## 2020-01-01 RX ADMIN — METOPROLOL TARTRATE 50 MG: 50 TABLET, FILM COATED ORAL at 09:29

## 2020-01-01 RX ADMIN — INSULIN LISPRO 2 UNITS: 100 INJECTION, SOLUTION INTRAVENOUS; SUBCUTANEOUS at 05:38

## 2020-01-01 RX ADMIN — GABAPENTIN 300 MG: 300 CAPSULE ORAL at 09:09

## 2020-01-01 RX ADMIN — Medication 30 ML: at 12:35

## 2020-01-01 RX ADMIN — LEVALBUTEROL HYDROCHLORIDE 1.25 MG: 1.25 SOLUTION RESPIRATORY (INHALATION) at 02:24

## 2020-01-01 RX ADMIN — GABAPENTIN 300 MG: 300 CAPSULE ORAL at 09:11

## 2020-01-01 RX ADMIN — HYDROCODONE BITARTRATE AND ACETAMINOPHEN 1 TABLET: 5; 325 TABLET ORAL at 18:08

## 2020-01-01 RX ADMIN — CHLORHEXIDINE GLUCONATE 15 ML: 0.12 RINSE ORAL at 08:55

## 2020-01-01 RX ADMIN — GABAPENTIN 300 MG: 300 CAPSULE ORAL at 17:48

## 2020-01-01 RX ADMIN — PIPERACILLIN AND TAZOBACTAM 3.38 G: 3; .375 INJECTION, POWDER, LYOPHILIZED, FOR SOLUTION INTRAVENOUS at 18:00

## 2020-01-01 RX ADMIN — METOPROLOL TARTRATE 50 MG: 50 TABLET, FILM COATED ORAL at 21:14

## 2020-01-01 RX ADMIN — SODIUM CHLORIDE 50 ML/HR: 900 INJECTION, SOLUTION INTRAVENOUS at 14:22

## 2020-01-01 RX ADMIN — INSULIN LISPRO 2 UNITS: 100 INJECTION, SOLUTION INTRAVENOUS; SUBCUTANEOUS at 11:46

## 2020-01-01 RX ADMIN — BACITRACIN 1 PACKET: 500 OINTMENT TOPICAL at 18:36

## 2020-01-01 RX ADMIN — INSULIN LISPRO 2 UNITS: 100 INJECTION, SOLUTION INTRAVENOUS; SUBCUTANEOUS at 00:00

## 2020-01-01 RX ADMIN — HYDROCODONE BITARTRATE AND ACETAMINOPHEN 1 TABLET: 5; 325 TABLET ORAL at 00:44

## 2020-01-01 RX ADMIN — DEXMEDETOMIDINE 1.5 MCG/KG/HR: 100 INJECTION, SOLUTION, CONCENTRATE INTRAVENOUS at 05:13

## 2020-01-01 RX ADMIN — HYDROCODONE BITARTRATE AND ACETAMINOPHEN 1 TABLET: 5; 325 TABLET ORAL at 10:54

## 2020-01-01 RX ADMIN — SODIUM CHLORIDE 75 ML/HR: 900 INJECTION, SOLUTION INTRAVENOUS at 15:51

## 2020-01-01 RX ADMIN — IPRATROPIUM BROMIDE 0.5 MG: 0.5 SOLUTION RESPIRATORY (INHALATION) at 10:02

## 2020-01-01 RX ADMIN — METHOCARBAMOL TABLETS 1500 MG: 750 TABLET, COATED ORAL at 09:11

## 2020-01-01 RX ADMIN — Medication 30 ML: at 08:14

## 2020-01-01 RX ADMIN — Medication: at 17:13

## 2020-01-01 RX ADMIN — AMOXICILLIN AND CLAVULANATE POTASSIUM 1 TABLET: 875; 125 TABLET, FILM COATED ORAL at 21:38

## 2020-01-01 RX ADMIN — Medication: at 17:02

## 2020-01-01 RX ADMIN — MICONAZOLE NITRATE: 20 CREAM TOPICAL at 20:22

## 2020-01-01 RX ADMIN — IPRATROPIUM BROMIDE AND ALBUTEROL SULFATE 3 ML: .5; 3 SOLUTION RESPIRATORY (INHALATION) at 11:13

## 2020-01-01 RX ADMIN — LEVETIRACETAM 500 MG: 500 TABLET ORAL at 09:11

## 2020-01-01 RX ADMIN — GABAPENTIN 300 MG: 300 CAPSULE ORAL at 08:44

## 2020-01-01 RX ADMIN — METOPROLOL TARTRATE 50 MG: 50 TABLET, FILM COATED ORAL at 20:43

## 2020-01-01 RX ADMIN — IPRATROPIUM BROMIDE AND ALBUTEROL SULFATE 3 ML: .5; 3 SOLUTION RESPIRATORY (INHALATION) at 03:17

## 2020-01-01 RX ADMIN — METOPROLOL TARTRATE 50 MG: 50 TABLET, FILM COATED ORAL at 17:23

## 2020-01-01 RX ADMIN — MICONAZOLE NITRATE: 20 CREAM TOPICAL at 08:56

## 2020-01-01 RX ADMIN — DEXMEDETOMIDINE 1.5 MCG/KG/HR: 100 INJECTION, SOLUTION, CONCENTRATE INTRAVENOUS at 09:26

## 2020-01-01 RX ADMIN — METHOCARBAMOL TABLETS 750 MG: 750 TABLET, COATED ORAL at 17:31

## 2020-01-01 RX ADMIN — SODIUM CHLORIDE 10 ML: 9 INJECTION, SOLUTION INTRAMUSCULAR; INTRAVENOUS; SUBCUTANEOUS at 05:35

## 2020-01-01 RX ADMIN — FAMOTIDINE 20 MG: 10 INJECTION INTRAVENOUS at 11:39

## 2020-01-01 RX ADMIN — Medication 30 ML: at 08:57

## 2020-01-01 RX ADMIN — IPRATROPIUM BROMIDE AND ALBUTEROL SULFATE 3 ML: .5; 3 SOLUTION RESPIRATORY (INHALATION) at 08:29

## 2020-01-01 RX ADMIN — DEXMEDETOMIDINE 0.7 MCG/KG/HR: 100 INJECTION, SOLUTION, CONCENTRATE INTRAVENOUS at 18:53

## 2020-01-01 RX ADMIN — ENOXAPARIN SODIUM 40 MG: 40 INJECTION SUBCUTANEOUS at 14:01

## 2020-01-01 RX ADMIN — IPRATROPIUM BROMIDE AND ALBUTEROL SULFATE 3 ML: .5; 3 SOLUTION RESPIRATORY (INHALATION) at 03:56

## 2020-01-01 RX ADMIN — GABAPENTIN 300 MG: 300 CAPSULE ORAL at 22:51

## 2020-01-01 RX ADMIN — CHLORHEXIDINE GLUCONATE 15 ML: 0.12 RINSE ORAL at 08:01

## 2020-01-01 RX ADMIN — DULOXETINE HYDROCHLORIDE 40 MG: 20 CAPSULE, DELAYED RELEASE ORAL at 08:06

## 2020-01-01 RX ADMIN — PROPOFOL 50 MCG/KG/MIN: 10 INJECTION, EMULSION INTRAVENOUS at 11:32

## 2020-01-01 RX ADMIN — GABAPENTIN 300 MG: 300 CAPSULE ORAL at 17:58

## 2020-01-01 RX ADMIN — INSULIN LISPRO 3 UNITS: 100 INJECTION, SOLUTION INTRAVENOUS; SUBCUTANEOUS at 18:13

## 2020-01-01 RX ADMIN — SODIUM CHLORIDE 10 ML: 9 INJECTION, SOLUTION INTRAMUSCULAR; INTRAVENOUS; SUBCUTANEOUS at 06:02

## 2020-01-01 RX ADMIN — HYDROCODONE BITARTRATE AND ACETAMINOPHEN 1 TABLET: 5; 325 TABLET ORAL at 16:53

## 2020-01-01 RX ADMIN — HYDROCODONE BITARTRATE AND ACETAMINOPHEN 1 TABLET: 5; 325 TABLET ORAL at 18:57

## 2020-01-01 RX ADMIN — IPRATROPIUM BROMIDE 0.5 MG: 0.5 SOLUTION RESPIRATORY (INHALATION) at 01:11

## 2020-01-01 RX ADMIN — IPRATROPIUM BROMIDE AND ALBUTEROL SULFATE 3 ML: .5; 3 SOLUTION RESPIRATORY (INHALATION) at 07:57

## 2020-01-01 RX ADMIN — GABAPENTIN 300 MG: 300 CAPSULE ORAL at 21:05

## 2020-01-01 RX ADMIN — METHOCARBAMOL TABLETS 750 MG: 750 TABLET, COATED ORAL at 17:28

## 2020-01-01 RX ADMIN — LEVETIRACETAM 500 MG: 500 SOLUTION ORAL at 08:13

## 2020-01-01 RX ADMIN — HYDROCODONE BITARTRATE AND ACETAMINOPHEN 1 TABLET: 5; 325 TABLET ORAL at 06:55

## 2020-01-01 RX ADMIN — DEXMEDETOMIDINE 1 MCG/KG/HR: 100 INJECTION, SOLUTION, CONCENTRATE INTRAVENOUS at 02:14

## 2020-01-01 RX ADMIN — MIDAZOLAM HYDROCHLORIDE 1 MG: 1 INJECTION, SOLUTION INTRAMUSCULAR; INTRAVENOUS at 12:25

## 2020-01-01 RX ADMIN — CHLORHEXIDINE GLUCONATE 15 ML: 0.12 RINSE ORAL at 20:17

## 2020-01-01 RX ADMIN — IPRATROPIUM BROMIDE 0.5 MG: 0.5 SOLUTION RESPIRATORY (INHALATION) at 02:47

## 2020-01-01 RX ADMIN — IPRATROPIUM BROMIDE AND ALBUTEROL SULFATE 3 ML: .5; 3 SOLUTION RESPIRATORY (INHALATION) at 15:37

## 2020-01-01 RX ADMIN — ARFORMOTEROL TARTRATE: 15 SOLUTION RESPIRATORY (INHALATION) at 06:10

## 2020-01-01 RX ADMIN — Medication 1 AMPULE: at 21:12

## 2020-01-01 RX ADMIN — SODIUM CHLORIDE 10 ML: 9 INJECTION, SOLUTION INTRAMUSCULAR; INTRAVENOUS; SUBCUTANEOUS at 06:13

## 2020-01-01 RX ADMIN — MORPHINE SULFATE 2 MG: 2 INJECTION, SOLUTION INTRAMUSCULAR; INTRAVENOUS at 01:23

## 2020-01-01 RX ADMIN — LEVETIRACETAM 500 MG: 5 INJECTION INTRAVENOUS at 20:50

## 2020-01-01 RX ADMIN — ONDANSETRON 4 MG: 2 INJECTION INTRAMUSCULAR; INTRAVENOUS at 00:48

## 2020-01-01 RX ADMIN — PIPERACILLIN AND TAZOBACTAM 3.38 G: 3; .375 INJECTION, POWDER, LYOPHILIZED, FOR SOLUTION INTRAVENOUS at 12:35

## 2020-01-01 RX ADMIN — PROPOFOL 25 MCG/KG/MIN: 10 INJECTION, EMULSION INTRAVENOUS at 05:13

## 2020-01-01 RX ADMIN — DEXMEDETOMIDINE 1.5 MCG/KG/HR: 100 INJECTION, SOLUTION, CONCENTRATE INTRAVENOUS at 11:15

## 2020-01-01 RX ADMIN — LORAZEPAM 2 MG: 1 TABLET ORAL at 07:03

## 2020-01-01 RX ADMIN — IPRATROPIUM BROMIDE AND ALBUTEROL SULFATE 3 ML: .5; 3 SOLUTION RESPIRATORY (INHALATION) at 11:57

## 2020-01-01 RX ADMIN — Medication: at 17:11

## 2020-01-01 RX ADMIN — MICONAZOLE NITRATE: 20 CREAM TOPICAL at 21:54

## 2020-01-01 RX ADMIN — ARFORMOTEROL TARTRATE: 15 SOLUTION RESPIRATORY (INHALATION) at 19:45

## 2020-01-01 RX ADMIN — CHLORHEXIDINE GLUCONATE 15 ML: 0.12 RINSE ORAL at 09:27

## 2020-01-01 RX ADMIN — PIPERACILLIN AND TAZOBACTAM 3.38 G: 3; .375 INJECTION, POWDER, LYOPHILIZED, FOR SOLUTION INTRAVENOUS at 10:05

## 2020-01-01 RX ADMIN — IOPAMIDOL 100 ML: 755 INJECTION, SOLUTION INTRAVENOUS at 01:51

## 2020-01-01 RX ADMIN — METOPROLOL TARTRATE 50 MG: 50 TABLET, FILM COATED ORAL at 09:10

## 2020-01-01 RX ADMIN — MORPHINE SULFATE 2 MG: 2 INJECTION, SOLUTION INTRAMUSCULAR; INTRAVENOUS at 00:47

## 2020-01-01 RX ADMIN — SODIUM CHLORIDE 25 ML/HR: 900 INJECTION, SOLUTION INTRAVENOUS at 06:35

## 2020-01-01 RX ADMIN — INSULIN LISPRO 3 UNITS: 100 INJECTION, SOLUTION INTRAVENOUS; SUBCUTANEOUS at 05:26

## 2020-01-01 RX ADMIN — DEXMEDETOMIDINE 1.5 MCG/KG/HR: 100 INJECTION, SOLUTION, CONCENTRATE INTRAVENOUS at 23:48

## 2020-01-01 RX ADMIN — ALPRAZOLAM 0.5 MG: 0.5 TABLET ORAL at 02:46

## 2020-01-01 RX ADMIN — LORAZEPAM 1 MG: 2 INJECTION INTRAMUSCULAR; INTRAVENOUS at 23:24

## 2020-01-01 RX ADMIN — VANCOMYCIN HYDROCHLORIDE 1000 MG: 1 INJECTION, POWDER, LYOPHILIZED, FOR SOLUTION INTRAVENOUS at 11:53

## 2020-01-01 RX ADMIN — METHIMAZOLE 10 MG: 5 TABLET ORAL at 08:06

## 2020-01-01 RX ADMIN — METOPROLOL TARTRATE 50 MG: 50 TABLET, FILM COATED ORAL at 08:45

## 2020-01-01 RX ADMIN — SODIUM CHLORIDE 30 ML: 9 INJECTION, SOLUTION INTRAMUSCULAR; INTRAVENOUS; SUBCUTANEOUS at 21:31

## 2020-01-01 RX ADMIN — DULOXETINE HYDROCHLORIDE 40 MG: 20 CAPSULE, DELAYED RELEASE ORAL at 09:55

## 2020-01-01 RX ADMIN — PROPOFOL 50 MCG/KG/MIN: 10 INJECTION, EMULSION INTRAVENOUS at 17:07

## 2020-01-01 RX ADMIN — LEVETIRACETAM 500 MG: 5 INJECTION INTRAVENOUS at 12:48

## 2020-01-01 RX ADMIN — GABAPENTIN 300 MG: 300 CAPSULE ORAL at 17:32

## 2020-01-01 RX ADMIN — SODIUM CHLORIDE 10 ML: 9 INJECTION, SOLUTION INTRAMUSCULAR; INTRAVENOUS; SUBCUTANEOUS at 05:24

## 2020-01-01 RX ADMIN — METHYLPREDNISOLONE SODIUM SUCCINATE 30 MG: 125 INJECTION, POWDER, FOR SOLUTION INTRAMUSCULAR; INTRAVENOUS at 05:53

## 2020-01-01 RX ADMIN — SODIUM CHLORIDE 25 ML/HR: 900 INJECTION, SOLUTION INTRAVENOUS at 01:46

## 2020-01-01 RX ADMIN — DEXMEDETOMIDINE 1.5 MCG/KG/HR: 100 INJECTION, SOLUTION, CONCENTRATE INTRAVENOUS at 14:55

## 2020-01-01 RX ADMIN — PIPERACILLIN AND TAZOBACTAM 3.38 G: 3; .375 INJECTION, POWDER, LYOPHILIZED, FOR SOLUTION INTRAVENOUS at 12:47

## 2020-01-01 RX ADMIN — ONDANSETRON 4 MG: 2 INJECTION INTRAMUSCULAR; INTRAVENOUS at 03:33

## 2020-01-01 RX ADMIN — MORPHINE SULFATE 2 MG: 2 INJECTION, SOLUTION INTRAMUSCULAR; INTRAVENOUS at 22:57

## 2020-01-01 RX ADMIN — METOPROLOL TARTRATE 50 MG: 50 TABLET, FILM COATED ORAL at 09:09

## 2020-01-01 RX ADMIN — Medication 1 AMPULE: at 21:22

## 2020-01-01 RX ADMIN — PIPERACILLIN AND TAZOBACTAM 3.38 G: 3; .375 INJECTION, POWDER, LYOPHILIZED, FOR SOLUTION INTRAVENOUS at 11:28

## 2020-01-01 RX ADMIN — BACITRACIN 1 PACKET: 500 OINTMENT TOPICAL at 17:09

## 2020-01-01 RX ADMIN — ONDANSETRON 4 MG: 2 INJECTION INTRAMUSCULAR; INTRAVENOUS at 18:30

## 2020-01-01 RX ADMIN — METHIMAZOLE 10 MG: 5 TABLET ORAL at 08:55

## 2020-01-01 RX ADMIN — ALPRAZOLAM 0.5 MG: 0.5 TABLET ORAL at 09:18

## 2020-01-01 RX ADMIN — MORPHINE SULFATE 4 MG: 4 INJECTION, SOLUTION INTRAMUSCULAR; INTRAVENOUS at 20:26

## 2020-01-01 RX ADMIN — INSULIN LISPRO 2 UNITS: 100 INJECTION, SOLUTION INTRAVENOUS; SUBCUTANEOUS at 05:37

## 2020-01-01 RX ADMIN — LEVALBUTEROL HYDROCHLORIDE 1.25 MG: 1.25 SOLUTION RESPIRATORY (INHALATION) at 10:02

## 2020-01-01 RX ADMIN — LEVETIRACETAM 500 MG: 500 SOLUTION ORAL at 17:09

## 2020-01-01 RX ADMIN — SODIUM CHLORIDE 10 ML: 9 INJECTION, SOLUTION INTRAMUSCULAR; INTRAVENOUS; SUBCUTANEOUS at 08:58

## 2020-01-01 RX ADMIN — IPRATROPIUM BROMIDE AND ALBUTEROL SULFATE 3 ML: .5; 3 SOLUTION RESPIRATORY (INHALATION) at 15:48

## 2020-01-01 RX ADMIN — CHLORHEXIDINE GLUCONATE 15 ML: 0.12 RINSE ORAL at 09:56

## 2020-01-01 RX ADMIN — CHLORHEXIDINE GLUCONATE 15 ML: 0.12 RINSE ORAL at 08:13

## 2020-01-01 RX ADMIN — ONDANSETRON 4 MG: 2 INJECTION INTRAMUSCULAR; INTRAVENOUS at 04:19

## 2020-01-01 RX ADMIN — IPRATROPIUM BROMIDE 0.5 MG: 0.5 SOLUTION RESPIRATORY (INHALATION) at 01:16

## 2020-01-01 RX ADMIN — FAMOTIDINE 20 MG: 10 INJECTION INTRAVENOUS at 22:19

## 2020-01-01 RX ADMIN — ONDANSETRON 4 MG: 2 INJECTION INTRAMUSCULAR; INTRAVENOUS at 23:29

## 2020-01-01 RX ADMIN — Medication: at 22:34

## 2020-01-01 RX ADMIN — LORAZEPAM 1 MG: 2 INJECTION INTRAMUSCULAR; INTRAVENOUS at 18:11

## 2020-01-01 RX ADMIN — LEVETIRACETAM 500 MG: 500 SOLUTION ORAL at 09:28

## 2020-01-01 RX ADMIN — PHENYLEPHRINE HYDROCHLORIDE 10 MCG/MIN: 10 INJECTION INTRAVENOUS at 15:42

## 2020-01-01 RX ADMIN — ENOXAPARIN SODIUM 40 MG: 40 INJECTION SUBCUTANEOUS at 12:47

## 2020-01-01 RX ADMIN — PROCHLORPERAZINE EDISYLATE 10 MG: 5 INJECTION INTRAMUSCULAR; INTRAVENOUS at 10:54

## 2020-01-01 RX ADMIN — GABAPENTIN 300 MG: 300 CAPSULE ORAL at 21:38

## 2020-01-01 RX ADMIN — Medication: at 09:13

## 2020-01-01 RX ADMIN — METHIMAZOLE 10 MG: 5 TABLET ORAL at 08:57

## 2020-01-01 RX ADMIN — PROPOFOL 20 MCG/KG/MIN: 10 INJECTION, EMULSION INTRAVENOUS at 08:55

## 2020-01-01 RX ADMIN — ALBUMIN (HUMAN) 12.5 G: 0.25 INJECTION, SOLUTION INTRAVENOUS at 02:33

## 2020-01-01 RX ADMIN — SODIUM CHLORIDE 25 ML/HR: 900 INJECTION, SOLUTION INTRAVENOUS at 08:21

## 2020-01-01 RX ADMIN — INSULIN LISPRO 2 UNITS: 100 INJECTION, SOLUTION INTRAVENOUS; SUBCUTANEOUS at 06:05

## 2020-01-01 RX ADMIN — AMLODIPINE BESYLATE 10 MG: 5 TABLET ORAL at 09:37

## 2020-01-01 RX ADMIN — LEVETIRACETAM 500 MG: 500 SOLUTION ORAL at 17:43

## 2020-01-01 RX ADMIN — ONDANSETRON 4 MG: 2 INJECTION INTRAMUSCULAR; INTRAVENOUS at 06:22

## 2020-01-01 RX ADMIN — FAMOTIDINE 20 MG: 10 INJECTION INTRAVENOUS at 20:49

## 2020-01-01 RX ADMIN — METOPROLOL TARTRATE 50 MG: 50 TABLET, FILM COATED ORAL at 15:57

## 2020-01-01 RX ADMIN — Medication 1 AMPULE: at 11:41

## 2020-01-01 RX ADMIN — PIPERACILLIN AND TAZOBACTAM 3.38 G: 3; .375 INJECTION, POWDER, LYOPHILIZED, FOR SOLUTION INTRAVENOUS at 02:33

## 2020-01-01 RX ADMIN — Medication 150 MCG/HR: at 16:19

## 2020-01-01 RX ADMIN — INSULIN LISPRO 2 UNITS: 100 INJECTION, SOLUTION INTRAVENOUS; SUBCUTANEOUS at 23:53

## 2020-01-01 RX ADMIN — LEVALBUTEROL HYDROCHLORIDE 1.25 MG: 1.25 SOLUTION RESPIRATORY (INHALATION) at 14:11

## 2020-01-01 RX ADMIN — FAMOTIDINE 20 MG: 10 INJECTION INTRAVENOUS at 20:44

## 2020-01-01 RX ADMIN — IPRATROPIUM BROMIDE AND ALBUTEROL SULFATE 3 ML: .5; 3 SOLUTION RESPIRATORY (INHALATION) at 03:33

## 2020-01-01 RX ADMIN — IPRATROPIUM BROMIDE AND ALBUTEROL SULFATE 3 ML: .5; 3 SOLUTION RESPIRATORY (INHALATION) at 23:52

## 2020-01-01 RX ADMIN — MAGNESIUM SULFATE HEPTAHYDRATE 1 G: 1 INJECTION, SOLUTION INTRAVENOUS at 12:47

## 2020-01-01 RX ADMIN — LABETALOL HYDROCHLORIDE 5 MG: 5 INJECTION INTRAVENOUS at 02:01

## 2020-01-01 RX ADMIN — ENOXAPARIN SODIUM 40 MG: 40 INJECTION SUBCUTANEOUS at 10:55

## 2020-01-01 RX ADMIN — METHOCARBAMOL TABLETS 750 MG: 750 TABLET, COATED ORAL at 17:58

## 2020-01-01 RX ADMIN — Medication 50 MCG/HR: at 09:57

## 2020-01-01 RX ADMIN — IPRATROPIUM BROMIDE 0.5 MG: 0.5 SOLUTION RESPIRATORY (INHALATION) at 09:02

## 2020-01-01 RX ADMIN — GABAPENTIN 300 MG: 300 CAPSULE ORAL at 15:57

## 2020-01-01 RX ADMIN — IPRATROPIUM BROMIDE AND ALBUTEROL SULFATE 3 ML: .5; 3 SOLUTION RESPIRATORY (INHALATION) at 23:56

## 2020-10-26 NOTE — ED PROVIDER NOTES
EMERGENCY DEPARTMENT HISTORY AND PHYSICAL EXAM 
 
 
Date: 10/26/2020 Patient Name: Celio Durant History of Presenting Illness Chief Complaint Patient presents with  Fall History Provided By: Patient HPI: Celio Durant, 81 y.o. female with PMHx significant for COPD on 2 L nasal cannula, hypertension, who presents with a chief complaint of wrist pain and ankle pain after mechanical fall. Patient reports that she slipped and fell on hardwood floor and landed on her right side. Is having pain in her wrist and her ankle. Does state that she hit her head but did not lose consciousness. She denies any preceding chest pain, palpitations. No increased shortness of breath, abdominal pain, nausea, vomiting. No one-sided numbness or weakness. PCP: Brandon Alfaro MD 
 
There are no other complaints, changes, or physical findings at this time. Current Outpatient Medications Medication Sig Dispense Refill  
 HYDROcodone-acetaminophen (Norco) 5-325 mg per tablet Take 1 Tab by mouth every four (4) hours as needed for Pain for up to 3 days. Max Daily Amount: 6 Tabs. 10 Tab 0  ibuprofen (MOTRIN) 800 mg tablet Take 1 Tab by mouth every eight (8) hours as needed for Pain for up to 7 days. 20 Tab 0  predniSONE (DELTASONE) 20 mg tablet Take 2 tablets (40mg) daily for 3 days, then 20mg (1 tab) daily for 3 days, then 10mg (1/2 tab) daily for 2 days 10 Tab 0  
 albuterol-ipratropium (DUO-NEB) 2.5 mg-0.5 mg/3 ml nebu 3 mL by Nebulization route four (4) times daily as needed.  methocarbamol (ROBAXIN) 750 mg tablet Take 750-1,500 mg by mouth two (2) times a day.  amLODIPine (NORVASC) 10 mg tablet Take 10 mg by mouth daily.  omeprazole (PRILOSEC) 40 mg capsule Take 40 mg by mouth daily.  ondansetron (ZOFRAN ODT) 4 mg disintegrating tablet Take 1 Tab by mouth every eight (8) hours as needed. (Patient taking differently: Take 4 mg by mouth three (3) times daily. ) 15 Tab 0  
  albuterol (PROVENTIL VENTOLIN) 2.5 mg /3 mL (0.083 %) nebulizer solution 3 mL by Nebulization route every four (4) hours as needed. 50 Each 2  
 fluticasone-salmeterol (ADVAIR) 250-50 mcg/dose diskus inhaler Take 1 Puff by inhalation two (2) times a day. 1 Inhaler 1  
 tiotropium (SPIRIVA) 18 mcg inhalation capsule Take 1 Cap by inhalation daily. Indications: BRONCHIAL ASTHMA 30 Cap 1  
 levETIRAcetam (KEPPRA) 500 mg tablet Take 1 Tab by mouth two (2) times a day. 60 Tab 1  
 metoprolol (LOPRESSOR) 50 mg tablet Take 1 Tab by mouth two (2) times a day. 60 Tab 1  DULoxetine (CYMBALTA) 20 mg capsule Take 40 mg by mouth daily. Past History Past Medical History: 
Past Medical History:  
Diagnosis Date  Arthritis  Asthma  COPD   
 2 L NC  
 Hypertension  Ischemic colitis ([facility] [address] 2012  Migraines  Neurological disorder   
 migraines  Psychiatric disorder   
 depression  Seizures ([facility] [address] Last seizure 4 years ago  Vaginal candidiasis 2012 Past Surgical History: 
Past Surgical History:  
Procedure Laterality Date  HX APPENDECTOMY  HX  SECTION    
 HX CHOLECYSTECTOMY  HX HYSTERECTOMY  HX ORTHOPAEDIC    
 lumbar disc sx  HX ORTHOPAEDIC    
 left knee sx  HX OTHER SURGICAL    
 colonoscopy-recent colitis  CT COLONOSCOPY W/BIOPSY SINGLE/MULTIPLE  3/1/2012 Family History: 
Family History Problem Relation Age of Onset  Stroke Mother  Heart Disease Mother  Lung Disease Father   
     copd Social History: 
Social History Tobacco Use  Smoking status: Current Every Day Smoker Packs/day: 0.25 Years: 30.00 Pack years: 7.50  Smokeless tobacco: Never Used Substance Use Topics  Alcohol use: No  
 Drug use: No  
  Types: Prescription, OTC Allergies: Allergies Allergen Reactions  Codeine Other (comments)  Erythromycin Nausea Only  Other Medication Other (comments) No sedative or narcotic per son due to hx addiction Review of Systems Review of Systems Constitutional: Negative for chills and fever. HENT: Negative for congestion, rhinorrhea and sore throat. Respiratory: Negative for cough and shortness of breath. Cardiovascular: Negative for chest pain. Gastrointestinal: Negative for abdominal pain, nausea and vomiting. Genitourinary: Negative for dysuria and urgency. Musculoskeletal: Positive for arthralgias. Skin: Negative for rash. Neurological: Negative for dizziness, light-headedness and headaches. All other systems reviewed and are negative. Physical Exam  
Physical Exam 
Vitals signs and nursing note reviewed. Constitutional:   
   General: She is not in acute distress. Appearance: She is well-developed. HENT:  
   Head: Normocephalic and atraumatic. Eyes:  
   Conjunctiva/sclera: Conjunctivae normal.  
   Pupils: Pupils are equal, round, and reactive to light. Neck: Musculoskeletal: Normal range of motion. Cardiovascular:  
   Rate and Rhythm: Normal rate and regular rhythm. Pulmonary:  
   Effort: Pulmonary effort is normal. No respiratory distress. Breath sounds: Normal breath sounds. No stridor. Abdominal:  
   General: There is no distension. Palpations: Abdomen is soft. Tenderness: There is no abdominal tenderness. Musculoskeletal: Normal range of motion. Comments: Tenderness and swelling to the right wrist, 2+ radial pulse Tenderness to the right ankle, no obvious deformity Skin: 
   General: Skin is warm and dry. Neurological:  
   Mental Status: She is alert and oriented to person, place, and time. Diagnostic Study Results Labs - No results found for this or any previous visit (from the past 12 hour(s)). Radiologic Studies -  
XR WRIST RT AP/LAT/OBL MIN 3V Final Result IMPRESSION:   
  
Probable acute superimposed on healed right distal radial fractures with associated soft tissue swelling XR ANKLE RT MIN 3 V Final Result IMPRESSION:   
  
Nonspecific right lateral ankle sprain. Incidental small plantar spur. Interval fracture of one of the 2 first-second metatarsal-tarsal fusion screws. Xr Wrist Rt Ap/lat/obl Min 3v Result Date: 10/26/2020 IMPRESSION: Probable acute superimposed on healed right distal radial fractures with associated soft tissue swelling Xr Ankle Rt Min 3 V Result Date: 10/26/2020 IMPRESSION: Nonspecific right lateral ankle sprain. Incidental small plantar spur. Interval fracture of one of the 2 first-second metatarsal-tarsal fusion screws. Medical Decision Making I am the first provider for this patient. I reviewed the vital signs, available nursing notes, past medical history, past surgical history, family history and social history. Vital Signs-Reviewed the patient's vital signs. Patient Vitals for the past 12 hrs: 
 Temp Pulse Resp BP SpO2  
10/26/20 1725 98.6 °F (37 °C) (!) 108 19 (!) 144/88 95 % 10/26/20 1428 98.6 °F (37 °C) (!) 108 20 (!) 147/91 96 % Pulse Oximetry Analysis - 95% on 2L Records Reviewed: Nursing Notes and Old Medical Records Provider Notes (Medical Decision Making):  
Ddx: sprain, strain, fx, contusion Plan for xr of right wrist and ankle ED Course:  
Initial assessment performed. The patients presenting problems have been discussed, and they are in agreement with the care plan formulated and outlined with them. I have encouraged them to ask questions as they arise throughout their visit. xr with likely new fx of right wrist at site of prior hardware. Patient placed in splint by ED tech. Additionally, hardware in her right foot is broken. Discussed this with the patient. I advised that she follow-up with her orthopedist for further management. Procedures: 
Procedures Critical Care: 
none Disposition: 
Discharge Note: 
5:45 PM 
 The patient has been re-evaluated and is ready for discharge. Reviewed available results with patient. Counseled patient on diagnosis and care plan. Patient has expressed understanding, and all questions have been answered. Patient agrees with plan and agrees to follow up as recommended, or to return to the ED if their symptoms worsen. Discharge instructions have been provided and explained to the patient, along with reasons to return to the ED. PLAN: 
1. Discharge Medication List as of 10/26/2020  5:45 PM  
  
START taking these medications Details HYDROcodone-acetaminophen (Norco) 5-325 mg per tablet Take 1 Tab by mouth every four (4) hours as needed for Pain for up to 3 days. Max Daily Amount: 6 Tabs., Normal, Disp-10 Tab,R-0  
  
ibuprofen (MOTRIN) 800 mg tablet Take 1 Tab by mouth every eight (8) hours as needed for Pain for up to 7 days. , Normal, Disp-20 Tab,R-0 CONTINUE these medications which have NOT CHANGED Details  
predniSONE (DELTASONE) 20 mg tablet Take 2 tablets (40mg) daily for 3 days, then 20mg (1 tab) daily for 3 days, then 10mg (1/2 tab) daily for 2 days, Print, Disp-10 Tab, R-0  
  
albuterol-ipratropium (DUO-NEB) 2.5 mg-0.5 mg/3 ml nebu 3 mL by Nebulization route four (4) times daily as needed., Historical Med  
  
methocarbamol (ROBAXIN) 750 mg tablet Take 750-1,500 mg by mouth two (2) times a day., Historical Med  
  
amLODIPine (NORVASC) 10 mg tablet Take 10 mg by mouth daily. , Historical Med  
  
omeprazole (PRILOSEC) 40 mg capsule Take 40 mg by mouth daily. , Historical Med  
  
ondansetron (ZOFRAN ODT) 4 mg disintegrating tablet Take 1 Tab by mouth every eight (8) hours as needed. , Print, Disp-15 Tab, R-0  
  
albuterol (PROVENTIL VENTOLIN) 2.5 mg /3 mL (0.083 %) nebulizer solution 3 mL by Nebulization route every four (4) hours as needed. , Print, Disp-50 Each, R-2  
  
fluticasone-salmeterol (ADVAIR) 250-50 mcg/dose diskus inhaler Take 1 Puff by inhalation two (2) times a day., Print, Disp-1 Inhaler, R-1  
  
tiotropium (SPIRIVA) 18 mcg inhalation capsule Take 1 Cap by inhalation daily. Indications: BRONCHIAL ASTHMA, Print, Disp-30 Cap, R-1  
  
levETIRAcetam (KEPPRA) 500 mg tablet Take 1 Tab by mouth two (2) times a day., Print, Disp-60 Tab, R-1  
  
metoprolol (LOPRESSOR) 50 mg tablet Take 1 Tab by mouth two (2) times a day., Print, Disp-60 Tab, R-1  
  
DULoxetine (CYMBALTA) 20 mg capsule Take 40 mg by mouth daily. , Historical Med 2. Follow-up Information Follow up With Specialties Details Why Contact Info Tony Darnell MD Orthopedic Surgery Schedule an appointment as soon as possible for a visit  [address] [facility] [address] [facility] 
[phone] [facility] EMERGENCY DEPT Emergency Medicine  As needed, If symptoms worsen [address] [place] [address] [address] [facility] 
[phone] Return to ED if worse Diagnosis Clinical Impression: 1. Closed fracture of right wrist, initial encounter This note will not be viewable in [address] Please note that this dictation was completed with [employer], the computer voice recognition software. Quite often unanticipated grammatical, syntax, homophones, and other interpretive errors are inadvertently transcribed by the computer software. Please disregard these errors. Please excuse any errors that have escaped final proofreading

## 2020-10-26 NOTE — ED TRIAGE NOTES
Pt arrived to ED w/ EMS after falling. EMS reported an obvious deformity to the right wrist. Pt also reports pain in her neck and right ankle. Pt reports she hit her head on a cabinet when she fell. Pt is AOX4 and monitored X3. Pt reports a hx of COPD and HTN. Pt arrived using 2L of O2 due to COPD. Pt is on O2 at home as well.

## 2020-10-26 NOTE — DISCHARGE INSTRUCTIONS
Please follow-up with orthopedics regarding your wrist fracture as well as regarding the hardware in your foot      Patient Education        Broken Wrist: Care Instructions  Your Care Instructions     Your wrist can break, or fracture, during sports, a fall, or other accidents. The break may happen when your wrist is hit or is used to protect you in a fall. Fractures can range from a small, hairline crack, to a bone or bones broken into two or more pieces. Your treatment depends on how bad the break is. Your doctor may have put your wrist in a cast or splint. This will help keep your wrist stable until your follow-up appointment. It may take weeks or months for your wrist to heal. You can help it heal with care at home. You heal best when you take good care of yourself. Eat a variety of healthy foods, and don't smoke. Follow-up care is a key part of your treatment and safety. Be sure to make and go to all appointments, and call your doctor if you are having problems. It's also a good idea to know your test results and keep a list of the medicines you take. How can you care for yourself at home? · Put ice or a cold pack on your wrist for 10 to 20 minutes at a time. Try to do this every 1 to 2 hours for the next 3 days (when you are awake). Put a thin cloth between the ice and your cast or splint. Keep your cast or splint dry. · Follow the splint or cast care instructions your doctor gives you. If you have a splint, do not take it off unless your doctor tells you to. Be careful not to put the splint on too tight. · Be safe with medicines. Take pain medicines exactly as directed. ? If the doctor gave you a prescription medicine for pain, take it as prescribed. ? If you are not taking a prescription pain medicine, ask your doctor if you can take an over-the-counter medicine. · Prop up your wrist on pillows when you sit or lie down in the first few days after the injury.  Keep your wrist higher than the level of your heart. This will help reduce swelling. · Move your fingers often to reduce swelling and stiffness, but do not use that hand to grab or carry anything. · Follow instructions for exercises to keep your arm strong. When should you call for help? Call your doctor now or seek immediate medical care if:    · You have new or worse pain.     · Your hand or fingers are cool or pale or change color.     · Your cast or splint feels too tight.     · You have tingling, weakness, or numbness in your hand or fingers. Watch closely for changes in your health, and be sure to contact your doctor if:    · You do not get better as expected.     · You have problems with your cast or splint. Where can you learn more? Go to http://www.gray.com/  Enter J579 in the search box to learn more about \"Broken Wrist: Care Instructions. \"  Current as of: March 2, 2020               Content Version: 12.6  © 2007-2829 [employer]. Care instructions adapted under license by [employer] (which disclaims liability or warranty for this information). If you have questions about a medical condition or this instruction, always ask your healthcare professional. [place] [address] any warranty or liability for your use of this information.

## 2020-11-07 NOTE — PROGRESS NOTES
Pt is tachycardic and tachypnic, currently on 5L O2. Complains of constant nausea . Zofran frequency changed per  phone order to Q4. Bedside shift change report given to [place] (oncoming nurse) by Lenard Whitfield RN (offgoing nurse). Report included the following information SBAR, Kardex, Procedure Summary, Intake/Output, MAR and Recent Results.

## 2020-11-07 NOTE — ED PROVIDER NOTES
EMERGENCY DEPARTMENT HISTORY AND PHYSICAL EXAM 
 
 
Date: 2020 Patient Name: Darío Starkey History of Presenting Illness Chief Complaint Patient presents with  COPD History Provided By: Patient and EMS 
 
HPI: Darío Starkey, 81 y.o. female  presents to the ED with cc of shortness of breath. Patient states she has been getting progressively worse shortness of breath over the past several days to week. She has had a mild cough productive of some phlegm. She complains of pain in her right shoulder. No fevers or chills. She has had nausea and diarrhea. She complains of headaches. She is also been having body aches. She wears 2 L of oxygen at home. She has been using nebulizers at home. She received a nebulizer in route via EMS. Past History Past Medical History: 
Past Medical History:  
Diagnosis Date  Arthritis  Asthma  COPD   
 2 L NC  
 Hypertension  Ischemic colitis ([facility] [address] 2012  Migraines  Neurological disorder   
 migraines  Psychiatric disorder   
 depression  Seizures ([facility] [address] Last seizure 4 years ago  Vaginal candidiasis 2012 Past Surgical History: 
Past Surgical History:  
Procedure Laterality Date  HX APPENDECTOMY  HX  SECTION    
 HX CHOLECYSTECTOMY  HX HYSTERECTOMY  HX ORTHOPAEDIC    
 lumbar disc sx  HX ORTHOPAEDIC    
 left knee sx  HX OTHER SURGICAL    
 colonoscopy-recent colitis  [place] COLONOSCOPY W/BIOPSY SINGLE/MULTIPLE  3/1/2012 Medications: No current facility-administered medications on file prior to encounter. Current Outpatient Medications on File Prior to Encounter Medication Sig Dispense Refill  predniSONE (DELTASONE) 20 mg tablet Take 2 tablets (40mg) daily for 3 days, then 20mg (1 tab) daily for 3 days, then 10mg (1/2 tab) daily for 2 days 10 Tab 0  
 albuterol-ipratropium (DUO-NEB) 2.5 mg-0.5 mg/3 ml nebu 3 mL by Nebulization route four (4) times daily as needed.  methocarbamol (ROBAXIN) 750 mg tablet Take 750-1,500 mg by mouth two (2) times a day.  amLODIPine (NORVASC) 10 mg tablet Take 10 mg by mouth daily.  omeprazole (PRILOSEC) 40 mg capsule Take 40 mg by mouth daily.  ondansetron (ZOFRAN ODT) 4 mg disintegrating tablet Take 1 Tab by mouth every eight (8) hours as needed. (Patient taking differently: Take 4 mg by mouth three (3) times daily. ) 15 Tab 0  
 albuterol (PROVENTIL VENTOLIN) 2.5 mg /3 mL (0.083 %) nebulizer solution 3 mL by Nebulization route every four (4) hours as needed. 50 Each 2  
 fluticasone-salmeterol (ADVAIR) 250-50 mcg/dose diskus inhaler Take 1 Puff by inhalation two (2) times a day. 1 Inhaler 1  
 tiotropium (SPIRIVA) 18 mcg inhalation capsule Take 1 Cap by inhalation daily. Indications: BRONCHIAL ASTHMA 30 Cap 1  
 levETIRAcetam (KEPPRA) 500 mg tablet Take 1 Tab by mouth two (2) times a day. 60 Tab 1  
 metoprolol (LOPRESSOR) 50 mg tablet Take 1 Tab by mouth two (2) times a day. 60 Tab 1  DULoxetine (CYMBALTA) 20 mg capsule Take 40 mg by mouth daily. Family History: 
Family History Problem Relation Age of Onset  Stroke Mother  Heart Disease Mother  Lung Disease Father   
     copd Social History: 
Social History Tobacco Use  Smoking status: Current Every Day Smoker Packs/day: 0.25 Years: 30.00 Pack years: 7.50  Smokeless tobacco: Never Used Substance Use Topics  Alcohol use: No  
 Drug use: No  
  Types: Prescription, OTC Allergies: Allergies Allergen Reactions  Codeine Other (comments)  Erythromycin Nausea Only  Other Medication Other (comments) No sedative or narcotic per son due to hx addiction All the above components of the past  history are auto-populated from the electronic record.   They have been reviewed and the patient has been interviewed for any pertinent past history that pertains to the patient's chief complaint and reason for visit. Not all pre-populated components may be accurate at the time this note was generated. Review of Systems Review of Systems Constitutional: Negative for chills and fever. HENT: Negative for congestion, ear pain, rhinorrhea, sore throat and trouble swallowing. Eyes: Negative for visual disturbance. Respiratory: Positive for cough and shortness of breath. Negative for chest tightness. Cardiovascular: Negative for chest pain and palpitations. Gastrointestinal: Positive for diarrhea and nausea. Negative for abdominal pain, blood in stool, constipation and vomiting. Genitourinary: Negative for decreased urine volume, difficulty urinating, dysuria and frequency. Musculoskeletal: Positive for arthralgias and myalgias. Negative for back pain and neck pain. Skin: Negative for color change and rash. Neurological: Positive for headaches. Negative for dizziness, weakness and light-headedness. Physical Exam  
Physical Exam 
Vitals signs and nursing note reviewed. Constitutional:   
   General: She is not in acute distress. Appearance: She is well-developed. She is not ill-appearing. HENT:  
   Head: Normocephalic. Eyes:  
   Conjunctiva/sclera: Conjunctivae normal.  
Neck: Musculoskeletal: Normal range of motion. Cardiovascular:  
   Rate and Rhythm: Normal rate and regular rhythm. Pulmonary:  
   Effort: Pulmonary effort is normal. No accessory muscle usage or respiratory distress. Abdominal:  
   General: There is no distension. Musculoskeletal:  
   Right lower leg: Edema present. Left lower leg: Edema present. Comments: Trace bilateral leg edema Skin: 
   General: Skin is warm and dry. Neurological:  
   Mental Status: She is alert and oriented to person, place, and time.   
 
 
 
Due to the COVID-19 pandemic, in order to reduce the spread and transmission of the virus, some basic elements of the physical exam have been deferred to reduce direct or close contact with the patient unless they are deemed to be absolutely necessary, regardless of whether the virus is highly suspected or not. Diagnostic Study Results Labs - Recent Results (from the past 24 hour(s)) EKG, 12 LEAD, INITIAL Collection Time: 11/07/20 12:15 AM  
Result Value Ref Range Ventricular Rate 110 BPM  
 Atrial Rate 110 BPM  
 P-R Interval 136 ms QRS Duration 82 ms Q-T Interval 350 ms QTC Calculation (Bezet) 473 ms Calculated P Axis 81 degrees Calculated R Axis 54 degrees Calculated T Axis 46 degrees Diagnosis Sinus tachycardia Inferior infarct (cited on or before 17-DEC-2015) Cannot rule out Anterior infarct (cited on or before 17-DEC-2015) When compared with ECG of 03-MAR-2018 12:57, 
fusion complexes are no longer present T wave amplitude has decreased in Anterior leads CBC WITH AUTOMATED DIFF Collection Time: 11/07/20 12:41 AM  
Result Value Ref Range WBC 12.2 (H) 3.6 - 11.0 K/uL  
 RBC 3.53 (L) 3.80 - 5.20 M/uL  
 HGB 10.5 (L) 11.5 - 16.0 g/dL HCT 33.8 (L) 35.0 - 47.0 % MCV 95.8 80.0 - 99.0 FL  
 MCH 29.7 26.0 - 34.0 PG  
 MCHC 31.1 30.0 - 36.5 g/dL  
 RDW 13.8 11.5 - 14.5 % PLATELET 482 (H) 936 - 400 K/uL MPV 9.5 8.9 - 12.9 FL  
 NRBC 0.0 0  WBC ABSOLUTE NRBC 0.00 0.00 - 0.01 K/uL NEUTROPHILS 80 (H) 32 - 75 % LYMPHOCYTES 9 (L) 12 - 49 % MONOCYTES 8 5 - 13 % EOSINOPHILS 2 0 - 7 % BASOPHILS 0 0 - 1 % IMMATURE GRANULOCYTES 1 (H) 0.0 - 0.5 % ABS. NEUTROPHILS 9.7 (H) 1.8 - 8.0 K/UL  
 ABS. LYMPHOCYTES 1.1 0.8 - 3.5 K/UL  
 ABS. MONOCYTES 0.9 0.0 - 1.0 K/UL  
 ABS. EOSINOPHILS 0.3 0.0 - 0.4 K/UL  
 ABS. BASOPHILS 0.1 0.0 - 0.1 K/UL  
 ABS. IMM. GRANS. 0.1 (H) 0.00 - 0.04 K/UL  
 DF AUTOMATED METABOLIC PANEL, COMPREHENSIVE  Collection Time: 11/07/20 12:41 AM  
 Result Value Ref Range Sodium 134 (L) 136 - 145 mmol/L Potassium 3.1 (L) 3.5 - 5.1 mmol/L Chloride 93 (L) 97 - 108 mmol/L  
 CO2 38 (H) 21 - 32 mmol/L Anion gap 3 (L) 5 - 15 mmol/L Glucose 139 (H) 65 - 100 mg/dL BUN 16 6 - 20 MG/DL Creatinine 0.64 0.55 - 1.02 MG/DL  
 BUN/Creatinine ratio 25 (H) 12 - 20 GFR est AA >60 >60 ml/min/1.73m2 GFR est non-AA >60 >60 ml/min/1.73m2 Calcium 8.8 8.5 - 10.1 MG/DL Bilirubin, total 0.3 0.2 - 1.0 MG/DL  
 ALT (SGPT) 15 12 - 78 U/L  
 AST (SGOT) 15 15 - 37 U/L Alk. phosphatase 122 (H) 45 - 117 U/L Protein, total 6.5 6.4 - 8.2 g/dL Albumin 2.5 (L) 3.5 - 5.0 g/dL Globulin 4.0 2.0 - 4.0 g/dL A-G Ratio 0.6 (L) 1.1 - 2.2 NT-PRO BNP Collection Time: 11/07/20 12:41 AM  
Result Value Ref Range NT pro- (H) <125 PG/ML  
TROPONIN I Collection Time: 11/07/20 12:41 AM  
Result Value Ref Range Troponin-I, Qt. <0.05 <0.05 ng/mL Radiologic Studies -  
CT CHEST W CONT Final Result IMPRESSION:  
Emphysema with large pleural-based necrotic lateral right upper lobe mass, with  
several other pulmonary nodules in both lungs as described all highly suspicious  
for bronchogenic neoplasm. Atypical/mycobacterial infection may result in  
cavitary upper lobe lesions though felt to be less likely. Correlate with  
pertinent clinical findings. The findings were called to Dr. Chu Petersen on 11/7/2020 at  [facility] by myself. [address] XR CHEST PORT Final Result IMPRESSION:  
6 cm right upper lobe masslike density and possible left upper lobe nodule. Recommend CT with IV contrast.  
  
 
CT Results  (Last 48 hours) 11/07/20 0151  CT CHEST W CONT Final result Impression:  IMPRESSION:  
Emphysema with large pleural-based necrotic lateral right upper lobe mass, with  
several other pulmonary nodules in both lungs as described all highly suspicious for bronchogenic neoplasm. Atypical/mycobacterial infection may result in  
cavitary upper lobe lesions though felt to be less likely. Correlate with  
pertinent clinical findings. The findings were called to Dr. Chantel Carrillo on 11/7/2020 at [address] [place] [address] by myself. Luis Siegel Narrative:  INDICATION: dyspnea, RUL mass COMPARISON: March 3, 2018 TECHNIQUE:  After the uneventful administration of IV contrast, 5 mm axial  
images were obtained through the chest. CT dose reduction was achieved through  
use of a standardized protocol tailored for this examination and automatic  
exposure control for dose modulation. FINDINGS:  
   
THYROID: Unremarkable. MEDIASTINUM/MITCHELL: Moderately dilated and predominantly fluid-filled thoracic  
esophagus. HEART/PERICARDIUM: Unremarkable. LUNGS: Emphysema. Large, pleural-based 5.3 x 3.1 cm mass lateral right upper  
lobe with small central cavitation. Similar but much smaller, spiculated nodule  
posterior left upper lobe 1.8 x 1.1 cm with small amount of central cavitation. 0.8 cm ill-defined oval nodule lingula; pleural-based 1.3 x 1.0 cm nodule  
posterior left lower lobe. Subtle pleural-based nodularity in the posterior left  
costophrenic angle image 50 proximally 2.6 x 0.7 cm. No pulmonary edema,  
pneumothorax, or pleural effusion. INCIDENTALLY IMAGED UPPER ABDOMEN: Intra and extrahepatic biliary dilatation  
similar to prior study. BONES: No destructive bone lesion. ADDITIONAL COMMENTS: N/A. CXR Results  (Last 48 hours) 11/07/20 0024  XR CHEST PORT Final result Impression:  IMPRESSION:  
6 cm right upper lobe masslike density and possible left upper lobe nodule. Recommend CT with IV contrast.  
  
 Narrative:  INDICATION: dyspnea EXAM:  AP CHEST RADIOGRAPH  
   
COMPARISON: March 5, 2018 FINDINGS:  
   
AP portable view of the chest demonstrates a normal cardiomediastinal  
 silhouette. 6.3 cm pleural-based masslike density lateral right upper lobe. Possible nodular density left upper lobe. No pulmonary edema or pneumothorax. The osseous structures are unremarkable. Medical Decision Making I reviewed the vital signs, available nursing notes, past medical history, past surgical history, family history and social history. Vital Signs-I have reviewed the vital signs that have been made available during the patient's emergency department visit. The vital signs auto-populated below are obtained mostly by electronic means through monitoring devices that have been downloaded into the patient's chart by the nursing staff. Some vital signs are not downloaded into the chart until after the patient has been discharged and this note has been completed, therefore some vital signs may not be available to the physician for review prior to patient's discharge or admission. The physician has reviewed the patient's triage vital signs, monitored the electronic monitoring devices remotely for any significant vital sign abnormalities, and have reviewed vital signs prior to discharge. Some vital signs reviewed at bedside or remotely utilizing electronic monitoring devices may be different than the vital signs downloaded into the electronic medical record. Some vital signs may be erroneous and inaccurate since they are obtained by electronic monitoring devices, and not all vital signs are verified for accuracy by nursing staff prior to downloading into the patient's chart. Patient Vitals for the past 24 hrs: 
 Temp Pulse Resp BP SpO2  
11/07/20 0013 98.3 °F (36.8 °C) (!) 111 30 (!) 159/73 97 % Records Reviewed: Nursing notes for today's visit have been reviewed. I have also reviewed most recent medical records pertinent to today's complaints, if available in our medical record system.   I have also reviewed all labs and imaging results from previous results in comparison to results obtained today. If an EKG was obtained today, it has been compared to previous EKGs, if available. If arriving via EMS, the EMS report has been reviewed if made available to us within the patient's time in the emergency department. Provider Notes (Medical Decision Making):  
Patient with a history of COPD presents with shortness of breath has been progressively getting worse. She does not have any increased oxygen needs but she is working to breathe with tachypnea and is in distress. She is able to speak full sentences. I do not believe that she needs noninvasive positive pressure ventilation. Imaging does not show any infiltrates or edema. She does have a right upper lobe mass and multiple pulmonary nodules that appear neoplastic. Patient did not have much improvement with steroids and bronchodilators. Would recommend admission for further evaluation and care. ED Course:  
Initial assessment performed. The patients presenting problems have been discussed, and they are in agreement with the care plan formulated and outlined with them. I have encouraged them to ask questions as they arise throughout their visit. Orders Placed This Encounter  XR CHEST PORT  
 CT CHEST W CONT  CBC WITH AUTOMATED DIFF  
 COMPREHENSIVE METABOLIC PANEL  
 PRO-BNP  TROPONIN I  
 EKG NOTEWRITER(ASAP ONLY)  EKG, 12 LEAD, INITIAL  
 SALINE LOCK IV ONE TIME STAT  ondansetron (ZOFRAN) injection 4 mg  morphine injection 2 mg  methylPREDNISolone (PF) (Solu-MEDROL) injection 125 mg  
 albuterol-ipratropium (DUO-NEB) 2.5 MG-0.5 MG/3 ML  sodium chloride (NS) flush 10 mL  iopamidoL (ISOVUE-370) 76 % injection 100 mL  ondansetron (ZOFRAN) injection 4 mg  DISCONTD: morphine injection 4 mg  
 fentaNYL citrate (PF) injection 50 mcg  levoFLOXacin (LEVAQUIN) 500 mg in D5W IVPB EKG 
 
 Date/Time: 11/7/2020 12:15 AM 
Performed by: Julissa Aparicio MD 
Authorized by: Julissa Aparicio MD  
 
ECG reviewed by ED Physician in the absence of a cardiologist: yes Rate:  
  ECG rate:  110 ECG rate assessment: tachycardic Rhythm:  
  Rhythm: sinus tachycardia Ectopy:  
  Ectopy: none QRS:  
  QRS axis:  Normal 
Conduction:  
  Conduction: normal   
ST segments: ST segments:  Normal 
T waves:  
  T waves: non-specific Critical Care Time:  
0 Disposition: 
Admit Diagnosis Clinical Impression: 1. COPD with acute exacerbation ([address] 2. Mass of upper lobe of right lung

## 2020-11-07 NOTE — PROGRESS NOTES
TRANSFER - IN REPORT: 
 
Verbal report received from [address] [place] [address] RN(name) on Ranjith Connolly  being received from ER(unit) for routine progression of care Report consisted of patients Situation, Background, Assessment and  
Recommendations(SBAR). Information from the following report(s) SBAR, Kardex, ED Summary, Intake/Output, MAR and Recent Results was reviewed with the receiving nurse. Opportunity for questions and clarification was provided. Assessment completed upon patients arrival to unit and care assumed. Asked RN to check VS before transferring pt. Last VS done at 0000 with MEWS score 5

## 2020-11-07 NOTE — ED NOTES
TRANSFER - OUT REPORT: 
 
Verbal report given to Surekha(name) on Samara Caicedo  being transferred to Renal(unit) for routine progression of care Report consisted of patients Situation, Background, Assessment and  
Recommendations(SBAR). Information from the following report(s) SBAR, ED Summary and MAR was reviewed with the receiving nurse. Lines:  
Peripheral IV 86/54/03 Left Basilic (Active) Site Assessment Clean, dry, & intact 11/07/20 0045 Phlebitis Assessment 0 11/07/20 0045 Infiltration Assessment 0 11/07/20 0045 Dressing Status Clean, dry, & intact 11/07/20 0045 Dressing Type Transparent 11/07/20 0045 Hub Color/Line Status Pink;Flushed;Patent 11/07/20 0045 Action Taken Blood drawn 11/07/20 0045 Opportunity for questions and clarification was provided.    
 
Patient transported with: 
 O2 via NASO @ 5L

## 2020-11-07 NOTE — H&P
. 
               
Hospitalist Admission Note NAME: Kingston Black :  1950 MRN:  [id number] Date/Time:  2020 6:22 AM 
 
Patient PCP: Jennifer Walters MD 
______________________________________________________________________ Given the patient's current clinical presentation, I have a high level of concern for decompensation if discharged from the emergency department. Complex decision making was performed, which includes reviewing the patient's available past medical records, laboratory results, and x-ray films. My assessment of this patient's clinical condition and my plan of care is as follows. Assessment / Plan: 
 
Worsening dyspnea Bilateral lung masses COPD exacerbation Acute on chronic hypoxic respiratory failure Anxiety Sinus tachycardia Shoulder and upper back pain CT scan with findings of emphysema with large pleural-based lateral right upper lobe mass, with several other pulmonary nodules in both lungs overall highly suspicious of bronchogenic neoplasm. I have discussed personally those findings with the patient and concerns of malignancy IV methylprednisone, bronchodilators only ipratropium in light of tachycardia Pulmonary consultation As needed analgesia Follows with Dr. Karla Corbett pulmonology Depression/anxiety Fibromyalgia As Needed Lorazepam for Anxiety Continue Cymbalta Continue gabapentin Hypertension continue amlodipine History of seizures No seizures for years Continue Keppra History of substance abuse Denied any use of substances recently Code Status: Full code Surrogate Decision Maker: Luciano Seymour DVT Prophylaxis: SCDs GI Prophylaxis: not indicated Baseline: Independent in ADLs Subjective: CHIEF COMPLAINT: Shortness of breath HISTORY OF PRESENT ILLNESS:    
Wesly Seay is a 81 y.o.    female who presents with complaints of worsening shortness of breath over the course of the past few days. Worsening backaches and right shoulder pain for the last day described as a continuous nagging pain. Worsening cough dry. Denies any fever or chills, no ill contacts. Please explained having chronic diarrhea 3-4 loose bowel movements a day. For the past few weeks. She is usually on 2 L of oxygen at home but has felt that she needed to increase the oxygen flow to manage her shortness of breath but without significant relief. Has recently had forearm and right foot fractures after mechanical falls. We were asked to admit for work up and evaluation of the above problems. Past Medical History:  
Diagnosis Date  Arthritis  Asthma  COPD   
 2 L NC  
 Hypertension  Ischemic colitis ([facility] [address] 2012  Migraines  Neurological disorder   
 migraines  Psychiatric disorder   
 depression  Seizures ([facility] [address] Last seizure 4 years ago  Vaginal candidiasis 2012 Past Surgical History:  
Procedure Laterality Date  HX APPENDECTOMY  HX  SECTION    
 HX CHOLECYSTECTOMY  HX HYSTERECTOMY  HX ORTHOPAEDIC    
 lumbar disc sx  HX ORTHOPAEDIC    
 left knee sx  HX OTHER SURGICAL    
 colonoscopy-recent colitis  [place] COLONOSCOPY W/BIOPSY SINGLE/MULTIPLE  3/1/2012 Social History Tobacco Use  Smoking status: Current Every Day Smoker Packs/day: 0.25 Years: 30.00 Pack years: 7.50  Smokeless tobacco: Never Used Substance Use Topics  Alcohol use: No  
  
 
Family History Problem Relation Age of Onset  Stroke Mother  Heart Disease Mother  Lung Disease Father   
     copd Allergies Allergen Reactions  Codeine Other (comments)  Erythromycin Nausea Only  Other Medication Other (comments) No sedative or narcotic per son due to hx addiction Prior to Admission medications Medication Sig Start Date End Date Taking? Authorizing Provider  
predniSONE (DELTASONE) 20 mg tablet Take 2 tablets (40mg) daily for 3 days, then 20mg (1 tab) daily for 3 days, then 10mg (1/2 tab) daily for 2 days 3/8/18   oRxanne Salomon MD  
albuterol-ipratropium (DUO-NEB) 2.5 mg-0.5 mg/3 ml nebu 3 mL by Nebulization route four (4) times daily as needed. Heri Rosenberg MD  
methocarbamol (ROBAXIN) 750 mg tablet Take 750-1,500 mg by mouth two (2) times a day. Heri Rosenberg MD  
amLODIPine (NORVASC) 10 mg tablet Take 10 mg by mouth daily. Heri Rosenberg MD  
omeprazole (PRILOSEC) 40 mg capsule Take 40 mg by mouth daily. Provider, Eron  
ondansetron (ZOFRAN ODT) 4 mg disintegrating tablet Take 1 Tab by mouth every eight (8) hours as needed. Patient taking differently: Take 4 mg by mouth three (3) times daily. 6/30/16   Roxanne Salomon MD  
albuterol (PROVENTIL VENTOLIN) 2.5 mg /3 mL (0.083 %) nebulizer solution 3 mL by Nebulization route every four (4) hours as needed. 12/23/15   Jose Alfredo Sommer MD  
fluticasone-salmeterol (ADVAIR) 250-50 mcg/dose diskus inhaler Take 1 Puff by inhalation two (2) times a day. 12/23/15   Jose Alfredo Sommer MD  
tiotropium (SPIRIVA) 18 mcg inhalation capsule Take 1 Cap by inhalation daily. Indications: BRONCHIAL ASTHMA 12/23/15   Jose Alfredo Sommer MD  
levETIRAcetam (KEPPRA) 500 mg tablet Take 1 Tab by mouth two (2) times a day. 12/23/15   Jose Alfredo Sommer MD  
metoprolol (LOPRESSOR) 50 mg tablet Take 1 Tab by mouth two (2) times a day. 12/23/15   Jose Alfredo Sommer MD  
DULoxetine (CYMBALTA) 20 mg capsule Take 40 mg by mouth daily. Heri Rosenberg MD  
 
 
REVIEW OF SYSTEMS:    
I am not able to complete the review of systems because: The patient is intubated and sedated The patient has altered mental status due to his acute medical problems The patient has baseline aphasia from prior stroke(s) The patient has baseline dementia and is not reliable historian The patient is in acute medical distress and unable to provide information Total of 12 systems reviewed as follows:   
   POSITIVE= underlined text  Negative = text not underlined General:  fever, chills, sweats, generalized weakness, weight loss/gain,  
   loss of appetite Eyes:    blurred vision, eye pain, loss of vision, double vision ENT:    rhinorrhea, pharyngitis Respiratory:   cough, sputum production, SOB, DÍAZ, wheezing, pleuritic pain  
Cardiology:   chest pain, palpitations, orthopnea, PND, edema, syncope Gastrointestinal:  abdominal pain , N/V, diarrhea, dysphagia, constipation, bleeding Genitourinary:  frequency, urgency, dysuria, hematuria, incontinence Muskuloskeletal :  arthralgia, myalgia, back pain Hematology:  easy bruising, nose or gum bleeding, lymphadenopathy Dermatological: rash, ulceration, pruritis, color change / jaundice Endocrine:   hot flashes or polydipsia Neurological:  headache, dizziness, confusion, focal weakness, paresthesia, Speech difficulties, memory loss, gait difficulty Psychological: Feelings of anxiety, depression, agitation Objective: VITALS:   
Visit Vitals BP (!) 159/73 (BP 1 Location: Left arm, BP Patient Position: At rest) Pulse (!) 111 Temp 98.3 °F (36.8 °C) Resp 30 Ht 5' 2\" (1.575 m) Wt 77.1 kg (170 lb) SpO2 97% BMI 31.09 kg/m² PHYSICAL EXAM: 
 
General:    Alert, cooperative, no distress, appears stated age. HEENT: Atraumatic, anicteric sclerae, pink conjunctivae No oral ulcers, mucosa moist, throat clear, dentition fair Neck:  Supple, symmetrical,  thyroid: non tender Lungs:   Very distant breath sounds. Prolonged expiratory phase. Scattered wheezes. No rales. Chest wall:  No tenderness  No Accessory muscle use. Heart:   Regular  rhythm, tachycardic,  No  murmur   No edema Abdomen:   Soft, non-tender. Not distended.   Bowel sounds normal 
 Extremities: Right arm in cast no cyanosis. No clubbing,   
  Skin turgor normal, Capillary refill normal, Radial dial pulse 2+ Skin:     Not pale. Not Jaundiced  No rashes Psych:  Good insight. Not depressed. Appears anxious Neurologic: EOMs intact. No facial asymmetry. No aphasia or slurred speech. Symmetrical strength, Sensation grossly intact. Alert and oriented X 4.  
 
_______________________________________________________________________ Care Plan discussed with: 
  Comments Patient x Family RN Care Manager Consultant:     
_______________________________________________________________________ Expected  Disposition:  
Home with Family x HH/PT/OT/RN   
SNF/LTC   
CHELO   
________________________________________________________________________ TOTAL TIME:  48 Minutes Critical Care Provided     Minutes non procedure based Comments Reviewed previous records  
>50% of visit spent in counseling and coordination of care  Discussion with patient and/or family and questions answered 
  
 
________________________________________________________________________ Signed: Román Watson MD 
 
Procedures: see electronic medical records for all procedures/Xrays and details which were not copied into this note but were reviewed prior to creation of Plan. LAB DATA REVIEWED:   
Recent Results (from the past 24 hour(s)) EKG, 12 LEAD, INITIAL Collection Time: 11/07/20 12:15 AM  
Result Value Ref Range Ventricular Rate 110 BPM  
 Atrial Rate 110 BPM  
 P-R Interval 136 ms QRS Duration 82 ms Q-T Interval 350 ms QTC Calculation (Bezet) 473 ms Calculated P Axis 81 degrees Calculated R Axis 54 degrees Calculated T Axis 46 degrees Diagnosis Sinus tachycardia Inferior infarct (cited on or before 17-DEC-2015) Cannot rule out Anterior infarct (cited on or before 17-DEC-2015) When compared with ECG of 03-MAR-2018 12:57, 
 fusion complexes are no longer present T wave amplitude has decreased in Anterior leads CBC WITH AUTOMATED DIFF Collection Time: 11/07/20 12:41 AM  
Result Value Ref Range WBC 12.2 (H) 3.6 - 11.0 K/uL  
 RBC 3.53 (L) 3.80 - 5.20 M/uL  
 HGB 10.5 (L) 11.5 - 16.0 g/dL HCT 33.8 (L) 35.0 - 47.0 % MCV 95.8 80.0 - 99.0 FL  
 MCH 29.7 26.0 - 34.0 PG  
 MCHC 31.1 30.0 - 36.5 g/dL  
 RDW 13.8 11.5 - 14.5 % PLATELET 017 (H) 363 - 400 K/uL MPV 9.5 8.9 - 12.9 FL  
 NRBC 0.0 0  WBC ABSOLUTE NRBC 0.00 0.00 - 0.01 K/uL NEUTROPHILS 80 (H) 32 - 75 % LYMPHOCYTES 9 (L) 12 - 49 % MONOCYTES 8 5 - 13 % EOSINOPHILS 2 0 - 7 % BASOPHILS 0 0 - 1 % IMMATURE GRANULOCYTES 1 (H) 0.0 - 0.5 % ABS. NEUTROPHILS 9.7 (H) 1.8 - 8.0 K/UL  
 ABS. LYMPHOCYTES 1.1 0.8 - 3.5 K/UL  
 ABS. MONOCYTES 0.9 0.0 - 1.0 K/UL  
 ABS. EOSINOPHILS 0.3 0.0 - 0.4 K/UL  
 ABS. BASOPHILS 0.1 0.0 - 0.1 K/UL  
 ABS. IMM. GRANS. 0.1 (H) 0.00 - 0.04 K/UL  
 DF AUTOMATED METABOLIC PANEL, COMPREHENSIVE Collection Time: 11/07/20 12:41 AM  
Result Value Ref Range Sodium 134 (L) 136 - 145 mmol/L Potassium 3.1 (L) 3.5 - 5.1 mmol/L Chloride 93 (L) 97 - 108 mmol/L  
 CO2 38 (H) 21 - 32 mmol/L Anion gap 3 (L) 5 - 15 mmol/L Glucose 139 (H) 65 - 100 mg/dL BUN 16 6 - 20 MG/DL Creatinine 0.64 0.55 - 1.02 MG/DL  
 BUN/Creatinine ratio 25 (H) 12 - 20 GFR est AA >60 >60 ml/min/1.73m2 GFR est non-AA >60 >60 ml/min/1.73m2 Calcium 8.8 8.5 - 10.1 MG/DL Bilirubin, total 0.3 0.2 - 1.0 MG/DL  
 ALT (SGPT) 15 12 - 78 U/L  
 AST (SGOT) 15 15 - 37 U/L Alk. phosphatase 122 (H) 45 - 117 U/L Protein, total 6.5 6.4 - 8.2 g/dL Albumin 2.5 (L) 3.5 - 5.0 g/dL Globulin 4.0 2.0 - 4.0 g/dL A-G Ratio 0.6 (L) 1.1 - 2.2 NT-PRO BNP Collection Time: 11/07/20 12:41 AM  
Result Value Ref Range NT pro- (H) <125 PG/ML  
TROPONIN I Collection Time: 11/07/20 12:41 AM  
Result Value Ref Range Troponin-I, Qt. <0.05 <0.05 ng/mL

## 2020-11-07 NOTE — CONSULTS
Pulmonology Consult - [facility] Subjective:  
Consult Note: 2020 10:34 AM 
 
This patient has been seen and evaluated at the request of Dr. Víctor Madera for bilateral pulmonary nodules. Patient is a 81 y.o. female Shortness of breath and chest pain started yesterday Better today  
cxr showed bilaterall upper lobe mass/nodules CT RUL mass-GAVIOTA/LLL nodules with central cavitation Long-standing tobacco  
50 yrs ~ 1ppd still smokes Ct 2018 NO mass No recent weight loss or hemoptysis No sputum production Last mammo and colonoscopy years ago O2 dependence Past Medical History:  
Diagnosis Date  Arthritis  Asthma  COPD   
 2 L NC  
 Hypertension  Ischemic colitis ([facility] [address] 2012  Migraines  Neurological disorder   
 migraines  Psychiatric disorder   
 depression  Seizures ([facility] [address] Last seizure 4 years ago  Vaginal candidiasis 2012 Past Surgical History:  
Procedure Laterality Date  HX APPENDECTOMY  HX  SECTION    
 HX CHOLECYSTECTOMY  HX HYSTERECTOMY  HX ORTHOPAEDIC    
 lumbar disc sx  HX ORTHOPAEDIC    
 left knee sx  HX OTHER SURGICAL    
 colonoscopy-recent colitis  [place] COL[place]COPY W/BIOPSY SINGLE/MULTIPLE  3/1/2012 Prior to Admission medications Medication Sig Start Date End Date Taking? Authorizing Provider  
predniSONE (DELTASONE) 20 mg tablet Take 2 tablets (40mg) daily for 3 days, then 20mg (1 tab) daily for 3 days, then 10mg (1/2 tab) daily for 2 days 3/8/18   Rick White MD  
albuterol-ipratropium (DUO-NEB) 2.5 mg-0.5 mg/3 ml nebu 3 mL by Nebulization route four (4) times daily as needed. Heri Rosenberg MD  
methocarbamol (ROBAXIN) 750 mg tablet Take 750-1,500 mg by mouth two (2) times a day. Heri Rosenberg MD  
amLODIPine (NORVASC) 10 mg tablet Take 10 mg by mouth daily. Heri Rosenberg MD  
omeprazole (PRILOSEC) 40 mg capsule Take 40 mg by mouth daily. Provider, Historical  
ondansetron (ZOFRAN ODT) 4 mg disintegrating tablet Take 1 Tab by mouth every eight (8) hours as needed. Patient taking differently: Take 4 mg by mouth three (3) times daily. 6/30/16   Galilea Connell MD  
albuterol (PROVENTIL VENTOLIN) 2.5 mg /3 mL (0.083 %) nebulizer solution 3 mL by Nebulization route every four (4) hours as needed. 12/23/15   Reymundo Enriquez MD  
fluticasone-salmeterol (ADVAIR) 250-50 mcg/dose diskus inhaler Take 1 Puff by inhalation two (2) times a day. 12/23/15   Reymundo Enriquez MD  
tiotropium (SPIRIVA) 18 mcg inhalation capsule Take 1 Cap by inhalation daily. Indications: BRONCHIAL ASTHMA 12/23/15   Reymundo Enriquez MD  
levETIRAcetam (KEPPRA) 500 mg tablet Take 1 Tab by mouth two (2) times a day. 12/23/15   Reymundo Enriquez MD  
metoprolol (LOPRESSOR) 50 mg tablet Take 1 Tab by mouth two (2) times a day. 12/23/15   Reymundo Enriquez MD  
DULoxetine (CYMBALTA) 20 mg capsule Take 40 mg by mouth daily. Other, MD Heri  
 
Allergies Allergen Reactions  Codeine Other (comments)  Erythromycin Nausea Only  Other Medication Other (comments) No sedative or narcotic per son due to hx addiction Social History Tobacco Use  Smoking status: Current Every Day Smoker Packs/day: 1 Years: 50.00 Pack years: 48  Smokeless tobacco: Never Used Substance Use Topics  Alcohol use: No  
  
Family History Problem Relation Age of Onset  Stroke Mother  Heart Disease Mother  Lung Disease Father   
     copd Current Facility-Administered Medications Medication Dose Route Frequency  sodium chloride (NS) flush 5-40 mL  5-40 mL IntraVENous Q8H  
 sodium chloride (NS) flush 5-40 mL  5-40 mL IntraVENous PRN  
 acetaminophen (TYLENOL) tablet 650 mg  650 mg Oral Q4H PRN  
 HYDROcodone-acetaminophen (NORCO) 5-325 mg per tablet 1 Tab  1 Tab Oral Q4H PRN  
 LORazepam (ATIVAN) tablet 2 mg  2 mg Oral BID PRN  
  naloxone (NARCAN) injection 0.4 mg  0.4 mg IntraVENous PRN  
 amLODIPine (NORVASC) tablet 10 mg  10 mg Oral DAILY  DULoxetine (CYMBALTA) capsule 40 mg  40 mg Oral DAILY  levETIRAcetam (KEPPRA) tablet 500 mg  500 mg Oral BID  methylPREDNISolone (PF) (SOLU-MEDROL) injection 40 mg  40 mg IntraVENous Q12H  
 gabapentin (NEURONTIN) capsule 300 mg  300 mg Oral BID  ipratropium (ATROVENT) 0.02 % nebulizer solution 0.5 mg  0.5 mg Nebulization Q6H RT  
 
 
Review of Systems: 
Constitutional HEENT Cardiovascular Pulmonary Gastrointestinal Genitourinary Musculoskeletal Integument Neurologic Endocrinologic Hematologic ROS unremarkable except for  HPI Objective:  
Vital Signs:   
Visit Vitals BP (!) 155/84 Pulse (!) 120 Temp 98.7 °F (37.1 °C) Resp 20 Ht 5' 2\" (1.575 m) Wt 77.1 kg (170 lb) SpO2 98% BMI 31.09 kg/m² O2 Device: Nasal cannula O2 Flow Rate (L/min): 5 l/min Temp (24hrs), Av.6 °F (37 °C), Min:98.3 °F (36.8 °C), Max:98.7 °F (37.1 °C) Intake/Output:  
Last shift:      No intake/output data recorded. Last 3 shifts: No intake/output data recorded. No intake or output data in the 24 hours ending 20 1034 Physical Exam:  
General:  Alert, cooperative, no distress, . Head:  Normocephalic, atraumatic without obvious abnormality Eyes:  Sclera non injected Conjunctivae. PERRL, EOMs intact. Nose: Nares normal. Septum midline. Mucosa normal. No drainage or sinus tenderness. Mouth: Moist mucus membranes poor Dentition Neck: Supple, symmetrical, trachea midline, no adenopathy,   No stridor Back:   Without CVA or spinal tenderness Lungs:   No acessory muscle use wheeze rhonchi rales- decreased BS Heart:  Regular rate and rhythm, S1, S2 normal, no murmur, click, rub or gallop. Abdomen:   Soft, non-tender. Bowel sounds normal. No masses, tenderness, guarding rebound No organomegaly. Extremities: no cyanosis  edema clubbing Skin: Warm dry. No rashes or lesions Lymph nodes: Cervical, supraclavicular, and axillary nodes normal.  
Neurologic: Alert /Oreinted no gross motor deficits Data Review Recent Results (from the past 24 hour(s)) EKG, 12 LEAD, INITIAL Collection Time: 11/07/20 12:15 AM  
Result Value Ref Range Ventricular Rate 110 BPM  
 Atrial Rate 110 BPM  
 P-R Interval 136 ms QRS Duration 82 ms Q-T Interval 350 ms QTC Calculation (Bezet) 473 ms Calculated P Axis 81 degrees Calculated R Axis 54 degrees Calculated T Axis 46 degrees Diagnosis Sinus tachycardia Inferior infarct (cited on or before 17-DEC-2015) Cannot rule out Anterior infarct (cited on or before 17-DEC-2015) When compared with ECG of 03-MAR-2018 12:57, 
fusion complexes are no longer present T wave amplitude has decreased in Anterior leads CBC WITH AUTOMATED DIFF Collection Time: 11/07/20 12:41 AM  
Result Value Ref Range WBC 12.2 (H) 3.6 - 11.0 K/uL  
 RBC 3.53 (L) 3.80 - 5.20 M/uL  
 HGB 10.5 (L) 11.5 - 16.0 g/dL HCT 33.8 (L) 35.0 - 47.0 % MCV 95.8 80.0 - 99.0 FL  
 MCH 29.7 26.0 - 34.0 PG  
 MCHC 31.1 30.0 - 36.5 g/dL  
 RDW 13.8 11.5 - 14.5 % PLATELET 734 (H) 387 - 400 K/uL MPV 9.5 8.9 - 12.9 FL  
 NRBC 0.0 0  WBC ABSOLUTE NRBC 0.00 0.00 - 0.01 K/uL NEUTROPHILS 80 (H) 32 - 75 % LYMPHOCYTES 9 (L) 12 - 49 % MONOCYTES 8 5 - 13 % EOSINOPHILS 2 0 - 7 % BASOPHILS 0 0 - 1 % IMMATURE GRANULOCYTES 1 (H) 0.0 - 0.5 % ABS. NEUTROPHILS 9.7 (H) 1.8 - 8.0 K/UL  
 ABS. LYMPHOCYTES 1.1 0.8 - 3.5 K/UL  
 ABS. MONOCYTES 0.9 0.0 - 1.0 K/UL  
 ABS. EOSINOPHILS 0.3 0.0 - 0.4 K/UL  
 ABS. BASOPHILS 0.1 0.0 - 0.1 K/UL  
 ABS. IMM. GRANS. 0.1 (H) 0.00 - 0.04 K/UL  
 DF AUTOMATED METABOLIC PANEL, COMPREHENSIVE Collection Time: 11/07/20 12:41 AM  
Result Value Ref Range Sodium 134 (L) 136 - 145 mmol/L Potassium 3.1 (L) 3.5 - 5.1 mmol/L  Chloride 93 (L) 97 - 108 mmol/L  
 CO2 38 (H) 21 - 32 mmol/L Anion gap 3 (L) 5 - 15 mmol/L Glucose 139 (H) 65 - 100 mg/dL BUN 16 6 - 20 MG/DL Creatinine 0.64 0.55 - 1.02 MG/DL  
 BUN/Creatinine ratio 25 (H) 12 - 20 GFR est AA >60 >60 ml/min/1.73m2 GFR est non-AA >60 >60 ml/min/1.73m2 Calcium 8.8 8.5 - 10.1 MG/DL Bilirubin, total 0.3 0.2 - 1.0 MG/DL  
 ALT (SGPT) 15 12 - 78 U/L  
 AST (SGOT) 15 15 - 37 U/L Alk. phosphatase 122 (H) 45 - 117 U/L Protein, total 6.5 6.4 - 8.2 g/dL Albumin 2.5 (L) 3.5 - 5.0 g/dL Globulin 4.0 2.0 - 4.0 g/dL A-G Ratio 0.6 (L) 1.1 - 2.2 NT-PRO BNP Collection Time: 11/07/20 12:41 AM  
Result Value Ref Range NT pro- (H) <125 PG/ML  
TROPONIN I Collection Time: 11/07/20 12:41 AM  
Result Value Ref Range Troponin-I, Qt. <0.05 <0.05 ng/mL Chest X-Ray:and CT reports and images noted Assessment:  
 
 
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 
· O2 dependent · COPD with exacerbation · 50 pk year tobacco use-(minimizes hx/habit) · Recommendations:  
 
1. CT bx R peripheral lung mass if able to move scapula away from entry point-otherwise bronch/EBUS/[place] 2. PET scan 3. Smoking cessation 4. Bronchodilators and steroids as necessary for COPD exacerbation DVT ppx Guarded outlook Hal Madrid MD

## 2020-11-07 NOTE — PROGRESS NOTES
Pharmacy Medication Reconciliation The patient was interviewed regarding current PTA medication list, use and drug allergies. The patient was questioned regarding use of any other inhalers, topical products, over the counter medications, herbal medications, vitamin products or ophthalmic/nasal/otic medication use. Allergy Update: Codeine; Erythromycin; and Other medication Recommendations/Findings: The following amendments were made to the patient's active medication list on file at [facility]:  
1) Additions: - Symbicort - Gabapentin - Meclizine - Aspirin - Morphine/Aquaphor - Spiriva Respimat 2) Deletions:  
     - Advair - Prednisone - Spiriva 3) Changes: - Methocarbamol: 750 mg 4 times daily - Metoprolol tartrate: 1 tablet daily Pertinent Findings: None 
 
-Clarified PTA med list with Rx Query, patient, and [facility]. PTA medication list was corrected to the following:  
 
Prior to Admission Medications Prescriptions Last Dose Informant Taking? DULoxetine (CYMBALTA) 20 mg capsule   No  
Sig: Take 40 mg by mouth daily. OTHER   Yes Sig: Apply  to affected area four (4) times daily as needed (Spasms). Morphine 120 mg in Aquaphor 80 g topical cream.  
albuterol (PROVENTIL VENTOLIN) 2.5 mg /3 mL (0.083 %) nebulizer solution   No  
Sig: 3 mL by Nebulization route every four (4) hours as needed. albuterol-ipratropium (DUO-NEB) 2.5 mg-0.5 mg/3 ml nebu   No  
Sig: 3 mL by Nebulization route four (4) times daily as needed. amLODIPine (NORVASC) 10 mg tablet   No  
Sig: Take 10 mg by mouth daily. aspirin 81 mg chewable tablet   Yes Sig: Take 81 mg by mouth daily. budesonide-formoteroL (Symbicort) 160-4.5 mcg/actuation HFAA   Yes Sig: Take 1 Puff by inhalation two (2) times a day.  
gabapentin (NEURONTIN) 300 mg capsule   Yes Sig: Take 300 mg by mouth three (3) times daily. levETIRAcetam (KEPPRA) 500 mg tablet   No  
Sig: Take 1 Tab by mouth two (2) times a day. meclizine (ANTIVERT) 25 mg tablet   Yes Sig: Take 25 mg by mouth four (4) times daily as needed for Dizziness. 3-4 times daily as needed  
methocarbamol (ROBAXIN) 750 mg tablet   No  
Sig: Take 750 mg by mouth four (4) times daily. metoprolol (LOPRESSOR) 50 mg tablet   No  
Sig: Take 1 Tab by mouth two (2) times a day. metoprolol tartrate (Lopressor) 50 mg tablet   Yes Sig: Take 50 mg by mouth daily. omeprazole (PRILOSEC) 40 mg capsule   No  
Sig: Take 40 mg by mouth daily. ondansetron (ZOFRAN ODT) 4 mg disintegrating tablet   No  
Sig: Take 1 Tab by mouth every eight (8) hours as needed. tiotropium bromide (Spiriva Respimat) 2.5 mcg/actuation inhaler   Yes Sig: Take 2 Puffs by inhalation daily. Facility-Administered Medications: None Thank you, Ramona Dawson, PHARMD

## 2020-11-07 NOTE — ED NOTES
Bedside and Verbal shift change report given to Dakotah Elena (oncoming nurse) by Alejandro Hewitt (offgoing nurse). Report included the following information SBAR, ED Summary and MAR.

## 2020-11-07 NOTE — ED NOTES
Pt belonging place in bag on bed with PT for transport. Purse with wallet and cell phone, shirt and pants placed in bag.

## 2020-11-07 NOTE — ED NOTES
Patient presents to the ED via [facility] complaining of difficulty breathing. EMS reports the patient has a history of COPD and is experiencing an excerebration. Patient was found by EMS alert & wheezing. Patient denies chest pain and acknowledges nausea and vomiting. Patient placed on the monitor x3 and provided with her call bell.
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
## 2020-11-08 NOTE — PROGRESS NOTES
RAPID RESPONSE TEAM 
 
Responded to overhead adult rapid response chest pain to room 3264. Patient c/o pain in chest that radiates to back, anxious in appearance, tremors in extremities. Admitted with shoulder and back pain. STAT EKG, STAT troponin, STAT cardiac enzymes, STAT CBC/CMP ordered per protocol. JIMENA Sprague and Dr. Joann Sofia at bedside. Bridget ASIF reviewed EKG, no ischemic changes noted. Orders received to give lorazepam 2 mg IV once and dilaudid 0.5 mg IV once. Lorazepam given, patient stated relief from pain and appeared to calm. Requested leslye Cheek be notified. Patient Vitals for the past 12 hrs: 
 Temp Pulse Resp BP SpO2  
11/08/20 0632 98.1 °F (36.7 °C) (!) 118 20 (!) 145/79 91 % 11/08/20 0604 98.5 °F (36.9 °C) (!) 119 22 (!) 153/93 93 % 11/08/20 0251 97.2 °F (36.2 °C) (!) 118 20 (!) 152/82 92 % 11/08/20 0245     94 % 11/07/20 2053     93 % 11/07/20 1838 98.4 °F (36.9 °C) (!) 124 20 (!) 177/85 96 % Patient to remain in room at this time. Please call with any needs or concerns. Nasrin Voss Rapid Response NICHOLAS Ruth

## 2020-11-08 NOTE — PROGRESS NOTES
Found patient with her oxygen off. SPO2 54%, placed oxygen back on patient. Instructed patient to take some deep breaths though her nose. Patient stated she would be better with her breathing treatment but she felt nauseous. I waited for the Nurse to give nausea medication and came back to do breathing treatment. SPO2 94% prior to breathing treatment.

## 2020-11-08 NOTE — PROGRESS NOTES
ADULT PROTOCOL: JET AEROSOL  REASSESSMENT Patient  Ramo Jones     81 y.o.   female     11/8/2020  9:05 AM 
 
Breath Sounds Pre Procedure: Right Breath Sounds: Diminished Left Breath Sounds: Diminished Breath Sounds Post Procedure: Right Breath Sounds: Diminished Left Breath Sounds: Diminished Breathing pattern: Pre procedure Breathing Pattern: Regular Post procedure Breathing Pattern: Regular Heart Rate: Pre procedure Pulse: 107 Post procedure Pulse: 109 Resp Rate: Pre procedure Respirations: 20 
         Post procedure Respirations: 20 
  
Oxygen: O2 Device: Nasal cannula  5L SpO2: Pre procedure SpO2: 91 % Post procedure SpO2: 91 % Nebulizer Therapy: Current medications Aerosolized Medications: Ipratropium bromide, Brovana, Pulmicort Problem List:  
Patient Active Problem List  
Diagnosis Code  COPD exacerbation ([facility] J44.1  [facility] G40.909  Osteopenia M85.80  Fibromyalgia M79.7  Essential hypertension, benign I10  
 Depression F32.9  Nephrolithiasis N20.0  Sciatica M54.30  Encephalopathy, unspecified G93.40  Acute respiratory failure (HCC) J96.00  
 Colitis K52.9  ARF (acute renal failure) (HCC) N17.9  Hypomagnesemia E83.42  Chronic pain G89.29  
 Recurrent falls R29.6  Sepsis ([facility] A41.9  Hyponatremia E87.1  Dilantin toxicity T42.0X1A  Rhabdomyolysis M62.82  
 Polypharmacy Z79.899  Bacteremia due to Escherichia coli R78.81, B96.20  Drug overdose T50.901A  Closed right ankle fracture S82.891A  Acute on chronic respiratory failure with hypercapnia (HCC) J96.22  
 Acute encephalopathy G93.40 Respiratory Therapist: Rexene Gitelman

## 2020-11-08 NOTE — PROGRESS NOTES
Received message from patient's nurse Lamine Malave stating : 
 
Patient complaining about severe back and shoulder pain and nausea. She is very anxious. She received Norco and Zofran around 1900 but states neither of the medications have worked. I have p.o. Ativan to give but wondering if there is anything else to offer for pain. Please advise. Thank you Discussion / orders: 
 
Zofran frequency was just increased today Ordered one-time dose of Dilaudid IV She does have codeine listed as her allergies however since she has been taking Norco and doing okay I believe she will do okay with Dilaudid as well. Please note that this note was dictated using Dragon computer voice recognition software. Quite often unanticipated grammatical, syntax, homophones, and other interpretive errors are inadvertently transcribed by the computer software. Please disregard these errors. Please excuse any errors that have escaped final proofreading.

## 2020-11-08 NOTE — PROGRESS NOTES
Pharmacy Automatic Renal Dosing Protocol - Antimicrobials Indication for Antimicrobials: COPD Exacerbation Current Regimen of Each Antimicrobial: 
Levofloxacin 500 mg IV q24h (Start Date ; Day 1) Previous Antimicrobial Therapy: 
None Significant Cultures:  
None Radiology / Imaging results: (X-ray, CT scan or MRI):  
 
Paralysis, amputations, malnutrition:  
 
Labs: 
Recent Labs 20 
0174 20 
0041 CREA 0.58 0.64 BUN 14 16 WBC 19.9* 12.2* Temp (24hrs), Av.9 °F (36.6 °C), Min:97.2 °F (36.2 °C), Max:98.5 °F (36.9 °C) Is the Patient on Dialysis? No 
 
Creatinine Clearance (mL/min):  
CrCl (Actual Body Weight): 94.9 CrCl (Adjusted Body Weight): 75.0 CrCl (Ideal Body Weight): 61.7 Impression/Plan:  
Levofloxacin dose changed to 500 mg for COPD exacerbation; dose approopraite for renal function Antimicrobial stop date TBD Pharmacy will follow daily and adjust medications as appropriate for renal function and/or serum levels. Thank you, 
BARRY Crandall 
 
Recommended duration of therapy 
http://[facility]/[place]/[facility]/Pharmacy/Clinical%20Companion/Duration%20of%20ABX%20therapy. docx Renal Dosing 
http://[facility]/[place]/[facility]/Pharmacy/Clinical%20Companion/Renal%20Dosing%[email or web address]. pdf

## 2020-11-08 NOTE — PROGRESS NOTES
Hospitalist Progress Note NAME: Alexi Herndon :  1950 MRN:  [id number] Assessment / Plan: 
Acute on chronic hypoxic respiratory failure POA 
COPD exacerbation POA Bilateral lung masses, cavitary, concerning from bronchogenic carcinoma SIRS, 2/2 above Chronic smoking, 50-pack-year tobacco use 
 
-CT scan with findings of emphysema with large pleural-based lateral right upper lobe mass, with several other pulmonary nodules in both lungs overall highly suspicious of bronchogenic neoplasm. Largest 5.3x 3.1 cm , cavitary lesion 
-2 L at baseline, currently on 5 L nasal cannula 
-Elevated WBC, likely because of steroids 
-Solu-Medrol 40 IV twice daily, increase to 3 times daily 
-Continue ipratropium every 6 hours, add levalbuterol nebulizer 
-Add GENEVA/ICS 
-Add Levaquin for COPD exacerbation  
 
-Appreciate pulmonary help, plan for CT-guided lung biopsy, if unsuccessful will need EBUS 
-Follows with Dr. Samantha Palacios against smoking 
 
-Chest pain overnight, likely secondary to lung mass, pleuritic in nature, chest x-ray reviewed, no change. Can use as needed morphine, cautiously 
-Check ABG 
ECHO Check TSH 
 
 
  
Depression/anxiety Fibromyalgia As Needed Lorazepam for Anxiety Continue Cymbalta Continue gabapentin 
  
Hypertension continue amlodipine 
  
History of seizures No seizures for years Continue Keppra 
  
History of substance abuse Denied any use of substances recently 
  
  
  
Code Status: Full code Surrogate Decision Maker: Son Mendon Called Sister and son, no answer 
  
DVT Prophylaxis: SCDs GI Prophylaxis: not indicated 
  
Baseline: Independent in ADLs Subjective: Chief Complaint / Reason for Physician Visit Reports she feels better than yesterday night. Overnight she had complained of chest pain, which has improved with IV pain medication. Currently on 5 L nasal cannula Review of Systems: Symptom Y/N Comments  Symptom Y/N Comments Fever/Chills n   Chest Pain y  improved Poor Appetite n   Edema n   
Cough y   Abdominal Pain n   
Sputum n   Joint Pain SOB/DÍAZ    Pruritis/Rash Nausea/vomit    Tolerating PT/OT Diarrhea    Tolerating Diet Constipation    Other Could NOT obtain due to:   
 
Objective: VITALS:  
Last 24hrs VS reviewed since prior progress note. Most recent are: 
Patient Vitals for the past 24 hrs: 
 Temp Pulse Resp BP SpO2  
11/08/20 1047 97.4 °F (36.3 °C) (!) 116 14 (!) 168/87 93 % 11/08/20 0902     91 % 11/08/20 0754 97.6 °F (36.4 °C) (!) 120 18 (!) 159/80 91 % 11/08/20 0632 98.1 °F (36.7 °C) (!) 118 20 (!) 145/79 91 % 11/08/20 0604 98.5 °F (36.9 °C) (!) 119 22 (!) 153/93 93 % 11/08/20 0251 97.2 °F (36.2 °C) (!) 118 20 (!) 152/82 92 % 11/08/20 0245     94 % 11/07/20 2053     93 % 11/07/20 1838 98.4 °F (36.9 °C) (!) 124 20 (!) 177/85 96 % 11/07/20 1511 97.8 °F (36.6 °C) (!) 129 20 (!) 161/81 96 % Intake/Output Summary (Last 24 hours) at 11/8/2020 1234 Last data filed at 11/7/2020 1727 Gross per 24 hour Intake 440 ml Output 250 ml Net 190 ml PHYSICAL EXAM: 
General: WD, WN. Alert, mild distress due to shortness of breath EENT:  EOMI. Anicteric sclerae. MMM Resp:  Wheezing bilaterally, coarse sounds CV:  Regular  rhythm,  No edema GI:  Soft, Non distended, Non tender.  +Bowel sounds Neurologic:  Alert and oriented X 3, normal speech, Psych:   Good insight. Not anxious nor agitated Skin:  No rashes. No jaundice Reviewed most current lab test results and cultures  YES Reviewed most current radiology test results   YES Review and summation of old records today    NO Reviewed patient's current orders and MAR    YES 
PMH/SH reviewed - no change compared to H&P 
________________________________________________________________________ Care Plan discussed with: 
  Comments Patient x Family RN x   
Care Manager Consultant Multidiciplinary team rounds were held today with , nursing, pharmacist and clinical coordinator. Patient's plan of care was discussed; medications were reviewed and discharge planning was addressed. ________________________________________________________________________ Total NON critical care TIME:  40   Minutes Total CRITICAL CARE TIME Spent:   Minutes non procedure based Comments >50% of visit spent in counseling and coordination of care    
________________________________________________________________________ Gary Camilo MD  
 
Procedures: see electronic medical records for all procedures/Xrays and details which were not copied into this note but were reviewed prior to creation of Plan. LABS: 
I reviewed today's most current labs and imaging studies. Pertinent labs include: 
Recent Labs 11/08/20 
8665 11/07/20 
0041 WBC 19.9* 12.2* HGB 11.1* 10.5* HCT 36.5 33.8* * 441* Recent Labs 11/08/20 
1847 11/07/20 
0041 * 134* K 4.2 3.1*  
CL 88* 93* CO2 40* 38* * 139* BUN 14 16 CREA 0.58 0.64  
CA 10.6* 8.8 ALB 2.7* 2.5* TBILI 0.3 0.3 ALT 19 15 Signed: Gary Camilo MD

## 2020-11-08 NOTE — PROGRESS NOTES
Rapid Response Team Note Called by RN at 9584 to see patient for chest pain stat. Upon entering the room the patient is noted to be in bed, seems to be in discomfort and anxious. Gianluca Quintana Patient complains of chest pain and describes it as a tightness that starts in her chest and feels like it is pulling in her back. Patient's nurse reports that she has been having some anxiety and she had been pulling off her oxygen supplement. She was also complaining of nausea earlier. Jojo Draper is a 81 y.o. WHITE OR  female medical history including but not limited to COPD, seizure disorder, fibromyalgia, hypertension, depression, hyponatremia was admitted yesterday for worsening dyspnea, COPD exacerbation and acute on chronic hypoxic respiratory failure. CT scan of chest reported findings of emphysema with large pleural-based lateral right upper lobe mass, with several other pulmonary nodules in both lungs overall highly suspicious of bronchogenic neoplasm. Allergies Allergen Reactions  Codeine Other (comments)  Erythromycin Nausea Only  Other Medication Other (comments) No sedative or narcotic per son due to hx addiction Current Facility-Administered Medications:  
  sodium chloride (NS) flush 5-40 mL, 5-40 mL, IntraVENous, Q8H, Justo Gill MD, 10 mL at 11/08/20 1889   sodium chloride (NS) flush 5-40 mL, 5-40 mL, IntraVENous, PRN, Justo Gill MD 
  acetaminophen (TYLENOL) tablet 650 mg, 650 mg, Oral, Q4H PRN, Justo Gill MD 
  HYDROcodone-acetaminophen (NORCO) 5-325 mg per tablet 1 Tab, 1 Tab, Oral, Q4H PRN, Justo Gill MD, 1 Tab at 11/08/20 1185   LORazepam (ATIVAN) tablet 2 mg, 2 mg, Oral, BID PRN, Justo Gill MD, 2 mg at 11/07/20 2999   naloxone (NARCAN) injection 0.4 mg, 0.4 mg, IntraVENous, PRN, Justo Gill MD 
  amLODIPine (NORVASC) tablet 10 mg, 10 mg, Oral, DAILY, Bridget Maribel Ruano MD, 10 mg at 11/07/20 4648   DULoxetine (CYMBALTA) capsule 40 mg, 40 mg, Oral, DAILY, Maribel Gill MD, 40 mg at 11/07/20 1210   levETIRAcetam (KEPPRA) tablet 500 mg, 500 mg, Oral, BID, Maribel Gill MD, 500 mg at 11/07/20 1724 
  methylPREDNISolone (PF) (SOLU-MEDROL) injection 40 mg, 40 mg, IntraVENous, Q12H, Maribel Gill MD, 40 mg at 11/07/20 2156 
  gabapentin (NEURONTIN) capsule 300 mg, 300 mg, Oral, BID, Maribel Gill MD, 300 mg at 11/07/20 1723   ipratropium (ATROVENT) 0.02 % nebulizer solution 0.5 mg, 0.5 mg, Nebulization, Q6H RT, Maribel Gill MD, 0.5 mg at 11/08/20 0247   enoxaparin (LOVENOX) injection 40 mg, 40 mg, SubCUTAneous, Q24H, Vic Spencer MD, 40 mg at 11/07/20 1247 
  metoprolol tartrate (LOPRESSOR) tablet 50 mg, 50 mg, Oral, BID, Ingris Moreno MD, 50 mg at 11/07/20 1723 
  methocarbamoL (ROBAXIN) tablet 750-1,500 mg, 750-1,500 mg, Oral, BID, Ingris Moreno MD, 1,500 mg at 11/07/20 1723   ondansetron (ZOFRAN) injection 4 mg, 4 mg, IntraVENous, Q4H PRN, Chito Boyce DO, 4 mg at 11/08/20 9604 Visit Vitals BP (!) 152/82 Pulse (!) 118 Temp 97.2 °F (36.2 °C) Resp 20 Ht 5' 2\" (1.575 m) Wt 77.1 kg (170 lb) SpO2 92% BMI 31.09 kg/m² Review of Systems Respiratory: Positive for shortness of breath. Negative for cough. Cardiovascular: Positive for chest pain. Gastrointestinal: Positive for nausea and vomiting. Negative for abdominal pain. Psychiatric/Behavioral: The patient is nervous/anxious. Physical Exam 
HENT:  
   Head: Normocephalic and atraumatic. Cardiovascular:  
   Rate and Rhythm: Tachycardia present. Pulmonary:  
   Effort: Pulmonary effort is normal.  
   Breath sounds: Decreased breath sounds present. Neurological:  
   Mental Status: She is alert. Psychiatric:     
   Mood and Affect: Mood is anxious. Pertinent Recent Labs and Xrays: 
 
Recent Labs 11/07/20 0041 WBC 12.2* HGB 10.5* HCT 33.8* * Recent Labs 11/07/20 0041 * K 3.1*  
CL 93* CO2 38* BUN 16  
CREA 0.64 * CA 8.8 No results for input(s): INR, INREXT in the last 72 hours. No results for input(s): PH, PCO2, PO2 in the last 72 hours. No results for input(s): PHI, PO2I, PCO2I in the last 72 hours. Recent Labs 11/07/20 0041 TROIQ <0.05 Lab Results Component Value Date/Time Glucose (POC) 134 (H) 03/06/2018 05:44 AM  
 Glucose (POC) 144 (H) 03/05/2018 11:04 PM  
 Glucose (POC) 193 (H) 03/05/2018 05:39 PM  
 Glucose (POC) 156 (H) 03/05/2018 12:11 PM  
 Glucose (POC) 167 (H) 03/05/2018 06:22 AM  
  
 
 
Recent Imaging studies(If Any) CXR Results  (Last 48 hours) 11/07/20 0024  XR CHEST PORT Final result Impression:  IMPRESSION:  
6 cm right upper lobe masslike density and possible left upper lobe nodule. Recommend CT with IV contrast.  
  
 Narrative:  INDICATION: dyspnea EXAM:  AP CHEST RADIOGRAPH  
   
COMPARISON: March 5, 2018 FINDINGS:  
   
AP portable view of the chest demonstrates a normal cardiomediastinal  
silhouette. 6.3 cm pleural-based masslike density lateral right upper lobe. Possible nodular density left upper lobe. No pulmonary edema or pneumothorax. The osseous structures are unremarkable. Echo Results  (Last 48 hours) None CT Results  (Last 48 hours) 11/07/20 0151  CT CHEST W CONT Final result Impression:  IMPRESSION:  
Emphysema with large pleural-based necrotic lateral right upper lobe mass, with  
several other pulmonary nodules in both lungs as described all highly suspicious  
for bronchogenic neoplasm. Atypical/mycobacterial infection may result in  
cavitary upper lobe lesions though felt to be less likely. Correlate with  
pertinent clinical findings. The findings were called to Dr. Marielle Leal on 11/7/2020 at [address] by myself. Luis Siegel Narrative:  INDICATION: dyspnea, RUL mass COMPARISON: March 3, 2018 TECHNIQUE:  After the uneventful administration of IV contrast, 5 mm axial  
images were obtained through the chest. CT dose reduction was achieved through  
use of a standardized protocol tailored for this examination and automatic  
exposure control for dose modulation. FINDINGS:  
   
THYROID: Unremarkable. MEDIASTINUM/MITCHELL: Moderately dilated and predominantly fluid-filled thoracic  
esophagus. HEART/PERICARDIUM: Unremarkable. LUNGS: Emphysema. Large, pleural-based 5.3 x 3.1 cm mass lateral right upper  
lobe with small central cavitation. Similar but much smaller, spiculated nodule  
posterior left upper lobe 1.8 x 1.1 cm with small amount of central cavitation. 0.8 cm ill-defined oval nodule lingula; pleural-based 1.3 x 1.0 cm nodule  
posterior left lower lobe. Subtle pleural-based nodularity in the posterior left  
costophrenic angle image 50 proximally 2.6 x 0.7 cm. No pulmonary edema,  
pneumothorax, or pleural effusion. INCIDENTALLY IMAGED UPPER ABDOMEN: Intra and extrahepatic biliary dilatation  
similar to prior study. BONES: No destructive bone lesion. ADDITIONAL COMMENTS: N/A.  
   
  
  
  
 
EKG RESULTS Procedure Component Value Units Date/Time EKG, 12 LEAD, INITIAL [[id number]] Collected:  11/07/20 0015 Order Status:  Completed Updated:  11/07/20 8867 Ventricular Rate 110 BPM   
  Atrial Rate 110 BPM   
  P-R Interval 136 ms QRS Duration 82 ms Q-T Interval 350 ms QTC Calculation (Bezet) 473 ms Calculated P Axis 81 degrees Calculated R Axis 54 degrees Calculated T Axis 46 degrees Diagnosis --  
  Sinus tachycardia Inferior infarct (cited on or before 17-DEC-2015) Cannot rule out Anterior infarct (cited on or before 17-DEC-2015) When compared with ECG of 03-MAR-2018 12:57, 
fusion complexes are no longer present T wave amplitude has decreased in Anterior leads Recent Microbiology Data(If Any) Derril [place] All Micro Results None LAB DATA REVIEWED:   
No results found for this or any previous visit (from the past 24 hour(s)). Assessment, action, and plan Chest pain · EKG was done and compared to last EKG it is similar with only difference of increased heart rate · It is very unlikely that this chest pain is cardiac related secondary to patient's pulmonary diagnosis. · Patient extremely anxious and therefore she received ativan 2 mg x 1 dose · I did order CBC, CMP , cardiac enzymes and troponin Signed by:  Teresa Whiting, KAYCEE, ACNP-BC Critical care time unrelated to prior notes: 45 minutes Please note that this note was dictated using Dragon computer voice recognition software. Quite often unanticipated grammatical, syntax, homophones, and other interpretive errors are inadvertently transcribed by the computer software. Please disregard these errors. Please excuse any errors that have escaped final proofreading.

## 2020-11-08 NOTE — PROGRESS NOTES
Bedside shift change report given to [address] [place] [address] (oncoming nurse) by Dex Hawkins RN (offgoing nurse). Report included the following information SBAR, Kardex, Procedure Summary, Intake/Output, MAR and Recent Results.

## 2020-11-08 NOTE — PROGRESS NOTES
0329 - Patient verbalized that she was having chest pains. Called rapid response. See notes from Rapid response RN Nick Meek for further details. Bedside and Verbal shift change report given to NICHOLAS Irby (oncoming nurse) by Yogesh Abbott RN (offgoing nurse). Report included the following information SBAR, Kardex, Procedure Summary, Intake/Output, MAR and Recent Results.

## 2020-11-08 NOTE — PROGRESS NOTES
Bedside shift change report given to [address] [place] [address] (oncoming nurse) by Linette Melendrez RN (offgoing nurse). Report included the following information SBAR, Kardex, Procedure Summary, Intake/Output, MAR and Recent Results.

## 2020-11-09 NOTE — PROGRESS NOTES
RAPID RESPONSE TEAM follow-up Rounded on pt d/t recent RRT for chest pain. Discussed with primary nurse. No acute concerns. No c/o chest pain. Cardiac enzymes negative. VSS Lab Results Component Value Date/Time CK 21 (L) 11/08/2020 06:23 AM  
 CK - MB 2.2 03/04/2018 03:34 AM  
 CK-MB Index 2.1 03/04/2018 03:34 AM  
 Troponin-I, Qt. <0.05 11/08/2020 06:23 AM  
  (H) 10/20/2015 02:53 AM  
 
Visit Vitals BP (!) 158/84 (BP 1 Location: Left arm, BP Patient Position: At rest) Pulse (!) 105 Temp 98.6 °F (37 °C) Resp 17 Ht 5' 2\" (1.575 m) Wt 77.1 kg (170 lb) SpO2 93% BMI 31.09 kg/m² No RRT interventions currently indicated. Eleni Bowman RN 
RRT, [id number]

## 2020-11-09 NOTE — PROGRESS NOTES
Hospitalist Progress Note NAME: Yasir Davis :  1950 MRN:  [id number] Assessment / Plan: 
Acute on chronic hypoxic respiratory failure POA 
COPD exacerbation POA Bilateral lung masses, cavitary, concerning from bronchogenic carcinoma/abscess? SIRS, 2/2 above Chronic smoking, 50-pack-year tobacco use 
 
-CT scan with findings of emphysema with large pleural-based lateral right upper lobe mass, with several other pulmonary nodules in both lungs overall highly suspicious of bronchogenic neoplasm. Largest 5.3x 3.1 cm , cavitary lesion 
-2 L at baseline, currently on 5 L nasal cannula 
-Elevated WBC, likely because of steroids 
-Solu-Medrol 40 IV 3 times a day 
-Continue nebs 
-Continue GENEVA/ICS 
-Antibiotic switch to Augmentin 
 
-Appreciate pulmonary help, plan for CT-guided lung biopsy, if unsuccessful will need EBUS 
-Follow cultures pathology 
-Follows with Dr. Patria Meneses pulmonology 
-Rozanna Loges against smoking 
 
-Discussed with the pulmonary, also concern for abscess, echo pending to rule out any endocarditis Hyperthyrodism TSH less than 0.01, free T4 2.0. Pending total T3 
-Reviewed chart, patient had a elevated free T4 in 2016. Had ultrasound which did not show any nodule 
-We will place on methimazole, continue beta-blocker 
-Will need outpatient follow-up, will give contact information for endocrinologist 
 
  
Depression/anxiety Fibromyalgia As Needed Lorazepam for Anxiety Continue Cymbalta Continue gabapentin 
  
Hypertension continue amlodipine 
  
History of seizures No seizures for years Continue Keppra 
  
History of substance abuse Denied any use of substances recently 
  
  
  
Code Status: Full code Surrogate Decision Maker: Son [place]? Discussed with the sister yesterday, 
  
DVT Prophylaxis: SCDs GI Prophylaxis: not indicated 
  
Baseline: Independent in ADLs Plan for CT-guided biopsy today/Tomorrow Subjective: Chief Complaint / Reason for Physician Visit Looks better than yesterday, breathing has improved. Continues to be on 5 L nasal cannula. Review of Systems: 
Symptom Y/N Comments  Symptom Y/N Comments Fever/Chills n   Chest Pain y  improved Poor Appetite n   Edema n   
Cough y   Abdominal Pain n   
Sputum n   Joint Pain SOB/DÍAZ    Pruritis/Rash Nausea/vomit    Tolerating PT/OT Diarrhea    Tolerating Diet Constipation    Other Could NOT obtain due to:   
 
Objective: VITALS:  
Last 24hrs VS reviewed since prior progress note. Most recent are: 
Patient Vitals for the past 24 hrs: 
 Temp Pulse Resp BP SpO2  
11/09/20 1002     94 % 11/09/20 0746 98.6 °F (37 °C) (!) 105 17 (!) 158/84 91 % 11/09/20 0315 97.6 °F (36.4 °C) (!) 104 17 (!) 146/74 93 % 11/09/20 0223     91 % 11/08/20 2256 97.6 °F (36.4 °C) (!) 102 18 131/75 95 % 11/08/20 2053     91 % 11/08/20 2016 97.7 °F (36.5 °C) 99 19 (!) 149/81 91 % 11/08/20 1600  (!) 115  (!) 155/78   
11/08/20 1507 97.5 °F (36.4 °C) (!) 115 22 (!) 170/80 92 % 11/08/20 1442 97.5 °F (36.4 °C) (!) 121 26 (!) 182/80 92 % Intake/Output Summary (Last 24 hours) at 11/9/2020 1236 Last data filed at 11/9/2020 2325 Gross per 24 hour Intake 480 ml Output 1050 ml Net -570 ml PHYSICAL EXAM: 
General: WD, WN. Alert, mild distress due to shortness of breath EENT:  EOMI. Anicteric sclerae. MMM Resp:  Wheezing bilaterally, coarse sounds CV:  Regular  rhythm,  No edema GI:  Soft, Non distended, Non tender.  +Bowel sounds Neurologic:  Alert and oriented X 3, normal speech, Psych:   Good insight. Not anxious nor agitated Skin:  No rashes. No jaundice Reviewed most current lab test results and cultures  YES Reviewed most current radiology test results   YES Review and summation of old records today    NO Reviewed patient's current orders and MAR    YES 
PMH/SH reviewed - no change compared to H&P 
 ________________________________________________________________________ Care Plan discussed with: 
  Comments Patient x Family RN x Care Manager Consultant Multidiciplinary team rounds were held today with , nursing, pharmacist and clinical coordinator. Patient's plan of care was discussed; medications were reviewed and discharge planning was addressed. ________________________________________________________________________ Total NON critical care TIME:  40   Minutes Total CRITICAL CARE TIME Spent:   Minutes non procedure based Comments >50% of visit spent in counseling and coordination of care    
________________________________________________________________________ Gadiel Figueroa MD  
 
Procedures: see electronic medical records for all procedures/Xrays and details which were not copied into this note but were reviewed prior to creation of Plan. LABS: 
I reviewed today's most current labs and imaging studies. Pertinent labs include: 
Recent Labs 11/09/20 
9065 11/08/20 
9983 11/07/20 
0041 WBC 17.4* 19.9* 12.2* HGB 10.6* 11.1* 10.5* HCT 34.7* 36.5 33.8*  
 440* 441* Recent Labs 11/09/20 
4419 11/08/20 
4222 11/07/20 
0041 * 134* 134* K 3.9 4.2 3.1*  
CL 86* 88* 93* CO2 44* 40* 38* * 130* 139* BUN 14 14 16 CREA 0.47* 0.58 0.64 CA 9.5 10.6* 8.8 MG 1.8  --   --   
ALB  --  2.7* 2.5* TBILI  --  0.3 0.3 ALT  --  19 15 Signed: Gadiel Figueroa MD

## 2020-11-09 NOTE — PROGRESS NOTES
Patient will be scheduled for lung biopsy on 11/10/20 . Patient with no IV access, has not bed NPO for procedure. Awaiting PICC. Narciso Nair RN notified.

## 2020-11-09 NOTE — PROGRESS NOTES
ORTHOPAEDIC CONSULT NOTE Subjective:  
 
Date of Consultation:  November 9, 2020 Genaro Knapp is a 81 y.o. female who is being seen for right wrist fracture, right ankle sprain---Pt seen in the ED 10/26/2020. Pt admitted from SOB/COPD, new lung mass. Righ wrist cast fitted by Dr Samm Mike with [address] Patient Active Problem List  
 Diagnosis Date Noted  Sepsis ([address] 12/18/2015 Priority: 1 - One  
  Class: Present on Admission  Bacteremia due to Escherichia coli 12/20/2015 Priority: 2 - Two Class: Present on Admission  Hyponatremia 12/18/2015 Priority: 2 - Two Class: Present on Admission  Rhabdomyolysis 12/18/2015 Priority: 2 - Two Class: Present on Admission  Dilantin toxicity 12/18/2015 Priority: 3 - Three Class: Present on Admission  Polypharmacy 12/18/2015 Priority: 3 - Three Class: Present on Admission  Acute encephalopathy 03/04/2018  Acute on chronic respiratory failure with hypercapnia ([address] 03/03/2018  Closed right ankle fracture 07/08/2016  Drug overdose 06/26/2016  Recurrent falls 12/17/2015  Chronic pain 02/06/2014  Hypomagnesemia 02/04/2014  Colitis 01/27/2014  ARF (acute renal failure) ([address] 01/27/2014  Encephalopathy, unspecified 10/24/2012  Acute respiratory failure ([address] 10/24/2012  COPD exacerbation ([address] 09/13/2010  Seizure disorder ([address] 09/13/2010  Osteopenia 09/13/2010  Fibromyalgia 09/13/2010  Essential hypertension, benign 09/13/2010  Depression 09/13/2010  Nephrolithiasis 09/13/2010  Sciatica 09/13/2010 Family History Problem Relation Age of Onset  Stroke Mother  Heart Disease Mother  Lung Disease Father   
     copd Social History Tobacco Use  Smoking status: Current Every Day Smoker Packs/day: 0.25 Years: 30.00 Pack years: 7.50  Smokeless tobacco: Never Used Substance Use Topics  Alcohol use:  No  
 
 Past Medical History:  
Diagnosis Date  Arthritis  Asthma  COPD   
 2 L NC  
 Hypertension  Ischemic colitis ([facility] [facility]) 2012  Migraines  Neurological disorder   
 migraines  Psychiatric disorder   
 depression  Seizures ([facility]) Last seizure 4 years ago  Vaginal candidiasis 2012 Past Surgical History:  
Procedure Laterality Date  HX APPENDECTOMY  HX  SECTION    
 HX CHOLECYSTECTOMY  HX HYSTERECTOMY  HX ORTHOPAEDIC    
 lumbar disc sx  HX ORTHOPAEDIC    
 left knee sx  HX OTHER SURGICAL    
 colonoscopy-recent colitis  AR COLONOSCOPY W/BIOPSY SINGLE/MULTIPLE  3/1/2012 Prior to Admission medications Medication Sig Start Date End Date Taking? Authorizing Provider  
metoprolol tartrate (Lopressor) 50 mg tablet Take 50 mg by mouth daily. Yes Provider, Historical  
tiotropium bromide (Spiriva Respimat) 2.5 mcg/actuation inhaler Take 2 Puffs by inhalation daily. Yes Provider, Historical  
budesonide-formoteroL (Symbicort) 160-4.5 mcg/actuation HFAA Take 1 Puff by inhalation two (2) times a day. Yes Provider, Historical  
gabapentin (NEURONTIN) 300 mg capsule Take 300 mg by mouth three (3) times daily. Yes Provider, Historical  
meclizine (ANTIVERT) 25 mg tablet Take 25 mg by mouth four (4) times daily as needed for Dizziness. 3-4 times daily as needed   Yes Provider, Historical  
aspirin 81 mg chewable tablet Take 81 mg by mouth daily. Yes Provider, Historical  
OTHER Apply  to affected area four (4) times daily as needed (Spasms). Morphine 120 mg in Aquaphor 80 g topical cream.   Yes Provider, Historical  
albuterol-ipratropium (DUO-NEB) 2.5 mg-0.5 mg/3 ml nebu 3 mL by Nebulization route four (4) times daily as needed. Heri Rosenberg MD  
methocarbamol (ROBAXIN) 750 mg tablet Take 750 mg by mouth four (4) times daily. Heri Rosenberg MD  
amLODIPine (NORVASC) 10 mg tablet Take 10 mg by mouth daily.     Heri Rosenberg MD  
 omeprazole (PRILOSEC) 40 mg capsule Take 40 mg by mouth daily. Eron Alfred  
ondansetron (ZOFRAN ODT) 4 mg disintegrating tablet Take 1 Tab by mouth every eight (8) hours as needed. 6/30/16   Meeta Salomon MD  
albuterol (PROVENTIL VENTOLIN) 2.5 mg /3 mL (0.083 %) nebulizer solution 3 mL by Nebulization route every four (4) hours as needed. 12/23/15   Lynn Wilder MD  
levETIRAcetam (KEPPRA) 500 mg tablet Take 1 Tab by mouth two (2) times a day. 12/23/15   Lynn Wilder MD  
metoprolol (LOPRESSOR) 50 mg tablet Take 1 Tab by mouth two (2) times a day. 12/23/15   Lynn Wilder MD  
DULoxetine (CYMBALTA) 20 mg capsule Take 40 mg by mouth daily. Other, MD Heri  
 
Current Facility-Administered Medications Medication Dose Route Frequency  amoxicillin-clavulanate (AUGMENTIN) 875-125 mg per tablet 1 Tab  1 Tab Oral Q12H  
 zinc oxide-cod liver oil (DESITIN) 40 % paste   Topical BID and QHS  gabapentin (NEURONTIN) capsule 300 mg  300 mg Oral TID  arformoterol 15 mcg/budesonide 0.5 mg neb solution   Nebulization BID RT  
 zinc oxide-cod liver oil (DESITIN) 40 % paste   Topical PRN  
 methylPREDNISolone (PF) (SOLU-MEDROL) injection 40 mg  40 mg IntraVENous Q8H  
 morphine injection 2 mg  2 mg IntraVENous Q6H PRN  
 0.9% sodium chloride infusion  50 mL/hr IntraVENous CONTINUOUS  
 levalbuterol (XOPENEX) nebulizer soln 1.25 mg/3 mL  1.25 mg Nebulization Q6H RT  
 ipratropium (ATROVENT) 0.02 % nebulizer solution 0.5 mg  0.5 mg Nebulization Q6H RT  
 labetaloL (NORMODYNE;TRANDATE) injection 10 mg  10 mg IntraVENous Q2H PRN  
 metoprolol tartrate (LOPRESSOR) tablet 50 mg  50 mg Oral TID  sodium chloride (NS) flush 5-40 mL  5-40 mL IntraVENous Q8H  
 sodium chloride (NS) flush 5-40 mL  5-40 mL IntraVENous PRN  
 acetaminophen (TYLENOL) tablet 650 mg  650 mg Oral Q4H PRN  
 HYDROcodone-acetaminophen (NORCO) 5-325 mg per tablet 1 Tab  1 Tab Oral Q4H PRN  
  LORazepam (ATIVAN) tablet 2 mg  2 mg Oral BID PRN  
 naloxone (NARCAN) injection 0.4 mg  0.4 mg IntraVENous PRN  
 amLODIPine (NORVASC) tablet 10 mg  10 mg Oral DAILY  DULoxetine (CYMBALTA) capsule 40 mg  40 mg Oral DAILY  levETIRAcetam (KEPPRA) tablet 500 mg  500 mg Oral BID  [Held by provider] enoxaparin (LOVENOX) injection 40 mg  40 mg SubCUTAneous Q24H  
 methocarbamoL (ROBAXIN) tablet 750-1,500 mg  750-1,500 mg Oral BID  ondansetron (ZOFRAN) injection 4 mg  4 mg IntraVENous Q4H PRN Allergies Allergen Reactions  Codeine Other (comments)  Erythromycin Nausea Only  Other Medication Other (comments) No sedative or narcotic per son due to hx addiction Review of Systems:  A comprehensive review of systems was negative except for that written in the HPI. Mental Status: no dementia Objective:  
 
Patient Vitals for the past 8 hrs: 
 BP Temp Pulse Resp SpO2  
20 1002     94 % 20 0746 (!) 158/84 98.6 °F (37 °C) (!) 105 17 91 % 20 0315 (!) 146/74 97.6 °F (36.4 °C) (!) 104 17 93 % Temp (24hrs), Av.7 °F (36.5 °C), Min:97.4 °F (36.3 °C), Max:98.6 °F (37 °C) Gen: Well-developed,  in no acute distress HEENT: Pink conjunctivae, hearing intact to voice, moist mucous membranes Neck: Supple Resp: No respiratory distress Card: RRR, palpable distal pulse-equal bilaterally, birsk cap refill all distal digits Abd:  non-distended Musc:  Composite fist. Intact flex/ext of the the digits. Bilat LE w/o swelling, ecchy or TTP. Supple pain free AROM for the ankle and toes. No sign of LE VTE Skin: right hand and dorsal hand/2nd MP joint with superficial abrasion from cast irration. Neuro: Cranial nerves are grossly intact, no focal motor weakness, follows commands appropriately Psych: Good insight, oriented to person, place and time, alert Imaging Review: EXAM: XR WRIST RT AP/LAT/OBL MIN 3V 
 INDICATION: wrist deformity post fall.  COMPARISON: 9/18/2012. 
 FINDINGS: Three  views of the right wrist demonstrate a malleable plate with 
multiple screws transfixing well-healed distal radial fracture. There is no 
lucency surrounding the screws. There is marked dorsal impaction of a 10 mm 
fragment of the distal radial surface with associated soft tissue swelling. IMPRESSION:  
 Probable acute superimposed on healed right distal radial fractures with 
associated soft tissue swelling EXAM: XR ANKLE RT MIN 3 V 
INDICATION: Right ankle pain after trauma. COMPARISON: 7/10/2016. [place] [facility] FINDINGS: Three views of the right ankle demonstrate no fracture or disruption 
of the ankle mortise. There is lateral soft tissue swelling. Incidental note is 
made of a small plantar spur. The patient has effusion of the first 
metatarsal-second metatarsal joint with a fracture of the horizontal screw. IMPRESSION:  
Nonspecific right lateral ankle sprain. Incidental small plantar spur. Interval fracture of one of the 2 first-second metatarsal-tarsal fusion screws. Labs:  
Recent Results (from the past 24 hour(s)) POC EG7 Collection Time: 11/08/20  4:31 PM  
Result Value Ref Range Calcium, ionized (POC) 1.25 1.12 - 1.32 mmol/L  
 pH (POC) 7.51 (H) 7.35 - 7.45    
 pCO2 (POC) 56.9 (H) 35.0 - 45.0 MMHG  
 pO2 (POC) 90 80 - 100 MMHG  
 HCO3 (POC) 45.7 (H) 22 - 26 MMOL/L Base excess (POC) 23 mmol/L  
 sO2 (POC) 97 92 - 97 % Site LEFT BRACHIAL Device: NASAL CANNULA Flow rate (POC) 5 L/M Allens test (POC) N/A Specimen type (POC) ARTERIAL    
RESPIRATORY PANEL,PCR,NASOPHARYNGEAL Collection Time: 11/08/20  4:32 PM  
 Specimen: Nasopharyngeal  
Result Value Ref Range Adenovirus Not detected NOTD Coronavirus 229E Not detected NOTD Coronavirus HKU1 Not detected NOTD Coronavirus CVNL63 Not detected NOTD Coronavirus OC43 Not detected NOTD Metapneumovirus Not detected NOTD Rhinovirus and Enterovirus Not detected NOTD Influenza A Not detected NOTD Influenza A, subtype H1 Not detected NOTD Influenza A, subtype H3 Not detected NOTD INFLUENZA A H1N1 PCR Not detected NOTD Influenza B Not detected NOTD Parainfluenza 1 Not detected NOTD Parainfluenza 2 Not detected NOTD Parainfluenza 3 Not detected NOTD Parainfluenza virus 4 Not detected NOTD    
 RSV by PCR Not detected NOTD B. parapertussis, PCR Not detected NOTD Bordetella pertussis - PCR Not detected NOTD Chlamydophila pneumoniae DNA, QL, PCR Not detected NOTD Mycoplasma pneumoniae DNA, QL, PCR Not detected NOTD METABOLIC PANEL, BASIC Collection Time: 11/09/20  2:47 AM  
Result Value Ref Range Sodium 133 (L) 136 - 145 mmol/L Potassium 3.9 3.5 - 5.1 mmol/L Chloride 86 (L) 97 - 108 mmol/L  
 CO2 44 (HH) 21 - 32 mmol/L Anion gap 3 (L) 5 - 15 mmol/L Glucose 129 (H) 65 - 100 mg/dL BUN 14 6 - 20 MG/DL Creatinine 0.47 (L) 0.55 - 1.02 MG/DL  
 BUN/Creatinine ratio 30 (H) 12 - 20 GFR est AA >60 >60 ml/min/1.73m2 GFR est non-AA >60 >60 ml/min/1.73m2 Calcium 9.5 8.5 - 10.1 MG/DL  
CBC WITH AUTOMATED DIFF Collection Time: 11/09/20  2:47 AM  
Result Value Ref Range WBC 17.4 (H) 3.6 - 11.0 K/uL  
 RBC 3.60 (L) 3.80 - 5.20 M/uL  
 HGB 10.6 (L) 11.5 - 16.0 g/dL HCT 34.7 (L) 35.0 - 47.0 % MCV 96.4 80.0 - 99.0 FL  
 MCH 29.4 26.0 - 34.0 PG  
 MCHC 30.5 30.0 - 36.5 g/dL  
 RDW 13.5 11.5 - 14.5 % PLATELET 794 907 - 333 K/uL MPV 9.8 8.9 - 12.9 FL  
 NRBC 0.0 0  WBC ABSOLUTE NRBC 0.00 0.00 - 0.01 K/uL NEUTROPHILS 92 (H) 32 - 75 % LYMPHOCYTES 2 (L) 12 - 49 % MONOCYTES 5 5 - 13 % EOSINOPHILS 0 0 - 7 % BASOPHILS 0 0 - 1 % IMMATURE GRANULOCYTES 1 (H) 0.0 - 0.5 % ABS. NEUTROPHILS 16.0 (H) 1.8 - 8.0 K/UL  
 ABS. LYMPHOCYTES 0.3 (L) 0.8 - 3.5 K/UL  
 ABS. MONOCYTES 0.9 0.0 - 1.0 K/UL ABS. EOSINOPHILS 0.0 0.0 - 0.4 K/UL  
 ABS. BASOPHILS 0.0 0.0 - 0.1 K/UL  
 ABS. IMM. GRANS. 0.2 (H) 0.00 - 0.04 K/UL  
 DF SMEAR SCANNED    
 PLATELET COMMENTS Large Platelets RBC COMMENTS MACROCYTOSIS 
1+ MAGNESIUM Collection Time: 11/09/20  2:47 AM  
Result Value Ref Range Magnesium 1.8 1.6 - 2.4 mg/dL TSH 3RD GENERATION Collection Time: 11/09/20  2:47 AM  
Result Value Ref Range TSH <0.01 (L) 0.36 - 3.74 uIU/mL Impression:  
 
Patient Active Problem List  
 Diagnosis Date Noted  Sepsis ([address] 12/18/2015 Priority: 1 - One  
  Class: Present on Admission  Bacteremia due to Escherichia coli 12/20/2015 Priority: 2 - Two Class: Present on Admission  Hyponatremia 12/18/2015 Priority: 2 - Two Class: Present on Admission  Rhabdomyolysis 12/18/2015 Priority: 2 - Two Class: Present on Admission  Dilantin toxicity 12/18/2015 Priority: 3 - Three Class: Present on Admission  Polypharmacy 12/18/2015 Priority: 3 - Three Class: Present on Admission  Acute encephalopathy 03/04/2018  Acute on chronic respiratory failure with hypercapnia ([address] 03/03/2018  Closed right ankle fracture 07/08/2016  Drug overdose 06/26/2016  Recurrent falls 12/17/2015  Chronic pain 02/06/2014  Hypomagnesemia 02/04/2014  Colitis 01/27/2014  ARF (acute renal failure) ([address] 01/27/2014  Encephalopathy, unspecified 10/24/2012  Acute respiratory failure ([address] 10/24/2012  COPD exacerbation ([address] 09/13/2010  Seizure disorder ([address] 09/13/2010  Osteopenia 09/13/2010  Fibromyalgia 09/13/2010  Essential hypertension, benign 09/13/2010  Depression 09/13/2010  Nephrolithiasis 09/13/2010  Sciatica 09/13/2010 Active Problems: COPD exacerbation ([address] (9/13/2010) Overview: O2 dependent--Dr. Yue Abad Plan:  
 
Right wrist short arm cast removed- wound care, and velcro splint ordered - (NV exam intact pre/post cast application) NWB on the RUE 
xrays of the right wrist ordered WBAT on the LE bilat Office FU with Dr Jessica Mcdonough as previously scheduled Dr. Jessica Mcdonough  aware and agrees with plan as above. Frederic Bañuelos PA-SARAH [employer]

## 2020-11-09 NOTE — PROGRESS NOTES
ADULT PROTOCOL: JET AEROSOL  REASSESSMENT Patient  Ramo Jones     81 y.o.   female     11/9/2020  10:03 AM 
 
Breath Sounds Pre Procedure: Right Breath Sounds: Diminished Left Breath Sounds: Diminished Breath Sounds Post Procedure: Right Breath Sounds: Diminished Left Breath Sounds: Diminished Breathing pattern: Pre procedure Breathing Pattern: Regular Post procedure Breathing Pattern: Regular Heart Rate: Pre procedure Pulse: 105 Post procedure Pulse: 105 Resp Rate: Pre procedure Respirations: 18 Post procedure Respirations: 18 
  
Oxygen: O2 Device: Nasal cannula   5L SpO2: Pre procedure SpO2: 94 % Post procedure SpO2: 94 % Nebulizer Therapy: Current medications Aerosolized Medications: Ipratropium bromide, Xopenex, Brovana, Pulmicort Problem List:  
Patient Active Problem List  
Diagnosis Code  COPD exacerbation ([facility] J44.1  [facility] G40.909  Osteopenia M85.80  Fibromyalgia M79.7  Essential hypertension, benign I10  
 Depression F32.9  Nephrolithiasis N20.0  Sciatica M54.30  Encephalopathy, unspecified G93.40  Acute respiratory failure (HCC) J96.00  
 Colitis K52.9  [facility]F [facility]acute renal failure) ([facility] N17.9  Hypomagnesemia E83.42  Chronic pain G89.29  
 Recurrent falls R29.6  Sepsis ([facility] A41.9  Hyponatremia E87.1  Dilantin toxicity T42.0X1A  Rhabdomyolysis M62.82  
 Polypharmacy Z79.899  Bacteremia due to Escherichia coli R78.81, B96.20  Drug overdose T50.901A  Closed right ankle fracture S82.891A  Acute on chronic respiratory failure with hypercapnia (HCC) J96.22  
 Acute encephalopathy G93.40 Respiratory Therapist: Rexene Gitelman

## 2020-11-09 NOTE — PROGRESS NOTES
Kian Jose RN tried x2 to get an IV and labs on this pt. This RN tried x1. ED tech with ultrasound machine tried x4 with no success. ED tech able to obtain labs. This RN left  for PICC team at 0630.

## 2020-11-09 NOTE — PROGRESS NOTES
RAPID RESPONSE TEAM- Follow Up Rounded on patient due to recent rapid response chest pain. Admitted for COPD exacerbation, bilateral lung masses. Chest pain felt to be more pleuritic in nature. Discussed with primary RN, Paulina Heart. No acute concerns, VSS, MEWS ***. Patient resting in bed, watching TV, upon entering room, patient became anxious, requesting pain medication. Relayed need to CORRY Moncada. Patient Vitals for the past 12 hrs: 
 Temp Pulse Resp BP SpO2  
11/08/20 1600  (!) 115  (!) 155/78   
11/08/20 1507 97.5 °F (36.4 °C) (!) 115 22 (!) 170/80 92 % 11/08/20 1442 97.5 °F (36.4 °C) (!) 121 26 (!) 182/80 92 % 11/08/20 1047 97.4 °F (36.3 °C) (!) 116 14 (!) 168/87 93 % 11/08/20 0902     91 % Lab Results Component Value Date/Time CK 21 (L) 11/08/2020 06:23 AM  
 CK - MB 2.2 03/04/2018 03:34 AM  
 CK-MB Index 2.1 03/04/2018 03:34 AM  
 Troponin-I, Qt. <0.05 11/08/2020 06:23 AM  
  (H) 10/20/2015 02:53 AM  
 
 
No RRT interventions indicated at this time. Please call with any questions or concerns. Leo Padgett Rapid Response RN [place]

## 2020-11-09 NOTE — PALLIATIVE CARE
Brief summary of visit , full note will be placed. At this time , she is pain , brief conversation to introduce our service including extra layer of support. Patient tells me without prompting \" I do not have much time left \" She is awaiting lung biopsy . We will continue to follow . Suggest : 
 
Decrease ativan from 2 to 1  mg every 12 hrs prn , given COPD on combination of opioids and benzodiazepine , high risk of respiratory depression . Place her on continuous pulse ox .

## 2020-11-09 NOTE — OP NOTES
Shabnam Torres MD - Adult Reconstruction and Total Joint Replacement Ness Moran - MRN [id number] - : 1950 (81 y.o.) Date: * No surgery found * PREOPERATIVE DIAGNOSIS:  Left knee degenerative joint disease POSTOPERATIVE DIAGNOSIS:  Same PROCEDURE: Total knee replacement with imageless computer navigation Implants Used:  
[employer] Truliant Femur size 3, Logic femur size 3, 12mm PSC poly Anesthesia: General + block / local 
 
Pre-operative antibiotic: Ancef Surgeon: Shabnam Torres Assist: DUONG Barrow (Performing all or most of the following assistant-at-surgery services including but not limited to: proper patient positioning, sterile/prep and draping, placement of instruments/trackers, operative exposure, minor portions of bone / soft tissue excision, final irrigation and debridement, deep and superficial closure, application of final dressings) EBL: minimal 
 
Drains: none Specimens: none Complications: none Condition: stable to PACU INDICATIONS: Longstanding knee pain unresponsive to conservative measures. The risks, benefits, and alternatives to the procedure were explained to the patient and they wished to proceed. They understood no guarantees could be given about the outcome of the procedure. DESCRIPTION OF PROCEDURE:  The patient was brought in to the operating room and placed supine on a standard OR table. Anesthesia was provided by the anesthesia team without difficulty. A thigh tourniquet was applied to the operative limb which was then prepped and draped in the usual fashion. Pre-operative antibiotics were administered. An appropriate time-out was performed. The limb was exsanguinated with an Esmarch and tourniquet inflated. A standard medial parapatellar arthrotomy was used. The fat pad was excised.  The patella was then subluxed laterally and attention turned to the femur. Navigation was used after the registration sequence to make the appropriate distal femoral cut, and drill for the lugs of the 4-in-1 guide. The femoral cut was confirmed with navigation. The cruciate ligaments were excised along with the anterior portions of the menisci. The  4-in-1 guide position was confirmed with navigation and pinned in parallel to the epicondylar axis and perpendicular to [place]'s line. The anterior, posterior and chamfer cuts were performed, protecting the collateral ligaments at all times. The posterior capsule was cleared and any osteophytes were removed. The box cut was then made. Attention was then turned to the tibia. The navigation system was used to perform the tibial cut perpendicular to the mechanical axis. The remainder of the menisci were excised and the tibia sized. The patella was noted to be in acceptable condition and was not resurfaced. Selective denervation was performed. Trial components were then placed and the knee taken through a range of motion and found to have excellent range of motion, stability, and ligamentous balance. The rotation of the tibial tray was marked and the trials removed. The trial tibial tray was reinserted and the tibial prepared for the keel of the tray. All cut surfaces were thoroughly lavaged and dried. The final implants were then cemented into place and excess cement removed with a curette. The tibial tray liner was inserted into the locking mechanism and the knee reduced. It was again taken through a range of motion and found to be stable in all planes with excellent tracking of the patella. The wound was thoroughly lavaged again. The extensor mechanism was repaired with heavy interrupted suture and a running stitch. Periarticular injection was performed. Skin closure was then performed in layers. Sterile compressive dressings were applied.  The patient was awakened, moved to the stretcher and taken to the recovery room in stable condition. At the conclusion of the procedure, all counts were correct. There no immediate complications.

## 2020-11-09 NOTE — CONSULTS
Palliative Medicine Consult Sarthak: [phone] ([phone]) Patient Name: Joid Jett YOB: 1950 Date of Initial Consult: 11/9/20 Reason for Consult: care decisions Requesting Provider: Adam Suárez MD  
Primary Care Physician: Dale Collins MD 
 
 SUMMARY:  
Jodi Jett is a 81 y.o. Female with a past history of COPD on 2 litres home oxygen , depression , anxiety , Fibromylagia, Hx of seizures , hx of substance abuse , chronic smoker , Recent fall with right foot fracture ,  who was admitted on 11/7/2020 from  Home with a diagnosis of acute COPD exacerbation , work  Up , CT chest showed emphysema with large pleural-based lateral right upper lobe mass, with bilateral  pulmonary nodules in both lungs,  highly suspicious of bronchogenic neoplasm. Pulmonary is following suggested CT guided bx of right lung lesion . Current medical issues leading to Palliative Medicine involvement include: care decisions for suspected lung cancer , awaiting biopsy . Last admission for COPD in our system is 2018. Social :  , lives with her only child Son Michelle Medina, chronic smoker, quit few weeks ago , decline in function to basically mostly in bed for past few months due to weakness . Yarsanism maggie . PALLIATIVE DIAGNOSES:  
1. Pain 2. Weakness , fatigue 3. Fibromyalgia 4. Hx of substance abuse 5. Suspected lung cancer 6. COPD PLAN:  
1. Chart reviewed, prior to visit, spoke to bed side Rn , who is about to give pain medication to patient . 2. Pain /fibromylagia : 
· Currently having flair up of pain . · Continue with gabapentin and Duloxetine (home regimen ) · Continue with low dose morphine I/V and norco for pain . · Avoid high dose opioids , given COPD. · Consider increasing Duloxetine to 60 mg daily . 3.  Anxiety : decrease ativan from 2 mg to 1 mg every 12 hrs prn , high risk of respiratory depression with combination of opioids and benzodiazepine, given COPD . 4. She tells me \"I can't  handle much \", she tells me without prompting \" I know I don't have much time left \", she shared maggie is very important to her . I will have  follow up with her . 5. Fatigue : on going due to suspected cancer . 6. Goals of care conversation : patient is going to have a biopsy of right lung tomorrow, will wait till we get diagnosis and prognosis . 7. We will discuss advance care planning next visit . 8. We will continue to follow for support . 9.  
10. Initial consult note routed to primary continuity provider and/or primary health care team members 11. Communicated plan of care with: Palliative [facility] Team 
 
 GOALS OF CARE / TREATMENT PREFERENCES:  
 
GOALS OF CARE: 
  
 
TREATMENT PREFERENCES:  
Code Status: Full Code Advance Care Planning: 
[x] The [facility] Interdisciplinary Team has updated the ACP Navigator with [address] and Patient Capacity Primary Decision Maker (Active): [facility] - [phone] Advance Care Planning 3/3/2018 Patient's Healthcare Decision Maker is: Legal Next of Kin Primary Decision Maker Name Nasrin Cooper Primary Decision Maker Phone Number [phone] Primary Decision Maker Relationship to Patient Adult child Secondary Decision Maker Name - Secondary Decision Maker Phone Number - Secondary Decision Maker Relationship to Patient -  
Confirm Advance Directive None Patient Would Like to Complete Advance Directive No  
Does the patient have other document types - Other Instructions: Other: As far as possible, the palliative care team has discussed with patient / health care proxy about goals of care / treatment preferences for patient. HISTORY:  
 
History obtained from: chart, Rn and patient . CHIEF COMPLAINT: pain HPI/SUBJECTIVE: The patient is:  
[] Verbal and participatory Patient has chronic pain issues/fibromyalgia , worse in last few days , she is extremely weak , c/o nausea on and off . Clinical Pain Assessment (nonverbal scale for severity on nonverbal patients):  
Clinical Pain Assessment Severity: 10 Location: back , shoulder , allover Character: dull Duration: few months worse in last 4 days. Effect: weak . Factors: dilaudid and morphine helps with pain . Duration: for how long has pt been experiencing pain (e.g., 2 days, 1 month, years) Frequency: how often pain is an issue (e.g., several times per day, once every few days, constant) FUNCTIONAL ASSESSMENT:  
 
Palliative Performance Scale (PPS): PPS: 40 PSYCHOSOCIAL/SPIRITUAL SCREENING:  
 
Palliative IDT has assessed this patient for cultural preferences / practices and a referral made as appropriate to needs (Cultural Services, Patient Advocacy, Ethics, etc.) Any spiritual / Baptism concerns: 
[] Yes /  [x] No 
 
Caregiver Burnout: 
[] Yes /  [x] No /  [] No Caregiver Present Anticipatory grief assessment:  
[x] Normal  / [] Maladaptive ESAS Anxiety: Anxiety: 1 ESAS Depression: Depression: 0 REVIEW OF SYSTEMS:  
 
Positive and pertinent negative findings in ROS are noted above in HPI. The following systems were [x] reviewed / [] unable to be reviewed as noted in HPI Other findings are noted below. Systems: constitutional, ears/nose/mouth/throat, respiratory, gastrointestinal, genitourinary, musculoskeletal, integumentary, neurologic, psychiatric, endocrine. Positive findings noted below. Modified ESAS Completed by: provider Fatigue: 8 Drowsiness: 0 Depression: 0 Pain: 10 Anxiety: 1 Nausea: 3 Anorexia: 7 Dyspnea: 4 PHYSICAL EXAM:  
 
From RN flowsheet: 
Wt Readings from Last 3 Encounters:  
11/07/20 170 lb (77.1 kg) 10/26/20 170 lb (77.1 kg) 03/07/18 156 lb 12 oz (71.1 kg) Blood pressure (!) 158/84, pulse (!) 105, temperature 98.6 °F (37 °C), resp. rate 17, height 5' 2\" (1.575 m), weight 170 lb (77.1 kg), SpO2 94 %. Pain Scale 1: Numeric (0 - 10) Pain Intensity 1: 10 
Pain Onset 1: acute Pain Location 1: Back, Shoulder Pain Orientation 1: Lower Pain Description 1: Aching, Cramping, Sharp Pain Intervention(s) 1: Medication (see MAR) Last bowel movement, if known:  
 
Constitutional:alert , oriented x3, in moderate distress due to pain Eyes: pupils equal, anicteric ENMT: no nasal discharge, moist mucous membranes Cardiovascular: regular rhythm, distal pulses intact Respiratory: breathing not labored, symmetric Gastrointestinal: soft non-tender, +bowel sounds Musculoskeletal: no deformity, no tenderness to palpation Skin: warm, dry Neurologic: following commands, moving all extremities Psychiatric: full affect, no hallucinations Other: 
 
 
 HISTORY:  
 
Active Problems: COPD exacerbation ([address] (2010) Overview: O2 dependent--Dr. Ofelia Romo Past Medical History:  
Diagnosis Date  Arthritis  Asthma  COPD   
 2 L NC  
 Hypertension  Ischemic colitis ([address] 2012  Migraines  Neurological disorder   
 migraines  Psychiatric disorder   
 depression  Seizures ([address] Last seizure 4 years ago  Vaginal candidiasis 2012 Past Surgical History:  
Procedure Laterality Date  HX APPENDECTOMY  HX  SECTION    
 HX CHOLECYSTECTOMY  HX HYSTERECTOMY  HX ORTHOPAEDIC    
 lumbar disc sx  HX ORTHOPAEDIC    
 left knee sx  HX OTHER SURGICAL    
 colonoscopy-recent colitis  [facility]/BIOPSY SINGLE/MULTIPLE  3/1/2012 Family History Problem Relation Age of Onset  Stroke Mother  Heart Disease Mother  Lung Disease Father   
     copd History reviewed, no pertinent family history. Social History Tobacco Use  Smoking status: Current Every Day Smoker Packs/day: 0.25 Years: 30.00 Pack years: 7.50  Smokeless tobacco: Never Used Substance Use Topics  Alcohol use: No  
 
Allergies Allergen Reactions  Codeine Other (comments)  Erythromycin Nausea Only  Other Medication Other (comments) No sedative or narcotic per son due to hx addiction Current Facility-Administered Medications Medication Dose Route Frequency  amoxicillin-clavulanate (AUGMENTIN) 875-125 mg per tablet 1 Tab  1 Tab Oral Q12H  
 zinc oxide-cod liver oil (DESITIN) 40 % paste   Topical BID and QHS  methIMAzole (TAPAZOLE) tablet 10 mg  10 mg Oral DAILY  gabapentin (NEURONTIN) capsule 300 mg  300 mg Oral TID  arformoterol 15 mcg/budesonide 0.5 mg neb solution   Nebulization BID RT  
 zinc oxide-cod liver oil (DESITIN) 40 % paste   Topical PRN  
 methylPREDNISolone (PF) (SOLU-MEDROL) injection 40 mg  40 mg IntraVENous Q8H  
 morphine injection 2 mg  2 mg IntraVENous Q6H PRN  
 0.9% sodium chloride infusion  50 mL/hr IntraVENous CONTINUOUS  
 levalbuterol (XOPENEX) nebulizer soln 1.25 mg/3 mL  1.25 mg Nebulization Q6H RT  
 ipratropium (ATROVENT) 0.02 % nebulizer solution 0.5 mg  0.5 mg Nebulization Q6H RT  
 labetaloL (NORMODYNE;TRANDATE) injection 10 mg  10 mg IntraVENous Q2H PRN  
 metoprolol tartrate (LOPRESSOR) tablet 50 mg  50 mg Oral TID  sodium chloride (NS) flush 5-40 mL  5-40 mL IntraVENous Q8H  
 sodium chloride (NS) flush 5-40 mL  5-40 mL IntraVENous PRN  
 acetaminophen (TYLENOL) tablet 650 mg  650 mg Oral Q4H PRN  
 HYDROcodone-acetaminophen (NORCO) 5-325 mg per tablet 1 Tab  1 Tab Oral Q4H PRN  
 LORazepam (ATIVAN) tablet 2 mg  2 mg Oral BID PRN  
 naloxone (NARCAN) injection 0.4 mg  0.4 mg IntraVENous PRN  
 amLODIPine (NORVASC) tablet 10 mg  10 mg Oral DAILY  DULoxetine (CYMBALTA) capsule 40 mg  40 mg Oral DAILY  levETIRAcetam (KEPPRA) tablet 500 mg  500 mg Oral BID  
  [Held by provider] enoxaparin (LOVENOX) injection 40 mg  40 mg SubCUTAneous Q24H  
 methocarbamoL (ROBAXIN) tablet 750-1,500 mg  750-1,500 mg Oral BID  ondansetron (ZOFRAN) injection 4 mg  4 mg IntraVENous Q4H PRN  
 
 
 
 LAB AND IMAGING FINDINGS:  
 
Lab Results Component Value Date/Time WBC 17.4 (H) 11/09/2020 02:47 AM  
 HGB 10.6 (L) 11/09/2020 02:47 AM  
 PLATELET 550 30/18/3192 02:47 AM  
 
Lab Results Component Value Date/Time Sodium 133 (L) 11/09/2020 02:47 AM  
 Potassium 3.9 11/09/2020 02:47 AM  
 Chloride 86 (L) 11/09/2020 02:47 AM  
 CO2 44 (HH) 11/09/2020 02:47 AM  
 BUN 14 11/09/2020 02:47 AM  
 Creatinine 0.47 (L) 11/09/2020 02:47 AM  
 Calcium 9.5 11/09/2020 02:47 AM  
 Magnesium 1.8 11/09/2020 02:47 AM  
 Phosphorus 2.6 03/08/2018 04:00 AM  
  
Lab Results Component Value Date/Time Alk. phosphatase 114 11/08/2020 06:23 AM  
 Protein, total 7.1 11/08/2020 06:23 AM  
 Albumin 2.7 (L) 11/08/2020 06:23 AM  
 Globulin 4.4 (H) 11/08/2020 06:23 AM  
 
Lab Results Component Value Date/Time INR 1.0 01/28/2014 08:40 AM  
 Prothrombin time 10.0 01/28/2014 08:40 AM  
 aPTT 31.5 (H) 01/28/2014 08:40 AM  
  
No results found for: IRON, FE, TIBC, IBCT, PSAT, FERR Lab Results Component Value Date/Time pH 7.45 03/05/2018 06:15 AM  
 PCO2 45 03/05/2018 06:15 AM  
 PO2 104 (H) 03/05/2018 06:15 AM  
 
No components found for: [place] Lab Results Component Value Date/Time CK 21 (L) 11/08/2020 06:23 AM  
 CK - MB 2.2 03/04/2018 03:34 AM  
  
 
 
   
 
Total time:  
Counseling / coordination time, spent as noted above:  
> 50% counseling / coordination?:  
 
Prolonged service was provided for  []30 min   []75 min in face to face time in the presence of the patient, spent as noted above. Time Start:  
Time End:  
Note: this can only be billed with 30257 (initial) or 30423 (follow up). If multiple start / stop times, list each separately.

## 2020-11-09 NOTE — PROGRESS NOTES
[facility] Subjective: No acute events overnight No acute distress No acute complaints Current Facility-Administered Medications Medication Dose Route Frequency  gabapentin (NEURONTIN) capsule 300 mg  300 mg Oral TID  arformoterol 15 mcg/budesonide 0.5 mg neb solution   Nebulization BID RT  
 zinc oxide-cod liver oil (DESITIN) 40 % paste   Topical PRN  
 levoFLOXacin (LEVAQUIN) 500 mg in D5W IVPB  500 mg IntraVENous Q24H  
 methylPREDNISolone (PF) (SOLU-MEDROL) injection 40 mg  40 mg IntraVENous Q8H  
 morphine injection 2 mg  2 mg IntraVENous Q6H PRN  
 0.9% sodium chloride infusion  50 mL/hr IntraVENous CONTINUOUS  
 levalbuterol (XOPENEX) nebulizer soln 1.25 mg/3 mL  1.25 mg Nebulization Q6H RT  
 ipratropium (ATROVENT) 0.02 % nebulizer solution 0.5 mg  0.5 mg Nebulization Q6H RT  
 labetaloL (NORMODYNE;TRANDATE) injection 10 mg  10 mg IntraVENous Q2H PRN  
 metoprolol tartrate (LOPRESSOR) tablet 50 mg  50 mg Oral TID  sodium chloride (NS) flush 5-40 mL  5-40 mL IntraVENous Q8H  
 sodium chloride (NS) flush 5-40 mL  5-40 mL IntraVENous PRN  
 acetaminophen (TYLENOL) tablet 650 mg  650 mg Oral Q4H PRN  
 HYDROcodone-acetaminophen (NORCO) 5-325 mg per tablet 1 Tab  1 Tab Oral Q4H PRN  
 LORazepam (ATIVAN) tablet 2 mg  2 mg Oral BID PRN  
 naloxone (NARCAN) injection 0.4 mg  0.4 mg IntraVENous PRN  
 amLODIPine (NORVASC) tablet 10 mg  10 mg Oral DAILY  DULoxetine (CYMBALTA) capsule 40 mg  40 mg Oral DAILY  levETIRAcetam (KEPPRA) tablet 500 mg  500 mg Oral BID  enoxaparin (LOVENOX) injection 40 mg  40 mg SubCUTAneous Q24H  
 methocarbamoL (ROBAXIN) tablet 750-1,500 mg  750-1,500 mg Oral BID  ondansetron (ZOFRAN) injection 4 mg  4 mg IntraVENous Q4H PRN Review of Systems: 
Constitutional HEENT Cardiovascular Pulmonary Gastrointestinal Genitourinary Musculoskeletal Integument Neurologic Endocrinologic Hematologic ROS unremarkable except for  HPI Objective:  
Vital Signs:   
Visit Vitals BP (!) 158/84 (BP 1 Location: Left arm, BP Patient Position: At rest) Pulse (!) 105 Temp 98.6 °F (37 °C) Resp 17 Ht 5' 2\" (1.575 m) Wt 77.1 kg (170 lb) SpO2 91% BMI 31.09 kg/m² O2 Device: Nasal cannula O2 Flow Rate (L/min): 5 l/min Temp (24hrs), Av.7 °F (36.5 °C), Min:97.4 °F (36.3 °C), Max:98.6 °F (37 °C) Intake/Output:  
Last shift:      No intake/output data recorded. Last 3 shifts:  1901 -  0700 In: 960 [P.O.:960] Out: 7162 [SXSUF:6152] Intake/Output Summary (Last 24 hours) at 2020 4439 Last data filed at 2020 8326 Gross per 24 hour Intake 720 ml Output 1050 ml Net -330 ml Physical Exam:  
General:  Alert, cooperative, no distress, . Head:  Normocephalic, atraumatic without obvious abnormality Eyes:  Sclera non injected Conjunctivae. PERRL, EOMs intact. Nose: Nares normal. Septum midline. Mucosa normal. No drainage or sinus tenderness. Mouth: Moist mucus membranes poor Dentition Neck: Supple, symmetrical, trachea midline, no adenopathy,   No stridor Back:   Without CVA or spinal tenderness Lungs:   No acessory muscle use wheeze rhonchi rales- decreased BS Heart:  Regular rate and rhythm, S1, S2 normal, no murmur, click, rub or gallop. Abdomen:   Soft, non-tender. Bowel sounds normal. No masses, tenderness, guarding rebound No organomegaly. Extremities: no cyanosis  edema clubbing Skin: Warm dry. No rashes or lesions Lymph nodes: Cervical, supraclavicular, and axillary nodes normal.  
Neurologic: Alert /Oreinted no gross motor deficits Data Review Recent Results (from the past 24 hour(s)) POC EG7 Collection Time: 20  4:31 PM  
Result Value Ref Range Calcium, ionized (POC) 1.25 1.12 - 1.32 mmol/L  
 pH (POC) 7.51 (H) 7.35 - 7.45    
 pCO2 (POC) 56.9 (H) 35.0 - 45.0 MMHG pO2 (POC) 90 80 - 100 MMHG  
 HCO3 (POC) 45.7 (H) 22 - 26 MMOL/L Base excess (POC) 23 mmol/L  
 sO2 (POC) 97 92 - 97 % Site LEFT BRACHIAL Device: NASAL CANNULA Flow rate (POC) 5 L/M Allens test (POC) N/A Specimen type (POC) ARTERIAL    
RESPIRATORY PANEL,PCR,NASOPHARYNGEAL Collection Time: 11/08/20  4:32 PM  
 Specimen: Nasopharyngeal  
Result Value Ref Range Adenovirus Not detected NOTD Coronavirus 229E Not detected NOTD Coronavirus HKU1 Not detected NOTD Coronavirus CVNL63 Not detected NOTD Coronavirus OC43 Not detected NOTD Metapneumovirus Not detected NOTD Rhinovirus and Enterovirus Not detected NOTD Influenza A Not detected NOTD Influenza A, subtype H1 Not detected NOTD Influenza A, subtype H3 Not detected NOTD INFLUENZA A H1N1 PCR Not detected NOTD Influenza B Not detected NOTD Parainfluenza 1 Not detected NOTD Parainfluenza 2 Not detected NOTD Parainfluenza 3 Not detected NOTD Parainfluenza virus 4 Not detected NOTD    
 RSV by PCR Not detected NOTD B. parapertussis, PCR Not detected NOTD Bordetella pertussis - PCR Not detected NOTD Chlamydophila pneumoniae DNA, QL, PCR Not detected NOTD Mycoplasma pneumoniae DNA, QL, PCR Not detected NOTD METABOLIC PANEL, BASIC Collection Time: 11/09/20  2:47 AM  
Result Value Ref Range Sodium 133 (L) 136 - 145 mmol/L Potassium 3.9 3.5 - 5.1 mmol/L Chloride 86 (L) 97 - 108 mmol/L  
 CO2 44 (HH) 21 - 32 mmol/L Anion gap 3 (L) 5 - 15 mmol/L Glucose 129 (H) 65 - 100 mg/dL BUN 14 6 - 20 MG/DL Creatinine 0.47 (L) 0.55 - 1.02 MG/DL  
 BUN/Creatinine ratio 30 (H) 12 - 20 GFR est AA >60 >60 ml/min/1.73m2 GFR est non-AA >60 >60 ml/min/1.73m2 Calcium 9.5 8.5 - 10.1 MG/DL  
CBC WITH AUTOMATED DIFF Collection Time: 11/09/20  2:47 AM  
Result Value Ref Range WBC 17.4 (H) 3.6 - 11.0 K/uL RBC 3.60 (L) 3.80 - 5.20 M/uL  
 HGB 10.6 (L) 11.5 - 16.0 g/dL HCT 34.7 (L) 35.0 - 47.0 % MCV 96.4 80.0 - 99.0 FL  
 MCH 29.4 26.0 - 34.0 PG  
 MCHC 30.5 30.0 - 36.5 g/dL  
 RDW 13.5 11.5 - 14.5 % PLATELET 380 663 - 271 K/uL MPV 9.8 8.9 - 12.9 FL  
 NRBC 0.0 0  WBC ABSOLUTE NRBC 0.00 0.00 - 0.01 K/uL NEUTROPHILS 92 (H) 32 - 75 % LYMPHOCYTES 2 (L) 12 - 49 % MONOCYTES 5 5 - 13 % EOSINOPHILS 0 0 - 7 % BASOPHILS 0 0 - 1 % IMMATURE GRANULOCYTES 1 (H) 0.0 - 0.5 % ABS. NEUTROPHILS 16.0 (H) 1.8 - 8.0 K/UL  
 ABS. LYMPHOCYTES 0.3 (L) 0.8 - 3.5 K/UL  
 ABS. MONOCYTES 0.9 0.0 - 1.0 K/UL  
 ABS. EOSINOPHILS 0.0 0.0 - 0.4 K/UL  
 ABS. BASOPHILS 0.0 0.0 - 0.1 K/UL  
 ABS. IMM. GRANS. 0.2 (H) 0.00 - 0.04 K/UL  
 DF SMEAR SCANNED    
 PLATELET COMMENTS Large Platelets RBC COMMENTS MACROCYTOSIS 
1+ MAGNESIUM Collection Time: 11/09/20  2:47 AM  
Result Value Ref Range Magnesium 1.8 1.6 - 2.4 mg/dL TSH 3RD GENERATION Collection Time: 11/09/20  2:47 AM  
Result Value Ref Range TSH <0.01 (L) 0.36 - 3.74 uIU/mL Chest X-Ray:and CT reports and images noted Assessment:  
 
 
· Bilateral pulmonary mass/nodules GAVIOTA/RUL/LLL-neoplasm vs lung abscess? · Subcarinal LA 
· O2 dependent · COPD with exacerbation · 50 pk year tobacco use-(minimizes hx/habit) Recommendations:  
 
1. CT guided bx of right lung lesion 2. PET scan 3. Smoking cessation 4. Bronchodilators and steroids for COPD exacerbation 5.  PO abx 5. DVT ppx 6. ECHO?  
 
 
Celina Stanford MD

## 2020-11-10 NOTE — PROGRESS NOTES
DIEGO plan: * Disposition: Home Health - pending PT/OT evals * Transport at D/C: Son - will need to be reminded to bring portable O2 for d/c 
* OP F/U: PCP, Pulmonary * ACP: Palliative Medicine following and will address AMD 
* 2nd IMM letter RN informed CM that pt's sister requested to speak with CM regarding concerns about pt's home environment. CM met with pt to discuss and inquire about any concerns. Pt stated she has no concerns about returning home with her son and son's family and would prefer CM to not speak with her sister. Pt underwent a lung biopsy today and results are currently pending. PT/OT consults placed and evals pending. CM will continue to follow. Xenia France LMSW Care Manager [facility] 
[phone]

## 2020-11-10 NOTE — PROGRESS NOTES
Bedside and Verbal shift change report given to Oriana Howard (oncoming nurse) by Gamal Hernandez (offgoing nurse). Report included the following information SBAR, Kardex, Intake/Output and MAR.

## 2020-11-10 NOTE — PROGRESS NOTES
Dr. Rosmery Ruiz at bedside to obtain consent for right lung biopsy. Procedure explained with opportunity to ask questions. Patient states understanding of procedure prior to signing consent.

## 2020-11-10 NOTE — PROGRESS NOTES
Bedside and Verbal shift change report given to Katya Burch (oncoming nurse) by Sadi Saleem (offgoing nurse). Report included the following information SBAR, Kardex, Intake/Output, MAR, Accordion and Recent Results.

## 2020-11-10 NOTE — PROGRESS NOTES
TRANSFER - OUT REPORT: 
 
Verbal report given to Jaja Denton on Ness Moran  being transferred to [id number] for routine progression of care Report consisted of patients Situation, Background, Assessment and  
Recommendations(SBAR). Information from the following report(s) SBAR and Procedure Summary was reviewed with the receiving nurse. Lines:  
Peripheral IV 11/09/20 Left Arm (Active) Site Assessment Clean, dry, & intact 11/10/20 1014 Phlebitis Assessment 0 11/10/20 0752 Infiltration Assessment 0 11/10/20 0752 Dressing Status Clean, dry, & intact 11/10/20 1080 Dressing Type Transparent;Tape 11/10/20 0752 Hub Color/Line Status Pink;Capped 11/10/20 0752 Action Taken Open ports on tubing capped 11/10/20 0242 Alcohol Cap Used Yes 11/10/20 5066 Opportunity for questions and clarification was provided. Patient transported with: 
 O2 @ 5 liters Tech

## 2020-11-10 NOTE — PROGRESS NOTES
RAPID RESPONSE TEAM- Follow Up Rounded on patient due to recent rapid response for CP. Lab Results Component Value Date/Time CK 21 (L) 11/08/2020 06:23 AM  
 CK - MB 2.2 03/04/2018 03:34 AM  
 CK-MB Index 2.1 03/04/2018 03:34 AM  
 Troponin-I, Qt. <0.05 11/08/2020 06:23 AM  
  (H) 10/20/2015 02:53 AM  
 
  
VSS. Cardiac enzymes negative. EKG showed not ischemic changes. No RRT interventions indicated at this time. Please call will any questions or concerns. Linette Corea RN 
RRT Lorraine Kebede Patient Vitals for the past 8 hrs: 
 Temp Pulse Resp BP SpO2  
11/09/20 1951 97.9 °F (36.6 °C) 96 17 (!) 150/69 96 % 11/09/20 1947     95 % 11/09/20 1942     94 % 11/09/20 1525 98.4 °F (36.9 °C) (!) 109 16 (!) 163/91 95 % 11/09/20 1449    (!) 158/84   
11/09/20 1409     93 %

## 2020-11-10 NOTE — PROGRESS NOTES
Spiritual Care Assessment/Progress Note [place] [address] 
 
 
NAME: Darío Starkey      MRN: [id number] AGE: 78 y.o. SEX: female Sabianist Affiliation: Other Language: English  
 
11/10/2020     Total Time (in minutes): 25 Spiritual Assessment begun in [facility] 3 MED TELE through conversation with: 
  
    [x]Patient        [] Family    [] Friend(s) Reason for Consult: Palliative Care, Follow-up Spiritual beliefs: (Please include comment if needed) [x] Identifies with a maggie tradition:     
   [] Supported by a maggie community:        
   [] Claims no spiritual orientation:       
   [] Seeking spiritual identity:            
   [] Adheres to an individual form of spirituality:       
   [] Not able to assess:                   
 
    
Identified resources for coping:  
   [x] Prayer                           
   [] Music                  [] Guided Imagery [x] Family/friends                 [] Pet visits [] Devotional reading                         [] Unknown 
   [] Other:                                        
 
 
Interventions offered during this visit: (See comments for more details) Patient Interventions: Affirmation of emotions/emotional suffering, Affirmation of maggie, Coping skills reviewed/reinforced, Iconic (affirming the presence of God/Higher Power), Normalization of emotional/spiritual concerns, Prayer (assurance of) Plan of Care: 
 
 [] Support spiritual and/or cultural needs  
 [] Support AMD and/or advance care planning process    
 [] Support grieving process 
 [] Coordinate Rites and/or Rituals  
 [] Coordination with community clergy [] No spiritual needs identified at this time 
 [] Detailed Plan of Care below (See Comments)  [] Make referral to Music Therapy 
[] Make referral to Pet Therapy    
[] Make referral to Addiction services 
[] Make referral to [facility] 
[] Make referral to Spiritual Care Partner [] No future visits requested       
[x] Follow up visits as needed Pt lives with her son and DIL. Her  and daughter both  some five years ago. The daughter's only child, a girl lives with them. Pt's son and his wife have four daughters. Family is important to her and she reports good support from her family. Additionally, she derives a sense of support from her maggie. She reports that people from her [place] call her and offer prayer. Assured her of ongoing  support as needed. Chaplain Frida, MDiv, MS, [facility] 
[phone]

## 2020-11-10 NOTE — PROGRESS NOTES
Name of procedure: RUL Lung Biopsy Sedation medications given: 1 mg Versed, 25 mcg Fentanyl Sedation tolerated: Well Vital Signs: Stable Fluids removed: None Samples sent to lab: Yes Any complications related to procedure: None Post Procedure Care Needed/order sets placed in [facility] care.

## 2020-11-10 NOTE — PROGRESS NOTES
[facility] Subjective: No acute events overnight No acute distress No acute complaints Current Facility-Administered Medications Medication Dose Route Frequency  amoxicillin-clavulanate (AUGMENTIN) 875-125 mg per tablet 1 Tab  1 Tab Oral Q12H  
 zinc oxide-cod liver oil (DESITIN) 40 % paste   Topical BID and QHS  methIMAzole (TAPAZOLE) tablet 10 mg  10 mg Oral DAILY  LORazepam (ATIVAN) tablet 1 mg  1 mg Oral BID PRN  
 bacitracin 500 unit/gram packet 1 Packet  1 Packet Topical DAILY  gabapentin (NEURONTIN) capsule 300 mg  300 mg Oral TID  arformoterol 15 mcg/budesonide 0.5 mg neb solution   Nebulization BID RT  
 zinc oxide-cod liver oil (DESITIN) 40 % paste   Topical PRN  
 methylPREDNISolone (PF) (SOLU-MEDROL) injection 40 mg  40 mg IntraVENous Q8H  
 morphine injection 2 mg  2 mg IntraVENous Q6H PRN  
 0.9% sodium chloride infusion  50 mL/hr IntraVENous CONTINUOUS  
 levalbuterol (XOPENEX) nebulizer soln 1.25 mg/3 mL  1.25 mg Nebulization Q6H RT  
 ipratropium (ATROVENT) 0.02 % nebulizer solution 0.5 mg  0.5 mg Nebulization Q6H RT  
 labetaloL (NORMODYNE;TRANDATE) injection 10 mg  10 mg IntraVENous Q2H PRN  
 metoprolol tartrate (LOPRESSOR) tablet 50 mg  50 mg Oral TID  sodium chloride (NS) flush 5-40 mL  5-40 mL IntraVENous Q8H  
 sodium chloride (NS) flush 5-40 mL  5-40 mL IntraVENous PRN  
 acetaminophen (TYLENOL) tablet 650 mg  650 mg Oral Q4H PRN  
 HYDROcodone-acetaminophen (NORCO) 5-325 mg per tablet 1 Tab  1 Tab Oral Q4H PRN  
 naloxone (NARCAN) injection 0.4 mg  0.4 mg IntraVENous PRN  
 amLODIPine (NORVASC) tablet 10 mg  10 mg Oral DAILY  DULoxetine (CYMBALTA) capsule 40 mg  40 mg Oral DAILY  levETIRAcetam (KEPPRA) tablet 500 mg  500 mg Oral BID  [Held by provider] enoxaparin (LOVENOX) injection 40 mg  40 mg SubCUTAneous Q24H  
 methocarbamoL (ROBAXIN) tablet 750-1,500 mg  750-1,500 mg Oral BID  
  ondansetron (ZOFRAN) injection 4 mg  4 mg IntraVENous Q4H PRN Review of Systems: 
Constitutional HEENT Cardiovascular Pulmonary Gastrointestinal Genitourinary Musculoskeletal Integument Neurologic Endocrinologic Hematologic ROS unremarkable except for  HPI Objective:  
Vital Signs:   
Visit Vitals BP (!) 160/79 Pulse 100 Temp 97.8 °F (36.6 °C) Resp 18 Ht 5' 2\" (1.575 m) Wt 77.1 kg (169 lb 15.6 oz) SpO2 94% BMI 31.09 kg/m² O2 Device: Nasal cannula O2 Flow Rate (L/min): 5 l/min Temp (24hrs), Av °F (36.7 °C), Min:97.8 °F (36.6 °C), Max:98.4 °F (36.9 °C) Intake/Output:  
Last shift:      No intake/output data recorded. Last 3 shifts:  1901 - 11/10 0700 In: -  
Out: [facility] No intake or output data in the 24 hours ending 11/10/20 0847 Physical Exam:  
General:  Alert, cooperative, no distress, . Head:  Normocephalic, atraumatic without obvious abnormality Eyes:  Sclera non injected Conjunctivae. PERRL, EOMs intact. Nose: Nares normal. Septum midline. Mucosa normal. No drainage or sinus tenderness. Mouth: Moist mucus membranes poor Dentition Neck: Supple, symmetrical, trachea midline, no adenopathy,   No stridor Back:   Without CVA or spinal tenderness Lungs:   No acessory muscle use wheeze rhonchi rales- decreased BS Heart:  Regular rate and rhythm, S1, S2 normal, no murmur, click, rub or gallop. Abdomen:   Soft, non-tender. Bowel sounds normal. No masses, tenderness, guarding rebound No organomegaly. Extremities: no cyanosis  edema clubbing Skin: Warm dry. No rashes or lesions Lymph nodes: Cervical, supraclavicular, and axillary nodes normal.  
Neurologic: Alert /Oriented no gross motor deficits Data Review Recent Results (from the past 24 hour(s)) ECHO ADULT COMPLETE Collection Time: 20  4:34 PM  
Result Value Ref Range IVSd 1.93 (A) 0.6 - 0.9 cm IVSs 2.52 cm LVIDd 3.37 (A) 3.9 - 5.3 cm LVIDs 2.18 cm  
 LVOT d 2.12 cm  
 LVPWd 1.78 (A) 0.6 - 0.9 cm  
 LVPWs 2.05 cm  
 LVOT Cardiac Output 9.47 liter/minute LVOT Peak Gradient 7.16 mmHg LVOT Peak Gradient 7.52 mmHg Left Ventricular Outflow Tract Mean Gradient 3.86 mmHg LVOT SV 91.3 mL  
 LVOT Peak Velocity 137.15 cm/s LVOT Peak Velocity 133.76 cm/s LVOT VTI 25.87 cm RVIDd 2.91 cm  
 RVSP 30.71 mmHg Left Atrium Major Axis 3.06 cm  
 LA Volume 53.99 22 - 52 mL  
 LA Area 4C 16.75 cm2 LA Vol 2C 68.73 (A) 22 - 52 mL  
 LA Vol 4C 37.16 22 - 52 mL Left Atrium to Aortic Root Ratio 1.13 Left Atrium to Aortic Root Ratio 1.13 Est. RA Pressure 10.00 mmHg AV Cusp 2.12 cm Aortic Valve Area by Continuity of Peak Velocity 2.90 cm2 Aortic Valve Area by Continuity of Peak Velocity 2.83 cm2 Aortic Valve Area by Continuity of Peak Velocity 2.80 cm2 Aortic Valve Area by Continuity of Peak Velocity 2.87 cm2 Aortic Valve Area by Continuity of VTI 3.63 cm2 AoV PG 11.35 mmHg AoV PG 11.16 mmHg Aortic Valve Systolic Mean Gradient 8.08 mmHg Aortic Valve Systolic Peak Velocity 308.34 cm/s Aortic Valve Systolic Peak Velocity 761.87 cm/s AoV VTI 25.11 cm  
 MV A Mejia 141.37 cm/s Mitral Valve E Wave Deceleration Time 115.47 ms  
 MV E Mejia 82.77 cm/s  
 E/E' lateral 8.76   
 LV E' Lateral Velocity 9.45 cm/s Pulmonic Valve Systolic Peak Instantaneous Gradient 3.92 mmHg Pulmonic Valve Max Velocity 98.99 cm/s Triscuspid Valve Regurgitation Peak Gradient 20.71 mmHg  
 TR Max Velocity 227.56 cm/s Ao Root D 2.94 cm  
 MV E/A 0.59 LV Mass .0 67 - 162 g  
 LV Mass AL Index 148.0 43 - 95 g/m2 Left Atrium Minor Axis 1.72 cm  
 LA Vol Index 30.27 16 - 28 ml/m2 LA Vol Index 38.53 16 - 28 ml/m2 LA Vol Index 20.83 16 - 28 ml/m2 LAURA/BSA VTI 2.0 cm2/m2 Chest X-Ray:and CT reports and images noted Assessment: · Bilateral pulmonary mass/nodules GAVIOTA/RUL/LLL-neoplasm vs lung abscess? · Subcarinal LA 
· O2 dependent · COPD with exacerbation · 50 pk year tobacco use-(minimizes hx/habit) Recommendations:  
 
1. CT guided bx of right lung lesion today 2. PET scan 3. Smoking cessation 4. Bronchodilators and steroids for COPD exacerbation 5.  PO abx 5. DVT ppx 6. ECHO done, no obvious evidence of endocarditis Stevan Leyva MD

## 2020-11-10 NOTE — PROGRESS NOTES
Bedside and Verbal shift change report given to Leah Fields (oncoming nurse) by Kerri Madrid (offgoing nurse). Report included the following information SBAR, Kardex, Intake/Output and MAR.

## 2020-11-10 NOTE — PROGRESS NOTES
Reason for Admission:   COPD 
               
RUR Score:   17 PCP: First and Last name:  Ellen Mcfarlane Name of Practice:  
 Are you a current patient: Yes/No:  Yes Approximate date of last visit:  One month ago Can you participate in a virtual visit if needed: Yes Do you (patient/family) have any concerns for transition/discharge? No 
           
Plan for utilizing home health:   Yes if needed Current Advanced Directive/Advance Care Plan:  Not interested. Full code Transition of Care Plan:      Home with [facility] Care Management Interventions PCP Verified by CM: Yes(One month ago) Mode of Transport at Discharge: Self(Son will transport pt back to home. ) Transition of Care Consult (CM Consult): Discharge Planning, Home Health(Pt had previous OP Physical therapy.) Discharge Durable Medical Equipment: (Pt uses a cane ,reported that she has BSC and RW) Physical Therapy Consult: No 
Occupational Therapy Consult: No 
Current Support Network: Lives with Caregiver(Pt lives with her son in a single level home has 4 stepst at entrance.) Confirm Follow Up Transport: Family Discharge Location Discharge Placement: Home with home health Pharmacy - [address] Pt [facility]. Son will transport pt back to home. Pt is on 2 L HO2, [facility] provided her HO2. Pt was independent with ADLs and IADLs before admission Kari Haro MSW 
ED Case Manager Ext [phone]

## 2020-11-11 NOTE — PROGRESS NOTES
ADULT PROTOCOL: JET AEROSOL  REASSESSMENT Patient  Virgie Trivedi     78 y.o.   female     11/11/2020  3:44 PM 
 
Breath Sounds Pre Procedure: Right Breath Sounds: Diminished(faint coarseness heard) Left Breath Sounds: Diminished(faint coarseness heard) Breath Sounds Post Procedure: Right Breath Sounds: Diminished Left Breath Sounds: Diminished Breathing pattern: Pre procedure Breathing Pattern: Regular Post procedure Breathing Pattern: Regular Heart Rate: Pre procedure Pulse: 107 Post procedure Pulse: 96 
 
Resp Rate: Pre procedure Respirations: 16 Post procedure Respirations: 16 
 
Oxygen: O2 Device: Nasal cannula Changed:no SpO2: Pre procedure SpO2: 96 %             Post procedure SpO2: 98 % Nebulizer Therapy: Current medications Aerosolized Medications: Ipratropium bromide, Xopenex Changed: no 
 
 
Problem List:  
Patient Active Problem List  
Diagnosis Code  COPD exacerbation ([facility] J44.1  Seizure disorder ([facility] G40.909  Osteopenia M85.80  Fibromyalgia M79.7  Essential hypertension, benign I10  
 Depression F32.9  Nephrolithiasis N20.0  Sciatica M54.30  Encephalopathy, unspecified G93.40  Acute respiratory failure (HCC) J96.00  
 Colitis K52.9  ARF (acute renal failure) ([facility]) N17.9  Hypomagnesemia E83.42  Chronic pain G89.29  
 Recurrent falls R29.6  Sepsis ([facility] A41.9  Hyponatremia E87.1  Dilantin toxicity T42.0X1A  Rhabdomyolysis M62.82  
 Polypharmacy Z79.899  Bacteremia due to Escherichia coli R78.81, B96.20  Drug overdose T50.901A  Closed right ankle fracture S82.891A  Acute on chronic respiratory failure with hypercapnia (HCC) J96.22  
 Acute encephalopathy G93.40 Respiratory Therapist: Emma Robertson, RT

## 2020-11-11 NOTE — PROGRESS NOTES
Attempted to see pt for PT marv. Pt had just received bad new about her lung biopsy and was emotionally distraught. She decline participation with therapy at this time. Will continue to follow.

## 2020-11-11 NOTE — PROGRESS NOTES
Occupational Therapy note: Attempted to see pt for OT marv. Pt had just received bad new about her lung biopsy and was emotionally distraught. She decline participation with therapy at this time. Will continue to follow.    
 
YADIRA Barth/L

## 2020-11-11 NOTE — PROGRESS NOTES
Unable to get blood specimen for AM labs and pt refused further attempts. Left message for PICC team to ask if someone would be willing to obtain specimen later this morning.

## 2020-11-11 NOTE — PROGRESS NOTES
[facility] Subjective: No acute events overnight No acute distress No acute complaints Ct guided bx suspicious for lung cancer, final results pending Current Facility-Administered Medications Medication Dose Route Frequency  LORazepam (ATIVAN) injection 2 mg  2 mg IntraVENous Q12H PRN  
 amoxicillin-clavulanate (AUGMENTIN) 875-125 mg per tablet 1 Tab  1 Tab Oral Q12H  
 zinc oxide-cod liver oil (DESITIN) 40 % paste   Topical BID and QHS  methIMAzole (TAPAZOLE) tablet 10 mg  10 mg Oral DAILY  LORazepam (ATIVAN) tablet 1 mg  1 mg Oral BID PRN  
 bacitracin 500 unit/gram packet 1 Packet  1 Packet Topical DAILY  gabapentin (NEURONTIN) capsule 300 mg  300 mg Oral TID  arformoterol 15 mcg/budesonide 0.5 mg neb solution   Nebulization BID RT  
 zinc oxide-cod liver oil (DESITIN) 40 % paste   Topical PRN  
 methylPREDNISolone (PF) (SOLU-MEDROL) injection 40 mg  40 mg IntraVENous Q8H  
 morphine injection 2 mg  2 mg IntraVENous Q6H PRN  
 0.9% sodium chloride infusion  50 mL/hr IntraVENous CONTINUOUS  
 levalbuterol (XOPENEX) nebulizer soln 1.25 mg/3 mL  1.25 mg Nebulization Q6H RT  
 ipratropium (ATROVENT) 0.02 % nebulizer solution 0.5 mg  0.5 mg Nebulization Q6H RT  
 labetaloL (NORMODYNE;TRANDATE) injection 10 mg  10 mg IntraVENous Q2H PRN  
 metoprolol tartrate (LOPRESSOR) tablet 50 mg  50 mg Oral TID  sodium chloride (NS) flush 5-40 mL  5-40 mL IntraVENous Q8H  
 sodium chloride (NS) flush 5-40 mL  5-40 mL IntraVENous PRN  
 acetaminophen (TYLENOL) tablet 650 mg  650 mg Oral Q4H PRN  
 HYDROcodone-acetaminophen (NORCO) 5-325 mg per tablet 1 Tab  1 Tab Oral Q4H PRN  
 naloxone (NARCAN) injection 0.4 mg  0.4 mg IntraVENous PRN  
 amLODIPine (NORVASC) tablet 10 mg  10 mg Oral DAILY  DULoxetine (CYMBALTA) capsule 40 mg  40 mg Oral DAILY  levETIRAcetam (KEPPRA) tablet 500 mg  500 mg Oral BID  
  [Held by provider] enoxaparin (LOVENOX) injection 40 mg  40 mg SubCUTAneous Q24H  
 methocarbamoL (ROBAXIN) tablet 750-1,500 mg  750-1,500 mg Oral BID  ondansetron (ZOFRAN) injection 4 mg  4 mg IntraVENous Q4H PRN Review of Systems: 
Constitutional HEENT Cardiovascular Pulmonary Gastrointestinal Genitourinary Musculoskeletal Integument Neurologic Endocrinologic Hematologic ROS unremarkable except for  HPI Objective:  
Vital Signs:   
Visit Vitals /68 (BP 1 Location: Right arm) Pulse (!) 101 Temp 98 °F (36.7 °C) Resp 22 Ht 5' 2\" (1.575 m) Wt 77.1 kg (169 lb 15.6 oz) SpO2 96% BMI 31.09 kg/m² O2 Device: Nasal cannula O2 Flow Rate (L/min): 5 l/min Temp (24hrs), Av.9 °F (36.6 °C), Min:97.7 °F (36.5 °C), Max:98.1 °F (36.7 °C) Intake/Output:  
Last shift:      No intake/output data recorded. Last 3 shifts: No intake/output data recorded. No intake or output data in the 24 hours ending 20 0939 Physical Exam:  
General:  Alert, cooperative, no distress, . Head:  Normocephalic, atraumatic without obvious abnormality Eyes:  Sclera non injected Conjunctivae. PERRL, EOMs intact. Nose: Nares normal. Septum midline. Mucosa normal. No drainage or sinus tenderness. Mouth: Moist mucus membranes poor Dentition Neck: Supple, symmetrical, trachea midline, no adenopathy,   No stridor Back:   Without CVA or spinal tenderness Lungs:   No acessory muscle use wheeze rhonchi rales- decreased BS Heart:  Regular rate and rhythm, S1, S2 normal, no murmur, click, rub or gallop. Abdomen:   Soft, non-tender. Bowel sounds normal. No masses, tenderness, guarding rebound No organomegaly. Extremities: no cyanosis  edema clubbing Skin: Warm dry. No rashes or lesions Lymph nodes: Cervical, supraclavicular, and axillary nodes normal.  
Neurologic: Alert /Oriented no gross motor deficits Data Review No results found for this or any previous visit (from the past 24 hour(s)). Chest X-Ray:and CT reports and images noted Assessment:  
 
 
· Bilateral pulmonary mass/nodules GAVIOTA/RUL/LLL-neoplasm vs lung abscess? · Subcarinal LA 
· O2 dependent · COPD with exacerbation · 50 pk year tobacco use-(minimizes hx/habit) Recommendations:  
 
1. CT guided bx of right lung lesion done, awaiting final results 2. PET scan as out pt 3. Smoking cessation 4. Bronchodilators and steroids for COPD exacerbation 5.  PO abx 5. DVT ppx 6. ECHO done, no obvious evidence of endocarditis 7. Increase dose of po pain medications 8. Xanax for anxiety 9. Will consult Dr Monet Murry today Lori Mays MD

## 2020-11-11 NOTE — CONSULTS
[address] [facility] Name:  Eugene Correa 
MR#:  [id number] :  1950 ACCOUNT #:  [de-identified] DATE OF SERVICE:  2020 REFERRING PHYSICIAN:  Dr. Andra Toro. REASON FOR CONSULTATION:  Probable lung cancer. HISTORY OF PRESENT ILLNESS:  The patient is a 82-year-old white female who came to the emergency room with more shortness of breath. She has got a history of longstanding COPD. She had a CT scan done of her chest that showed emphysema with a large pleural base necrotic right lobe mass and several other pulmonary nodules in both lungs. The patient underwent a CT guided biopsy yesterday. Preliminary path is positive for cancer. We do not have the final path back. The patient reports she had been very weak, continues to have some shortness of breath, which she has chronically, and we were asked to see her for further evaluation. PAST MEDICAL HISTORY:  Significant for COPD, asthma, hypertension, ischemic colitis, migraines, depression. CURRENT MEDICATIONS:  She is on amlodipine, Augmentin, nebulizer, Cymbalta, Neurontin, Atrovent, Xopenex nebulizer, Keppra, Tapazole, Robaxin, Lopressor, and prednisone. ALLERGIES:  CODEINE, ERYTHROMYCIN. SOCIAL HISTORY:  She is a smoker, but she reports that she has quit. She does not drink. FAMILY HISTORY:  Her older sister has had breast cancer. REVIEW OF SYSTEMS:  A 12-point system done and negative except for above. PHYSICAL EXAMINATION: 
GENERAL:  Lying in bed, in no acute distress. VITAL SIGNS:  Temperature 98.0, pulse 101, blood pressure 138/68, respirations 22, saturating 96% on 5 liters nasal canula. HEENT:  Normocephalic, atraumatic. HEME/LYMPH:  No cervical or supraclavicular or axillary lymphadenopathy appreciated. RESPIRATORY:  No distress. CARDIOVASCULAR:  Regular. ABDOMEN:  Soft, nontender. EXTREMITIES:  No clubbing, cyanosis, or edema.  
NEURO:  Nonfocal. 
 
 LABORATORY VALUES:  White blood cell count 17.4, hemoglobin 10.6, platelets 495 that was from 11/09. Chemistry from 11/09 showed sodium 133, BUN 14, creatinine 0.47. LFTs done on 11/08 total bilirubin 0.3, ALT 19, AST 17, alkaline phosphatase 114. IMPRESSION AND PLAN:  The patient is a 71-year-old white female with chronic obstructive pulmonary disease, who has what appears to be lung cancer. I talked to her today about the fact that we need to wait and see what the Pathology shows. It appears that she most likely has stage IV disease in both lungs. However, she will need a PET scan once we confirm the diagnosis of her lung cancer. We will continue to follow along with you. She continues to be treated for a possible pneumonia. When she is able to be discharged, she will follow up in the office, and once we had the path and the PET scan, then make further recommendations in terms of treatment options. Treatment may be difficult. Her performance status is quite tenuous. We will have to see how she improves. MD LELE Arita/[email or web address] 
D:  11/11/2020 10:19 
T:  11/11/2020 12:43 JOB #:  [id number]

## 2020-11-11 NOTE — PROGRESS NOTES
Hospitalist Progress Note NAME: Kennedi Issa :  1950 MRN:  [id number] Assessment / Plan: 
 
Bilateral lung masses, cavitary, concerning for bronchogenic carcinoma s/p Lung biopsy Acute on chronic hypoxic respiratory failure POA 
COPD exacerbation POA Chronic smoking, 50-pack-year tobacco use 
 
-CT scan with findings of emphysema with large pleural-based lateral right upper lobe mass, with several other pulmonary nodules in both lungs overall highly suspicious of bronchogenic neoplasm. Largest 5.3x 3.1 cm , cavitary lesion 
-2 L at baseline, currently on 4 L nasal cannula 
-Elevated WBC, likely because of steroids 
-Continue Solu-Medrol, nebs, GENEVA/ICS 
-Continue empiric Augmentin. -Appreciate pulmonary help 
- s/p CT-guided lung biopsy 11/10 
-Follow cultures pathology 
-Follows with Dr. Elicia Cobb pulmonology 
-Brayan Magana against smoking 
- TTE with poor quality but no reported vegetation - F/U lung biopsy result 
- oncology consulted, appreciate input. Bobbi Long will follow up in the office, and once we had the path and the PET scan, then make further recommendations in terms of treatment options\" Nausea and vomiting  
- Denies constipation, has hx of IBS. Abdomen soft and nontender 
- cause of vomiting unclear 
- continue PRN Zofran and compazine - Further workup with abdominal US or CT if N/V persists Hyperthyrodism TSH less than 0.01, free T4 2.0. Pending total T3 
-Reviewed chart, patient had a elevated free T4 in 2016. Had ultrasound which did not show any nodule - Started on methimazole, continue beta-blocker 
-Will need outpatient follow-up, will give contact information for endocrinologist 
 
  
Depression/anxiety Fibromyalgia As Needed Lorazepam for Anxiety Continue Cymbalta Continue gabapentin 
  
Hypertension continue amlodipine 
  
History of seizures No seizures for years Continue Keppra 
  
History of substance abuse Denied any use of substances recently 
  
  
Code Status: Full code Surrogate Decision Maker: Son [place]? Discussed with the sister yesterday, 
  
DVT Prophylaxis: SCDs GI Prophylaxis: not indicated 
  
Baseline: Independent in ADLs Plan for CT-guided biopsy today/Tomorrow Subjective: Chief Complaint / Reason for Physician Visit On 4L NC Continues to have nausea and vomiting Review of Systems: 
Symptom Y/N Comments  Symptom Y/N Comments Fever/Chills n   Chest Pain y  improved Poor Appetite n   Edema n   
Cough y   Abdominal Pain n   
Sputum n   Joint Pain SOB/DÍAZ    Pruritis/Rash Nausea/vomit    Tolerating PT/OT Diarrhea    Tolerating Diet Constipation    Other Could NOT obtain due to:   
 
Objective: VITALS:  
Last 24hrs VS reviewed since prior progress note. Most recent are: 
Patient Vitals for the past 24 hrs: 
 Temp Pulse Resp BP SpO2  
11/11/20 0856 98 °F (36.7 °C) (!) 101 22 138/68 96 % 11/11/20 0304     96 % 11/11/20 0240 97.7 °F (36.5 °C) 92 18 (!) 152/79 96 % 11/10/20 2327 97.9 °F (36.6 °C) 89 20 131/61 96 % 11/10/20 2028     93 % 11/10/20 2005 97.8 °F (36.6 °C) 98 17 127/65 93 % 11/10/20 1541 97.8 °F (36.6 °C) (!) 107 18 (!) 129/59 91 % 11/10/20 1345 97.7 °F (36.5 °C) (!) 107 18 138/80 95 % 11/10/20 1330  (!) 108 18 (!) 148/71 98 % 11/10/20 1315  (!) 106 16 (!) 146/74 100 % 11/10/20 1300  (!) 108 18 (!) 153/77 100 % 11/10/20 1255  (!) 106 18 (!) 140/70 99 % 11/10/20 1250  (!) 110 20 (!) 144/78 98 % 11/10/20 1245  (!) 108 19 (!) 140/78 99 % 11/10/20 1240  (!) 112 16 134/80 98 % 11/10/20 1235  (!) 108 20 (!) 146/66 98 % 11/10/20 1230  (!) 111 18 (!) 154/69 98 % 11/10/20 1225  (!) 110 20 (!) 148/65 98 % No intake or output data in the 24 hours ending 11/11/20 1106 PHYSICAL EXAM: 
General: WD, WN. Alert, mild distress due to shortness of breath EENT:  EOMI. Anicteric sclerae. MMM Resp:  Wheezing bilaterally, coarse sounds CV:  Regular  rhythm,  No edema GI:  Soft, Non distended, Non tender.  +Bowel sounds Neurologic:  Alert and oriented X 3, normal speech, Psych:   Good insight. Not anxious nor agitated Skin:  No rashes. No jaundice Reviewed most current lab test results and cultures  YES Reviewed most current radiology test results   YES Review and summation of old records today    NO Reviewed patient's current orders and MAR    YES 
PMH/SH reviewed - no change compared to H&P 
________________________________________________________________________ Care Plan discussed with: 
  Comments Patient x Family RN x Care Manager Consultant Multidiciplinary team rounds were held today with , nursing, pharmacist and clinical coordinator. Patient's plan of care was discussed; medications were reviewed and discharge planning was addressed. ________________________________________________________________________ Total NON critical care TIME:  40   Minutes Total CRITICAL CARE TIME Spent:   Minutes non procedure based Comments >50% of visit spent in counseling and coordination of care    
________________________________________________________________________ Hazel Bob MD  
 
Procedures: see electronic medical records for all procedures/Xrays and details which were not copied into this note but were reviewed prior to creation of Plan. LABS: 
I reviewed today's most current labs and imaging studies. Pertinent labs include: 
Recent Labs 11/09/20 0247 WBC 17.4* HGB 10.6* HCT 34.7*  
 Recent Labs 11/09/20 0247 * K 3.9 CL 86* CO2 44* * BUN 14  
CREA 0.47* CA 9.5 MG 1.8 Signed: Hazel Bob MD

## 2020-11-11 NOTE — PROGRESS NOTES
Hospitalist Progress Note NAME: Sofia Diehl :  1950 MRN:  [id number] Assessment / Plan: 
Acute on chronic hypoxic respiratory failure POA 
COPD exacerbation POA Bilateral lung masses, cavitary, concerning from bronchogenic carcinoma/abscess? SIRS, 2/2 above Chronic smoking, 50-pack-year tobacco use 
 
-CT scan with findings of emphysema with large pleural-based lateral right upper lobe mass, with several other pulmonary nodules in both lungs overall highly suspicious of bronchogenic neoplasm. Largest 5.3x 3.1 cm , cavitary lesion 
-2 L at baseline, currently on 4 L nasal cannula 
-Elevated WBC, likely because of steroids 
-Continue Solu-Medrol  
-Continue nebs 
-Continue GENEVA/ICS 
-Antibiotic switched to Augmentin, continue 
 
-Appreciate pulmonary help 
- s/p CT-guided lung biopsy 11/10 
-Follow cultures pathology 
-Follows with Dr. Vikram Ward pulmonology 
-Reesa Bogus against smoking 
 
-Discussed with the pulmonary, also concern for abscess, TTE with poor quality but no reported vegetation Hyperthyrodism TSH less than 0.01, free T4 2.0. Pending total T3 
-Reviewed chart, patient had a elevated free T4 in 2016. Had ultrasound which did not show any nodule - Started on methimazole, continue beta-blocker 
-Will need outpatient follow-up, will give contact information for endocrinologist 
 
  
Depression/anxiety Fibromyalgia As Needed Lorazepam for Anxiety Continue Cymbalta Continue gabapentin 
  
Hypertension continue amlodipine 
  
History of seizures No seizures for years Continue Keppra 
  
History of substance abuse Denied any use of substances recently 
  
  
  
Code Status: Full code Surrogate Decision Maker: Son [place]? Discussed with the sister yesterday, 
  
DVT Prophylaxis: SCDs GI Prophylaxis: not indicated 
  
Baseline: Independent in ADLs Plan for CT-guided biopsy today/Tomorrow Subjective: Chief Complaint / Reason for Physician Visit On 4L NC Has nausea. S/p lung biopsy today Review of Systems: 
Symptom Y/N Comments  Symptom Y/N Comments Fever/Chills n   Chest Pain y  improved Poor Appetite n   Edema n   
Cough y   Abdominal Pain n   
Sputum n   Joint Pain SOB/DÍAZ    Pruritis/Rash Nausea/vomit    Tolerating PT/OT Diarrhea    Tolerating Diet Constipation    Other Could NOT obtain due to:   
 
Objective: VITALS:  
Last 24hrs VS reviewed since prior progress note. Most recent are: 
Patient Vitals for the past 24 hrs: 
 Temp Pulse Resp BP SpO2  
11/10/20 2028     93 % 11/10/20 2005 97.8 °F (36.6 °C) 98 17 127/65 93 % 11/10/20 1541 97.8 °F (36.6 °C) (!) 107 18 (!) 129/59 91 % 11/10/20 1345 97.7 °F (36.5 °C) (!) 107 18 138/80 95 % 11/10/20 1330  (!) 108 18 (!) 148/71 98 % 11/10/20 1315  (!) 106 16 (!) 146/74 100 % 11/10/20 1300  (!) 108 18 (!) 153/77 100 % 11/10/20 1255  (!) 106 18 (!) 140/70 99 % 11/10/20 1250  (!) 110 20 (!) 144/78 98 % 11/10/20 1245  (!) 108 19 (!) 140/78 99 % 11/10/20 1240  (!) 112 16 134/80 98 % 11/10/20 1235  (!) 108 20 (!) 146/66 98 % 11/10/20 1230  (!) 111 18 (!) 154/69 98 % 11/10/20 1225  (!) 110 20 (!) 148/65 98 % 11/10/20 1042 98.1 °F (36.7 °C) (!) 106 20 (!) 159/71 98 % 11/10/20 0722 97.8 °F (36.6 °C) 100 18 (!) 160/79 94 % 11/10/20 0242 98 °F (36.7 °C) 97 18 (!) 154/74 95 % 11/10/20 0118     92 % 11/09/20 2348 98.1 °F (36.7 °C) (!) 102 16 (!) 145/98 92 % No intake or output data in the 24 hours ending 11/10/20 2128 PHYSICAL EXAM: 
General: WD, WN. Alert, mild distress due to shortness of breath EENT:  EOMI. Anicteric sclerae. MMM Resp:  Wheezing bilaterally, coarse sounds CV:  Regular  rhythm,  No edema GI:  Soft, Non distended, Non tender.  +Bowel sounds Neurologic:  Alert and oriented X 3, normal speech, Psych:   Good insight. Not anxious nor agitated Skin:  No rashes. No jaundice Reviewed most current lab test results and cultures  YES Reviewed most current radiology test results   YES Review and summation of old records today    NO Reviewed patient's current orders and MAR    YES 
PMH/SH reviewed - no change compared to H&P 
________________________________________________________________________ Care Plan discussed with: 
  Comments Patient x Family RN x Care Manager Consultant Multidiciplinary team rounds were held today with , nursing, pharmacist and clinical coordinator. Patient's plan of care was discussed; medications were reviewed and discharge planning was addressed. ________________________________________________________________________ Total NON critical care TIME:  40   Minutes Total CRITICAL CARE TIME Spent:   Minutes non procedure based Comments >50% of visit spent in counseling and coordination of care    
________________________________________________________________________ Manju Leo MD  
 
Procedures: see electronic medical records for all procedures/Xrays and details which were not copied into this note but were reviewed prior to creation of Plan. LABS: 
I reviewed today's most current labs and imaging studies. Pertinent labs include: 
Recent Labs 11/09/20 0247 11/08/20 
6414 WBC 17.4* 19.9* HGB 10.6* 11.1*  
HCT 34.7* 36.5  440* Recent Labs 11/09/20 0247 11/08/20 
1808 * 134* K 3.9 4.2 CL 86* 88* CO2 44* 40* * 130* BUN 14 14 CREA 0.47* 0.58  
CA 9.5 10.6* MG 1.8  --   
ALB  --  2.7* TBILI  --  0.3 ALT  --  19 Signed: Manju Leo MD

## 2020-11-11 NOTE — PROGRESS NOTES
Bedside and Verbal shift change report given to Twila Brittle (oncoming nurse) by Niraj Aguilera (offgoing nurse). Report included the following information SBAR, Kardex, Intake/Output, MAR, Accordion and Recent Results.

## 2020-11-12 NOTE — PROGRESS NOTES
Bedside and Verbal shift change report given to Genaro Cifuentes (oncoming nurse) by Hazel Richard (offgoing nurse). Report included the following information SBAR, Kardex, Intake/Output, MAR, Accordion, Recent Results, Med Rec Status and Quality Measures.

## 2020-11-12 NOTE — PROGRESS NOTES
1900- Bedside shift change report given to Chet Stinson (oncoming nurse) by Dedrick Gale (offgoing nurse). Report included the following information SBAR, Kardex, MAR, Accordion and Recent Results.

## 2020-11-12 NOTE — PROGRESS NOTES
Hematology Oncology Progress Note Follow up for: NSCLC Chart notes reviewed since last visit. Case discussed with following: . Patient complains of the following: very weak Additional concerns noted by the staff:  
 
Patient Vitals for the past 24 hrs: 
 BP Temp Pulse Resp SpO2  
11/12/20 0844 (!) 154/58 97.6 °F (36.4 °C) 91 20 95 % 11/12/20 0831     90 % 11/12/20 0304 (!) 161/84 97.7 °F (36.5 °C) 91 20 96 % 11/12/20 0154     94 % 11/11/20 2242 127/73 97.6 °F (36.4 °C) 85 18 94 % 11/11/20 1946     95 % 11/11/20 1930 133/65 97.8 °F (36.6 °C) 81 17 95 % 11/11/20 1835 135/60 97.3 °F (36.3 °C) 86 18 94 % 11/11/20 1533 (!) 148/80 98.3 °F (36.8 °C) (!) 112 20 97 % 11/11/20 1131 (!) 155/70 98 °F (36.7 °C) (!) 116 22 90 % Review of Systems: 
12 point ROS done and negative except as above Physical Examination: 
Gen NAD Resp no distress Psych normal 
 
Labs: 
Recent Results (from the past 24 hour(s)) CBC WITH AUTOMATED DIFF Collection Time: 11/11/20 12:15 PM  
Result Value Ref Range WBC 13.5 (H) 3.6 - 11.0 K/uL  
 RBC 3.56 (L) 3.80 - 5.20 M/uL  
 HGB 10.6 (L) 11.5 - 16.0 g/dL HCT 34.3 (L) 35.0 - 47.0 % MCV 96.3 80.0 - 99.0 FL  
 MCH 29.8 26.0 - 34.0 PG  
 MCHC 30.9 30.0 - 36.5 g/dL  
 RDW 13.2 11.5 - 14.5 % PLATELET 323 (H) 250 - 400 K/uL MPV 10.2 8.9 - 12.9 FL  
 NRBC 0.0 0  WBC ABSOLUTE NRBC 0.00 0.00 - 0.01 K/uL NEUTROPHILS 92 (H) 32 - 75 % LYMPHOCYTES 3 (L) 12 - 49 % MONOCYTES 4 (L) 5 - 13 % EOSINOPHILS 0 0 - 7 % BASOPHILS 0 0 - 1 % IMMATURE GRANULOCYTES 1 (H) 0.0 - 0.5 % ABS. NEUTROPHILS 12.5 (H) 1.8 - 8.0 K/UL  
 ABS. LYMPHOCYTES 0.4 (L) 0.8 - 3.5 K/UL  
 ABS. MONOCYTES 0.5 0.0 - 1.0 K/UL  
 ABS. EOSINOPHILS 0.0 0.0 - 0.4 K/UL  
 ABS. BASOPHILS 0.0 0.0 - 0.1 K/UL  
 ABS. IMM. GRANS. 0.1 (H) 0.00 - 0.04 K/UL  
 DF SMEAR SCANNED    
 RBC COMMENTS NORMOCYTIC, NORMOCHROMIC METABOLIC PANEL, BASIC  
 Collection Time: 11/11/20 12:15 PM  
Result Value Ref Range Sodium 135 (L) 136 - 145 mmol/L Potassium 3.7 3.5 - 5.1 mmol/L Chloride 89 (L) 97 - 108 mmol/L  
 CO2 44 (HH) 21 - 32 mmol/L Anion gap 2 (L) 5 - 15 mmol/L Glucose 213 (H) 65 - 100 mg/dL BUN 17 6 - 20 MG/DL Creatinine 0.54 (L) 0.55 - 1.02 MG/DL  
 BUN/Creatinine ratio 31 (H) 12 - 20 GFR est AA >60 >60 ml/min/1.73m2 GFR est non-AA >60 >60 ml/min/1.73m2 Calcium 9.1 8.5 - 10.1 MG/DL Path Lung bx: squamous cell Ca Assessment and Plan: NSCLC 
-squamous cell Ca 
-Will check PDL-1 
-CT shows likely Stage IV disease with nodules in both lungs, will need PET as outpatient 
-Will get Head MRI now 
-will have to see how she improves in terms of her performance status in terms of tx COPD 
-followed by Dr. Vanesa Pritchard, appreciate his help

## 2020-11-12 NOTE — PROGRESS NOTES
ADULT PROTOCOL: JET AEROSOL  REASSESSMENT Patient  Ness Moran     78 y.o.   female     11/12/2020  4:10 PM 
 
Breath Sounds Pre Procedure: Right Breath Sounds: Coarse, Diminished Left Breath Sounds: Coarse, Diminished Breath Sounds Post Procedure: Right Breath Sounds: Diminished Left Breath Sounds: Diminished Breathing pattern: Pre procedure Breathing Pattern: Regular Post procedure Breathing Pattern: Regular Heart Rate: Pre procedure Pulse: 90 
         Post procedure Pulse: 98 Resp Rate: Pre procedure Respirations: 18 Post procedure Respirations: 18 
 
 
Oxygen: O2 Device: Nasal cannula   Flow rate (L/min) 5lpm 
   Changed: NO SpO2: Pre procedure SpO2: 92 %   with oxygen Post procedure SpO2: 95 %  with oxygen Nebulizer Therapy: Current medications Aerosolized Medications: Brovana, Pulmicort Changed: NO 
 
 
 
Problem List:  
Patient Active Problem List  
Diagnosis Code  COPD exacerbation ([facility] J44.1  Seizure disorder ([facility] G40.909  Osteopenia M85.80  Fibromyalgia M79.7  Essential hypertension, benign I10  
 Depression F32.9  Nephrolithiasis N20.0  Sciatica M54.30  Encephalopathy, unspecified G93.40  Acute respiratory failure (HCC) J96.00  
 Colitis K52.9  ARF (acute renal failure) ([facility]) N17.9  Hypomagnesemia E83.42  Chronic pain G89.29  
 Recurrent falls R29.6  Sepsis ([facility] A41.9  Hyponatremia E87.1  Dilantin toxicity T42.0X1A  Rhabdomyolysis M62.82  
 Polypharmacy Z79.899  Bacteremia due to Escherichia coli R78.81, B96.20  Drug overdose T50.901A  Closed right ankle fracture S82.891A  Acute on chronic respiratory failure with hypercapnia (HCC) J96.22  
 Acute encephalopathy G93.40 Respiratory Therapist: Lisset Wong, RT

## 2020-11-12 NOTE — PROGRESS NOTES
Bedside and Verbal shift change report given to Joslyn (oncoming nurse) by Maria Guadalupe Barone (offgoing nurse). Report included the following information SBAR, Kardex, Intake/Output, MAR, Accordion and Recent Results.

## 2020-11-12 NOTE — PROGRESS NOTES
-Please complete MRI History and Safety Screening Form for this patient using [employer] only under Orders Requiring a Screening Form: 
 

## 2020-11-12 NOTE — PROGRESS NOTES
Comprehensive Nutrition Assessment Type and Reason for Visit: Initial, RD nutrition re-screen/LOS Nutrition Recommendations/Plan:  
Continue regular diet Add ensure enlive daily Nutrition Assessment:    
Patient medically noted for COPD, nausea/vomiting, acute on chronic respiratory failure, and new diagnosis of lung cancer. PMH seizure disorder, HTN, depression, and right wrist fracture. Patient drowsy at time of visit; emesis bag sitting on her chest. She reports a poor appetite currently. Has not tried ONS before but is open to them; will send ensure enlive daily for now until nausea/vomiting improves. She was falling asleep during visit; unable to discuss nutrition history PTA. Continue regular, liberalized diet. Encourage intake of meals as tolerated. Estimated Daily Nutrient Needs: 
Energy (kcal): 1629 kcal (BMR 1253 x 1. 3AF); Weight Used for Energy Requirements: Current Protein (g): 100g (1.3 g/kg bw); Weight Used for Protein Requirements: Current Fluid (ml/day): 1600 mL; Method Used for Fluid Requirements: 1 ml/kcal 
 
Nutrition Related Findings:      
Medications: Norvasc, Cymbalta, Humalog, Keppra, Lopressor, Prednisone, PRN Zofran and compazine BM 11/9 Na 135, -[phone] Wounds:   
None Current Nutrition Therapies: DIET REGULAR Anthropometric Measures: 
· Height:  5' 2\" (157.5 cm) · Current Body Wt:  77.1 kg (169 lb 15.6 oz) · BMI Category:  Obese class 1 (BMI 30.0-34. 9) Nutrition Diagnosis:  
· Inadequate protein-energy intake related to (decreased appetite, nausea/vomiting) as evidenced by intake 0-25% Nutrition Interventions:  
Food and/or Nutrient Delivery: Continue current diet, Start oral nutrition supplement Nutrition Education and Counseling: No recommendations at this time Coordination of Nutrition Care: No recommendation at this time Goals: 
PO intake >50% of meals/supplement next 2-4 days Nutrition Monitoring and Evaluation: Behavioral-Environmental Outcomes:   
Food/Nutrient Intake Outcomes: Food and nutrient intake, Supplement intake Physical Signs/Symptoms Outcomes: Biochemical data, Weight, Nausea/vomiting Discharge Planning:   
Continue current diet Electronically signed by Morgan Manjarrez RD on 11/12/2020 at 2:32 PM 
 
Contact: ext [phone]

## 2020-11-12 NOTE — PROGRESS NOTES
Attempted to see pt for PT eval. She was asleep in supine, barely able to arouse but falling quickly back asleep. Pt with emesis bag at bedside. Not able to stay awake enough to answer questions. Session aborted.

## 2020-11-12 NOTE — PROGRESS NOTES
Occupational Therapy Chart reviewed; cleared for tx; patient with emesis bag at face upon approach; declines OT at this time due to nausea. Will retry later today as able.  Rimma Cross OTR/L

## 2020-11-12 NOTE — PROGRESS NOTES
Hospitalist Progress Note NAME: Meche Kamara :  1950 MRN:  [id number] Assessment / Plan: 
 
Bilateral pulmonary mass, cavitary, 2/2 lung cancer Acute on chronic hypoxic respiratory failure POA 
COPD exacerbation On home O2 Chronic smoking, 50-pack-year tobacco use 
 
-CT scan with findings of emphysema with large pleural-based lateral right upper lobe mass, with several other pulmonary nodules in both lungs overall highly suspicious of bronchogenic neoplasm. Largest 5.3x 3.1 cm , cavitary lesion 
- TTE with poor quality but no reported vegetation  
-2 L at baseline, currently on 5 L nasal cannula 
-Elevated WBC, likely because of steroids 
-Continue Solu-Medrol, nebs, GENEVA/ICS 
-Continue empiric Augmentin. D#4 (total abx duration D#6) - s/p CT-guided lung biopsy 11/10. SCC. Likely stage 4 per oncololgy - Plan for head MRI and PDL-1 noted - F/U lung biopsy result 
-Pulmonology and oncology on board, appreciate input. Plan for outpatient PET scan Intractable nausea and vomiting  
- Denies constipation, has hx of IBS. Abdomen soft and nontender 
- continue PRN Zofran and compazine. Add scopolamine patch - Look for brain met, Brain MRI ordered by Radha Deleon Hyperglycemia 
- No hx of DM. Likley due to steroid. - start sliding scale insulin Hyperthyrodism TSH less than 0.01, free T4 2.0. Pending total T3 
-Reviewed chart, patient had a elevated free T4 in 2016. Had ultrasound which did not show any nodule - Started on methimazole, continue beta-blocker 
-Will need outpatient follow-up, will give contact information for endocrinologist 
 
Anemia: possible AOCD Thrombocytopenia: reactive 
  
 
Depression/anxiety Fibromyalgia As Needed Lorazepam for Anxiety Continue Cymbalta Continue gabapentin 
  
Hypertension continue amlodipine 
  
History of seizures No seizures for years Continue Keppra 
  
History of substance abuse Denied any use of substances recently 
  
  
 Code Status: Full code Surrogate Decision Maker: Son Lion? Discussed with the sister yesterday, 
  
DVT Prophylaxis: SCDs GI Prophylaxis: not indicated 
  
Baseline: Independent in ADLs Plan for CT-guided biopsy today/Tomorrow Subjective: Chief Complaint / Reason for Physician Visit On 5L O2 via  NC Continues to have nausea and vomiting Denies abdominal pain Review of Systems: 
Symptom Y/N Comments  Symptom Y/N Comments Fever/Chills n   Chest Pain y  improved Poor Appetite n   Edema n   
Cough y   Abdominal Pain n   
Sputum n   Joint Pain SOB/DÍAZ    Pruritis/Rash Nausea/vomit    Tolerating PT/OT Diarrhea    Tolerating Diet Constipation    Other Could NOT obtain due to:   
 
Objective: VITALS:  
Last 24hrs VS reviewed since prior progress note. Most recent are: 
Patient Vitals for the past 24 hrs: 
 Temp Pulse Resp BP SpO2  
11/12/20 0844 97.6 °F (36.4 °C) 91 20 (!) 154/58 95 % 11/12/20 0831     90 % 11/12/20 0304 97.7 °F (36.5 °C) 91 20 (!) 161/84 96 % 11/12/20 0154     94 % 11/11/20 2242 97.6 °F (36.4 °C) 85 18 127/73 94 % 11/11/20 1946     95 % 11/11/20 1930 97.8 °F (36.6 °C) 81 17 133/65 95 % 11/11/20 1835 97.3 °F (36.3 °C) 86 18 135/60 94 % 11/11/20 1533 98.3 °F (36.8 °C) (!) 112 20 (!) 148/80 97 % 11/11/20 1131 98 °F (36.7 °C) (!) 116 22 (!) 155/70 90 % Intake/Output Summary (Last 24 hours) at 11/12/2020 1045 Last data filed at 11/12/2020 4765 Gross per 24 hour Intake  Output 1475 ml Net -1475 ml PHYSICAL EXAM: 
General: WD, WN. Alert, mild distress due to shortness of breath EENT:  EOMI. Anicteric sclerae. MMM Resp:  Wheezing bilaterally, coarse sounds CV:  Regular  rhythm,  No edema GI:  Soft, Non distended, Non tender.  +Bowel sounds Neurologic:  Alert and oriented X 3, normal speech, Psych:   Good insight. Not anxious nor agitated Skin:  No rashes. No jaundice Reviewed most current lab test results and cultures  YES Reviewed most current radiology test results   YES Review and summation of old records today    NO Reviewed patient's current orders and MAR    YES 
PMH/SH reviewed - no change compared to H&P 
________________________________________________________________________ Care Plan discussed with: 
  Comments Patient x Family RN x Care Manager Consultant Multidiciplinary team rounds were held today with , nursing, pharmacist and clinical coordinator. Patient's plan of care was discussed; medications were reviewed and discharge planning was addressed. ________________________________________________________________________ Total NON critical care TIME:  40   Minutes Total CRITICAL CARE TIME Spent:   Minutes non procedure based Comments >50% of visit spent in counseling and coordination of care    
________________________________________________________________________ Hazel Bob MD  
 
Procedures: see electronic medical records for all procedures/Xrays and details which were not copied into this note but were reviewed prior to creation of Plan. LABS: 
I reviewed today's most current labs and imaging studies. Pertinent labs include: 
Recent Labs 11/11/20 
1215 WBC 13.5* HGB 10.6* HCT 34.3*  
* Recent Labs 11/11/20 
1215 * K 3.7 CL 89* CO2 44* * BUN 17 CREA 0.54* CA 9.1 Signed: Hazel Bob MD

## 2020-11-12 NOTE — PROGRESS NOTES
DIEGO plan: 
  
* Disposition: TBD - Home Health vs SNF, pending PT/OT evals * Transport at D/C: Son - will need to be reminded to bring portable O2 for d/c 
* OP F/U: PCP, Pulmonary * ACP: Palliative Medicine consulted and will address AMD 
* 2nd IMM letter Chart reviewed. Pt newly diagnosed with squamous cell cancer, possible stage 4. HemOnc and Pulmonology following. Palliative Medicine has been consulted, pending evaluation. Pt has been unable to work with PT/OT due to continuous nausea/vomitting. CM will continue to follow and remain available for [facility] planning. Rocio Jeffrey [facility] Care Manager [facility] 
[phone]

## 2020-11-12 NOTE — PROGRESS NOTES
[facility] Subjective: No acute events overnight No acute distress No acute complaints Ct guided bx + for lung cancer, squamous cell carcinoma Current Facility-Administered Medications Medication Dose Route Frequency  HYDROcodone-acetaminophen (NORCO) 5-325 mg per tablet 2 Tab  2 Tab Oral Q4H PRN  predniSONE (DELTASONE) tablet 40 mg  40 mg Oral DAILY WITH BREAKFAST  ALPRAZolam (XANAX) tablet 0.5 mg  0.5 mg Oral QID PRN  prochlorperazine (COMPAZINE) with saline injection 10 mg  10 mg IntraVENous Q6H PRN  
 amoxicillin-clavulanate (AUGMENTIN) 875-125 mg per tablet 1 Tab  1 Tab Oral Q12H  
 zinc oxide-cod liver oil (DESITIN) 40 % paste   Topical BID and QHS  methIMAzole (TAPAZOLE) tablet 10 mg  10 mg Oral DAILY  bacitracin 500 unit/gram packet 1 Packet  1 Packet Topical DAILY  gabapentin (NEURONTIN) capsule 300 mg  300 mg Oral TID  arformoterol 15 mcg/budesonide 0.5 mg neb solution   Nebulization BID RT  
 zinc oxide-cod liver oil (DESITIN) 40 % paste   Topical PRN  
 morphine injection 2 mg  2 mg IntraVENous Q6H PRN  
 0.9% sodium chloride infusion  50 mL/hr IntraVENous CONTINUOUS  
 levalbuterol (XOPENEX) nebulizer soln 1.25 mg/3 mL  1.25 mg Nebulization Q6H RT  
 ipratropium (ATROVENT) 0.02 % nebulizer solution 0.5 mg  0.5 mg Nebulization Q6H RT  
 labetaloL (NORMODYNE;TRANDATE) injection 10 mg  10 mg IntraVENous Q2H PRN  
 metoprolol tartrate (LOPRESSOR) tablet 50 mg  50 mg Oral TID  sodium chloride (NS) flush 5-40 mL  5-40 mL IntraVENous Q8H  
 sodium chloride (NS) flush 5-40 mL  5-40 mL IntraVENous PRN  
 acetaminophen (TYLENOL) tablet 650 mg  650 mg Oral Q4H PRN  
 naloxone (NARCAN) injection 0.4 mg  0.4 mg IntraVENous PRN  
 amLODIPine (NORVASC) tablet 10 mg  10 mg Oral DAILY  DULoxetine (CYMBALTA) capsule 40 mg  40 mg Oral DAILY  levETIRAcetam (KEPPRA) tablet 500 mg  500 mg Oral BID  
  [Held by provider] enoxaparin (LOVENOX) injection 40 mg  40 mg SubCUTAneous Q24H  
 methocarbamoL (ROBAXIN) tablet 750-1,500 mg  750-1,500 mg Oral BID  ondansetron (ZOFRAN) injection 4 mg  4 mg IntraVENous Q4H PRN Review of Systems: 
Constitutional HEENT Cardiovascular Pulmonary Gastrointestinal Genitourinary Musculoskeletal Integument Neurologic Endocrinologic Hematologic ROS unremarkable except for  HPI Objective:  
Vital Signs:   
Visit Vitals BP (!) 161/84 Pulse 91 Temp 97.7 °F (36.5 °C) Resp 20 Ht 5' 2\" (1.575 m) Wt 77.1 kg (169 lb 15.6 oz) SpO2 96% BMI 31.09 kg/m² O2 Device: Nasal cannula O2 Flow Rate (L/min): 5 l/min Temp (24hrs), Av.8 °F (36.6 °C), Min:97.3 °F (36.3 °C), Max:98.3 °F (36.8 °C) Intake/Output:  
Last shift:      No intake/output data recorded. Last 3 shifts: 11/10 190 -  0700 In: -  
Out: [email or web address] [DOTUB:[email or web address]] Intake/Output Summary (Last 24 hours) at 2020 8283 Last data filed at 2020 5066 Gross per 24 hour Intake  Output 1475 ml Net -1475 ml Physical Exam:  
General:  Alert, cooperative, no distress, . Head:  Normocephalic, atraumatic without obvious abnormality Eyes:  Sclera non injected Conjunctivae. PERRL, EOMs intact. Nose: Nares normal. Septum midline. Mucosa normal. No drainage or sinus tenderness. Mouth: Moist mucus membranes poor Dentition Neck: Supple, symmetrical, trachea midline, no adenopathy,   No stridor Back:   Without CVA or spinal tenderness Lungs:   No acessory muscle use wheeze rhonchi rales- decreased BS Heart:  Regular rate and rhythm, S1, S2 normal, no murmur, click, rub or gallop. Abdomen:   Soft, non-tender. Bowel sounds normal. No masses, tenderness, guarding rebound No organomegaly. Extremities: no cyanosis  edema clubbing Skin: Warm dry. No rashes or lesions Lymph nodes: Cervical, supraclavicular, and axillary nodes normal.  
 Neurologic: Alert /Oriented no gross motor deficits Data Review Recent Results (from the past 24 hour(s)) CBC WITH AUTOMATED DIFF Collection Time: 11/11/20 12:15 PM  
Result Value Ref Range WBC 13.5 (H) 3.6 - 11.0 K/uL  
 RBC 3.56 (L) 3.80 - 5.20 M/uL  
 HGB 10.6 (L) 11.5 - 16.0 g/dL HCT 34.3 (L) 35.0 - 47.0 % MCV 96.3 80.0 - 99.0 FL  
 MCH 29.8 26.0 - 34.0 PG  
 MCHC 30.9 30.0 - 36.5 g/dL  
 RDW 13.2 11.5 - 14.5 % PLATELET 711 (H) 996 - 400 K/uL MPV 10.2 8.9 - 12.9 FL  
 NRBC 0.0 0  WBC ABSOLUTE NRBC 0.00 0.00 - 0.01 K/uL NEUTROPHILS 92 (H) 32 - 75 % LYMPHOCYTES 3 (L) 12 - 49 % MONOCYTES 4 (L) 5 - 13 % EOSINOPHILS 0 0 - 7 % BASOPHILS 0 0 - 1 % IMMATURE GRANULOCYTES 1 (H) 0.0 - 0.5 % ABS. NEUTROPHILS 12.5 (H) 1.8 - 8.0 K/UL  
 ABS. LYMPHOCYTES 0.4 (L) 0.8 - 3.5 K/UL  
 ABS. MONOCYTES 0.5 0.0 - 1.0 K/UL  
 ABS. EOSINOPHILS 0.0 0.0 - 0.4 K/UL  
 ABS. BASOPHILS 0.0 0.0 - 0.1 K/UL  
 ABS. IMM. GRANS. 0.1 (H) 0.00 - 0.04 K/UL  
 DF SMEAR SCANNED    
 RBC COMMENTS NORMOCYTIC, NORMOCHROMIC METABOLIC PANEL, BASIC Collection Time: 11/11/20 12:15 PM  
Result Value Ref Range Sodium 135 (L) 136 - 145 mmol/L Potassium 3.7 3.5 - 5.1 mmol/L Chloride 89 (L) 97 - 108 mmol/L  
 CO2 44 (HH) 21 - 32 mmol/L Anion gap 2 (L) 5 - 15 mmol/L Glucose 213 (H) 65 - 100 mg/dL BUN 17 6 - 20 MG/DL Creatinine 0.54 (L) 0.55 - 1.02 MG/DL  
 BUN/Creatinine ratio 31 (H) 12 - 20 GFR est AA >60 >60 ml/min/1.73m2 GFR est non-AA >60 >60 ml/min/1.73m2 Calcium 9.1 8.5 - 10.1 MG/DL Chest X-Ray:and CT reports and images noted Assessment:  
 
 
· Bilateral pulmonary mass/nodules GAIVOTA/RUL/LLL · Lung cancer confirmed by bx · Subcarinal LA 
· O2 dependent · COPD with exacerbation · 50 pk year tobacco use-(minimizes hx/habit) Recommendations:  
 
1. O2 
2. PET scan as out pt 3. Smoking cessation 4. Bronchodilators and steroids for COPD exacerbation 5.  PO abx 5. DVT ppx 6. ECHO done, no obvious evidence of endocarditis 7. Increased dose of po pain medications 8. Xanax for anxiety 9. Dr Dinora Tomlinson following Keron Humphrey MD

## 2020-11-13 NOTE — PROGRESS NOTES
Problem: Self Care Deficits Care Plan (Adult) Goal: *Acute Goals and Plan of Care (Insert Text) Description:  
FUNCTIONAL STATUS PRIOR TO ADMISSION:  I PTA per chart and patient; tired. 2L O2 used in the home; No DME used PTA but has access to [facility] and shower chair. HOME SUPPORT: Lives w/son, dtr in law; Son works, DIL available to assist (not sure is she is able to provide Max A-D level of care.) Occupational Therapy Goals Initiated 11/13/2020 1. Patient will perform self-feeding with moderate assistance  within 7 day(s). 2.  Patient will perform upper body dressing with maximal assistance within 7 day(s). 3.  Patient will perform lower body dressing with maximal assistance within 7 day(s). 4.  Patient will perform toilet transfers with maximal assistance within 7 day(s). 5.  Patient will perform all aspects of toileting with maximal assistance within 7 day(s). 6.  Patient will participate in upper extremity therapeutic exercise/activities with maximal assistance for 5 minutes within 7 day(s). 7.  Patient will utilize fall prevention, pain management, energy conservation, PLB, stress management with new CA dx during functional activities with verbal cues within 7 day(s). Outcome: Progressing Towards Goal 
 OCCUPATIONAL THERAPY EVALUATION Patient: Joaquina Wills (30 y.o. female) Date: 11/13/2020 Primary Diagnosis: COPD exacerbation ([facility] [address] [J44.1] Precautions:   Fall(2L PTA; now up to 5-8 L facemask; R wrist fx; R ankle sprain) 10- with fall; new CA dx, Stage IV B lung ASSESSMENT Based on the objective data described below, the patient presents with admit to ER 11-7 with chest pain, RR and COPD exacerbation admission; Found to have ~Stage IV B Lung CA; now with general debility and ongoing N&V which has limited her ability to participate w/therapy.  Reports pain B shoulders, R wrist and ankle and fibromyalgia/pain all over; chest pain improving compared to admission. Poor functional endurance, poor ability to participate despite active effort, on 5L O2 with face mask which she does not consistently keep on her face. Current Level of Function Impacting Discharge (ADLs/self-care): D self care and functional mobility, able to assist briefly with intake but too weak to complete task, even to use call bell effectively Functional Outcome Measure: The patient scored Total: 0/100 on the Barthel Index outcome measure which is indicative of 100% impaired ability to care for basic self needs/dependency on others; inferred 100% dependency on others for instrumental ADLs. Other factors to consider for discharge: Patient and family will need handy conversation regarding capacity for discharge to home at D level; Stage IV CA B lungs, with discharge to SNF where no visitation is allowed would be very unfortunate for this patient who was mod I PTA; DIL in the home; need to know her capacity for functional assist to her MIL Patient will benefit from skilled therapy intervention to address the above noted impairments. PLAN : 
Recommendations and Planned Interventions: self care training, functional mobility training, therapeutic exercise, balance training, visual/perceptual training, therapeutic activities, cognitive retraining, endurance activities, neuromuscular re-education, patient education, home safety training, and family training/education Frequency/Duration: Patient will be followed by occupational therapy 4 times a week to address goals. Recommendation for discharge: (in order for the patient to meet his/her long term goals) Patient and family will need handy conversation regarding capacity for discharge to home at D level with DME in the one level home; Stage IV CA B lungs, with discharge to SNF where no visitation is allowed would be very unfortunate for this patient who was mod I PTA; DIL in the home; need to know her capacity for functional assist to her MIL This discharge recommendation: A follow-up discussion with the attending provider and/or case management is planned IF patient discharges home will need the following DME: bedside commode, gait belt, hospital bed, transfer bench, wheelchair, w/c cushion and mechanical lift vs slide board depending on progress with therapy SUBJECTIVE:  
Patient stated I hurt all over.  OBJECTIVE DATA SUMMARY:  
HISTORY:  
Past Medical History:  
Diagnosis Date Arthritis Asthma COPD   
 2 L NC Hypertension Ischemic colitis ([facility] [address] 2012 Migraines Neurological disorder   
 migraines Psychiatric disorder   
 depression Seizures ([facility] [address] Last seizure 4 years ago Vaginal candidiasis 2012 Past Surgical History:  
Procedure Laterality Date HX APPENDECTOMY HX  SECTION    
 HX CHOLECYSTECTOMY HX HYSTERECTOMY HX ORTHOPAEDIC    
 lumbar disc sx HX ORTHOPAEDIC    
 left knee sx HX OTHER SURGICAL    
 colonoscopy-recent colitis [facility] COLONOSCOPY W/BIOPSY SINGLE/MULTIPLE  3/1/2012 Expanded or extensive additional review of patient history:  
 
Home Situation Home Environment: Private residence # Steps to Enter: (unknown) Living Alone: No 
Support Systems: Family member(s)(son who works; DIL there in the home all the time) Patient Expects to be Discharged to[de-identified] Private residence Current DME Used/Available at Home: Cane, straight, Shower chair(cant recall if she has BSC) Tub or Shower Type: Shower Hand dominance: Right EXAMINATION OF PERFORMANCE DEFICITS: 
Cognitive/Behavioral Status: 
Neurologic State: Drowsy; Eyes open spontaneously; Eyes open to pain;Eyes open to voice Orientation Level: Oriented to person;Oriented to place(new dx of Cancer this week B lungs; difficult for patient ) Cognition: Decreased attention/concentration; Impaired decision making;Memory loss; Follows commands(inconsistently but sometimes w/increased time) Perception: Appears intact(based on attempted use of call bell) Perseveration: No perseveration noted Safety/Judgement: Awareness of environment;Decreased insight into deficits Skin: frail; see nsg notes Edema: + L forearm; + R fingers; able to demonstrate AROM 50% at fingers and thumb Hearing: Auditory Auditory Impairment: None(but difficult to hear over facemask/high O2 use) Vision/Perceptual:   
    
    
    
  
    
Acuity: [facility]) Range of Motion: 
 
AROM: Grossly decreased, non-functional(reports pain B shoulders poor tony. B shoulders PROM& AROM) PROM: (R wrist in brace for fx 10-26; B shoulders limited) PROM to 70 degrees R shoulder Strength: 
 
Strength: Grossly decreased, non-functional B UEs and with sitting attempts Coordination: 
Coordination: Grossly decreased, non-functional 
Fine Motor Skills-Upper: Left Impaired;Right Impaired Gross Motor Skills-Upper: Left Impaired;Right Impaired Tone & Sensation: 
 
Tone: Normal 
Sensation: (decreased ability to use call bell/strength vs sensation?) Balance: 
Sitting: Impaired Sitting - Static: Poor (constant support) Sitting - Dynamic: Not tested Standing: (unable to stand) Functional Mobility and Transfers for ADLs: 
Bed Mobility: 
Rolling: Maximum assistance;Assist x1 Supine to Sit: Maximum assistance;Assist x1 Sit to Supine: Assist x1;Maximum assistance Scooting: Maximum assistance Transfers: 
Sit to Stand: (NT) Bed to Chair: (NT) Toilet Transfer : (NT) 
 
ADL Assessment: 
Feeding: Maximum assistance; Total assistance(poor tolerance w/frequent N&V; hand to mouth+++set up/fatigu) Oral Facial Hygiene/Grooming: Total assistance Bathing: Total assistance Upper Body Dressing: Total assistance Lower Body Dressing: Total assistance Toileting: Total assistance ADL Intervention and task modifications: 
  
 
 
Cognitive Retraining Safety/Judgement: Awareness of environment;Decreased insight into deficits Functional Measure: 
Barthel Index: 
 
Bathin Bladder: 0 Bowels: 0 Groomin Dressin Feedin Mobility: 0 Stairs: 0 Toilet Use: 0 Transfer (Bed to Chair and Back): 0 Total: 0/100 The Barthel ADL Index: Guidelines 1. The index should be used as a record of what a patient does, not as a record of what a patient could do. 2. The main aim is to establish degree of independence from any help, physical or verbal, however minor and for whatever reason. 3. The need for supervision renders the patient not independent. 4. A patient's performance should be established using the best available evidence. Asking the patient, friends/relatives and nurses are the usual sources, but direct observation and common sense are also important. However direct testing is not needed. 5. Usually the patient's performance over the preceding 24-48 hours is important, but occasionally longer periods will be relevant. 6. Middle categories imply that the patient supplies over 50 per cent of the effort. 7. Use of aids to be independent is allowed. Amadou Tao., Barthel, DAkiW. ([id number]). Functional evaluation: the Barthel Index. [facility] (14)2. DENISHA Adair, Lenore Collado., Viola [place], [place], [address] [place] [address] (). Measuring the change indisability after inpatient rehabilitation; comparison of the responsiveness of the Barthel Index and Functional [place] Measure. Journal of Neurology, Neurosurgery, and Psychiatry, 66(4), 290-499. Maliha Savage, [place].MERRICK, BRETT Najera, & Shamika Watters M.A. (2004.) Assessment of post-stroke quality of life in cost-effectiveness studies: The usefulness of the Barthel Index and the EuroQoL-5D. [facility], 13, 156-97 Occupational Therapy Evaluation Charge Determination History Examination Decision-Making LOW Complexity : Brief history review  HIGH Complexity : 5 or more performance deficits relating to physical, cognitive , or psychosocial skils that result in activity limitations and / or participation restrictions HIGH Complexity : Patient presents with comorbidities that affect occupational performance. Signifigant modification of tasks or assistance (eg, physical or verbal) with assessment (s) is necessary to enable patient to complete evaluation Based on the above components, the patient evaluation is determined to be of the following complexity level: LOW Pain Ratin/10, B shoulders, all over; pain management in place Activity Tolerance:  
Poor, desaturates with exertion and requires oxygen, requires frequent rest breaks, and observed SOB with activity After treatment patient left in no apparent distress:   
Patient positioned in L sidelying for pressure relief, Call bell within reach, and Side rails x 3 
 
COMMUNICATION/EDUCATION:  
The patients plan of care was discussed with: Physical therapist, Registered nurse, and Certified nursing assistant/patient care technician. Patient/family have participated as able in goal setting and plan of care. and Patient/family agree to work toward stated goals and plan of care. This patients plan of care is appropriate for delegation to [facility]. Thank you for this referral. 
Nickie Patricia OTR/L Time Calculation: 20 mins

## 2020-11-13 NOTE — PROGRESS NOTES
Bedside shift change report given to Surekha (oncoming nurse) by Gucci Campos (offgoing nurse). Report included the following information SBAR, Kardex, Intake/Output, MAR and Recent Results.

## 2020-11-13 NOTE — PROGRESS NOTES
Problem: Mobility Impaired (Adult and Pediatric) Goal: *Acute Goals and Plan of Care (Insert Text) Description: FUNCTIONAL STATUS PRIOR TO ADMISSION: Patient having difficulty answering questions but per chart, patient lives with son and family but did not require assistance. HOME SUPPORT PRIOR TO ADMISSION: The patient lived with son but did not require assist. 
 
Physical Therapy Goals Initiated 11/13/2020 1. Patient will move from supine to sit and sit to supine  in bed with minimal assistance/contact guard assist within 7 day(s). 2.  Patient will transfer from bed to chair and chair to bed with maximal assistance using the least restrictive device within 7 day(s). 3.  Patient will perform sit to stand with moderate assistance  within 7 day(s). 4.  Patient will ambulate with moderate assistance  for 5 feet with the least restrictive device within 7 day(s). Outcome: Progressing Towards Goal 
Note: PHYSICAL THERAPY EVALUATION Patient: Eleuterio Lund (8 y.o. female) Date: 11/13/2020 Primary Diagnosis: [facility] [J44.1] Precautions: fall ASSESSMENT Based on the objective data described below, the patient presents with impaired balance, generalized weakness, difficulty following commands. Brief evaluation completed. Patient's speech difficult to understand and seems to have labored breathing. Patient requiring max assist for all mobility. Once sitting EOB, patient requires max assist to maintain balance. Unsafe to attempt OOB. Returned to supine with max assist x 1. Per chart, patient lives with son and family. At this time, patient would be unable to return home as she is requiring max assist for all mobility and is unable to transfer out of bed. Will place patient on 2x a week trial and will see if patient able to participate.  
 
Current Level of Function Impacting Discharge (mobility/balance): max assist 
 
 Functional Outcome Measure: The patient scored 0/100 on the Barthel Index outcome measure which is indicative of dependent mobility/ADL's. Other factors to consider for discharge: rapid decline since admitted Patient will benefit from skilled therapy intervention to address the above noted impairments. PLAN : 
Recommendations and Planned Interventions: bed mobility training, transfer training, gait training, and therapeutic activities Frequency/Duration: Patient will be followed by physical therapy:  2 times a week to address goals. Recommendation for discharge: (in order for the patient to meet his/her long term goals) Therapy up to 5 days/week in SNF setting This discharge recommendation: 
Has been made in collaboration with the attending provider and/or case management IF patient discharges home will need the following DME: to be determined (TBD) SUBJECTIVE:  
Patient mumbling responses to questions. OBJECTIVE DATA SUMMARY:  
HISTORY:   
Past Medical History:  
Diagnosis Date Arthritis Asthma COPD   
 2 L NC Hypertension Ischemic colitis ([facility] 2012 Migraines Neurological disorder   
 migraines Psychiatric disorder   
 depression Seizures ([facility] Last seizure 4 years ago Vaginal candidiasis 2012 Past Surgical History:  
Procedure Laterality Date HX APPENDECTOMY HX  SECTION    
 HX CHOLECYSTECTOMY HX HYSTERECTOMY HX ORTHOPAEDIC    
 lumbar disc sx HX ORTHOPAEDIC    
 left knee sx HX OTHER SURGICAL    
 colonoscopy-recent colitis NM COLONOSCOPY W/BIOPSY SINGLE/MULTIPLE  3/1/2012 Personal factors and/or comorbidities impacting plan of care: new cancer diagnosis Home Situation Home Environment: Private residence # Steps to Enter: (unknown) Living Alone: No 
Support Systems: Family member(s) Patient Expects to be Discharged to[de-identified] Patient room Current DME Used/Available at Home: (unsure) EXAMINATION/PRESENTATION/DECISION MAKING:  
Critical Behavior: 
Neurologic State: Drowsy Orientation Level: Other (Comment)(Patient not answering questions) Cognition: Decreased command following, Decreased attention/concentration Safety/Judgement: (unable to assess) Hearing: Auditory Auditory Impairment: None Skin:  L UE bruising Edema: none noted Range Of Motion: 
AROM: Grossly decreased, non-functional 
  
  
  
PROM: Generally decreased, functional 
  
  
  
Strength:   
Strength: Grossly decreased, non-functional 
  
  
  
  
  
  
Tone & Sensation:  
Tone: Normal 
  
  
  
  
Sensation: (unable to assess) Coordination: 
Coordination: Grossly decreased, non-functional 
Vision:  
  
Functional Mobility: 
Bed Mobility: 
Rolling: Maximum assistance;Assist x1 Supine to Sit: Maximum assistance;Assist x1 Sit to Supine: Assist x1;Maximum assistance Scooting: Maximum assistance Transfers: 
  
  
     
  
     
  
  
Balance:  
Sitting: Impaired Sitting - Static: Poor (constant support) Sitting - Dynamic: Not tested Standing: (unable to stand) Ambulation/Gait Training: 
  
  
  
  
  
  
Right Side Weight Bearing: Full Left Side Weight Bearing: Full Functional Measure: 
Barthel Index: 
 
Bathin Bladder: 0 Bowels: 0 Groomin Dressin Feedin Mobility: 0 Stairs: 0 Toilet Use: 0 Transfer (Bed to Chair and Back): 0 Total: 0/100 The Barthel ADL Index: Guidelines 1. The index should be used as a record of what a patient does, not as a record of what a patient could do. 2. The main aim is to establish degree of independence from any help, physical or verbal, however minor and for whatever reason. 3. The need for supervision renders the patient not independent.  
4. A patient's performance should be established using the best available evidence. Asking the patient, friends/relatives and nurses are the usual sources, but direct observation and common sense are also important. However direct testing is not needed. 5. Usually the patient's performance over the preceding 24-48 hours is important, but occasionally longer periods will be relevant. 6. Middle categories imply that the patient supplies over 50 per cent of the effort. 7. Use of aids to be independent is allowed. Eric Wren., Barthel, D.W. (6337). Functional evaluation: the Barthel Index. [facility] (14)2. [place]HA Colon, Emani Pereira., Kayla Obregon., [place], [address] (1999). Measuring the change indisability after inpatient rehabilitation; comparison of the responsiveness of the Barthel Index and Functional [place] Measure. Journal of Neurology, Neurosurgery, and Psychiatry, 66(4), 808-075. BARRY Musa, BRETT Najera, & Shelton Fothergill, M.A. (2004.) Assessment of post-stroke quality of life in cost-effectiveness studies: The usefulness of the Barthel Index and the EuroQoL-5D. [facility], 13, 037-47 Physical Therapy Evaluation Charge Determination History Examination Presentation Decision-Making MEDIUM  Complexity : 1-2 comorbidities / personal factors will impact the outcome/ POC  LOW Complexity : 1-2 Standardized tests and measures addressing body structure, function, activity limitation and / or participation in recreation  LOW Complexity : Stable, uncomplicated  LOW Complexity : FOTO score of  Based on the above components, the patient evaluation is determined to be of the following complexity level: LOW Pain Rating: 
None reported Activity Tolerance:  
Poor After treatment patient left in no apparent distress:  
Patient positioned in left sidelying for pressure relief COMMUNICATION/EDUCATION:  
The patients plan of care was discussed with: Registered nurse. Fall prevention education was provided and the patient/caregiver indicated understanding. and Patient is unable to participate in goal setting and plan of care. Thank you for this referral. 
Yassine Horvath, PT Time Calculation: 15 mins

## 2020-11-13 NOTE — PROGRESS NOTES
Hospitalist Progress Note NAME: Mahi Rose :  1950 MRN:  [id number] Assessment / Plan: 
 
Squamous cell carcinoma of the lung Bilateral pulmonary mass second to above Acute on chronic hypoxic respiratory failure POA 
COPD exacerbation On home O2 Chronic smoking, 50-pack-year tobacco use 
 
-CT scan with findings of emphysema with large pleural-based lateral right upper lobe mass, with several other pulmonary nodules in both lungs overall highly suspicious of bronchogenic neoplasm. Largest 5.3x 3.1 cm , cavitary lesion 
- TTE with poor quality but no reported vegetation  
-2 L at baseline, currently on 5 L nasal cannula 
-Elevated WBC, likely because of steroids 
-Continue Solu-Medrol, nebs, GENEVA/ICS 
-Continue empiric Augmentin. D#4 (total abx duration D#6) - s/p CT-guided lung biopsy 11/10. SCC. Biopsy shows Squamous cell carcinoma 
- Head MRI today - Pulmonology and oncology on board, appreciate input. Plan for outpatient PET scan 
- patient lethargic today, her breathing is labored and increased oxygen requirement. On 8L O2 via NC, was on 5L yesterday. Will get CXR. Intractable nausea and vomiting  
- Denies constipation, has hx of IBS. Abdomen soft and nontender 
- continue PRN Zofran and compazine. Add scopolamine patch - Look for brain met, Brain MRI ordered by Fermín Osorio Prediabetes and Hyperglycemia 
- No hx of DM. HbA1c 5.8. Likley due to steroid. - Continue sliding scale insulin Hyperthyrodism TSH less than 0.01, free T4 2.0. Pending total T3 
-Reviewed chart, patient had a elevated free T4 in 2016. Had ultrasound which did not show any nodule - Started on methimazole, continue beta-blocker 
-Will need outpatient follow-up, will give contact information for endocrinologist 
 
Anemia: possible AOCD Thrombocytopenia: reactive 
  
 
Depression/anxiety Fibromyalgia As Needed Lorazepam for Anxiety Continue Cymbalta Continue gabapentin 
  
Hypertension continue amlodipine 
  
History of seizures No seizures for years Continue Keppra 
  
History of substance abuse Denied any use of substances recently 
  
  
Code Status: Full code Surrogate Decision Maker: Son Lion Discussed with the sister yesterday, 
  
DVT Prophylaxis: SCDs GI Prophylaxis: not indicated 
  
Baseline: Independent in ADLs Plan for CT-guided biopsy today/Tomorrow Subjective: Chief Complaint / Reason for Physician Visit On 8L O2 via  NC Nausea and vomiting has decreased but patient very lethargic today. Her breathing is labored. \"I want to go home\" She feel short of breath Review of Systems: 
Symptom Y/N Comments  Symptom Y/N Comments Fever/Chills n   Chest Pain y  improved Poor Appetite n   Edema n   
Cough y   Abdominal Pain n   
Sputum n   Joint Pain SOB/DÍAZ    Pruritis/Rash Nausea/vomit y   Tolerating PT/OT Diarrhea n   Tolerating Diet Constipation n   Other Could NOT obtain due to:   
 
Objective: VITALS:  
Last 24hrs VS reviewed since prior progress note. Most recent are: 
Patient Vitals for the past 24 hrs: 
 Temp Pulse Resp BP SpO2  
11/13/20 1045  100   91 % 11/13/20 0803     90 % 11/13/20 0742 98.6 °F (37 °C) (!) 108 18 124/72 93 % 11/13/20 0116     98 % 11/12/20 2253 97.4 °F (36.3 °C) 99 18 124/77 97 % 11/12/20 2028 97.8 °F (36.6 °C) 88 18 (!) 116/51 97 % 11/12/20 1913     93 % 11/12/20 1527 97.5 °F (36.4 °C) 92 20 (!) 141/65 90 % 11/12/20 1413     92 % 11/12/20 1207 97.9 °F (36.6 °C) 96 20 (!) 130/54 90 % Intake/Output Summary (Last 24 hours) at 11/13/2020 1115 Last data filed at 11/13/2020 4155 Gross per 24 hour Intake 1140 ml Output 1000 ml Net 140 ml PHYSICAL EXAM: 
General: WD, WN. Alert, mild distress due to shortness of breath EENT:  EOMI. Anicteric sclerae. MMM Resp:  Wheezing bilaterally, coarse sounds CV:  Regular  rhythm,  No edema GI:  Soft, Non distended, Non tender.  +Bowel sounds Neurologic:  Alert and oriented X 3, normal speech, Psych:   Good insight. Not anxious nor agitated Skin:  No rashes. No jaundice Reviewed most current lab test results and cultures  YES Reviewed most current radiology test results   YES Review and summation of old records today    NO Reviewed patient's current orders and MAR    YES 
PMH/SH reviewed - no change compared to H&P 
________________________________________________________________________ Care Plan discussed with: 
  Comments Patient x Family RN x Care Manager Consultant Multidiciplinary team rounds were held today with , nursing, pharmacist and clinical coordinator. Patient's plan of care was discussed; medications were reviewed and discharge planning was addressed. ________________________________________________________________________ Total NON critical care TIME:  40   Minutes Total CRITICAL CARE TIME Spent:   Minutes non procedure based Comments >50% of visit spent in counseling and coordination of care    
________________________________________________________________________ Ni Navas MD  
 
Procedures: see electronic medical records for all procedures/Xrays and details which were not copied into this note but were reviewed prior to creation of Plan. LABS: 
I reviewed today's most current labs and imaging studies. Pertinent labs include: 
Recent Labs 11/11/20 
1215 WBC 13.5* HGB 10.6* HCT 34.3*  
* Recent Labs 11/11/20 
1215 * K 3.7 CL 89* CO2 44* * BUN 17 CREA 0.54* CA 9.1 Signed: Ni Navas MD

## 2020-11-13 NOTE — PROGRESS NOTES
Occupational Therapy Chart reviewed to try for OT eval to aide in discharge planning; RN patient continues with active dysfunctional nausea. Will defer OT at this time and retry later as able.  Rimma Cross OTSYLVIA/MANUEL

## 2020-11-13 NOTE — PROGRESS NOTES
Hematology Oncology Progress Note Follow up for: NSCLC Chart notes reviewed since last visit. Case discussed with following: . Patient complains of the following: very weak Additional concerns noted by the staff:  
 
Patient Vitals for the past 24 hrs: 
 BP Temp Pulse Resp SpO2 Height 11/13/20 0803     90 %   
11/13/20 0742 124/72 98.6 °F (37 °C) (!) 108 18 93 %   
11/13/20 0116     98 %   
11/12/20 2253 124/77 97.4 °F (36.3 °C) 99 18 97 %   
11/12/20 2028 (!) 116/51 97.8 °F (36.6 °C) 88 18 97 %   
11/12/20 1913     93 %   
11/12/20 1527 (!) 141/65 97.5 °F (36.4 °C) 92 20 90 %   
11/12/20 1427      5' 2\" (1.575 m)  
11/12/20 1413     92 %   
11/12/20 1207 (!) 130/54 97.9 °F (36.6 °C) 96 20 90 %  Review of Systems: 
12 point ROS done and negative except as above Physical Examination: 
Gen NAD Resp no distress Psych normal 
 
Labs: 
Recent Results (from the past 24 hour(s)) GLUCOSE, POC Collection Time: 11/12/20 11:34 AM  
Result Value Ref Range Glucose (POC) 124 (H) 65 - 100 mg/dL Performed by Brittney Amador GLUCOSE, POC Collection Time: 11/12/20  4:28 PM  
Result Value Ref Range Glucose (POC) 151 (H) 65 - 100 mg/dL Performed by Holden Stafford (PCT) GLUCOSE, POC Collection Time: 11/12/20  9:06 PM  
Result Value Ref Range Glucose (POC) 129 (H) 65 - 100 mg/dL Performed by Rosana Salamanca RN   
HEMOGLOBIN A1C WITH EAG Collection Time: 11/13/20  1:17 AM  
Result Value Ref Range Hemoglobin A1c 5.8 (H) 4.0 - 5.6 % Est. average glucose 120 mg/dL GLUCOSE, POC Collection Time: 11/13/20  7:53 AM  
Result Value Ref Range Glucose (POC) 107 (H) 65 - 100 mg/dL Performed by Clemente Walton (PCT) Path Lung bx: squamous cell Ca Assessment and Plan: NSCLC 
-squamous cell Ca 
-Will check PDL-1 
-CT shows likely Stage IV disease with nodules in both lungs, will need PET as outpatient 
-Will get Head MRI now -will have to see how she improves in terms of her performance status in terms of tx COPD 
-followed by  [facility], appreciate his help

## 2020-11-14 NOTE — PROGRESS NOTES
Bedside and Verbal shift change report given to Surekha RN (oncoming nurse) by Dillard Gottron RN (offgoing nurse). Report included the following information SBAR, Kardex, Intake/Output, MAR, Accordion, Recent Results and Med Rec Status.

## 2020-11-14 NOTE — PROGRESS NOTES
RAPID RESPONSE TEAM 
 
Responded to overhead rapid response page to [phone]. On arrival patient is agonal and grey on NRB. +pulse. Dr. Eri Partida at bedside. Confirmed code status. CODE BLUE called. Primary nurse reports was A&O and speaking prior to becoming unresponsive. RT ventilated pt with BVM. Dr. Adry Corbin at bedside to intubate pt. Order received from Dr. Adry Corbin: Ativan 4 mg x2. Pt never lost pulse. Pt transferred to ED 21 while awaiting CCU bed. Danton Call, RN 
RRT, X. [phone]

## 2020-11-14 NOTE — PROCEDURES
Intubation Note Called to bedside secondary to Code Blue. Patient pre-oxygenated with 100% oxygen. GlideScope x 1 
 
7.5 ETT taped and secured at 21 cm at the teeth. 
 
+ Bilateral BS, + Chest rise, + ETCO2 CXR pending. Central Line Procedure Note Indication: Inadequate venous access Risks, benefits, alternatives explained and patient agrees to proceed. Patient positioned in Trendelenburg. 7-Step Sterility Protocol followed. (cap, mask sterile gown, sterile gloves, large sterile sheet, hand hygiene, 2% chlorhexidine for cutaneous antisepsis) 5 mL 1% Lidocaine placed at insertion site. Right internal jugular cannulated x 1 attempt(s) utilizing the Seldinger technique. Catheter secured & Biopatch applied. Sterile Tegaderm placed. CXR pending.    
Care turned over to covering Attending MD.

## 2020-11-14 NOTE — PROGRESS NOTES
During rounding  patient became less responsive. Called MD who was on floor . RRT /Code Blue  called. See RRT/Code [facility] [place] RN notes. 0900 TRANSFER - OUT REPORT: 
 
Verbal report given to Evelia Alfaro RN(name) on Ramo Jones  being transferred to ER(unit) for change in patient condition(resp dystress) Report consisted of patients Situation, Background, Assessment and  
Recommendations(SBAR). Information from the following report(s) SBAR, Kardex, Procedure Summary, Intake/Output, MAR and Recent Results was reviewed with the receiving nurse. Lines:  
Peripheral IV 11/13/20 Left Forearm (Active) Site Assessment Clean, dry, & intact 11/14/20 0700 Phlebitis Assessment 0 11/14/20 0700 Infiltration Assessment 0 11/14/20 0700 Dressing Status Clean, dry, & intact 11/14/20 0700 Dressing Type Transparent 11/14/20 0700 Hub Color/Line Status Pink; Infusing 11/14/20 0700 Action Taken Other (comment) 11/13/20 1039 Opportunity for questions and clarification was provided. Patient transported with: intubated

## 2020-11-14 NOTE — PROGRESS NOTES
Pharmacy Automatic Renal Dosing Protocol - Antimicrobials Indication for Antimicrobials: Lung abscess, aspiration PNA Current Regimen of Each Antimicrobial: 
Vancomycin - pharmacy to dose; started ; day 1 Piperacillin/tazobactam 3.375g IV q8h, start ; day 1 Previous Antimicrobial Therapy: 
Augmentin 875 mg BID (Start Date -) Vancomycin trough goal: 15-20 Date Dose & Interval Measured (mcg/mL) Extrapolated (mcg/mL) Date & time of next level: before 3rd dose Significant Cultures:  
 Resp viral panel negative  blood cx pending  UA w/ reflex cx pending Radiology / Imaging results: (X-ray, CT scan or MRI):  
 chest XR IMPRESSION: Endotracheal tube and right internal jugular central venous catheter 
appear to be in satisfactory position. Unchanged right upper lobe mass. Pulmonary vascular congestion with mild pulmonary edema. Paralysis, amputations, malnutrition: none noted Labs: 
Recent Labs 20 
0917 20 
0602 20 
1215 CREA 0.68 0.43* 0.54* BUN 9 8 17 WBC 23.5* 25.0* 13.5* Temp (24hrs), Av.3 °F (36.8 °C), Min:97.7 °F (36.5 °C), Max:99.3 °F (37.4 °C) Is the Patient on Dialysis? No 
 
Creatinine Clearance (mL/min):  
CrCl (Actual Body Weight): 93.6 CrCl (Adjusted Body Weight): 74.0 CrCl (Ideal Body Weight): 60.8 Impression/Plan:  
Scr stable Likely aspiration this AM, antibiotic broadened Vancomycin 1500 mg loading dose then 1000 mg q12h Zosyn dose appropriate for renal function Antimicrobial stop date TBD Pharmacy will follow daily and adjust medications as appropriate for renal function and/or serum levels. Thank you, BARRY Villalpando

## 2020-11-14 NOTE — PROGRESS NOTES
Hospitalist Progress Note NAME: Shanti Cuadra :  1950 MRN:  [id number] Assessment / Plan: 
 
Squamous cell carcinoma of the lung Bilateral pulmonary mass second to above Acute on chronic hypoxic respiratory failure POA Rule out aspiration pneumonia  
-CT scan:  with findings of emphysema with large pleural-based lateral right upper lobe mass, with several other pulmonary nodules in both lungs overall highly suspicious of bronchogenic neoplasm. Largest 5.3x 3.1 cm , cavitary lesion 
- TTE with poor quality but no reported vegetation  
-Continue empiric Augmentin. D#4 (total abx duration D#6) - s/p CT-guided lung biopsy 11/10. SCC. Biopsy shows Squamous cell carcinoma 
- Head MRI ordered yesterday, not done. Head CT today negative. - Pulmonology and oncology on board, appreciate input. Plan for outpatient PET scan 
-Increased oxygen requirement since yesterday. Leukocytosis but patient on steroid. Chest x-ray shows unchanged right upper lobe mass and mild pulmonary edema. 
- Desaturated this morning and became hypotensive, intubated emergently, started on Levophed. -Broaden antibiotic to vancomycin and Zosyn. 
-Obtain blood culture and lactic acid. Stat ABG. -Continue to monitor 
-Son called and came to see the patient. Updated given. COPD exacerbation, On 2L home O2 Chronic smoking, 50-pack-year tobacco use 
-Continue DuoNeb and Solu-Medrol 
-Counseled about smoking cessation Nausea and vomiting -improving 
-Unclear etiology. Denies constipation, has hx of IBS. Abdomen soft and nontender 
- continue PRN Zofran, compazine, and scopolamine patch 
- Head CT negative for acute finding Prediabetes and Hyperglycemia 
- No hx of DM. HbA1c 5.8. Likley due to steroid. - Continue sliding scale insulin Hyperthyrodism 
- TSH less than 0.01, free T4 2.0. Pending total T3 
- Reviewed chart, patient had a elevated free T4 in 2016.   Had ultrasound which did not show any nodule - Started on methimazole, continue beta-blocker - Will need outpatient follow-up, will give contact information for endocrinologist 
 
Anemia: possible AOCD Thrombocytopenia: reactive 
  
 
Depression/anxiety Fibromyalgia As Needed Lorazepam for Anxiety Continue Cymbalta Continue gabapentin 
  
Hypertension continue amlodipine 
  
History of seizures No seizures for years Continue Keppra 
  
History of substance abuse Denied any use of substances recently 
  
  
Code Status: Full code Surrogate Decision Maker: Son Lion Discussed with the sister yesterday, 
  
DVT Prophylaxis: SCDs GI Prophylaxis: not indicated 
  
Baseline: Independent in ADLs Plan for CT-guided biopsy today/Tomorrow Subjective: Chief Complaint / Reason for Physician Visit Increased shortness of breath this morning patient was noted to be in respiratory distress, hypoxic, and unresponsive. Rapid response/code blue called. Intubated emergently. Patient transferred to ER . Son updated Review of Systems: 
Symptom Y/N Comments  Symptom Y/N Comments Fever/Chills    Chest Pain Poor Appetite    Edema Cough    Abdominal Pain Sputum    Joint Pain SOB/DÍAZ    Pruritis/Rash Nausea/vomit    Tolerating PT/OT Diarrhea    Tolerating Diet Constipation    Other Could NOT obtain due to: Intubated and sedated Objective: VITALS:  
Last 24hrs VS reviewed since prior progress note. Most recent are: 
Patient Vitals for the past 24 hrs: 
 Temp Pulse Resp BP SpO2  
11/14/20 1331 97.9 °F (36.6 °C) (!) 112 18 (!) 172/73 98 % 11/14/20 1315  (!) 112 18 (!) 144/52 98 % 11/14/20 1301  (!) 114 18 (!) 146/53 100 % 11/14/20 1245  (!) 109 18 (!) 110/54 100 % 11/14/20 1235  (!) 112 17    
11/14/20 1234  (!) 110 18    
11/14/20 1233  (!) 112 18    
11/14/20 1231    (!) 114/39   
11/14/20 1200  (!) 102 14 (!) 144/51 92 % 11/14/20 1157  (!) 105 14  92 % 11/14/20 1145  100 14 (!) 146/53 91 % 11/14/20 1130  100 14 (!) 148/49 92 % 11/14/20 1115  97 14 (!) 141/51 93 % 11/14/20 1100  (!) 102 14 (!) 127/46 93 % 11/14/20 1045  (!) 115 14 (!) 115/43 92 % 11/14/20 1030  (!) 105 14  98 % 11/14/20 1015  (!) 107 14 (!) 148/63 100 % 11/14/20 1000  (!) 106 14 (!) 114/58 98 % 11/14/20 0945  (!) 101 14 (!) 177/71 100 % 11/14/20 0930  (!) 112 14 (!) 68/45 99 % 11/14/20 0915  (!) 125 14 (!) 65/43 99 % 11/14/20 0912  (!) 129 14 (!) 64/38 98 % 11/14/20 0905  65 14  98 % 11/14/20 0843  (!) 147  (!) 145/71   
11/14/20 0713 97.7 °F (36.5 °C) 65 18 127/68 92 % 11/14/20 0333 98 °F (36.7 °C) 99 18 (!) 128/58 94 % 11/14/20 0302 98.2 °F (36.8 °C) (!) 112 18 (!) 129/49 90 % 11/14/20 0111     95 % 11/13/20 2335 98.4 °F (36.9 °C) 97 18 (!) 113/58 94 % 11/13/20 2021 98.4 °F (36.9 °C) (!) 116 20 126/62 96 % 11/13/20 1931     94 % 11/13/20 1553 98.3 °F (36.8 °C) 88 20 (!) 104/50 95 % 11/13/20 1450     92 % Intake/Output Summary (Last 24 hours) at 11/14/2020 1343 Last data filed at 11/13/2020 1553 Gross per 24 hour Intake 200 ml Output  Net 200 ml PHYSICAL EXAM: 
General: Intubated and sedated EENT:  EOMI. Anicteric sclerae. ET tube in place Resp:  Coarse breath sounds bilaterally CV:  Tachycardic, regular rhythm, no edema GI:  Soft, Non distended, Non tender.  +Bowel sounds Neurologic:  Alert and oriented X 3, normal speech, Psych:   Deferred Skin:  No rashes. No jaundice Reviewed most current lab test results and cultures  YES Reviewed most current radiology test results   YES Review and summation of old records today    NO Reviewed patient's current orders and MAR    YES 
PMH/SH reviewed - no change compared to H&P 
________________________________________________________________________ Care Plan discussed with: 
  Comments Patient x Family RN x   
Care Manager Consultant Multidiciplinary team rounds were held today with , nursing, pharmacist and clinical coordinator. Patient's plan of care was discussed; medications were reviewed and discharge planning was addressed. ________________________________________________________________________ Total NON critical care TIME:  40   Minutes Total CRITICAL CARE TIME Spent:   Minutes non procedure based Comments >50% of visit spent in counseling and coordination of care    
________________________________________________________________________ Margaret Orlando MD  
 
Procedures: see electronic medical records for all procedures/Xrays and details which were not copied into this note but were reviewed prior to creation of Plan. LABS: 
I reviewed today's most current labs and imaging studies. Pertinent labs include: 
Recent Labs 11/14/20 
6174 11/14/20 
0602 WBC 23.5* 25.0* HGB 9.1* 10.1* HCT 31.1* 34.1*  
* 506* Recent Labs 11/14/20 
9022 11/14/20 
0602  137  
K 2.7* 2.9*  
CL 95* 93* CO2 41* 43* * 102* BUN 9 8  
CREA 0.68 0.43* CA 8.4* 9.2 MG 1.5*  --   
ALB 1.8*  --   
TBILI 0.2  --   
ALT 12  --   
 
 
Signed: Margaret Orlando MD

## 2020-11-14 NOTE — PROGRESS NOTES
ADULT PROTOCOL: JET AEROSOL  REASSESSMENT Patient  Jaylon Griffin     78 y.o.   female     11/14/2020  6:27 AM 
 
Breath Sounds Pre Procedure: Right Breath Sounds: Diminished Left Breath Sounds: Diminished Breath Sounds Post Procedure: Right Breath Sounds: Diminished Left Breath Sounds: Diminished Breathing pattern: Pre procedure Breathing Pattern: Tachypneic Post procedure Breathing Pattern: Regular Heart Rate: Pre procedure Pulse: 80 
         Post procedure Pulse: 72 Resp Rate: Pre procedure Respirations: 20 
         Post procedure Respirations: 16 
 
Oxygen: O2 Device: Nasal cannula   Flow rate (L/min) 5 Changed: NO SpO2: Pre procedure SpO2: 95 %   with oxygen Post procedure SpO2: 93 %  with oxygen Nebulizer Therapy: Current medications Aerosolized Medications: Brovana, Ipratropium bromide, Pulmicort Changed: NO Smoking History: Current Everyday Smoker Problem List:  
Patient Active Problem List  
Diagnosis Code  COPD exacerbation ([facility] J44.1  [facility] ([facility] G40.909  Osteopenia M85.80  Fibromyalgia M79.7  Essential hypertension, benign I10  
 Depression F32.9  Nephrolithiasis N20.0  Sciatica M54.30  Encephalopathy, unspecified G93.40  Acute respiratory failure (HCC) J96.00  
 Colitis K52.9  ARF (acute renal failure) (HCC) N17.9  Hypomagnesemia E83.42  Chronic pain G89.29  
 Recurrent falls R29.6  Sepsis ([facility] A41.9  Hyponatremia E87.1  Dilantin toxicity T42.0X1A  Rhabdomyolysis M62.82  
 Polypharmacy Z79.899  Bacteremia due to Escherichia coli R78.81, B96.20  Drug overdose T50.901A  Closed right ankle fracture S82.891A  Acute on chronic respiratory failure with hypercapnia (HCC) J96.22  
 Acute encephalopathy G93.40 Respiratory Therapist: Romario Hawk

## 2020-11-14 NOTE — PROGRESS NOTES
[facility] Pulmonary, Critical Care, and Sleep Medicine Name: Ronal Riley MRN: [id number] : 1950 Hospital: [place] [facility] Date: 2020 Subjective: 
 
Pt intubated this AM for resp arrest. Pt was admitted a few days ago for RUL mass- CT - FNA showed new Sqcell Ca with contralateral nodules. Pt ate breakfast this AM and Per nursing they suctioned eggs from her airways Review of systems not obtained due to patient factors. Objective: 
 
 
Exam 
Vital Signs: 
Visit Vitals BP (!) 65/43 Pulse (!) 125 Temp 97.7 °F (36.5 °C) Resp 14 Ht 5' 2\" (1.575 m) Wt 77.1 kg (169 lb 15.6 oz) SpO2 99% BMI 31.09 kg/m² Temp (24hrs), Av.5 °F (36.9 °C), Min:97.7 °F (36.5 °C), Max:99.3 °F (37.4 °C) Intake/Output Summary (Last 24 hours) at 2020 5190 Last data filed at 2020 1045 Gross per 24 hour Intake  Output 400 ml Net -400 ml GENERAL: well developed and in mild distress, NECK:  no jugular vein distention, no retractions, no thyromegaly or masses, LUNGS: decreased breath sounds billaterally and with rhonchi , HEART:  Regular rate and rhythm with no MGR; no edema is present, ABDOMEN:  soft with no tenderness, bowel sounds present, EXTREMITIES:  warm with no cyanosis and SKIN:  no jaundice or ecchymosis Labs: 
Recent Labs 20 
0917 20 
0602 20 
1215 WBC 23.5* 25.0* 13.5* HGB 9.1* 10.1* 10.6* HCT 31.1* 34.1* 34.3*  
* 506* 427* Recent Labs 20 
0602 20 
1215  135*  
K 2.9* 3.7 CL 93* 89* CO2 43* 44* * 213* BUN 8 17 CREA 0.43* 0.54* CA 9.2 9.1 No results for input(s): PHI, PO2I, PCO2I in the last 72 hours. Impression     Plan 1. Stage IV squamous cell Ca- primary RUL pleurally based caviating mass s/p CT- FNA 11/10/200 + Sq cell ca with GAVIOTA nodule as well- awaiting brain MRI 2. Acute hypoxic resp failure- aspiration PNA 3. Septic shock- suspect ECFV low- bedside critical care ECHO nml EF > 60% + LVH RV nml sixe not dilated , IVC was fat with > 50% resp phasic variation 4. COPD- one vent no air trapping 5. AMS- need to r/o brain mets 6. N/v 
7. Anemia/thrombocytopenia 8. FULL CODE 
 
 
 
 
 
 · STAT CT head eval for bleeding brain mets · VACV with propofol goal RASS 0 to -1 · Isotonic fluids and colloids · CXR post intubation ? New GAVIOTA ASD · Zosyn/vanco 
· PCT · Pt is critically ill . CCT EOP 30 min. At risk for decline due to sepsis/MODS  
· Marguerite Bernal MD 
11/14/2020

## 2020-11-14 NOTE — ED NOTES
0900. Pt arrived via inpatient RN from floor. Pt intubated after becoming unresponsive while eating breakfast.  
0930 pt continues to be hypotensive. RN obtained orders for levophed rip and propofol. Pt continues to wake and attempt to cough on vent increased propofol. Pt pressure low increased levophed drip 
0943 Son Updated on plan of care and at the bedside. RN notified MD pt would like update. Intensivist at bedside to [address] [facility] [facility] and assess pt. .   
1015 inserted singh cath. Pt has large patchy red areas to sacrum and folds of labia. Pt cleaned and cathed. 1050 pt vomited RN provided suction large amount of pink sputum with presence of breakfast noted when inline suction performed. RN placed OG tube and placed on low continuous suction. 1126 urine obtained. RN turned and cleaned pt replaced linen and gowns. 1145 Pt transported with respiratory, Primary and secondary RN on monitor while bagged  With oxygen to CT.  
 
1236 Pt returned from [address] [place] [address] contacted [place] tech to obtain additional IV access. 1340 Oncology at bedside. 1400  IV infiltrated on previous unit with potassium RN notes that left arm is more swollen than on arrival with purpling and blistering Contacted pharmacy who states to alternated warm and cool compresses. And to aspirate any blistered noted to get medication out of arm. Elevated left arm and placed warm compress at this time. MD [facility]  notified. 1415 pt resting comfortably on bed. RN contacted reparatory for updated vent settings. 1425 attempted to gain additional IV access for IV medications unsuccessful. Pt has two right sided lines and central line. RN cleaned and changed Central line site placed bio patch and central line dressing. 12 RN cleaned wounds to sacrum that appear to be moister associated skin break down. Will placed lift team consult and wound care consult. VS stable at is time. Pt continues to wake slightly on movement.  Adjusted pt sedation and pain medications. 1515 placed warm compress to left upper arm. Pt continues to signs of blisters and swelling. [id number] cold compress to left upper arm. Arm continues to show signs of break down. Drainage present on bed linen. 1630 rotated compresses on pt. Requested repeat ABG from MD.  
1730 pt resting comfortably in bed. VS Stable.

## 2020-11-14 NOTE — PROGRESS NOTES
Responded to [place] [address] page for patient Curt Smith on MED TELE. Provided pastoral presence outside patient room as medical team assessed and provided care. Reviewed chart to help medical team know of code status and help provide emergency contact information upon request before patient was transferred following code. Consult with MD and RN. Chaplains will follow as able and/or needed. [address] [facility] Spiritual Care Provider  Paging Service [phone])

## 2020-11-14 NOTE — ED NOTES
0900. Pt arrived via inpatient RN from floor. Pt intubated after becoming unresponsive while eating breakfast.  
0930 pt continues to be hypotensive. RN obtained orders for levophed rip and propofol. Pt continues to wake and attempt to cough on vent increased propofol. Pt pressure low increased levophed drip 
0943 Son Updated on plan of care and at the bedside. RN notified MD pt would like update. Intensivist at bedside to [address] [facility] and assess pt. .   
1015 inserted singh cath. Pt has large patchy red areas to sacrum and folds of labia. Pt cleaned and cathed. 1050 pt vomited RN provided suction large amount of pink sputum with presence of breakfast noted when inline suction performed. RN placed OG tube and placed on low continuous suction. 1126 urine obtained. RN turned and cleaned pt replaced linen and gowns. 1145 Pt transported with respiratory, Primary and secondary RN on monitor while bagged  With oxygen to CT.  
 
1236 Pt returned from [address] [place] [address] contacted [place] tech to obtain additional IV access. 1340 Oncology at bedside. 1400  IV infiltrated on previous unit with potassium RN notes that left arm is more swollen than on arrival with purpling and blistering Contacted pharmacy who states to alternated warm and cool compresses. And to aspirate any blistered noted to get medication out of arm. Elevated left arm and placed warm compress at this time. MD [facility]  notified. 1415 pt resting comfortably on bed. RN contacted reparatory for updated vent settings. 1425 attempted to gain additional IV access for IV medications unsuccessful. Pt has two right sided lines and central line. RN cleaned and changed Central line site placed bio patch and central line dressing. 12 RN cleaned wounds to sacrum that appear to be moister associated skin break down. Will placed lift team consult and wound care consult. VS stable at is time. Pt continues to wake slightly on movement.  Adjusted pt sedation and pain medications. 1515 placed warm compress to left upper arm. Pt continues to signs of blisters and swelling. [id number] cold compress to left upper arm. Arm continues to show signs of break down. Drainage present on bed linen. 1630 rotated compresses on pt. Requested repeat ABG from MD.  
1730 pt resting comfortably in bed. VS Stable. 1744 Pt has significant reduction ins swelling and drainage of the left arm. TRANSFER - OUT REPORT: 
 
Verbal report given to Dalia Dance RN (name) on [facility]  being transferred to ICU (unit) for routine progression of care Report consisted of patients Situation, Background, Assessment and  
Recommendations(SBAR). Information from the following report(s) SBAR, Kardex and ED Summary was reviewed with the receiving nurse. Lines:  
Triple Lumen 11/14/20 Right Internal jugular (Active) Dressing Status Clean, dry, & intact 11/14/20 1743 Peripheral IV 11/13/20 Left Forearm (Active) Site Assessment Clean, dry, & intact 11/14/20 0700 Phlebitis Assessment 0 11/14/20 0700 Infiltration Assessment 0 11/14/20 0700 Dressing Status Clean, dry, & intact 11/14/20 0700 Dressing Type Transparent 11/14/20 0700 Hub Color/Line Status Pink; Infusing 11/14/20 0700 Action Taken Other (comment) 11/13/20 1039 Peripheral IV 11/14/20 Right Antecubital (Active) Opportunity for questions and clarification was provided. Patient transported with: 
 Monitor O2 @ vent  liters Registered Nurse Tech

## 2020-11-14 NOTE — PROGRESS NOTES
Oncology Progress Note Patient Name:  Kennedi Mills Date of Service: 11/14/2020 Admit Date: 11/7/2020 Interval History Patient became hypotensive and unresponsive this morning.  
-per RN, there was suspicion patient had a significant aspiration event. 
-patient was intubated and given levophed. 
-she remains intubated and sedated but opens eyes to voice. Subjective:  
 
 
 
Current Facility-Administered Medications Medication Dose Route Frequency  propofol (DIPRIVAN) 10 mg/mL infusion  0-50 mcg/kg/min IntraVENous TITRATE  levETIRAcetam (KEPPRA) 500 mg in saline (iso-osm) 100 mL IVPB (premix)  500 mg IntraVENous Q12H  
 albumin human 25% (BUMINATE) solution 12.5 g  12.5 g IntraVENous Q6H  
 albuterol-ipratropium (DUO-NEB) 2.5 MG-0.5 MG/3 ML  3 mL Nebulization Q4H RT  
 methylPREDNISolone (PF) (Solu-MEDROL) injection 60 mg  60 mg IntraVENous Q6H  
 fentaNYL (PF) 1,500 mcg/30 mL (50 mcg/mL) infusion  0-200 mcg/hr IntraVENous TITRATE  miconazole (SECURA) 2 % extra thick cream   Topical BID  piperacillin-tazobactam (ZOSYN) 3.375 g in 0.9% sodium chloride (MBP/ADV) 100 mL MBP  3.375 g IntraVENous Q8H  
 vancomycin (VANCOCIN) 1500 mg in  ml infusion  1,500 mg IntraVENous ONCE  
 [START ON 11/15/2020] vancomycin (VANCOCIN) 1,000 mg in 0.9% sodium chloride 250 mL (VIAL-MATE)  1,000 mg IntraVENous Q12H  
 PHENYLephrine (ERIK-SYNEPHRINE) 30 mg in 0.9% sodium chloride 250 mL infusion   mcg/min IntraVENous TITRATE  nitroglycerin (NITROBID) 2 % ointment 1 Inch  1 Inch Topical BID  insulin lispro (HUMALOG) injection   SubCUTAneous AC&HS  
 glucose chewable tablet 16 g  4 Tab Oral PRN  
 dextrose (D50W) injection syrg 12.5-25 g  12.5-25 g IntraVENous PRN  
 glucagon (GLUCAGEN) injection 1 mg  1 mg IntraMUSCular PRN  
 scopolamine (TRANSDERM-SCOP) 1 mg over 3 days 1 Patch  1 Patch TransDERmal Q72H  HYDROcodone-acetaminophen (NORCO) 5-325 mg per tablet 2 Tab  2 Tab Oral Q4H PRN  
 ALPRAZolam (XANAX) tablet 0.5 mg  0.5 mg Oral QID PRN  prochlorperazine (COMPAZINE) with saline injection 10 mg  10 mg IntraVENous Q6H PRN  zinc oxide-cod liver oil (DESITIN) 40 % paste   Topical BID and QHS  [Held by provider] methIMAzole (TAPAZOLE) tablet 10 mg  10 mg Oral DAILY  bacitracin 500 unit/gram packet 1 Packet  1 Packet Topical DAILY  [Held by provider] gabapentin (NEURONTIN) capsule 300 mg  300 mg Oral TID  zinc oxide-cod liver oil (DESITIN) 40 % paste   Topical PRN  
 0.9% sodium chloride infusion  25 mL/hr IntraVENous CONTINUOUS  
 labetaloL (NORMODYNE;TRANDATE) injection 10 mg  10 mg IntraVENous Q2H PRN  
 sodium chloride (NS) flush 5-40 mL  5-40 mL IntraVENous Q8H  
 sodium chloride (NS) flush 5-40 mL  5-40 mL IntraVENous PRN  
 acetaminophen (TYLENOL) tablet 650 mg  650 mg Oral Q4H PRN  
 naloxone (NARCAN) injection 0.4 mg  0.4 mg IntraVENous PRN  
 enoxaparin (LOVENOX) injection 40 mg  40 mg SubCUTAneous Q24H  
 [Held by provider] methocarbamoL (ROBAXIN) tablet 750-1,500 mg  750-1,500 mg Oral BID  ondansetron (ZOFRAN) injection 4 mg  4 mg IntraVENous Q4H PRN Current Outpatient Medications Medication Sig  
 metoprolol tartrate (Lopressor) 50 mg tablet Take 50 mg by mouth daily.  tiotropium bromide (Spiriva Respimat) 2.5 mcg/actuation inhaler Take 2 Puffs by inhalation daily.  budesonide-formoteroL (Symbicort) 160-4.5 mcg/actuation HFAA Take 1 Puff by inhalation two (2) times a day.  gabapentin (NEURONTIN) 300 mg capsule Take 300 mg by mouth three (3) times daily.  meclizine (ANTIVERT) 25 mg tablet Take 25 mg by mouth four (4) times daily as needed for Dizziness. 3-4 times daily as needed  aspirin 81 mg chewable tablet Take 81 mg by mouth daily.  OTHER Apply  to affected area four (4) times daily as needed (Spasms). Morphine 120 mg in Aquaphor 80 g topical cream.  
 albuterol-ipratropium (DUO-NEB) 2.5 mg-0.5 mg/3 ml nebu 3 mL by Nebulization route four (4) times daily as needed.  methocarbamol (ROBAXIN) 750 mg tablet Take 750 mg by mouth four (4) times daily.  amLODIPine (NORVASC) 10 mg tablet Take 10 mg by mouth daily.  omeprazole (PRILOSEC) 40 mg capsule Take 40 mg by mouth daily.  ondansetron (ZOFRAN ODT) 4 mg disintegrating tablet Take 1 Tab by mouth every eight (8) hours as needed.  albuterol (PROVENTIL VENTOLIN) 2.5 mg /3 mL (0.083 %) nebulizer solution 3 mL by Nebulization route every four (4) hours as needed.  levETIRAcetam (KEPPRA) 500 mg tablet Take 1 Tab by mouth two (2) times a day.  metoprolol (LOPRESSOR) 50 mg tablet Take 1 Tab by mouth two (2) times a day.  DULoxetine (CYMBALTA) 20 mg capsule Take 40 mg by mouth daily. Review of Systems: 
ROS not conducted due to intubation and sedation. Objective:  
 
Patient Vitals for the past 8 hrs: 
 BP Temp Pulse Resp SpO2  
11/14/20 1615 139/60  (!) 116 18 98 % 11/14/20 1600 (!) 92/50  (!) 110 18 97 % 11/14/20 1549   (!) 117 18 98 % 11/14/20 1548     98 % 11/14/20 1545 (!) 153/64  (!) 115 18 98 % 11/14/20 1530 (!) 141/68  (!) 115 18 98 % 11/14/20 1515 (!) 179/73  (!) 111 18 98 % 11/14/20 1500 (!) 168/68  (!) 113 18 98 % 11/14/20 1445 (!) 165/62  (!) 112 18 98 % 11/14/20 1430 (!) 177/73  (!) 113 18 98 % 11/14/20 1415 (!) 176/72  (!) 112 18 98 % 11/14/20 1400 (!) 172/71  (!) 110 18 98 % 11/14/20 1345 (!) 169/65  (!) 112 18 98 % 11/14/20 1331 (!) 172/73 97.9 °F (36.6 °C) (!) 112 18 98 % 11/14/20 1315 (!) 144/52  (!) 112 18 98 % 11/14/20 1301 (!) 146/53  (!) 114 18 100 % 11/14/20 1245 (!) 110/54  (!) 109 18 100 % 11/14/20 1235   (!) 112 17   
11/14/20 1234   (!) 110 18   
11/14/20 1233   (!) 112 18   
11/14/20 1231 (!) 114/39      
11/14/20 1200 (!) 144/51  (!) 102 14 92 % 20 1157   (!) 105 14 92 % 20 1145 (!) 146/53  100 14 91 % 20 1130 (!) 148/49  100 14 92 % 20 1115 (!) 141/51  97 14 93 % 20 1100 (!) 127/46  (!) 102 14 93 % 20 1045 (!) 115/43  (!) 115 14 92 % 20 1030   (!) 105 14 98 % 20 1015 (!) 148/63  (!) 107 14 100 % 20 1000 (!) 114/58  (!) 106 14 98 % 20 0945 (!) 177/71  (!) 101 14 100 % 20 0930 (!) 68/45  (!) 112 14 99 % 20 0915 (!) 65/43  (!) 125 14 99 % 20 0912 (!) 64/38  (!) 129 14 98 % 20 0905   65 14 98 % 20 0843 (!) 145/71  (!) 147   Temp (24hrs), Av.1 °F (36.7 °C), Min:97.7 °F (36.5 °C), Max:98.4 °F (36.9 °C) No intake/output data recorded. Physical Exam: 
GEN:  Elderly  lady; ETT in place. HEENT: NCAT Neck: soft, supple, trachea midline PULM: reduced air entry at bases BL. Coarse breath sounds and expiratory wheezes noted. CARDS: RRR, S1S2+, no MRG 
ABD: soft, NT/ND, BS+ EXT: UE/:E edema noted. NEURO: opens eyes to voice PSYCH: unable to assess. Labs:   
Recent Results (from the past 24 hour(s)) GLUCOSE, POC Collection Time: 20  4:38 PM  
Result Value Ref Range Glucose (POC) 134 (H) 65 - 100 mg/dL Performed by Isaak Gomez GLUCOSE, POC Collection Time: 20  9:09 PM  
Result Value Ref Range Glucose (POC) 186 (H) 65 - 100 mg/dL Performed by Nat Lindsey RN   
CBC W/O DIFF Collection Time: 20  6:02 AM  
Result Value Ref Range WBC 25.0 (H) 3.6 - 11.0 K/uL  
 RBC 3.45 (L) 3.80 - 5.20 M/uL  
 HGB 10.1 (L) 11.5 - 16.0 g/dL HCT 34.1 (L) 35.0 - 47.0 % MCV 98.8 80.0 - 99.0 FL  
 MCH 29.3 26.0 - 34.0 PG  
 MCHC 29.6 (L) 30.0 - 36.5 g/dL  
 RDW 13.4 11.5 - 14.5 % PLATELET 588 (H) 072 - 400 K/uL MPV 10.2 8.9 - 12.9 FL  
 NRBC 0.1 (H) 0  WBC ABSOLUTE NRBC 0.03 (H) 0.00 - 0.01 K/uL METABOLIC PANEL, BASIC  
 Collection Time: 11/14/20  6:02 AM  
Result Value Ref Range Sodium 137 136 - 145 mmol/L Potassium 2.9 (L) 3.5 - 5.1 mmol/L Chloride 93 (L) 97 - 108 mmol/L  
 CO2 43 (HH) 21 - 32 mmol/L Anion gap 1 (L) 5 - 15 mmol/L Glucose 102 (H) 65 - 100 mg/dL BUN 8 6 - 20 MG/DL Creatinine 0.43 (L) 0.55 - 1.02 MG/DL  
 BUN/Creatinine ratio 19 12 - 20 GFR est AA >60 >60 ml/min/1.73m2 GFR est non-AA >60 >60 ml/min/1.73m2 Calcium 9.2 8.5 - 10.1 MG/DL  
GLUCOSE, POC Collection Time: 11/14/20  7:11 AM  
Result Value Ref Range Glucose (POC) 117 (H) 65 - 100 mg/dL Performed by Leonardo Gorman (CON) LACTIC ACID Collection Time: 11/14/20  9:17 AM  
Result Value Ref Range Lactic acid 1.7 0.4 - 2.0 MMOL/L  
CBC WITH AUTOMATED DIFF Collection Time: 11/14/20  9:17 AM  
Result Value Ref Range WBC 23.5 (H) 3.6 - 11.0 K/uL  
 RBC 3.09 (L) 3.80 - 5.20 M/uL HGB 9.1 (L) 11.5 - 16.0 g/dL HCT 31.1 (L) 35.0 - 47.0 % .6 (H) 80.0 - 99.0 FL  
 MCH 29.4 26.0 - 34.0 PG  
 MCHC 29.3 (L) 30.0 - 36.5 g/dL  
 RDW 13.4 11.5 - 14.5 % PLATELET 334 (H) 407 - 400 K/uL MPV 10.4 8.9 - 12.9 FL  
 NRBC 0.1 (H) 0  WBC ABSOLUTE NRBC 0.03 (H) 0.00 - 0.01 K/uL NEUTROPHILS 81 (H) 32 - 75 % BAND NEUTROPHILS 5 % LYMPHOCYTES 5 (L) 12 - 49 % MONOCYTES 4 (L) 5 - 13 % EOSINOPHILS 2 0 - 7 % BASOPHILS 0 0 - 1 % METAMYELOCYTES 1 % MYELOCYTES 2 % IMMATURE GRANULOCYTES 0 0.0 - 0.5 % ABS. NEUTROPHILS 20.2 (H) 1.8 - 8.0 K/UL  
 ABS. LYMPHOCYTES 1.2 0.8 - 3.5 K/UL  
 ABS. MONOCYTES 0.9 0.0 - 1.0 K/UL  
 ABS. EOSINOPHILS 0.5 (H) 0.0 - 0.4 K/UL  
 ABS. BASOPHILS 0.0 0.0 - 0.1 K/UL  
 ABS. IMM. GRANS. 0.0 0.00 - 0.04 K/UL  
 DF MANUAL PLATELET COMMENTS Giant platelets RBC COMMENTS BASOPHILIC STIPPLING 
PRESENT 
    
 RBC COMMENTS POLYCHROMASIA 1+ 
    
 RBC COMMENTS MACROCYTOSIS 
1+ WBC COMMENTS LEFT SHIFT Pathologist review Pathology Review Requested METABOLIC PANEL, COMPREHENSIVE Collection Time: 11/14/20  9:17 AM  
Result Value Ref Range Sodium 138 136 - 145 mmol/L Potassium 2.7 (LL) 3.5 - 5.1 mmol/L Chloride 95 (L) 97 - 108 mmol/L  
 CO2 41 (HH) 21 - 32 mmol/L Anion gap 2 (L) 5 - 15 mmol/L Glucose 139 (H) 65 - 100 mg/dL BUN 9 6 - 20 MG/DL Creatinine 0.68 0.55 - 1.02 MG/DL  
 BUN/Creatinine ratio 13 12 - 20 GFR est AA >60 >60 ml/min/1.73m2 GFR est non-AA >60 >60 ml/min/1.73m2 Calcium 8.4 (L) 8.5 - 10.1 MG/DL Bilirubin, total 0.2 0.2 - 1.0 MG/DL  
 ALT (SGPT) 12 12 - 78 U/L  
 AST (SGOT) 13 (L) 15 - 37 U/L Alk. phosphatase 78 45 - 117 U/L Protein, total 5.4 (L) 6.4 - 8.2 g/dL Albumin 1.8 (L) 3.5 - 5.0 g/dL Globulin 3.6 2.0 - 4.0 g/dL A-G Ratio 0.5 (L) 1.1 - 2.2 MAGNESIUM Collection Time: 11/14/20  9:17 AM  
Result Value Ref Range Magnesium 1.5 (L) 1.6 - 2.4 mg/dL GLUCOSE, POC Collection Time: 11/14/20  9:28 AM  
Result Value Ref Range Glucose (POC) 142 (H) 65 - 100 mg/dL Performed by Stephon Or (EDT) EKG, 12 LEAD, INITIAL Collection Time: 11/14/20 10:07 AM  
Result Value Ref Range Ventricular Rate 108 BPM  
 Atrial Rate 108 BPM  
 P-R Interval 116 ms  
 QRS Duration 88 ms Q-T Interval 322 ms QTC Calculation (Bezet) 431 ms Calculated P Axis 76 degrees Calculated R Axis 58 degrees Calculated T Axis 71 degrees Diagnosis Sinus tachycardia with premature atrial complexes Possible Left atrial enlargement When compared with ECG of 07-NOV-2020 00:15, 
premature atrial complexes are now present URINALYSIS W/ REFLEX CULTURE Collection Time: 11/14/20 11:26 AM  
 Specimen: Urine Result Value Ref Range Color YELLOW/STRAW Appearance CLOUDY (A) CLEAR Specific gravity 1.014 1.003 - 1.030    
 pH (UA) 6.5 5.0 - 8.0 Protein 30 (A) NEG mg/dL Glucose Negative NEG mg/dL Ketone Negative NEG mg/dL  Bilirubin Negative NEG    
 Blood Negative NEG Urobilinogen 0.2 0.2 - 1.0 EU/dL Nitrites Negative NEG Leukocyte Esterase Negative NEG    
 WBC 5-10 0 - 4 /hpf  
 RBC 0-5 0 - 5 /hpf Epithelial cells MODERATE (A) FEW /lpf Bacteria 1+ (A) NEG /hpf  
 UA:UC IF INDICATED CULTURE NOT INDICATED BY UA RESULT CNI Hyaline cast 0-2 0 - 5 /lpf Granular cast 2-5 (A) NEG /lpf WBC cast 0-2 (A) NEG /lpf Other: Renal Epithelial cells Present POC EG7 Collection Time: 11/14/20 12:08 PM  
Result Value Ref Range Calcium, ionized (POC) 1.19 1.12 - 1.32 mmol/L  
 FIO2 (POC) 0.60 % pH (POC) 7.37 7.35 - 7.45    
 pCO2 (POC) 71.8 (H) 35.0 - 45.0 MMHG  
 pO2 (POC) 129 (H) 80 - 100 MMHG  
 HCO3 (POC) 41.9 (H) 22 - 26 MMOL/L Base excess (POC) 17 mmol/L  
 sO2 (POC) 99 (H) 92 - 97 % Site RIGHT RADIAL Device: VENT Mode ASSIST CONTROL Tidal volume 400 ml Set Rate 14 bpm  
 PEEP/CPAP (POC) 6 cmH2O  
 PIP (POC) 31 Allens test (POC) YES Specimen type (POC) ARTERIAL Total resp. rate 14 Assessment:  
 
Active Problems: COPD exacerbation ([facility] [address] (9/13/2010) Overview: O2 dependent--Dr. Rachel Peralta Plan: Ms. Jalil Leung is a 73y/o lady with newly diagnosed squamous cell non-small cell lung cancer. Squamous Cell Lung Cancer: 
-patient appears to have metastatic disease based on presence of pulmonary nodules bilaterally  
-will request PDL1 testing 
-MRI brain pending 
-will need PET as outpatient if she recovers. -see below Respiratory Failure/Shock: 
-patient's condition is complicated by significant COPD with underlying malignancy 
-pressors started this morning. Disposition: 
-patient's situation is quite serious. The next 24-48hrs will be critical to determine next steps. We will continue to follow along. Please call with any questions or concerns. Leane Burkitt, MD 
11/14/2020

## 2020-11-14 NOTE — PROGRESS NOTES
Received notification from bedside RN about patient with regards to: patient hard stick, unable to draw am labs, RN requesting order for arterial stick Intervention given: ordered as requested

## 2020-11-14 NOTE — PROGRESS NOTES
Responding to Rapid Response/Code Blue. Per nurse in room pt. Alert & oriented suddenly becoming unresponsive. Per nurse no loss of pulse. Upon arrival pt. Heart 139. anesthesia at bedside bagging spontaneous movement of legs noted. FSBS 117. Multiple attempts at IV access in progress. Current sites with redness of arms. Intubated 7.5 ETT 21 @ teeth. Central line placement on the right established. No medications or CPR measure initiated. Collins Simon present. Transfer to ER 21. With monitor and vent

## 2020-11-15 NOTE — PROGRESS NOTES
[facility] Name: Ness Moran MRN: [id number] : 1950 Hospital: [place] Date: 11/15/2020 Subjective: 
 
 
Deeply sedated Does not follow commands RASS -3 Review of systems not obtained due to patient factors. Objective: 
 
 
Exam 
Vital Signs: 
Visit Vitals BP (!) 123/43 Pulse (!) 106 Temp 98.5 °F (36.9 °C) Resp 18 Ht 5' 2\" (1.575 m) Wt 77.1 kg (169 lb 15.6 oz) SpO2 95% BMI 31.09 kg/m² Temp (24hrs), Av.4 °F (36.9 °C), Min:97.9 °F (36.6 °C), Max:98.7 °F (37.1 °C) Intake/Output Summary (Last 24 hours) at 11/15/2020 9335 Last data filed at 11/15/2020 0500 Gross per 24 hour Intake 1988.84 ml Output 435 ml Net 1553.84 ml GENERAL: well developed and in mild distress, NECK:  no jugular vein distention, no retractions, no thyromegaly or masses, LUNGS: decreased breath sounds billaterally and with rhonchi , HEART:  Regular rate and rhythm with no MGR; no edema is present, ABDOMEN:  soft with no tenderness, bowel sounds present, EXTREMITIES:  warm with no cyanosis and SKIN:  no jaundice or ecchymosis Labs: 
Recent Labs 11/15/20 
9397 20 
4080 20 
0602 WBC 18.2* 23.5* 25.0* HGB 8.1* 9.1* 10.1* HCT 26.5* 31.1* 34.1*  
 483* 506* Recent Labs 11/15/20 
9462 20 
1915 20 
0602  138 137  
K 3.0* 2.7* 2.9*  
CL 96* 95* 93* CO2 38* 41* 43* * 139* 102* BUN 11 9 8 CREA 0.60 0.68 0.43* CA 8.7 8.4* 9.2 MG  --  1.5*  --   
ALB  --  1.8*  --   
TBILI  --  0.2  --   
ALT  --  12  --   
INR 1.1  --   --   
 
 
Recent Labs 20 
1208 PHI 7.37 PO2I 129* PCO2I 71.8* Impression     Plan 1. Stage IV squamous cell Ca- primary RUL pleurally based caviating mass s/p CT- FNA 11/10/200 + Sq cell ca with GAVIOTA nodule as well- awaiting brain MRI 2. Acute hypoxic resp failure- aspiration PNA 3. Resolved Septic shock- suspect ECFV low- bedside critical care ECHO nml EF > 60% + LVH RV nml sixe not dilated , IVC was fat with > 50% resp phasic variation 4. COPD- one vent no air trapping 5. AMS- need to r/o brain mets 6. N/v 
7. Anemia/thrombocytopenia 8. FULL CODE 
 
 
 
 
 
 · CT head negative · Off pressor and WBC coming down- CXR today with a RLL infiltrate c/w aspiration PNA · Reduce sedation to RASS 0 and do SBT later today · Pt is critically ill . CCT EOP 30 min. At risk for decline due to sepsis/MODS  
· Mo Soriano MD 
11/15/2020

## 2020-11-15 NOTE — PROGRESS NOTES
Received notification from bedside RN about patient with regards to: K+ 3.0, needing repletion orders Intervention given: KCL 20 meq q 2 hours x 2 doses, recheck K+ level 2 hours S/P repletion is completed

## 2020-11-15 NOTE — PROGRESS NOTES
[address]  Report received from [facility], [facility], [address]- Patient arrived to CCU. CHG bath given and linens changed. Primary Nurse Alfredo Pederson RN and Clif Dickinson RN performed a dual skin assessment on this patient. Bruise noted on left forearm. Excoriation to buttocks. 1821- Admission assessment completed. 1900- Report given to [facility]. 1900- Security called to lock up 's license, social security card, and credit cards. 2000-  locked [employer], ID card, and insurance cards.

## 2020-11-15 NOTE — PROGRESS NOTES
11/15/20 0601 ABCDEF Bundle SAT Safety Screen Passed No  
SAT Screen Reason for Failure Agitation that threatents accidental line/tube removal

## 2020-11-15 NOTE — PROGRESS NOTES
0700: Report received from Tristan Colorado and NICHOLAS Cortez. Included: SBAR, KARDEX, MAR, iTRACE, and pertinent information given. We discussed events leading up to today and overnight events were minimal. Discussed plan for the day including information on consult to Pulmonary Dr. Lorraine Almeida for new Lung CA dx and follow up. 0730: Assessment complete, patient opens eyes to voice and stimulus, very agitated, breathing over vent and throwing peak pressure alarms. Patient did not follow commands, moved all extremities, severely agitated with HR 140s. Increased Sedation as appropriate. Excoriation noted on buttocks, applying Desitin as ordered and in perineum/sacrum. Abrasions and ecchymosis noted on both arms. Abrasion/Blister on right hand being treated with bacitracin. . See MAR for details 1000: Spoke to Son, [place]) he did not have 4 digit security code on hand, completed identification demographic information to confirm: , Name, Address, Middle Name and patient condition. We discussed plan for the patient and at this time this morning she had high levels of agitation that required us to increase her sedation. We discussed plan to consult to Pulmonary for Lung CA Dx and treatment options (if any, per Son) He expressed feelings of care and concern for his Mother. He informed me that she quit smoking prior to admission, and that many family members have passed from [address] [place] [address] in his family. He asked about Dr. Suly Schneider (Hem/Onc) following up with him. I informed him that hopefully we can get more answers on Monday on the plan moving forward. We went over visitation rules at this time and discussed options for ZOOM meetings. Son was very pleasant and understanding of current times. He expressed that he has a child with autoimmune insuffiencey and so he is worried to come to the hospital physically. 1015: Arnoldo Traylor, (patient's brother) at bedside to visit patient.  Discussed that I needed to verify his visitation being allowed at this time d/t privacy from Adia De La Fuente. Spoke to Adia on the phone and he allowed Robert ellington to visit. He expressed he doesn't want him to have any detailed medical information but basic information is ok at this time. 1016: Spoke to Robert ellington about Adia being the point of contact and that we would have to get medical updates from him from now on. He understood, asked questions about condition--deferred response. Reiterated to speak to Adia.  
 
1025: Robert ellington left from bedside. 1300: Paged Radiologist to check CXR to confirm OGT placement. MD noted that she could not see past the esophagus and to place an order for KUB to confirm. 1320: Spoke to Dr. Marybeth Lewis about patient condition, discussed events leading up to and current status. See her NOTE for more details. 1400: OGT not in right place, removed 1600: Reassessment- no changes 1700: NGT placed 66cm Left Nare, Green Bile noted upon placement, confirmed with auscultation, awaiting for KUB results to place on suction when able. 1800: Spoke to Adia, (son) over the phone, discussed patient condition and current oxygen needs. Discussed NGT placement confirmed and discontinuation of OGT from [facility] today Spoke about meeting with Dr. Marybeth Lewis today including earlier discussion and plan for updates tomorrow. 1900: Gave report to Nasrin Villa RN.

## 2020-11-15 NOTE — PROGRESS NOTES
Pharmacy Automatic Renal Dosing Protocol - Antimicrobials Indication for Antimicrobials: Lung abscess, aspiration PNA Current Regimen of Each Antimicrobial: 
Vancomycin 1000 mg q12h; started ; day 2 Piperacillin/tazobactam 3.375g IV q8h, start ; day 2 Previous Antimicrobial Therapy: 
Augmentin 875 mg BID (Start Date -) Vancomycin trough goal: 15-20 Date Dose & Interval Measured (mcg/mL) Extrapolated (mcg/mL) Date & time of next level: before 3rd dose Significant Cultures:  
 Resp viral panel negative  blood cx pending  UA w/ reflex cx pending Radiology / Imaging results: (X-ray, CT scan or MRI):  
 chest XR IMPRESSION: Endotracheal tube and right internal jugular central venous catheter 
appear to be in satisfactory position. Unchanged right upper lobe mass. Pulmonary vascular congestion with mild pulmonary edema. Paralysis, amputations, malnutrition: none noted Labs: 
Recent Labs 11/15/20 
4450 20 
9184 20 
0602 CREA 0.60 0.68 0.43* BUN 11 9 8 WBC 18.2* 23.5* 25.0* Temp (24hrs), Av.4 °F (36.9 °C), Min:97.9 °F (36.6 °C), Max:98.7 °F (37.1 °C) Is the Patient on Dialysis? No 
 
Creatinine Clearance (mL/min):  
CrCl (Actual Body Weight): 93.6 CrCl (Adjusted Body Weight): 74.0 CrCl (Ideal Body Weight): 60.8 Impression/Plan:  
Scr stable Continue current dose of abx; appropriate for indication/renal function Vancomycin  trough prior to dose due  at 0000 Antimicrobial stop date TBD Pharmacy will follow daily and adjust medications as appropriate for renal function and/or serum levels. Thank you, BARRY Merrill

## 2020-11-15 NOTE — PROGRESS NOTES
This RN will be overseeing the care, documentation, medication administration, & assessments performed by Chidi Stevens RN. 
 
8534 Contacted JIMENA Tobias to ask for an order for restraints 7524 Orders received for restraints from NP 5323 Akil Lopez Contacted JIMENA Tobias to report patient's K 3.0  
 
0356 Orders received for 2 runs of 20mEq KCl from JIMENA Cardoso & recheck K 2 hrs after K runs are completed

## 2020-11-15 NOTE — PROGRESS NOTES
Hospitalist Progress Note NAME: Genaro Knapp :  1950 MRN:  [id number] Assessment / Plan: 
 
Squamous cell carcinoma of the lung Acute on chronic hypoxic hypercapnic respiratory failure POA Aspiration pneumonia and sepsis 
-CT scan: with findings of emphysema with large pleural-based lateral right upper lobe mass, with several other pulmonary nodules in both lungs overall highly suspicious of bronchogenic neoplasm. Largest 5.3x 3.1 cm , cavitary lesion 
- TTE with poor quality but no reported vegetation - s/p CT-guided lung biopsy 11/10. SCC. Biopsy shows Squamous cell carcinoma 
- Head MRI ordered yesterday, not done. Head CT today negative. - Pulmonology and oncology on board, appreciate input. Plan for outpatient PET scan 
-Increased oxygen requirement since yesterday. Leukocytosis but patient on steroid. Chest x-ray shows unchanged right upper lobe mass and mild pulmonary edema. 
- Intubated for worsening respiratory failure on , suspected aspiration. CXR suspicious for increasing opacities in lung bases - Initially on Augmentin, antibiotics broadened to vanc and Zosyn on  . Will continue - Blood culture : NGTD   
- Remains intubated and sedated. Requiring 40% FiO2, PEEP of 6. On propofol 35 and fentanyl. Off pressor. 
-Continue to monitor Hypokalemia 
- Continue to replace and monitor COPD exacerbation, On 2L home O2 Chronic smoking, 50-pack-year tobacco use 
-Continue DuoNeb and Solu-Medrol 
-Counseled about smoking cessation Nausea and vomiting - resolved - Unclear etiology 
- continue PRN Zofran, compazine, and scopolamine patch 
- Head CT negative for acute finding Prediabetes and Hyperglycemia 
- No hx of DM. HbA1c 5.8. Likley due to steroid. - Continue sliding scale insulin Hyperthyrodism 
- TSH less than 0.01, free T4 2.0. Pending total T3 
- Reviewed chart, patient had a elevated free T4 in 2016.   Had ultrasound which did not show any nodule - Started on methimazole, continue beta-blocker - Will need outpatient follow-up, will give contact information for endocrinologist 
 
Anemia: possible AOCD Thrombocytopenia: reactive 
  
 
Depression/anxiety Fibromyalgia As Needed Lorazepam for Anxiety Continue Cymbalta Continue gabapentin 
  
Hypertension continue amlodipine 
  
History of seizures No seizures for years Continue Keppra 
  
History of substance abuse Denied any use of substances recently 
  
  
Code Status: Full code Surrogate Decision Maker: Son Lion Discussed with the sister yesterday, 
  
DVT Prophylaxis: SCDs GI Prophylaxis: not indicated 
  
Baseline: Independent in ADLs Plan for CT-guided biopsy today/Tomorrow Subjective: Chief Complaint / Reason for Physician Visit Agitated this morning Remains intubated and on mechanical vent. Weaned off pressor. On fentanyl and propofol. No nausea or vomiting. Tachycardic and BP soft but was able to wean off pressor. Review of Systems: 
Symptom Y/N Comments  Symptom Y/N Comments Fever/Chills    Chest Pain Poor Appetite    Edema Cough    Abdominal Pain Sputum    Joint Pain SOB/DÍAZ    Pruritis/Rash Nausea/vomit    Tolerating PT/OT Diarrhea    Tolerating Diet Constipation    Other Could NOT obtain due to: Intubated and sedated Objective: VITALS:  
Last 24hrs VS reviewed since prior progress note. Most recent are: 
Patient Vitals for the past 24 hrs: 
 Temp Pulse Resp BP SpO2  
11/15/20 1300  (!) 114 18 (!) 126/42 95 % 11/15/20 1252  (!) 115  (!) 122/43   
11/15/20 1230  (!) 118 18 (!) 122/43 95 % 11/15/20 1200  (!) 114 18 (!) 119/43 95 % 11/15/20 1141  (!) 105 18  94 % 11/15/20 1130  (!) 104 18 (!) 134/48 95 % 11/15/20 1100  (!) 105 18 (!) 131/49 95 % 11/15/20 1030  (!) 108 18 (!) 119/47 95 % 11/15/20 1000  (!) 113 18 (!) 116/44 94 % 11/15/20 0930  (!) 125 18 (!) 119/41 95 % 11/15/20 0900  (!) 135 18 (!) 143/53 96 % 11/15/20 0830  (!) 104 18 (!) 121/50 95 % 11/15/20 0800 98.5 °F (36.9 °C) (!) 101 18 (!) 122/46 96 % 11/15/20 0730  (!) 103 18 (!) 131/46 96 % 11/15/20 0700  (!) 114 18 (!) 128/41 95 % 11/15/20 0630  (!) 106 18 (!) 123/43 95 % 11/15/20 0600  (!) 110 18 (!) 130/46 95 % 11/15/20 0530  (!) 114 18 (!) 117/43 95 % 11/15/20 0500  (!) 119 18 (!) 126/45 95 % 11/15/20 0430  (!) 121 18 (!) 133/47 95 % 11/15/20 0400 98.5 °F (36.9 °C) (!) 105 18 (!) 120/42 96 % 11/15/20 0349  (!) 101 18  96 % 11/15/20 0345     96 % 11/15/20 0330  (!) 109 18 (!) 119/45 96 % 11/15/20 0300  (!) 113 18 (!) 116/46 96 % 11/15/20 0230  (!) 112 18 (!) 109/50 96 % 11/15/20 0200  (!) 116 18 (!) 107/48 96 % 11/15/20 0100  (!) 127 18 (!) 119/54 95 % 11/15/20 0030  (!) 120 19 117/69 96 % 11/15/20 0011  (!) 110 18  97 % 11/15/20 0006     97 % 11/15/20 0000 98.7 °F (37.1 °C) (!) 106 18  96 % 11/14/20 2300  (!) 113 18 (!) 129/45 96 % 11/14/20 2230  (!) 116 18 (!) 127/51 96 % 11/14/20 2200  (!) 120 18 (!) 118/49 97 % 11/14/20 2130  (!) 118 18 (!) 108/45 96 % 11/14/20 2100  (!) 122 18 (!) 120/49 96 % 11/14/20 2030  (!) 121 18 (!) 118/48 95 % 11/14/20 2000 98.4 °F (36.9 °C) (!) 105 21 (!) 109/50 97 % 11/14/20 1956     98 % 11/14/20 1949  (!) 105 18  97 % 11/14/20 1900  (!) 112 19 117/61 99 % 11/14/20 1844  (!) 109 15 137/65 100 % 11/14/20 1811 98.4 °F (36.9 °C) (!) 109 18 (!) 121/57   
11/14/20 1700  (!) 110 18 (!) 133/50 98 % 11/14/20 1645  (!) 113 18 137/64 98 % 11/14/20 1630  (!) 116 18 (!) 141/57 98 % 11/14/20 1615  (!) 116 18 139/60 98 % 11/14/20 1600  (!) 110 18 (!) 92/50 97 % 11/14/20 1549  (!) 117 18  98 % 11/14/20 1548     98 % 11/14/20 1545  (!) 115 18 (!) 153/64 98 % 11/14/20 1530  (!) 115 18 (!) 141/68 98 % 11/14/20 1515  (!) 111 18 (!) 179/73 98 % 11/14/20 1500  (!) 113 18 (!) 168/68 98 % 11/14/20 1445  (!) 112 18 (!) 165/62 98 % 11/14/20 1430  (!) 113 18 (!) 177/73 98 % 11/14/20 1415  (!) 112 18 (!) 176/72 98 % 11/14/20 1400  (!) 110 18 (!) 172/71 98 % 11/14/20 1345  (!) 112 18 (!) 169/65 98 % 11/14/20 1331 97.9 °F (36.6 °C) (!) 112 18 (!) 172/73 98 % Intake/Output Summary (Last 24 hours) at 11/15/2020 1319 Last data filed at 11/15/2020 0800 Gross per 24 hour Intake 2032.64 ml Output 475 ml Net 1557.64 ml PHYSICAL EXAM: 
General: Intubated and sedated EENT:  EOMI. Anicteric sclerae. ET tube in place Resp:  Coarse breath sounds bilaterally CV:  Tachycardic, regular rhythm, no edema GI:  Soft, Non distended, Non tender.  +Bowel sounds Neurologic:  Intubated and sedated Psych:   Deferred Skin:  No rashes. No jaundice Reviewed most current lab test results and cultures  YES Reviewed most current radiology test results   YES Review and summation of old records today    NO Reviewed patient's current orders and MAR    YES 
PMH/ reviewed - no change compared to H&P 
________________________________________________________________________ Care Plan discussed with: 
  Comments Patient x Family RN x Care Manager Consultant Multidiciplinary team rounds were held today with , nursing, pharmacist and clinical coordinator. Patient's plan of care was discussed; medications were reviewed and discharge planning was addressed. ________________________________________________________________________ Total NON critical care TIME:  40   Minutes Total CRITICAL CARE TIME Spent:   Minutes non procedure based Comments >50% of visit spent in counseling and coordination of care    
________________________________________________________________________ Senthil Geraldoea, MD  
 
 Procedures: see electronic medical records for all procedures/Xrays and details which were not copied into this note but were reviewed prior to creation of Plan. LABS: 
I reviewed today's most current labs and imaging studies. Pertinent labs include: 
Recent Labs 11/15/20 
1702 11/14/20 
1450 11/14/20 
0602 WBC 18.2* 23.5* 25.0* HGB 8.1* 9.1* 10.1* HCT 26.5* 31.1* 34.1*  
 483* 506* Recent Labs 11/15/20 
8035 11/14/20 
1290 11/14/20 
0602  138 137  
K 3.0* 2.7* 2.9*  
CL 96* 95* 93* CO2 38* 41* 43* * 139* 102* BUN 11 9 8 CREA 0.60 0.68 0.43* CA 8.7 8.4* 9.2 MG  --  1.5*  --   
ALB  --  1.8*  --   
TBILI  --  0.2  --   
ALT  --  12  --   
INR 1.1  --   --   
 
 
Signed: Pito Curtis MD

## 2020-11-15 NOTE — PROGRESS NOTES
1900 Bedside and Verbal shift change report given to Madalyn Power, RN and Morenita PETERSON (oncoming nurse) by Valarie Madera RN (offgoing nurse). Report included the following information SBAR, Kardex, ED Summary, Procedure Summary, Intake/Output, MAR, Recent Results, Med Rec Status and Cardiac Rhythm Sinus Tach. 2000 Shift assessment completed, see flowsheets for details 0000 Reassessment completed, see flowsheets for details 0107 Restraints started  
  
0356 Orders received for 2 runs of 20mEq KCl from JIMENA Cardoso & recheck K 2 hrs after K runs are completed 0400 Reassessment completed, see flowsheets for details 
 
0700 Bedside and Verbal shift change report given to [email or web address]  (oncoming nurse) by Madalyn Power RN and Yaquelin Friedman RN (offgoing nurse). Report included the following information SBAR, Kardex, ED Summary, Intake/Output, MAR, Recent Results, Med Rec Status and Cardiac Rhythm Sinus tach.

## 2020-11-16 NOTE — PROGRESS NOTES
0700: bedside shift report received from offgoing RN 
 
0800: Shift assessment complete, pupils round/brisk/ spontaneous movement in BLE. Small cough and gag w/inline suction. Dr. Chela Carr at bedside Switched vent settings to rate of 14. Will continue to titrate propofol based on patient need 1200:Reassessment complete. 1600:  Tube feed started @ rate of 10 
 
1900: bedside shift report given to oncoming RN

## 2020-11-16 NOTE — PROGRESS NOTES
PT note:  
 
Chart reviewed and noted patient transferred into CCU due to aspiration and now intubated. Patient is currently medically unstable as she is intubated and sedated. Will defer and continue to follow.   
 
Kota Tirado, PT, DPT

## 2020-11-16 NOTE — PROGRESS NOTES
1900 - Bedside shift change report given to Therese Morris RN (oncoming nurse) by Brendan Betts RN (offgoing nurse). Report included the following information SBAR, Kardex, ED Summary, Intake/Output, MAR, Recent Results and Cardiac Rhythm Sinus tach. 2030 - Shift assessment complete, see flowsheets for details. Mouth care and singh care completed. 0000 - Reassessment complete, see flowsheets for changes. Lung sounds coarse with inspiratory wheezing heard bilaterally. Singh care and mouth care completed. 0400 - Reassessment complete, no changes documented. Singh care and mouth care completed. Labs drawn and sent. 0700 - Bedside shift change report given to Rock Chaparro RN (oncoming nurse) by Therese Morris RN (offgoing nurse). Report included the following information SBAR, Kardex, Intake/Output, MAR, Recent Results and Cardiac Rhythm Sinus tach.

## 2020-11-16 NOTE — PROGRESS NOTES
Hospitalist Progress Note NAME: Sony Bailey :  1950 MRN:  [id number] Assessment / Plan: 
 
Squamous cell carcinoma of the lung Acute on chronic hypoxic hypercapnic respiratory failure POA Aspiration pneumonia and sepsis 
-CT scan: with findings of emphysema with large pleural-based lateral right upper lobe mass, with several other pulmonary nodules in both lungs overall highly suspicious of bronchogenic neoplasm. Largest 5.3x 3.1 cm , cavitary lesion 
- TTE with poor quality but no  Reported vegetation - s/p CT-guided lung biopsy 11/10. SCC. Biopsy shows Squamous cell carcinoma 
- Head MRI ordered yesterday, not done. Head CT today negative. - Pulmonology and oncology on board, appreciate input. Plan for outpatient PET scan 
-Increased oxygen requirement since yesterday. Leukocytosis but patient on steroid. Chest x-ray shows unchanged right upper lobe mass and mild pulmonary edema. 
- Intubated for worsening respiratory failure on , suspected aspiration. CXR shows increasing opacities in lung bases - Initially on Augmentin, antibiotics broadened to vanc and Zosyn on  . Will continue - Blood culture : NGTD   
- Remains intubated and sedated. Requiring 40% FiO2, PEEP of 6. On propofol 35 and fentanyl. Remains off pressor 
-Continue to monitor Hypokalemia -resolved COPD exacerbation, On 2L home O2 Chronic smoking, 50-pack-year tobacco use 
-Continue DuoNeb and Solu-Medrol 
-Counseled about smoking cessation Nausea and vomiting - resolved - Unclear etiology 
- continue PRN Zofran, compazine, and scopolamine patch 
- Head CT negative for acute finding Prediabetes and Hyperglycemia 
- No hx of DM. HbA1c 5.8. Likley due to steroid. - Continue sliding scale insulin Hyperthyrodism 
- TSH less than 0.01, free T4 2.0. Total T3 wnl - Patient had a elevated free T4 in 2016. Had ultrasound which did not show any nodule - Started on methimazole and beta blocker - Will need outpatient follow-up, will give contact information for endocrinologist 
 
Anemia: possible AOCD Thrombocytopenia: reactive 
  
 
Depression/anxiety Fibromyalgia As Needed Lorazepam for Anxiety Continue Cymbalta Continue gabapentin 
  
Hypertension continue amlodipine 
  
History of seizures No seizures for years Continue Keppra 
  
History of substance abuse Denied any use of substances recently 
  
  
Code Status: Full code Surrogate Decision Maker: Son Lion Discussed with the sister yesterday, 
  
DVT Prophylaxis: SCDs GI Prophylaxis: not indicated 
  
Baseline: Independent in ADLs Plan for CT-guided biopsy today/Tomorrow Subjective: Chief Complaint / Reason for Physician Visit No acute events overnight Remains intubated and sedated Tachycardia better today Review of Systems: 
Symptom Y/N Comments  Symptom Y/N Comments Fever/Chills    Chest Pain Poor Appetite    Edema Cough    Abdominal Pain Sputum    Joint Pain SOB/DÍAZ    Pruritis/Rash Nausea/vomit    Tolerating PT/OT Diarrhea    Tolerating Diet Constipation    Other Could NOT obtain due to: Intubated and sedated Objective: VITALS:  
Last 24hrs VS reviewed since prior progress note. Most recent are: 
Patient Vitals for the past 24 hrs: 
 Temp Pulse Resp BP SpO2  
11/16/20 1700  (!) 107 20 (!) 125/56 95 % 11/16/20 1600 98.3 °F (36.8 °C) (!) 115 23 (!) 140/67 93 % 11/16/20 1534  (!) 101 14  96 % 11/16/20 1533     96 % 11/16/20 1500  96 14 (!) 133/57 96 % 11/16/20 1400  (!) 102 14 (!) 120/51 96 % 11/16/20 1330  (!) 112 15 (!) 125/49 95 % 11/16/20 1300  (!) 116 15 130/67 93 % 11/16/20 1218     95 % 11/16/20 1217  (!) 104 14  95 % 11/16/20 1200 98.2 °F (36.8 °C) (!) 109 14 119/62 95 % 11/16/20 1100  (!) 119 14 138/63 92 % 11/16/20 1000  (!) 109 18 126/61 95 % 11/16/20 0900  (!) 108 14 (!) 114/51 95 % 11/16/20 0800 98.4 °F (36.9 °C) (!) 108 14 116/65 94 % 11/16/20 0742  (!) 101 14  94 % 11/16/20 0741     94 % 11/16/20 0700  (!) 103 18 (!) 137/58 95 % 11/16/20 0600  (!) 104 18 (!) 128/53 96 % 11/16/20 0500  (!) 113 17 (!) 110/48 97 % 11/16/20 0400 98.7 °F (37.1 °C) (!) 109 18 (!) 118/51 95 % 11/16/20 0329  100 18  95 % 11/16/20 0300  (!) 101 18 (!) 131/58 96 % 11/16/20 0200  (!) 102 18 (!) 123/56 96 % 11/16/20 0100  (!) 106 18 (!) 127/54 96 % 11/16/20 0000 98.1 °F (36.7 °C) (!) 111 18 (!) 118/52 94 % 11/15/20 2310  (!) 101 18  96 % 11/15/20 2300  99 18 136/61 96 % 11/15/20 2200  (!) 110 18 (!) 124/50 95 % 11/15/20 2100  (!) 118 18 (!) 121/47 95 % 11/15/20 2000 98.2 °F (36.8 °C) (!) 120 18 (!) 125/48 95 % 11/15/20 1929  (!) 109 18  95 % 11/15/20 1900  (!) 111 18 (!) 130/51 95 % 11/15/20 1830  (!) 112 18 (!) 124/49 95 % 11/15/20 1800  (!) 112 18 (!) 114/46 95 % Intake/Output Summary (Last 24 hours) at 11/16/2020 1757 Last data filed at 11/16/2020 1600 Gross per 24 hour Intake 2939.6 ml Output 720 ml Net 2219.6 ml  
  
 
PHYSICAL EXAM: 
General: Intubated and sedated EENT:  EOMI. Anicteric sclerae. ET tube in place Resp:  Coarse breath sounds bilaterally CV:  Tachycardic, regular rhythm, no edema GI:  Soft, Non distended, Non tender.  +Bowel sounds Neurologic:  Intubated and sedated Psych:   Deferred Skin:  No rashes. No jaundice Reviewed most current lab test results and cultures  YES Reviewed most current radiology test results   YES Review and summation of old records today    NO Reviewed patient's current orders and MAR    YES 
PMH/ reviewed - no change compared to H&P 
________________________________________________________________________ Care Plan discussed with: 
  Comments Patient x Family RN x Care Manager Consultant Multidiciplinary team rounds were held today with , nursing, pharmacist and clinical coordinator. Patient's plan of care was discussed; medications were reviewed and discharge planning was addressed. ________________________________________________________________________ Total NON critical care TIME:  40   Minutes Total CRITICAL CARE TIME Spent:   Minutes non procedure based Comments >50% of visit spent in counseling and coordination of care    
________________________________________________________________________ Narinder Gambino MD  
 
Procedures: see electronic medical records for all procedures/Xrays and details which were not copied into this note but were reviewed prior to creation of Plan. LABS: 
I reviewed today's most current labs and imaging studies. Pertinent labs include: 
Recent Labs 11/16/20 
1784 11/15/20 
1150 11/14/20 
1280 WBC 19.6* 18.2* 23.5* HGB 8.0* 8.1* 9.1*  
HCT 26.6* 26.5* 31.1*  
* 391 483* Recent Labs 11/16/20 
0749 11/15/20 
3691 11/14/20 
8788  137 138  
K 3.6 3.0* 2.7* CL 98 96* 95* CO2 35* 38* 41* * 180* 139* BUN 18 11 9 CREA 0.56 0.60 0.68  
CA 9.2 8.7 8.4* MG 2.0  --  1.5* ALB  --   --  1.8* TBILI  --   --  0.2 ALT  --   --  12 INR  --  1.1  --   
 
 
Signed: Narinder Gambino MD

## 2020-11-16 NOTE — PROGRESS NOTES
OT note:  
 
Chart reviewed and noted patient transferred into CCU due to aspiration and now intubated. Patient is currently medically unstable as she is intubated and sedated. Will defer and continue to follow.   
 
Osmin Skelton, OTR/L

## 2020-11-16 NOTE — PROGRESS NOTES
Pharmacy Automatic Renal Dosing Protocol - Antimicrobials Vancomycin trough goal: 15-20 Date Dose & Interval Measured (mcg/mL) Extrapolated (mcg/mL)  
11/15 23:32 1000 mg Q12H 17.7 16.5 Impression/Plan: · Vancomycin trough resulted therapeutic at 16.5 mcg/ml. Continue with the current regimen of 1000 mg IV every 12 hours Pharmacy will follow daily and adjust medications as appropriate for renal function and/or serum levels. Thank you, Татьяна Swann, PHARMD

## 2020-11-16 NOTE — PROGRESS NOTES
[facility] Pulmonary, Critical Care, and Sleep Medicine Name: Cristobal Rocha MRN: [id number] : 1950 Hospital: [facility] Date: 2020 Subjective: 
 
 
Deeply sedated Does not follow commands RASS -3 
 
 
 
2020 On vent. Some secrtions. Not following commands. NO fever. Review of systems not obtained due to patient factors. Impression     Plan 1. Stage IV squamous cell Ca- primary RUL pleurally based caviating mass s/p CT- FNA 11/10/200 + Sq cell ca with GAVIOTA nodule as well- awaiting brain MRI 2. Acute hypoxic resp failure- aspiration PNA 3. Resolved Septic shock- suspect ECFV low- bedside critical care ECHO nml EF > 60% + LVH RV nml sixe not dilated , IVC was fat with > 50% resp phasic variation 4. COPD- one vent no air trapping 5. AMS- need to r/o brain mets 6. N/v 
7. Anemia/thrombocytopenia 8. FULL CODE 
 
 
 
 
 
 · CT head negative · Off pressor and WBC coming down- CXR today with a RLL infiltrate c/w aspiration PNA · Reduce sedation to RASS 0 and do SBT later today · Daily SAT, SBT · Low dose TF  
· Palliatve care following · Hold on lung cancer staging for now · Pt is critically ill . CCT EOP 30 min. At risk for decline due to sepsis/MODS Objective: 
 
 
Exam 
Vital Signs: 
Visit Vitals BP (!) 125/49 Pulse (!) 112 Temp 98.2 °F (36.8 °C) Resp 15 Ht 5' 2\" (1.575 m) Wt 77.1 kg (169 lb 15.6 oz) SpO2 95% BMI 31.09 kg/m² Temp (24hrs), Av.4 °F (36.9 °C), Min:98.1 °F (36.7 °C), Max:98.7 °F (37.1 °C) Intake/Output Summary (Last 24 hours) at 2020 1401 Last data filed at 2020 1300 Gross per 24 hour Intake 2829.42 ml Output 720 ml Net 2109.42 ml GENERAL: well developed and in mild distress, NECK:  no jugular vein distention, no retractions, no thyromegaly or masses, LUNGS: decreased breath sounds billaterally and with rhonchi , HEART:  Regular rate and rhythm with no MGR; no edema is present, ABDOMEN:  soft with no tenderness, bowel sounds present, EXTREMITIES:  warm with no cyanosis and SKIN:  no jaundice or ecchymosis Labs: 
Recent Labs 11/16/20 
3245 11/15/20 
3701 11/14/20 
5951 WBC 19.6* 18.2* 23.5* HGB 8.0* 8.1* 9.1*  
HCT 26.6* 26.5* 31.1*  
* 391 483* Recent Labs 11/16/20 
1310 11/15/20 
2487 11/14/20 
8725  137 138  
K 3.6 3.0* 2.7* CL 98 96* 95* CO2 35* 38* 41* * 180* 139* BUN 18 11 9 CREA 0.56 0.60 0.68  
CA 9.2 8.7 8.4* MG 2.0  --  1.5* ALB  --   --  1.8* TBILI  --   --  0.2 ALT  --   --  12 INR  --  1.1  --   
 
 
Recent Labs 11/14/20 
1208 PHI 7.37 PO2I 129* PCO2I 71.8* · Marito Mills MD 
11/16/2020

## 2020-11-16 NOTE — PROGRESS NOTES
Comprehensive Nutrition Assessment Type and Reason for Visit: [facility] Nutrition Recommendations/Plan:  
Initiate TF via NGT:  Start Osmolite 1.2 @ 10mL/h, advance rate 10mL q 8h as tolerated to Goal Rate of 25mL/h + Prosource 5 x day + 50mL H2O flush q 6h (provides 1020kcals/108gPro/94gCHO/692mL) Nutrition Assessment:   Chart reviewed, case discussed during CCU rounds. Pt intubated over the weekend, needs re-estimated. She is on propofol @ 23. 1mL/h, which provides 610 kcals daily. Per discussion with Dr. Fanta taylor to start TF today as she is NPO x 3 days. TF at goal + propofol will meet 117% kcal and 100% protein needs. Malnutrition Assessment: 
Malnutrition Status:   UTD Estimated Daily Nutrient Needs: 
Energy (kcal): PSU 1395 (MSJ 3899); Weight Used for Energy Requirements: Current Protein (g): 100g (1.3 g/kg bw); Weight Used for Protein Requirements: Current Fluid (ml/day): 1600 mL; Method Used for Fluid Requirements: 1 ml/kcal 
 
 
Nutrition Related Findings:  Meds: pepcid, humalog, solumedrol, zosyn, vancomycin, [email or web address]: Drips: propofol, fentanyl. Edema: trace-generalized. BM 11/14 Wounds:   
Moisture associate skin damage Current Nutrition Therapies: DIET TUBE FEEDING Anthropometric Measures: 
· Height:  5' 2\" (157.5 cm) · Current Body Wt:  77.1 kg (169 lb 15.6 oz) · Ideal Body Wt:  110 lbs:  154.5 % · BMI Category:  Obese class 1 (BMI 30.0-34. 9) Nutrition Diagnosis:  
· Inadequate protein-energy intake related to (decreased appetite, nausea/vomiting) as evidenced by intake 0-25% Previous dx continues. Nutrition Interventions:  
Food and/or Nutrient Delivery: Start tube feeding Nutrition Education and Counseling: No recommendations at this time Coordination of Nutrition Care: Continue to monitor while inpatient, Interdisciplinary rounds Goals: Pt will tolerate TF initiation with residuals <500mL in 2-4 days. Nutrition Monitoring and Evaluation:  
Behavioral-Environmental Outcomes: None identified Food/Nutrient Intake Outcomes: Enteral nutrition intake/tolerance Physical Signs/Symptoms Outcomes: Biochemical data, Weight, Nausea/vomiting Discharge Planning: Too soon to determine Electronically signed by Carolyn Irizarry RD, [address]  on 11/16/2020 at 1:05 PM 
 
Contact: [phone]

## 2020-11-16 NOTE — INTERDISCIPLINARY ROUNDS
Interdisciplinary team rounds were held 11/16/2020 with the following team members:Care Management, Diabetes Treatment Specialist, Nursing, Nutrition, Palliative Care, Pharmacy, Physical Therapy, Physician and Respiratory Therapy. Plan of care discussed. See clinical pathway and/or care plan for interventions and desired outcomes.

## 2020-11-16 NOTE — PROGRESS NOTES
Oncology Progress Note Patient Name:  Jeremy Can Date of Service: 11/16/2020 Admit Date: 11/7/2020 Interval History Patient became hypotensive and unresponsive over the weekend, now intubated and sedated. -per RN, Meeta Cervantes, aspiration event likely culprit 
-she remains intubated and sedated but opens eyes to voice, off levophed Subjective:  
 
 
 
Current Facility-Administered Medications Medication Dose Route Frequency  metoprolol tartrate (LOPRESSOR) tablet 50 mg  50 mg Oral Q12H  propofol (DIPRIVAN) 10 mg/mL infusion  0-50 mcg/kg/min IntraVENous TITRATE  levETIRAcetam (KEPPRA) 500 mg in saline (iso-osm) 100 mL IVPB (premix)  500 mg IntraVENous Q12H  
 albuterol-ipratropium (DUO-NEB) 2.5 MG-0.5 MG/3 ML  3 mL Nebulization Q4H RT  
 methylPREDNISolone (PF) (Solu-MEDROL) injection 60 mg  60 mg IntraVENous Q6H  
 fentaNYL (PF) 1,500 mcg/30 mL (50 mcg/mL) infusion  0-200 mcg/hr IntraVENous TITRATE  miconazole (SECURA) 2 % extra thick cream   Topical BID  piperacillin-tazobactam (ZOSYN) 3.375 g in 0.9% sodium chloride (MBP/ADV) 100 mL MBP  3.375 g IntraVENous Q8H  
 vancomycin (VANCOCIN) 1,000 mg in 0.9% sodium chloride 250 mL (VIAL-MATE)  1,000 mg IntraVENous Q12H  
 PHENYLephrine (ERIK-SYNEPHRINE) 30 mg in 0.9% sodium chloride 250 mL infusion   mcg/min IntraVENous TITRATE  insulin lispro (HUMALOG) injection   SubCUTAneous Q6H  
 glucose chewable tablet 16 g  4 Tab Oral PRN  
 dextrose (D50W) injection syrg 12.5-25 g  12.5-25 g IntraVENous PRN  
 glucagon (GLUCAGEN) injection 1 mg  1 mg IntraMUSCular PRN  
 scopolamine (TRANSDERM-SCOP) 1 mg over 3 days 1 Patch  1 Patch TransDERmal Q72H  
 HYDROcodone-acetaminophen (NORCO) 5-325 mg per tablet 2 Tab  2 Tab Oral Q4H PRN  
 ALPRAZolam (XANAX) tablet 0.5 mg  0.5 mg Oral QID PRN  prochlorperazine (COMPAZINE) with saline injection 10 mg  10 mg IntraVENous Q6H PRN  
  zinc oxide-cod liver oil (DESITIN) 40 % paste   Topical BID and QHS  methIMAzole (TAPAZOLE) tablet 10 mg  10 mg Oral DAILY  bacitracin 500 unit/gram packet 1 Packet  1 Packet Topical DAILY  [Held by provider] gabapentin (NEURONTIN) capsule 300 mg  300 mg Oral TID  zinc oxide-cod liver oil (DESITIN) 40 % paste   Topical PRN  
 0.9% sodium chloride infusion  25 mL/hr IntraVENous CONTINUOUS  
 labetaloL (NORMODYNE;TRANDATE) injection 10 mg  10 mg IntraVENous Q2H PRN  
 sodium chloride (NS) flush 5-40 mL  5-40 mL IntraVENous Q8H  
 sodium chloride (NS) flush 5-40 mL  5-40 mL IntraVENous PRN  
 acetaminophen (TYLENOL) tablet 650 mg  650 mg Oral Q4H PRN  
 naloxone (NARCAN) injection 0.4 mg  0.4 mg IntraVENous PRN  
 enoxaparin (LOVENOX) injection 40 mg  40 mg SubCUTAneous Q24H  
 [Held by provider] methocarbamoL (ROBAXIN) tablet 750-1,500 mg  750-1,500 mg Oral BID  ondansetron (ZOFRAN) injection 4 mg  4 mg IntraVENous Q4H PRN Review of Systems: 
ROS not conducted due to intubation and sedation. Objective:  
 
Patient Vitals for the past 8 hrs: 
 BP Temp Pulse Resp SpO2  
20 0800 116/65 98.4 °F (36.9 °C) (!) 108 14 94 % 20 0742   (!) 101 14 94 % 20 0741     94 % 20 0700 (!) 137/58  (!) 103 18 95 % 20 0600 (!) 128/53  (!) 104 18 96 % 20 0500 (!) 110/48  (!) 113 17 97 % 20 0400 (!) 118/51 98.7 °F (37.1 °C) (!) 109 18 95 % 20 0329   100 18 95 % 20 0300 (!) 131/58  (!) 101 18 96 % Temp (24hrs), Av.4 °F (36.9 °C), Min:98.1 °F (36.7 °C), Max:98.7 °F (37.1 °C) No intake/output data recorded. Physical Exam: 
GEN:  Elderly  lady; ETT in place. HEENT: NCAT Neck: soft, supple, trachea midline PULM: reduced air entry at bases BL. Coarse breath sounds and expiratory wheezes noted. CARDS: RRR, S1S2+, no MRG 
ABD: soft, NT/ND, BS+ EXT: UE/:E edema noted. NEURO: opens eyes to voice PSYCH: unable to assess. Labs:   
Recent Results (from the past 24 hour(s)) GLUCOSE, POC Collection Time: 11/15/20 11:43 AM  
Result Value Ref Range Glucose (POC) 196 (H) 65 - 100 mg/dL Performed by Ade Armstrong RN (JEREL) GLUCOSE, POC Collection Time: 11/15/20  5:14 PM  
Result Value Ref Range Glucose (POC) 182 (H) 65 - 100 mg/dL Performed by Ade Armstrong RN (JEREL) Ansari Ray Collection Time: 11/15/20 11:32 PM  
Result Value Ref Range Vancomycin,trough 17.7 (H) 5.0 - 10.0 ug/mL Reported dose date NOT PROVIDED Reported dose time: NOT PROVIDED Reported dose: NOT PROVIDED UNITS  
GLUCOSE, POC Collection Time: 11/16/20  1:08 AM  
Result Value Ref Range Glucose (POC) 158 (H) 65 - 100 mg/dL Performed by Randy Spears CBC WITH AUTOMATED DIFF Collection Time: 11/16/20  4:26 AM  
Result Value Ref Range WBC 19.6 (H) 3.6 - 11.0 K/uL  
 RBC 2.71 (L) 3.80 - 5.20 M/uL HGB 8.0 (L) 11.5 - 16.0 g/dL HCT 26.6 (L) 35.0 - 47.0 % MCV 98.2 80.0 - 99.0 FL  
 MCH 29.5 26.0 - 34.0 PG  
 MCHC 30.1 30.0 - 36.5 g/dL  
 RDW 14.1 11.5 - 14.5 % PLATELET 628 (H) 244 - 400 K/uL MPV 10.5 8.9 - 12.9 FL  
 NRBC 0.2 (H) 0  WBC ABSOLUTE NRBC 0.04 (H) 0.00 - 0.01 K/uL NEUTROPHILS 91 (H) 32 - 75 % BAND NEUTROPHILS 2 % LYMPHOCYTES 3 (L) 12 - 49 % MONOCYTES 3 (L) 5 - 13 % EOSINOPHILS 0 0 - 7 % BASOPHILS 0 0 - 1 % METAMYELOCYTES 1 % IMMATURE GRANULOCYTES 0 0.0 - 0.5 % ABS. NEUTROPHILS 18.2 (H) 1.8 - 8.0 K/UL  
 ABS. LYMPHOCYTES 0.6 (L) 0.8 - 3.5 K/UL  
 ABS. MONOCYTES 0.6 0.0 - 1.0 K/UL  
 ABS. EOSINOPHILS 0.0 0.0 - 0.4 K/UL  
 ABS. BASOPHILS 0.0 0.0 - 0.1 K/UL  
 ABS. IMM. GRANS. 0.0 0.00 - 0.04 K/UL  
 DF AUTOMATED    
 RBC COMMENTS NORMOCYTIC, NORMOCHROMIC METABOLIC PANEL, BASIC Collection Time: 11/16/20  4:26 AM  
Result Value Ref Range Sodium 138 136 - 145 mmol/L  Potassium 3.6 3.5 - 5.1 mmol/L  
 Chloride 98 97 - 108 mmol/L  
 CO2 35 (H) 21 - 32 mmol/L Anion gap 5 5 - 15 mmol/L Glucose 138 (H) 65 - 100 mg/dL BUN 18 6 - 20 MG/DL Creatinine 0.56 0.55 - 1.02 MG/DL  
 BUN/Creatinine ratio 32 (H) 12 - 20 GFR est AA >60 >60 ml/min/1.73m2 GFR est non-AA >60 >60 ml/min/1.73m2 Calcium 9.2 8.5 - 10.1 MG/DL MAGNESIUM Collection Time: 11/16/20  4:26 AM  
Result Value Ref Range Magnesium 2.0 1.6 - 2.4 mg/dL GLUCOSE, POC Collection Time: 11/16/20  5:57 AM  
Result Value Ref Range Glucose (POC) 154 (H) 65 - 100 mg/dL Performed by Jaclyn Ellsworth BLOOD GAS, ARTERIAL Collection Time: 11/16/20  9:55 AM  
Result Value Ref Range pH 7.40 7.35 - 7.45    
 PCO2 60 (H) 35.0 - 45.0 mmHg PO2 83 80 - 100 mmHg O2 SAT 96 92 - 97 % BICARBONATE 37 (H) 22 - 26 mmol/L  
 BASE EXCESS 9.4 mmol/L  
 O2 METHOD VENTILATOR    
 FIO2 50 % MODE A/C Tidal volume 400 SET RATE 16    
 EPAP/CPAP/PEEP 6 Sample source ARTERIAL    
 SITE LEFT RADIAL SKYE'S TEST YES Assessment:  
 
Active Problems: COPD exacerbation ([address] (9/13/2010) Overview: O2 dependent--Dr. De Luna Po Plan: Ms. Claudell Flirt is a 73y/o lady with newly diagnosed squamous cell non-small cell lung cancer. Squamous Cell Lung Cancer: 
-patient appears to have metastatic disease based on presence of pulmonary nodules bilaterally  
-will request PDL1 testing 
-MRI brain pending for staging, CT head no mets 
-will need PET as outpatient if she recovers. Respiratory Failure/Shock, Aspriation: 
-patient's condition is complicated by significant COPD with underlying malignancy 
-BP improved and now off levophed Disposition: 
-patient's situation is quite serious. Amber Carty MD 
11/16/2020

## 2020-11-17 NOTE — INTERDISCIPLINARY ROUNDS
Interdisciplinary team rounds were held 11/17/2020 with the following team members:Care Management, Diabetes Treatment Specialist, Nursing, Nutrition, Pharmacy, Physical Therapy, Physician, Respiratory Therapy and Clinical Coordinator. Plan of care discussed. See clinical pathway and/or care plan for interventions and desired outcomes.

## 2020-11-17 NOTE — PROGRESS NOTES
Hospitalist Progress Note NAME: Alexi Herndon :  1950 MRN:  [id number] Assessment / Plan: 
Squamous cell carcinoma of the lung Acute on chronic hypoxic hypercapnic respiratory failure POA Aspiration pneumonia and sepsis 
-CT scan: with findings of emphysema with large pleural-based lateral right upper lobe mass, with several other pulmonary nodules in both lungs overall highly suspicious of bronchogenic neoplasm. Largest 5.3x 3.1 cm , cavitary lesion. Likely metastatic with bilateral pulm nodules 
-heme/onc following 
-MRI brain pending, staging 
-S/p CT-guided lung biopsy 11/10 shows SCC 
-Chest x-ray unchanged right upper lobe mass, mild pulmonary edema. CXR  shows increasing bibasilar opacities 
-Continue empiric IV antibiotics, broadened to vancomycin and Zosyn 
-Leukocytosis noted in setting of IV steroids Acute exacerbation of COPD Acute on chronic resp failure on 2L at home chronically 
-failed SBT today 
-IV steroids 
-bronchodilators 
-50 pack-yr smoking Hx 
-Intubated for worsening respiratory failure . Possible aspiration 
  
Hypokalemia -resolved 
  
Nausea and vomiting - resolved - Unclear etiology 
- continue PRN Zofran, compazine, and scopolamine patch 
- Head CT negative for acute finding 
  
Prediabetes and Hyperglycemia 
- No hx of DM. HbA1c 5.8. Likley due to steroid. - Continue sliding scale insulin 
  
Hyperthyrodism 
- TSH less than 0.01, free T4 2.0. Total T3 wnl - Patient had a elevated free T4 in 2016. Had ultrasound which did not show any nodule - Started on methimazole and beta blocker - Will need outpatient follow-up, will give contact information for endocrinologist 
  
Anemia: possible AOCD Thrombocytopenia: reactive 
  
  
Depression/anxiety Fibromyalgia As Needed Lorazepam for Anxiety, now sedated on vent Continue Cymbalta Continue gabapentin 
  
Hypertension continue amlodipine 
  
History of seizures No seizures for years Continue Keppra 
  
History of substance abuse Denied any use of substances recently 
  
  
Code Status: Full code Surrogate Decision Maker: Luciano Seymour Palliative team reaching out to family to discuss treatment options. Assistance appreciated 
  
Prognosis guarded. Family considering transition to comfort care after visitation 
  
DVT Prophylaxis: SCDs GI Prophylaxis: not indicated 
  
Baseline: Independent in ADLs 
  
Plan for CT-guided biopsy today/Tomorrow Subjective: Chief Complaint / Reason for Physician Visit Patient sedated on ventilator Discussed with RN events overnight. Review of Systems: 
Symptom Y/N Comments  Symptom Y/N Comments Fever/Chills    Chest Pain Poor Appetite    Edema Cough    Abdominal Pain Sputum    Joint Pain SOB/DÍAZ    Pruritis/Rash Nausea/vomit    Tolerating PT/OT Diarrhea    Tolerating Diet Constipation    Other Could NOT obtain due to: Sedated on vent Objective: VITALS:  
Last 24hrs VS reviewed since prior progress note. Most recent are: 
Patient Vitals for the past 24 hrs: 
 Temp Pulse Resp BP SpO2  
11/17/20 1512  60 14  96 % 11/17/20 1400  66 14 122/76 96 % 11/17/20 1300  72 14 (!) 120/50 94 % 11/17/20 1200 98.5 °F (36.9 °C) 73 14 (!) 129/50 94 % 11/17/20 1113  71 14  95 % 11/17/20 1100  67 14 (!) 139/59 96 % 11/17/20 1000  68 14 (!) 128/52 95 % 11/17/20 0900  70 19 (!) 129/56 95 % 11/17/20 0800 98.5 °F (36.9 °C) (!) 113 16 (!) 160/76 99 % 11/17/20 0731  77 14  95 % 11/17/20 0700  73 14 (!) 126/51 96 % 11/17/20 0600  (!) 111 19 (!) 143/72 96 % 11/17/20 0500  97 15 139/67 94 % 11/17/20 0402  71 14  96 % 11/17/20 0400 98.9 °F (37.2 °C) 71 14 (!) 125/58 96 % 11/17/20 0200  74 14 (!) 134/56 96 % 11/17/20 0100  77 14 130/61 96 % 11/17/20 0000  91 22 133/62 96 % 11/16/20 2300  (!) 110 19 131/62 96 % 11/16/20 2246  (!) 115 14  96 % 11/16/20 2200 99.1 °F (37.3 °C) (!) 114 14 130/60 94 % 11/16/20 2000  97 20 (!) 123/59 95 % 11/16/20 1958  98 14  95 % 11/16/20 1956     95 % 11/16/20 1900  (!) 103 16 (!) 129/57 95 % 11/16/20 1800  (!) 110 17 129/61 94 % 11/16/20 1700  (!) 107 20 (!) 125/56 95 % 11/16/20 1600 98.3 °F (36.8 °C) (!) 115 23 (!) 140/67 93 % Intake/Output Summary (Last 24 hours) at 11/17/2020 1550 Last data filed at 11/17/2020 1424 Gross per 24 hour Intake 2833.08 ml Output 840 ml Net 1993.08 ml I had a face to face encounter with this patient and independently examined this patient on 11/17/2020 as outlined below: PHYSICAL EXAM: 
 
General: Adult  female, sedated on vent, appears older than stated age EENT:  EOMI. Anicteric sclerae. MMM Resp:  Diminished air entry, coarse bs, no wheeze/rhonchi CV:  Regular  rhythm,  No edema GI:  Soft, Non distended, Non tender. +Bowel sounds Neurologic:  Limited exam, moves extremities, opens eyes to voice Psych:   deferred Skin:  No rashes. No jaundice Reviewed most current lab test results and cultures  YES Reviewed most current radiology test results   YES Review and summation of old records today    NO Reviewed patient's current orders and MAR    YES 
PMH/ reviewed - no change compared to H&P 
________________________________________________________________________ Care Plan discussed with: 
  Comments Patient x Family RN x Care Manager Consultant Multidiciplinary team rounds were held today with , nursing, pharmacist and clinical coordinator. Patient's plan of care was discussed; medications were reviewed and discharge planning was addressed. ________________________________________________________________________ Total NON critical care TIME:  35   Minutes Total CRITICAL CARE TIME Spent:   Minutes non procedure based Comments >50% of visit spent in counseling and coordination of care    
________________________________________________________________________ Oscar Simon DO  
 
Procedures: see electronic medical records for all procedures/Xrays and details which were not copied into this note but were reviewed prior to creation of Plan. LABS: 
I reviewed today's most current labs and imaging studies. Pertinent labs include: 
Recent Labs 11/17/20 
0402 11/16/20 
0426 11/15/20 
0050 WBC 19.4* 19.6* 18.2* HGB 7.8* 8.0* 8.1* HCT 26.5* 26.6* 26.5*  
* 456* 391 Recent Labs 11/17/20 
0402 11/16/20 
0426 11/15/20 
6451  138 137  
K 3.7 3.6 3.0*  
 98 96* CO2 36* 35* 38* * 138* 180* BUN 27* 18 11 CREA 0.55 0.56 0.60  
CA 9.1 9.2 8.7 MG  --  2.0  --   
INR  --   --  1.1 Signed: Oscar Simon DO

## 2020-11-17 NOTE — PROGRESS NOTES
Occupational Therapy Chart reviewed and spoke with nursing. Patient continues to not be appropriate for OT session due to being intubated and sedated. Patient failed SBT this am due to apnea. Will continue to follow.   
 
Osmin Skelton, OTR/L

## 2020-11-17 NOTE — PROGRESS NOTES
PT note:  
 
Chart reviewed and spoke with nursing. Patient continues to not be appropriate for PT session due to being intubated and sedated. Patient failed SBT this am due to apnea. Will continue to follow.   
 
Maddi Sandhu, PT, DPT

## 2020-11-17 NOTE — PROGRESS NOTES
Palliative Medicine [place] [phone] ([place]) [address] [facility],[address] [phone] ([place]) Diagnoses: Joaquina Wills is a 78 y. o.female with a past history of COPD on 2 litres home oxygen , depression , anxiety , Fibromylagia, Hx of seizures , hx of substance abuse , chronic smoker , Recent fall with right foot fracture ,  who was admitted on 11/7/2020 from  Home with a diagnosis of acute COPD exacerbation , work  Up , CT chest showed emphysema with large pleural-based lateral right upper lobe mass, with bilateral  pulmonary nodules in both lungs,  highly suspicious of bronchogenic neoplasm. Pulmonary is following suggested CT guided bx of right lung lesion . Current medical issues leading to Palliative Medicine involvement include: care decisions for suspected lung cancer  (From Dr Louis Patino notes). newly diagnosed squamous cell non-small cell lung cancer. GOALS OF CARE: Patient currently intubated and sedated. Son, Adia, would like to see how patient does over the next few days with current level of care. If she is unable to be weaned off the ventilator then he will likely opt for comfort measures. He voiced to the Palliative team today that should patient's heart stop, he would not want chest compressions or electric shock. He would be okay with pressors if needed. TREATMENT PREFERENCES:  
Code Status: Partial Code Advance Care Planning: 
[x] The [facility] Interdisciplinary Team has updated the ACP Navigator with [address] and Patient Capacity Primary Decision [address] [facility] Agent):   Primary Decision Maker (Active): Brain Peaches - [phone] Relationship to patient: 
[] Named in a scanned document  
[x] Legal Next of Kin 
[] Guardian Medical Interventions: Limited additional interventions Family Meeting Documentation Participants: Palliative team of Dr Mera Howard, [employer], [facility], son Aminata Cuellar and patient seen in room (intubated, sedated) prior to meeting in conference room. Psychosocial: Son, Adia, shared the followin. Social history: Patient comes from a large family, one of 9 siblings. She has only one surviving child, Adia. Lost a daughter to overdose in  (this daughter's daughter, age 5, is being raised by Adia).   in 1. Jg's mother in law  last year. Lot of loss and grief for Adia and rest of patient's family. Patient lived with Adia and his family (his wife and four children ages 21,15, 5and 9years old). It is especially important for family to have a chance to say their goodbyes with patient and [place] closure\" per Adia. He asked if extended family can begin to come in to say their goodbyes as he \"does not have a good feeling that she will make it\". Family Visitation: Discussed with , Naima Benitez. Agreed that family can come over the course of one day to come and say their goodbyes (no limit on how many family members come). They will be escorted a few at a time to patient's room to have some time with her.) 2. History of reliance on prescription meds:  Patient relied on medications to cope (per Adia, percocet, lexapro, other meds) and she smoked cigarettes regularly. He noted that patient did stop smoking for a while but then took it up again after her daughter  of an overdose. 3. Patient's stated wishes: Per Adia, patient would not want to live if she could not interact and engage with others, have a quality life. He shared how she saw her father in a Nursing facility in the last two months of his life and he could only blink his eyes, \"she told me she would not want to live that way and would not want to suffer\". Per Adia, patient had shared in the past that she would not want to have CPR, she is partial code now. (Ok with pressors, no to chest compressions, no to electric shock). Spiritual History: Patient relied on her maggie to cope with the initial unknown diagnosis. Per Adia, patient wanted to be cremated at time of death, she did not want any viewing or memorial service or . She wanted her ashes to be buried next to her  and daughter. Discussion: We discussed patient's current condition. Adia feels that he is getting good information from Dr Kira Garsia about patient's condition and situation. He is realistic, feels his mother will not survive this event. Palliative team shared their conversations with patient prior to patient being intubated and how family was so important to patient. Education provided about role of palliative team and discussed what patient's goals would be (noted above). Outcome / Plan: Adia will let us know when the extended family can come to see patient/ say their goodbyes, ICU will be informed of the day for family to visit. He would like to see how patient does over the course of the next few days, likely will lean towards comfort / extubation, if no improvement.

## 2020-11-17 NOTE — PROGRESS NOTES
2030 - Pt transferred from CCU 2522 to [id number] - Bedside shift change report given to Henry Villa RN (oncoming nurse) by Roshni Hamlin RN (offgoing nurse). Report included the following information SBAR, Kardex, Intake/Output, MAR, Recent Results and Cardiac Rhythm Sinus tach.

## 2020-11-17 NOTE — PROGRESS NOTES
Pharmacy Automatic Renal Dosing Protocol - Antimicrobials Indication for Antimicrobials: Lung abscess, aspiration PNA Current Regimen of Each Antimicrobial: 
Piperacillin/tazobactam 3.375g IV q8h, start ; day 4 Previous Antimicrobial Therapy: 
Augmentin 875 mg BID (Start Date -) Vancomycin 1000 mg q12h; started - Vancomycin trough goal: 15-20 Date Dose & Interval Measured (mcg/mL) Extrapolated (mcg/mL)  
11/15 23:32 1000 mg Q12H 17.7 16.5 Date & time of next level: n/a Significant Cultures:  
 Resp viral panel negative  blood cx NGTD, pending  UA w/ reflex cx - UA negative nitrite, LE, 1+ bacteria, 5-10 WBC, not reflexed Radiology / Imaging results: (X-ray, CT scan or MRI):  
 chest XR IMPRESSION: Endotracheal tube and right internal jugular central venous catheter 
appear to be in satisfactory position. Unchanged right upper lobe mass. Pulmonary vascular congestion with mild pulmonary edema. Paralysis, amputations, malnutrition: none noted Labs: 
Recent Labs 20 
0402 20 
0426 11/15/20 
8525 CREA 0.55 0.56 0.60 BUN 27* 18 11 WBC 19.4* 19.6* 18.2* Temp (24hrs), Av.7 °F (37.1 °C), Min:98.3 °F (36.8 °C), Max:99.1 °F (37.3 °C) Is the Patient on Dialysis? No 
 
Creatinine Clearance (mL/min):  
CrCl (Actual Body Weight): 93.6 CrCl (Adjusted Body Weight): 74.0 CrCl (Ideal Body Weight): 60.8 Impression/Plan:  
Scr stable Vancomycin d/c Continue current dose of zosyn; appropriate for indication/renal function Antimicrobial stop date TBD Pharmacy will follow daily and adjust medications as appropriate for renal function and/or serum levels. Thank you, BARRY Jean-Baptiste

## 2020-11-17 NOTE — PALLIATIVE CARE
Brief summary of visit , full note will be placed . Palliative team , myself , Renetta Resendiz and Romana Beery Parekh/JOHANNA , met with patient only child /son/legal next of kin Mr Gildardo Hendrix notes he is getting updates from Dr Jade Tomlin ( patient known to Dr Jade Tomlin for long time ). - he understand \" no real good outcome\",  
 
- he decided for partial code, based on what patient would want ( no chest compression , no shock , no ACLS), only vasopressors , if she needs to be placed  back on vasopressors , pink sheet placed. - Current goal is to wait and see how she does over the course of few days to see if she is medically extubated , he does not want to prolong her suffering . we discuss Compassionate extubation and transition to comfort focus care if she does not show any signs of recovery  to medical extubation . 
- he wants  Immediate family members to visit for  closure , Braxton Beck will discuss with ICU lead to help with visitation .

## 2020-11-17 NOTE — PROGRESS NOTES
11/17/20 4036 ABCDEF Bundle SBT Safety Screen Passed No  
SBT Screen Reason for Failure Agitation (apnea)

## 2020-11-17 NOTE — PROGRESS NOTES
0700 Bedside and Verbal shift change report received from Ivy, [facility] (offgoing nurse). Report included the following information SBAR, Kardex, Intake/Output, MAR, Accordion and Recent Results. 0800 Assessment complete. See flowsheet for details. 0945 IDR completed. Will arrange to have patient go down for MRI today, pending patient being able to be weaned off of fentanyl drip. 1200 Reassessment unchanged. Will continue to monitor. 1500 Patient's son meeting with palliative care team. Code status changed. 1600 Reassessment unchanged. Will continue to monitor. 1715 Patient to and back from MRI without incident. 1930 Bedside and Verbal shift change report given to NICHOLAS Haynes (oncoming nurse). Report included the following information SBAR, Kardex, Intake/Output, MAR, Accordion and Recent Results.

## 2020-11-17 NOTE — PROGRESS NOTES
2100- Bedside and Verbal shift change report given to NICHOLAS Mckay (oncoming nurse) by Lisa Gonsalez RN (offgoing nurse). Report included the following information SBAR, Kardex, Intake/Output, MAR, Recent Results and Cardiac Rhythm NSR.  
2200- Patient responds to voice. Delayed command following. Pupils round and reactive. + cough and gag with suction. Noted with agitation Propofol increased see MAR.  
0000- Reassessment performed. No changes. 0400- Reassessment performed. See flow sheet. 9024- Sedation paused for SAT. Patient is drowsy but responds to voice and able to follow commands. 0784- SBT failed . Patient apneic. Patient noted with agitation. SBT discontinued and patient placed back on rate. 0700- Report given to NICHOLAS Espinoza.

## 2020-11-17 NOTE — PROGRESS NOTES
Oncology Progress Note Patient Name:  Samara Caicedo Date of Service: 11/17/2020 Admit Date: 11/7/2020 Interval History Patient became hypotensive and unresponsive over the weekend, now intubated and sedated. - aspiration event likely culprit 
-she remains intubated and sedated but opens eyes to voice and follows commands. TF started via NG tube Subjective:  
 
 
 
Current Facility-Administered Medications Medication Dose Route Frequency  fentaNYL citrate (PF) injection 100 mcg  100 mcg IntraVENous Q4H PRN  
 methylPREDNISolone (PF) (Solu-MEDROL) injection 30 mg  30 mg IntraVENous Q8H  
 famotidine (PF) (PEPCID) 20 mg in 0.9% sodium chloride 10 mL injection  20 mg IntraVENous Q12H  chlorhexidine (ORAL CARE KIT) 0.12 % mouthwash 15 mL  15 mL Oral Q12H  
 alcohol 62% (NOZIN) nasal  1 Ampule  1 Ampule Topical Q12H  
 metoprolol tartrate (LOPRESSOR) tablet 50 mg  50 mg Oral Q12H  propofol (DIPRIVAN) 10 mg/mL infusion  0-50 mcg/kg/min IntraVENous TITRATE  levETIRAcetam (KEPPRA) 500 mg in saline (iso-osm) 100 mL IVPB (premix)  500 mg IntraVENous Q12H  
 albuterol-ipratropium (DUO-NEB) 2.5 MG-0.5 MG/3 ML  3 mL Nebulization Q4H RT  
 fentaNYL (PF) 1,500 mcg/30 mL (50 mcg/mL) infusion  0-200 mcg/hr IntraVENous TITRATE  miconazole (SECURA) 2 % extra thick cream   Topical BID  piperacillin-tazobactam (ZOSYN) 3.375 g in 0.9% sodium chloride (MBP/ADV) 100 mL MBP  3.375 g IntraVENous Q8H  
 PHENYLephrine (ERIK-SYNEPHRINE) 30 mg in 0.9% sodium chloride 250 mL infusion   mcg/min IntraVENous TITRATE  insulin lispro (HUMALOG) injection   SubCUTAneous Q6H  
 glucose chewable tablet 16 g  4 Tab Oral PRN  
 dextrose (D50W) injection syrg 12.5-25 g  12.5-25 g IntraVENous PRN  
 glucagon (GLUCAGEN) injection 1 mg  1 mg IntraMUSCular PRN  
 scopolamine (TRANSDERM-SCOP) 1 mg over 3 days 1 Patch  1 Patch TransDERmal Q72H  HYDROcodone-acetaminophen (NORCO) 5-325 mg per tablet 2 Tab  2 Tab Oral Q4H PRN  
 ALPRAZolam (XANAX) tablet 0.5 mg  0.5 mg Oral QID PRN  prochlorperazine (COMPAZINE) with saline injection 10 mg  10 mg IntraVENous Q6H PRN  zinc oxide-cod liver oil (DESITIN) 40 % paste   Topical BID and QHS  methIMAzole (TAPAZOLE) tablet 10 mg  10 mg Oral DAILY  bacitracin 500 unit/gram packet 1 Packet  1 Packet Topical DAILY  [Held by provider] gabapentin (NEURONTIN) capsule 300 mg  300 mg Oral TID  zinc oxide-cod liver oil (DESITIN) 40 % paste   Topical PRN  
 0.9% sodium chloride infusion  25 mL/hr IntraVENous CONTINUOUS  
 labetaloL (NORMODYNE;TRANDATE) injection 10 mg  10 mg IntraVENous Q2H PRN  
 sodium chloride (NS) flush 5-40 mL  5-40 mL IntraVENous Q8H  
 sodium chloride (NS) flush 5-40 mL  5-40 mL IntraVENous PRN  
 acetaminophen (TYLENOL) tablet 650 mg  650 mg Oral Q4H PRN  
 naloxone (NARCAN) injection 0.4 mg  0.4 mg IntraVENous PRN  
 enoxaparin (LOVENOX) injection 40 mg  40 mg SubCUTAneous Q24H  
 [Held by provider] methocarbamoL (ROBAXIN) tablet 750-1,500 mg  750-1,500 mg Oral BID  ondansetron (ZOFRAN) injection 4 mg  4 mg IntraVENous Q4H PRN Review of Systems: 
ROS not conducted due to intubation and sedation. Objective:  
 
Patient Vitals for the past 8 hrs: 
 BP Temp Pulse Resp SpO2  
20 1000 (!) 128/52  68 14 95 % 20 0900 (!) 129/56  70 19 95 % 20 0800 (!) 160/76 98.5 °F (36.9 °C) (!) 113 16 99 % 20 0731   77 14 95 % 20 0700 (!) 126/51  73 14 96 % 20 0600 (!) 143/72  (!) 111 19 96 % 20 0500 139/67  97 15 94 % 20 0402   71 14 96 % 20 0400 (!) 125/58 98.9 °F (37.2 °C) 71 14 96 % Temp (24hrs), Av.6 °F (37 °C), Min:98.2 °F (36.8 °C), Max:99.1 °F (37.3 °C) 
 
 
701 - 1900 In: 195 Out: 175 [Urine:175] Physical Exam: GEN:  Elderly  lady; ETT in place, NG tube in with tube feeds going. HEENT: NCAT Neck: soft, supple, trachea midline PULM: reduced air entry at bases BL. Coarse breath sounds and expiratory wheezes noted. CARDS: RRR, S1S2+, no MRG 
ABD: soft, NT/ND, BS+ EXT: UE/:E edema noted. NEURO: opens eyes to voice PSYCH: unable to assess. Labs:   
Recent Results (from the past 24 hour(s)) GLUCOSE, POC Collection Time: 11/16/20 11:36 AM  
Result Value Ref Range Glucose (POC) 142 (H) 65 - 100 mg/dL Performed by Lacey FOREMANN   
GLUCOSE, POC Collection Time: 11/16/20  5:09 PM  
Result Value Ref Range Glucose (POC) 145 (H) 65 - 100 mg/dL Performed by Lacey SMITH   
GLUCOSE, POC Collection Time: 11/17/20 12:59 AM  
Result Value Ref Range Glucose (POC) 155 (H) 65 - 100 mg/dL Performed by Gamal Joseph CBC W/O DIFF Collection Time: 11/17/20  4:02 AM  
Result Value Ref Range WBC 19.4 (H) 3.6 - 11.0 K/uL  
 RBC 2.71 (L) 3.80 - 5.20 M/uL HGB 7.8 (L) 11.5 - 16.0 g/dL HCT 26.5 (L) 35.0 - 47.0 % MCV 97.8 80.0 - 99.0 FL  
 MCH 28.8 26.0 - 34.0 PG  
 MCHC 29.4 (L) 30.0 - 36.5 g/dL  
 RDW 14.2 11.5 - 14.5 % PLATELET 835 (H) 553 - 400 K/uL MPV 10.7 8.9 - 12.9 FL  
 NRBC 0.2 (H) 0  WBC ABSOLUTE NRBC 0.04 (H) 0.00 - 0.01 K/uL METABOLIC PANEL, BASIC Collection Time: 11/17/20  4:02 AM  
Result Value Ref Range Sodium 141 136 - 145 mmol/L Potassium 3.7 3.5 - 5.1 mmol/L Chloride 100 97 - 108 mmol/L  
 CO2 36 (H) 21 - 32 mmol/L Anion gap 5 5 - 15 mmol/L Glucose 139 (H) 65 - 100 mg/dL BUN 27 (H) 6 - 20 MG/DL Creatinine 0.55 0.55 - 1.02 MG/DL  
 BUN/Creatinine ratio 49 (H) 12 - 20 GFR est AA >60 >60 ml/min/1.73m2 GFR est non-AA >60 >60 ml/min/1.73m2 Calcium 9.1 8.5 - 10.1 MG/DL  
GLUCOSE, POC Collection Time: 11/17/20  6:05 AM  
Result Value Ref Range Glucose (POC) 155 (H) 65 - 100 mg/dL Performed by Bertrum Cowden Assessment:  
 
Active Problems: COPD exacerbation ([address] (9/13/2010) Overview: O2 dependent--Dr. Renetta Rey Plan: Ms. Brigette Elizabeth is a 73y/o lady with newly diagnosed squamous cell non-small cell lung cancer. Squamous Cell Lung Cancer: 
-patient appears to have metastatic disease based on presence of pulmonary nodules bilaterally, no abdominal imaging done yet. - PDL1 testing requested 
-MRI brain pending for staging, CT head no mets 
-will need PET as outpatient if she recovers. Respiratory Failure/Shock, Aspriation: 
-patient's condition is complicated by significant COPD with underlying malignancy 
-BP improved and now off levophed - Nurse today discussed they will try to wean sedation prior to SBT today, yesterday she was apneic during trial 
 
Disposition: 
-patient's situation is quite serious. Roseanne Mcardle, MD 
11/17/2020

## 2020-11-17 NOTE — PROGRESS NOTES
Care Management: 
 
DIEGO plan: 
  
 TBD - Home Health vs SNF, pending PT/OT evals * Transport  Son - will need to be reminded to bring portable O2 for d/c 
* OP F/U: PCP, Pulmonary, Oncology Vented and critical in ICU. For MRI brain possibly today. She is newly diagnosed with lung cancer  and oncology following. NK is  son Adia. PTA she was independent,  home 02 with [facility]. Trevor Ayala RN ACM [phone]

## 2020-11-17 NOTE — PROGRESS NOTES
[facility] [place] Pulmonary, Critical Care, and Sleep Medicine Name: Cici Cabrera MRN: [id number] : 1950 Hospital: [facility] Date: 2020 Subjective: No acute events overnight Sedated, intubated Review of systems not obtained due to patient factors. Impression     Plan 1. Stage IV squamous cell Ca- primary RUL pleurally based caviating mass s/p CT- FNA 11/10/200 + Sq cell ca with GAVIOTA nodule as well- awaiting brain MRI 2. Acute hypoxic resp failure- aspiration PNA 3. Resolved Septic shock- suspect ECFV low- bedside critical care ECHO nml EF > 60% + LVH RV nml sixe not dilated , IVC was fat with > 50% resp phasic variation 4. COPD- one vent no air trapping 5. AMS- need to r/o brain mets 6. N/v 
7. Anemia/thrombocytopenia 8. FULL CODE 
 
 
 
 
 
 · CT head negative · Off pressor · On abx · Reduce sedation to RASS 0  
· SAT/SBT as tolerated · Daily SAT, SBT · Low dose TF  
· Palliatve care following · Hold on lung cancer staging for now · PUD/DVT prophylaxis · Jet nebs · IV steroids · Glycemic control Objective: 
 
 
Exam 
Vital Signs: 
Visit Vitals BP (!) 160/76 (BP 1 Location: Right arm, BP Patient Position: At rest) Pulse (!) 113 Temp 98.5 °F (36.9 °C) Resp 16 Ht 5' 2\" (1.575 m) Wt 77.1 kg (169 lb 15.6 oz) SpO2 99% BMI 31.09 kg/m² Temp (24hrs), Av.6 °F (37 °C), Min:98.2 °F (36.8 °C), Max:99.1 °F (37.3 °C) Intake/Output Summary (Last 24 hours) at 2020 8630 Last data filed at 2020 0800 Gross per 24 hour Intake 2050.73 ml Output 770 ml Net 1280.73 ml GENERAL: well developed and in mild distress, NECK:  no jugular vein distention, no retractions, no thyromegaly or masses, LUNGS: decreased breath sounds billaterally and with rhonchi , HEART:  Regular rate and rhythm with no MGR; no edema is present, ABDOMEN:  soft with no tenderness, bowel sounds present, EXTREMITIES:  warm with no cyanosis and SKIN:  no jaundice or ecchymosis Labs: 
Recent Labs 11/17/20 
0402 11/16/20 
0426 11/15/20 
9122 WBC 19.4* 19.6* 18.2* HGB 7.8* 8.0* 8.1* HCT 26.5* 26.6* 26.5*  
* 456* 391 Recent Labs 11/17/20 
0402 11/16/20 
0426 11/15/20 
3745 11/14/20 
5045  138 137 138  
K 3.7 3.6 3.0* 2.7*  
 98 96* 95* CO2 36* 35* 38* 41* * 138* 180* 139* BUN 27* 18 11 9 CREA 0.55 0.56 0.60 0.68  
CA 9.1 9.2 8.7 8.4* MG  --  2.0  --  1.5* ALB  --   --   --  1.8* TBILI  --   --   --  0.2 ALT  --   --   --  12 INR  --   --  1.1  --   
 
 
Recent Labs 11/14/20 
1208 PHI 7.37 PO2I 129* PCO2I 71.8*  
 
 
· Monica Alvarado MD 
11/17/2020

## 2020-11-17 NOTE — PROGRESS NOTES
Palliative Medicine Dr. Patrick Horton led a family meeting centering on goals of care with pt's son Tash Webb. Palliative  Janeth Rodriguez, [employer] and  present. With [facility], accompanied Adia to visit his mother prior to the meeting in the ICU conference room. Accompanied him back to the pt's room post meeting. Adia gave voice to the anticipatory grief he and the pt's larger family is experiencing and his understanding that the pt may not survive this hospital stay. Assured him of ongoing  support. Chaplain Heather, MDiv, MS, [facility] 
[phone] ([phone])

## 2020-11-18 NOTE — PROGRESS NOTES
PT note:  
 
Chart reviewed and spoke with nursing. Patient continues to medically unstable for PT session due to intubation and sedation. Will continue to follow.   
 
Kota Tirado, PT, DPT

## 2020-11-18 NOTE — PROGRESS NOTES
1915- Bedside and Verbal shift change report given to Ruby RN (oncoming nurse) by Matthew Hancock RN  (offgoing nurse). Report included the following information SBAR, Kardex, Intake/Output, MAR and Cardiac Rhythm NSR.  
2000- Shift assessment performed see flow sheet. Patient is responsive to voice. Restraints to bilateral wrist. Propofol infusing at 50 mcg. 0000- Reassessment performed see flow sheet. 0400- Reassessment performed see flow sheet. 0535- Propofol paused. Patient has become agitated . Propofol restarted.  
0700-

## 2020-11-18 NOTE — PROGRESS NOTES
0700 Bedside and Verbal shift change report received from [facility] (offgoing nurse). Report included the following information SBAR, Kardex, Intake/Output, MAR, Accordion and Recent Results. 0800 Assessment complete. See flowsheet for details. 1200 Reassessment unchanged. Will continue to monitor. 1530 Updated by Valarie Chadwick on patient plan of care. Patient's family to visit tomorrow. See notes. 1600 Reassessment unchanged. Will continue to monitor. Report josefina

## 2020-11-18 NOTE — PROGRESS NOTES
Oncology Progress Note Patient Name:  Dwain Joshua Date of Service: 11/18/2020 Admit Date: 11/7/2020 Interval History Patient became hypotensive and unresponsive over the weekend, now intubated  
- aspiration event likely culprit 
-she remains intubated Subjective:  
 
 
 
Current Facility-Administered Medications Medication Dose Route Frequency  dexmedeTOMidine (PRECEDEX) 400 mcg in 0.9% sodium chloride 100 mL infusion  0.2-0.7 mcg/kg/hr IntraVENous TITRATE  multivit-folic acid-herbal 225 (WELLESSE PLUS) oral liquid 30 mL  30 mL Oral DAILY  fentaNYL citrate (PF) injection 100 mcg  100 mcg IntraVENous Q4H PRN  
 methylPREDNISolone (PF) (Solu-MEDROL) injection 30 mg  30 mg IntraVENous Q8H  
 famotidine (PF) (PEPCID) 20 mg in 0.9% sodium chloride 10 mL injection  20 mg IntraVENous Q12H  chlorhexidine (ORAL CARE KIT) 0.12 % mouthwash 15 mL  15 mL Oral Q12H  
 alcohol 62% (NOZIN) nasal  1 Ampule  1 Ampule Topical Q12H  
 metoprolol tartrate (LOPRESSOR) tablet 50 mg  50 mg Oral Q12H  propofol (DIPRIVAN) 10 mg/mL infusion  0-50 mcg/kg/min IntraVENous TITRATE  levETIRAcetam (KEPPRA) 500 mg in saline (iso-osm) 100 mL IVPB (premix)  500 mg IntraVENous Q12H  
 albuterol-ipratropium (DUO-NEB) 2.5 MG-0.5 MG/3 ML  3 mL Nebulization Q4H RT  
 fentaNYL (PF) 1,500 mcg/30 mL (50 mcg/mL) infusion  0-200 mcg/hr IntraVENous TITRATE  miconazole (SECURA) 2 % extra thick cream   Topical BID  piperacillin-tazobactam (ZOSYN) 3.375 g in 0.9% sodium chloride (MBP/ADV) 100 mL MBP  3.375 g IntraVENous Q8H  
 insulin lispro (HUMALOG) injection   SubCUTAneous Q6H  
 glucose chewable tablet 16 g  4 Tab Oral PRN  
 dextrose (D50W) injection syrg 12.5-25 g  12.5-25 g IntraVENous PRN  
 glucagon (GLUCAGEN) injection 1 mg  1 mg IntraMUSCular PRN  
 scopolamine (TRANSDERM-SCOP) 1 mg over 3 days 1 Patch  1 Patch TransDERmal Q72H  HYDROcodone-acetaminophen (NORCO) 5-325 mg per tablet 2 Tab  2 Tab Oral Q4H PRN  
 ALPRAZolam (XANAX) tablet 0.5 mg  0.5 mg Oral QID PRN  prochlorperazine (COMPAZINE) with saline injection 10 mg  10 mg IntraVENous Q6H PRN  zinc oxide-cod liver oil (DESITIN) 40 % paste   Topical BID and QHS  methIMAzole (TAPAZOLE) tablet 10 mg  10 mg Oral DAILY  bacitracin 500 unit/gram packet 1 Packet  1 Packet Topical DAILY  [Held by provider] gabapentin (NEURONTIN) capsule 300 mg  300 mg Oral TID  zinc oxide-cod liver oil (DESITIN) 40 % paste   Topical PRN  
 0.9% sodium chloride infusion  25 mL/hr IntraVENous CONTINUOUS  
 labetaloL (NORMODYNE;TRANDATE) injection 10 mg  10 mg IntraVENous Q2H PRN  
 sodium chloride (NS) flush 5-40 mL  5-40 mL IntraVENous Q8H  
 sodium chloride (NS) flush 5-40 mL  5-40 mL IntraVENous PRN  
 acetaminophen (TYLENOL) tablet 650 mg  650 mg Oral Q4H PRN  
 naloxone (NARCAN) injection 0.4 mg  0.4 mg IntraVENous PRN  
 enoxaparin (LOVENOX) injection 40 mg  40 mg SubCUTAneous Q24H  
 [Held by provider] methocarbamoL (ROBAXIN) tablet 750-1,500 mg  750-1,500 mg Oral BID  ondansetron (ZOFRAN) injection 4 mg  4 mg IntraVENous Q4H PRN Review of Systems: 
ROS not conducted due to intubation and sedation. Objective:  
 
Patient Vitals for the past 8 hrs: 
 BP Temp Pulse Resp SpO2  
20 1200 (!) 166/78 99.4 °F (37.4 °C) 96 16 94 % 20 1105   85 16 95 % 20 1104     95 % 20 1100 (!) 136/51  69 19 95 % 20 1000 (!) 125/47  72 16 94 % 20 0900 (!) 127/49  77 14 94 % 20 0800 (!) 150/58 98.6 °F (37 °C) (!) 113 14 92 % 20 0728   (!) 113 20 95 % 20 0727     95 % 20 0700 (!) 167/95  (!) 108 19 95 % 20 0600 (!) 175/78  (!) 112 18 94 % Temp (24hrs), Av.5 °F (36.9 °C), Min:98.2 °F (36.8 °C), Max:99.4 °F (37.4 °C) 
 
 
701 -  1900 In: 647.7 [I.V.:289.7] Out: 1000 [Urine:1000] Physical Exam: 
GEN:  Elderly  lady; ETT in place, NG tube in with tube feeds going. HEENT: NCAT Neck: soft, supple, trachea midline PULM: reduced air entry at bases BL. Coarse breath sounds and expiratory wheezes noted. CARDS: RRR, S1S2+, no MRG 
ABD: soft, NT/ND, BS+ EXT: UE/:E edema noted. NEURO: opens eyes to voice PSYCH: unable to assess. Labs:   
Recent Results (from the past 24 hour(s)) GLUCOSE, POC Collection Time: 11/17/20  5:31 PM  
Result Value Ref Range Glucose (POC) 168 (H) 65 - 100 mg/dL Performed by Lata Swain (JACKELINE RN) GLUCOSE, POC Collection Time: 11/17/20 11:30 PM  
Result Value Ref Range Glucose (POC) 159 (H) 65 - 100 mg/dL Performed by Miki Guzman METABOLIC PANEL, BASIC Collection Time: 11/18/20  4:28 AM  
Result Value Ref Range Sodium 142 136 - 145 mmol/L Potassium 3.5 3.5 - 5.1 mmol/L Chloride 104 97 - 108 mmol/L  
 CO2 38 (H) 21 - 32 mmol/L Anion gap 0 (L) 5 - 15 mmol/L Glucose 141 (H) 65 - 100 mg/dL BUN 36 (H) 6 - 20 MG/DL Creatinine 0.55 0.55 - 1.02 MG/DL  
 BUN/Creatinine ratio 65 (H) 12 - 20 GFR est AA >60 >60 ml/min/1.73m2 GFR est non-AA >60 >60 ml/min/1.73m2 Calcium 8.6 8.5 - 10.1 MG/DL  
CBC W/O DIFF Collection Time: 11/18/20  4:28 AM  
Result Value Ref Range WBC 21.7 (H) 3.6 - 11.0 K/uL  
 RBC 2.72 (L) 3.80 - 5.20 M/uL HGB 7.9 (L) 11.5 - 16.0 g/dL HCT 26.8 (L) 35.0 - 47.0 % MCV 98.5 80.0 - 99.0 FL  
 MCH 29.0 26.0 - 34.0 PG  
 MCHC 29.5 (L) 30.0 - 36.5 g/dL  
 RDW 14.0 11.5 - 14.5 % PLATELET 918 922 - 951 K/uL MPV 10.5 8.9 - 12.9 FL  
 NRBC 0.2 (H) 0  WBC ABSOLUTE NRBC 0.05 (H) 0.00 - 0.01 K/uL BLOOD GAS, ARTERIAL Collection Time: 11/18/20  5:02 AM  
Result Value Ref Range pH 7.37 7.35 - 7.45    
 PCO2 65 (H) 35.0 - 45.0 mmHg PO2 87 80 - 100 mmHg O2 SAT 96 92 - 97 %  BICARBONATE 37 (H) 22 - 26 mmol/L  
 BASE EXCESS 8.8 mmol/L  
 O2 METHOD VENTILATOR    
 FIO2 40 % MODE A/C [place] volume 400 SET RATE 14    
 EPAP/CPAP/PEEP 6 Sample source ASPERGILLUS TERREUS    
 SITE ARTERIAL SKYE'S TEST YES    
GLUCOSE, POC Collection Time: 11/18/20  5:26 AM  
Result Value Ref Range Glucose (POC) 144 (H) 65 - 100 mg/dL Performed by Popeye Dunne GLUCOSE, POC Collection Time: 11/18/20 11:41 AM  
Result Value Ref Range Glucose (POC) 153 (H) 65 - 100 mg/dL Performed by Jovani Priest (TRV RN) Assessment:  
 
Active Problems: COPD exacerbation ([facility] [address] (9/13/2010) Overview: O2 dependent--Dr. Senait Madrigal Plan: Ms. Anisha Nolan is a 71y/o lady with newly diagnosed squamous cell non-small cell lung cancer. Squamous Cell Lung Cancer: 
-patient appears to have metastatic disease based on presence of pulmonary nodules bilaterally, no abdominal imaging done yet. - PDL1 testing requested 
-MRI brain showed no metastatic disease 
-will need PET as outpatient if she recovers. Respiratory Failure/Shock, Aspriation: 
- patient's condition is complicated by significant COPD with underlying malignancy 
- remains intubated Disposition: 
-patient's situation is quite serious. Robin Batista MD 
11/18/2020

## 2020-11-18 NOTE — PROGRESS NOTES
Occupational Therapy Note: 
 
Chart reviewed. Pt remains intubated and sedated, not medically stable for OT evaluation. Will defer and continue to follow. Thank you.  
 
Chun Weinberg OTR/L

## 2020-11-18 NOTE — PROGRESS NOTES
[facility] [place] Pulmonary, Critical Care, and Sleep Medicine Name: Rios Pagan MRN: [id number] : 1950 Hospital: [address] Date: 2020 Subjective: No acute events overnight Sedated, intubated Review of systems not obtained due to patient factors. Impression     Plan 1. Stage IV squamous cell Ca- primary RUL pleurally based caviating mass s/p CT- FNA 11/10/200 + Sq cell ca with GAVIOTA nodule as well- awaiting brain MRI 2. Acute hypoxic resp failure- aspiration PNA 3. Resolved Septic shock- suspect ECFV low- bedside critical care ECHO nml EF > 60% + LVH RV nml sixe not dilated , IVC was fat with > 50% resp phasic variation 4. COPD- one vent no air trapping 5. AMS- need to r/o brain mets 6. N/v 
7. Anemia/thrombocytopenia · Vent support · Off pressor · On abx · Reduce sedation to RASS 0  
· SAT/SBT as tolerated · Nutrition, TF  
· Palliatve care following · Hold on lung cancer staging for now · PUD/DVT prophylaxis · Jet nebs · IV steroids · Glycemic control · DNR Objective: 
 
 
Exam 
Vital Signs: 
Visit Vitals BP (!) 167/95 Pulse (!) 113 Temp 98.2 °F (36.8 °C) Resp 20 Ht 5' 2\" (1.575 m) Wt 77.1 kg (169 lb 15.6 oz) SpO2 95% BMI 31.09 kg/m² Temp (24hrs), Av.4 °F (36.9 °C), Min:98.2 °F (36.8 °C), Max:98.5 °F (36.9 °C) Intake/Output Summary (Last 24 hours) at 2020 1330 Last data filed at 2020 0700 Gross per 24 hour Intake 3317.78 ml Output 975 ml Net 2342.78 ml GENERAL: well developed and in mild distress, NECK:  no jugular vein distention, no retractions, no thyromegaly or masses, LUNGS: decreased breath sounds billaterally and with rhonchi , HEART:  Regular rate and rhythm with no MGR; no edema is present, ABDOMEN:  soft with no tenderness, bowel sounds present, EXTREMITIES:  warm with no cyanosis and SKIN:  no jaundice or ecchymosis Labs: Recent Labs 11/18/20 
1125 11/17/20 
0402 11/16/20 
8240 WBC 21.7* 19.4* 19.6* HGB 7.9* 7.8* 8.0*  
HCT 26.8* 26.5* 26.6*  
 413* 456* Recent Labs 11/18/20 
4902 11/17/20 
0402 11/16/20 
1946  141 138  
K 3.5 3.7 3.6  100 98 CO2 38* 36* 35* * 139* 138* BUN 36* 27* 18  
CREA 0.55 0.55 0.56  
CA 8.6 9.1 9.2 MG  --   --  2.0 No results for input(s): PHI, PO2I, PCO2I in the last 72 hours. ·  
 
 
Ema Silva MD 
11/18/2020

## 2020-11-18 NOTE — PROGRESS NOTES
Palliative Medicine [place]: [phone] ([phone] [facility]: [phone] ([phone]) Phone call received from Riley, family would like to come and say their goodbyes to patient on Thursday 11/19 between the hours of 3-5 pm. A niece, Mag Seals will be coming at 8 am to say her goodbyes, as she works nights. Names will be given to the . Adia is very realistic, he will be present in the afternoon with rest of family (there may be about 10 people coming, they will be with patient for about 10 mins in groups of two, per Adia. If patient is critical then he would like his children to come and say goodbye (they are 17,17, 23 yo). Spoke to Reedley about this plan.

## 2020-11-18 NOTE — PROGRESS NOTES
Interdisciplinary team rounds were held 11/18/2020 with the following team members:Care Management, Diabetes Treatment Specialist, Nursing, Nurse Practitioner, Pharmacy, Physical Therapy, Physician, Respiratory Therapy and Wound Care. Plan of care discussed. Goal: Adjust medications,diuresis  continue to monitor and support. See MD orders and progress notes for further  interventions and desired outcomes.

## 2020-11-18 NOTE — PROGRESS NOTES
0700 Bedside and Verbal shift [facility]going [facility] Report included the following information SBAR, Kardex, Intake/Output, MAR, Accordion and Recent Results. 0800 Assessment complete. See flowsheet for details. 1200 Reassessment unchanged. Will continue to monitor. 1530 Updated by Aysha Truong on patient plan of care. Patient's family to visit tomorrow. See notes. 1600 Reassessment unchanged. Will continue to monitor.

## 2020-11-19 NOTE — PROGRESS NOTES
Received message from patient's nurse Darrell Aly stating : 
 
Patient had two large watery stools within the last hour. She also had a moderate/large watery stool on day shift. Would she be a candidate for flexiseal. 
  
 
Discussion / orders: 
 
Ordered C-diff panned with enteric contact isolation Ordered fecal management system Please note that this note was dictated using Dragon computer voice recognition software. Quite often unanticipated grammatical, syntax, homophones, and other interpretive errors are inadvertently transcribed by the computer software. Please disregard these errors. Please excuse any errors that have escaped final proofreading.

## 2020-11-19 NOTE — PROGRESS NOTES
PT note:  
 
Chart reviewed and spoke with nursing. Patient continues to be intubated and sedated, not medically stable for therapy. Will defer and sign off. Please re-consult if patient becomes medically stable.   
 
Naima Degroot, PT, DPT

## 2020-11-19 NOTE — PROGRESS NOTES
Comprehensive Nutrition Assessment Type and Reason for Visit: Rahul Joe Nutrition Recommendations/Plan:  
Continue TF as ordered Nutrition Assessment:   Chart reviewed, case discussed during CCU rounds. Pt remains intubated and sedated with propofol @ 18.5mL/h, which provides 488 kcals daily. TF at goal rate with minimal residuals. TF + propofol meets 93% kcal and 100% protein needs. Pt developed diarrhea overnight, likely combination of zosyn day 6 and TF. K being repleted (3.0). -201, diabetes team is following. MVI added earlier this week to ensure micronutrient needs are met given low TF volume. Estimated Daily Nutrient Needs: 
Energy (kcal): PSU 0287 (MSJ 9721); Weight Used for Energy Requirements: Current Protein (g): 100g (1.3 g/kg bw); Weight Used for Protein Requirements: Current Fluid (ml/day): 1600 mL; Method Used for Fluid Requirements: 1 ml/kcal 
 
 
Nutrition Related Findings:  Meds: pepcid, humalog, solumedrol, zosyn, MVI, KCl, [email or web address]; Drips: propofol, fentanyl, precedex. Edema: +2-generalized, BUE. BM Flexiseal   
 
Wounds:   
Moisture associate skin damage Current Nutrition Therapies: 
Current Tube Feeding (TF) Orders: · Feeding Route: Nasogastric · Formula: Osmolite 1.2 · Schedule:Continuous · Regimen: 25mL/h · Additives/Modulars: Protein(Prosource 5 x day) · Water Flushes: 50mL q 6h · Current TF & Flush Orders Provides: 1020kcals/108gPro/94gCHO/692mL · Goal TF & Flush Orders Provides: 1020kcals/108gPro/94gCHO/692mL Anthropometric Measures: 
· Height:  5' 2\" (157.5 cm) · Current Body Wt:  79.1 kg (174 lb 6.1 oz) · Ideal Body Wt:  110 lbs:  154.5 % · BMI Category:  Obese class 1 (BMI 30.0-34. 9) Nutrition Diagnosis:  
· Inadequate protein-energy intake related to (decreased appetite, nausea/vomiting) as evidenced by intake 0-25% Previous dx resolved. Nutrition Interventions: Food and/or Nutrient Delivery: Continue tube feeding Nutrition Education and Counseling: No recommendations at this time Coordination of Nutrition Care: Continue to monitor while inpatient, Interdisciplinary rounds Goals: Pt will tolerate TF @ goal rate with residuals <500mL in 2-4 days. Nutrition Monitoring and Evaluation:  
Behavioral-Environmental Outcomes: None identified Food/Nutrient Intake Outcomes: Enteral nutrition intake/tolerance Physical Signs/Symptoms Outcomes: Biochemical data, Weight, Nausea/vomiting Discharge Planning: Too soon to determine Electronically signed by Lupe Salomon RD, [address]  on 11/19/2020 at 12:20 PM 
 
Contact: [id number]

## 2020-11-19 NOTE — PROGRESS NOTES
0700: Bedside report received from Rush Escalante, RN 
 
7442: Assessment complete. See flowsheets 0830: grandaughter at bedside. All questions answered 6686: ID rounds in progress. Orders placed 1025: Son at bedside. All questions answered  
 
1200: Reassessment complete. See flowsheets 1500: multiple family members at bedside to say goodbye with palliative MD 
 
1600: reassessment complete. See flowsheets 1900: Report given to Roya Correa

## 2020-11-19 NOTE — PROGRESS NOTES
Palliative Medicine [facility]: [phone] ([phone]) [facility]: [phone] ([phone]) LCSW met with son Angelita Rai, who has been in the hospital for majority of the day, to be with patient. Several other family members in to visit, to say their goodbyes, including patient's brothers, sisters and nieces. Patient is the youngest of nine siblings, is described as a very \"loving aunt, sister\", who was very helpful to various family members at different times. She is clearly very loved. Family that LCSW met were appropriately grieving and able to say their goodbyes. Adia shared that he and Dr Lorraine Almeida talked this morning, he noted that he would like to see how patient does over the weekend and if unable to be weaned on Monday 11/23, they will transition to comfort measures. He likely would want hospice involved if patient does survive extubation. Jg's children need to be able to say goodbye to patient and the younger children aren't allowed in the ICU. Explained to Adia that there is a process for hospice admission and that patient may or may not survive the extubation. Encouraged Adia to have the children say goodbye prior to extubation if possible, he noted that they may be able to do face time with their grand mother. Emotional support provided to family members as they said their goodbyes to patient. More family was to arrive, including Jg's eldest daughter. Adia is coping well, he had a difficult day yesterday (anticipatory grief), which is why he spent the day today with patient. Palliative team remains available to patient and family as needed.

## 2020-11-19 NOTE — PROGRESS NOTES
[facility] Name: Alexi Herndon MRN: [id number] : 1950 Hospital: [facility] Date: 2020 Subjective: No acute events overnight Sedated, intubated Review of systems not obtained due to patient factors. Impression     Plan 1. Stage IV squamous cell Ca- primary RUL pleurally based caviating mass s/p CT- FNA 11/10/200 + Sq cell ca with GAVIOTA nodule as well- awaiting brain MRI 2. Acute hypoxic resp failure- aspiration PNA 3. Resolved Septic shock- suspect ECFV low- bedside critical care ECHO nml EF > 60% + LVH RV nml sixe not dilated , IVC was fat with > 50% resp phasic variation 4. COPD- one vent no air trapping 5. AMS- need to r/o brain mets 6. N/v 
7. Anemia/thrombocytopenia · Vent support · Off pressor · On abx · Replete K · Sedation to RASS 0  
· SAT/SBT as tolerated · Nutrition, TF  
· Palliatve care following · Hold on lung cancer staging for now · PUD/DVT prophylaxis · Jet nebs · IV steroids · Glycemic control · DNR Objective: 
 
 
Exam 
Vital Signs: 
Visit Vitals BP (!) 147/66 Pulse (!) 102 Temp 99.4 °F (37.4 °C) Resp 26 Ht 5' 2\" (1.575 m) Wt 79.1 kg (174 lb 6.1 oz) SpO2 91% BMI 31.90 kg/m² Temp (24hrs), Av.6 °F (37.6 °C), Min:99.4 °F (37.4 °C), Max:99.8 °F (37.7 °C) Intake/Output Summary (Last 24 hours) at 2020 9813 Last data filed at 2020 0700 Gross per 24 hour Intake 2560.33 ml Output 4627 ml Net -2066.67 ml GENERAL: well developed and in mild distress, NECK:  no jugular vein distention, no retractions, no thyromegaly or masses, LUNGS: decreased breath sounds billaterally and with rhonchi , HEART:  Regular rate and rhythm with no MGR; no edema is present, ABDOMEN:  soft with no tenderness, bowel sounds present, EXTREMITIES:  warm with no cyanosis and SKIN:  no jaundice or ecchymosis Labs: Recent Labs 11/19/20 
0430 11/18/20 
0428 11/17/20 
0402 WBC 24.9* 21.7* 19.4* HGB 9.0* 7.9* 7.8* HCT 30.0* 26.8* 26.5*  
 359 413* Recent Labs 11/19/20 
0430 11/18/20 
0428 11/17/20 
0402  142 141  
K 3.0* 3.5 3.7 CL 97 104 100 CO2 40* 38* 36* * 141* 139* BUN 30* 36* 27* CREA 0.56 0.55 0.55 CA 8.7 8.6 9.1 No results for input(s): PHI, PO2I, PCO2I in the last 72 hours. CXR: no acute changes Betty Pace MD 
11/19/2020

## 2020-11-19 NOTE — PROGRESS NOTES
Oncology Progress Note Patient Name:  Amado Roper Date of Service: 11/19/2020 Admit Date: 11/7/2020 Interval History Patient became hypotensive and unresponsive over the weekend, now intubated  
- aspiration event likely culprit 
- she remains intubated, per nursing becomes agitated when trying to wean off vent, on precedex and propofol now with trial of SBT planned today Subjective:  
 
 
 
Current Facility-Administered Medications Medication Dose Route Frequency  hydrALAZINE (APRESOLINE) 20 mg/mL injection 10 mg  10 mg IntraVENous Q6H PRN  potassium chloride 20 mEq in 50 ml IVPB  20 mEq IntraVENous Q1H  
 levETIRAcetam (KEPPRA) oral solution 500 mg  500 mg Oral BID  dexmedeTOMidine (PRECEDEX) 400 mcg in 0.9% sodium chloride 100 mL infusion  0.1-1.5 mcg/kg/hr IntraVENous TITRATE  multivit-folic acid-herbal 262 (WELLESSE PLUS) oral liquid 30 mL  30 mL Oral DAILY  fentaNYL citrate (PF) injection 100 mcg  100 mcg IntraVENous Q4H PRN  
 methylPREDNISolone (PF) (Solu-MEDROL) injection 30 mg  30 mg IntraVENous Q8H  
 famotidine (PF) (PEPCID) 20 mg in 0.9% sodium chloride 10 mL injection  20 mg IntraVENous Q12H  chlorhexidine (ORAL CARE KIT) 0.12 % mouthwash 15 mL  15 mL Oral Q12H  
 alcohol 62% (NOZIN) nasal  1 Ampule  1 Ampule Topical Q12H  
 metoprolol tartrate (LOPRESSOR) tablet 50 mg  50 mg Oral Q12H  propofol (DIPRIVAN) 10 mg/mL infusion  0-50 mcg/kg/min IntraVENous TITRATE  albuterol-ipratropium (DUO-NEB) 2.5 MG-0.5 MG/3 ML  3 mL Nebulization Q4H RT  
 fentaNYL (PF) 1,500 mcg/30 mL (50 mcg/mL) infusion  0-200 mcg/hr IntraVENous TITRATE  miconazole (SECURA) 2 % extra thick cream   Topical BID  piperacillin-tazobactam (ZOSYN) 3.375 g in 0.9% sodium chloride (MBP/ADV) 100 mL MBP  3.375 g IntraVENous Q8H  
 insulin lispro (HUMALOG) injection   SubCUTAneous Q6H  
 glucose chewable tablet 16 g  4 Tab Oral PRN  
  dextrose (D50W) injection syrg 12.5-25 g  12.5-25 g IntraVENous PRN  
 glucagon (GLUCAGEN) injection 1 mg  1 mg IntraMUSCular PRN  
 scopolamine (TRANSDERM-SCOP) 1 mg over 3 days 1 Patch  1 Patch TransDERmal Q72H  
 HYDROcodone-acetaminophen (NORCO) 5-325 mg per tablet 2 Tab  2 Tab Oral Q4H PRN  
 ALPRAZolam (XANAX) tablet 0.5 mg  0.5 mg Oral QID PRN  prochlorperazine (COMPAZINE) with saline injection 10 mg  10 mg IntraVENous Q6H PRN  zinc oxide-cod liver oil (DESITIN) 40 % paste   Topical BID and QHS  methIMAzole (TAPAZOLE) tablet 10 mg  10 mg Oral DAILY  bacitracin 500 unit/gram packet 1 Packet  1 Packet Topical DAILY  [Held by provider] gabapentin (NEURONTIN) capsule 300 mg  300 mg Oral TID  zinc oxide-cod liver oil (DESITIN) 40 % paste   Topical PRN  
 0.9% sodium chloride infusion  25 mL/hr IntraVENous CONTINUOUS  
 labetaloL (NORMODYNE;TRANDATE) injection 10 mg  10 mg IntraVENous Q2H PRN  
 sodium chloride (NS) flush 5-40 mL  5-40 mL IntraVENous Q8H  
 sodium chloride (NS) flush 5-40 mL  5-40 mL IntraVENous PRN  
 acetaminophen (TYLENOL) tablet 650 mg  650 mg Oral Q4H PRN  
 naloxone (NARCAN) injection 0.4 mg  0.4 mg IntraVENous PRN  
 enoxaparin (LOVENOX) injection 40 mg  40 mg SubCUTAneous Q24H  
 [Held by provider] methocarbamoL (ROBAXIN) tablet 750-1,500 mg  750-1,500 mg Oral BID  ondansetron (ZOFRAN) injection 4 mg  4 mg IntraVENous Q4H PRN Review of Systems: 
ROS not conducted due to intubation and sedation. Objective:  
 
Patient Vitals for the past 8 hrs: 
 BP Temp Pulse Resp SpO2 Weight 11/19/20 0930 (!) 163/70  89 23 92 %   
11/19/20 0900 (!) 164/56  87 25 91 %   
11/19/20 0830 (!) 166/73  (!) 107 27 (!) 88 %   
11/19/20 0800 (!) 147/66 98.4 °F (36.9 °C) (!) 102 26 91 %   
11/19/20 0729   76 19 92 %   
11/19/20 0728     92 %   
11/19/20 0700 (!) 148/67  81 21 92 %   
11/19/20 0600 (!) 142/59  90 23 91 % 79.1 kg (174 lb 6.1 oz) 20 0500 (!) 144/58  99 25 99 %   
20 0400 (!) 123/59 99.4 °F (37.4 °C) 100 29 93 %   
20 0323   (!) 109 29 92 %   
20 0300 (!) 136/52  77 21 93 %  Temp (24hrs), Av.4 °F (37.4 °C), Min:98.4 °F (36.9 °C), Max:99.8 °F (37.7 °C) 
 
 
701 - 1900 In: -  
Out: 544 [XROSE:386] Physical Exam: 
GEN:  Elderly  lady; ETT in place, NG tube HEENT: NCAT Neck: soft, supple, trachea midline PULM: reduced air entry at bases BL. Coarse breath sounds and expiratory wheezes noted. CARDS: RRR, S1S2+, no MRG 
ABD: soft, NT/ND, BS+ EXT: UE/:E edema noted. NEURO: opens eyes to voice PSYCH: unable to assess. Labs:   
Recent Results (from the past 24 hour(s)) GLUCOSE, POC Collection Time: 20 11:41 AM  
Result Value Ref Range Glucose (POC) 153 (H) 65 - 100 mg/dL Performed by Savanna Singh (JACKELINE RN) GLUCOSE, POC Collection Time: 20  5:13 PM  
Result Value Ref Range Glucose (POC) 156 (H) 65 - 100 mg/dL Performed by Fabricio Hernandez RN   
GLUCOSE, POC Collection Time: 20 11:09 PM  
Result Value Ref Range Glucose (POC) 201 (H) 65 - 100 mg/dL Performed by Fabio Reyes RN   
BLOOD GAS, ARTERIAL Collection Time: 20  4:00 AM  
Result Value Ref Range pH 7.56 (HH) 7.35 - 7.45    
 PCO2 48 (H) 35.0 - 45.0 mmHg PO2 69 (L) 80 - 100 mmHg O2 SAT 96 92 - 97 % BICARBONATE 42 (H) 22 - 26 mmol/L  
 BASE EXCESS 17.6 mmol/L  
 O2 METHOD VENTILATOR    
 FIO2 30 % MODE A/C [place] volume 400 SET RATE 14    
 EPAP/CPAP/PEEP 6 Sample source ARTERIAL    
 SITE LEFT RADIAL SKYE'S TEST YES    
METABOLIC PANEL, BASIC Collection Time: 20  4:30 AM  
Result Value Ref Range Sodium 143 136 - 145 mmol/L Potassium 3.0 (L) 3.5 - 5.1 mmol/L Chloride 97 97 - 108 mmol/L  
 CO2 40 (H) 21 - 32 mmol/L Anion gap 6 5 - 15 mmol/L Glucose 146 (H) 65 - 100 mg/dL BUN 30 (H) 6 - 20 MG/DL Creatinine 0.56 0.55 - 1.02 MG/DL  
 BUN/Creatinine ratio 54 (H) 12 - 20 GFR est AA >60 >60 ml/min/1.73m2 GFR est non-AA >60 >60 ml/min/1.73m2 Calcium 8.7 8.5 - 10.1 MG/DL  
CBC W/O DIFF Collection Time: 11/19/20  4:30 AM  
Result Value Ref Range WBC 24.9 (H) 3.6 - 11.0 K/uL  
 RBC 3.12 (L) 3.80 - 5.20 M/uL HGB 9.0 (L) 11.5 - 16.0 g/dL HCT 30.0 (L) 35.0 - 47.0 % MCV 96.2 80.0 - 99.0 FL  
 MCH 28.8 26.0 - 34.0 PG  
 MCHC 30.0 30.0 - 36.5 g/dL  
 RDW 13.7 11.5 - 14.5 % PLATELET 037 033 - 773 K/uL MPV 10.6 8.9 - 12.9 FL  
 NRBC 0.4 (H) 0  WBC ABSOLUTE NRBC 0.09 (H) 0.00 - 0.01 K/uL GLUCOSE, POC Collection Time: 11/19/20  5:49 AM  
Result Value Ref Range Glucose (POC) 136 (H) 65 - 100 mg/dL Performed by Amol Perales RN Assessment:  
 
Active Problems: COPD exacerbation ([facility] [address] (9/13/2010) Overview: O2 dependent--Dr. Gloria Begum Plan: Ms. Alexsandra Stone is a 71y/o lady with newly diagnosed squamous cell non-small cell lung cancer. Squamous Cell Lung Cancer: 
-patient appears to have metastatic disease based on presence of pulmonary nodules bilaterally, no abdominal imaging done yet. - PDL1 testing requested - MRI brain showed no metastatic disease 
- will need PET as outpatient if she recovers. Respiratory Failure/Shock, Aspriation: 
- patient's condition is complicated by significant COPD with underlying malignancy 
- remains intubated, failed weaning trials for several days now. Disposition: 
-patient's situation is quite serious. Raphael Hunter MD 
11/19/2020

## 2020-11-19 NOTE — PROGRESS NOTES
Hospitalist Progress Note NAME: Divina Santana :  1950 MRN:  [id number] Assessment / Plan: 
Squamous cell carcinoma of the lung Acute on chronic hypoxic hypercapnic respiratory failure POA AECOPD Aspiration pneumonia and sepsis 
-CT scan: with findings of emphysema with large pleural-based lateral right upper lobe mass, with several other pulmonary nodules in both lungs overall highly suspicious of bronchogenic neoplasm. Largest 5.3x 3.1 cm , cavitary lesion. Likely metastatic with bilateral pulm nodules 
-heme/onc following MRI brain  shows no evidence of intracranial metastatic disease. Atrophy and chronic white matter disease with probable artifactual diffusion signal right temporal lobe. No acute abnormality. Nasal pharyngeal fluid related to nasogastric tube and bilateral mastoid effusions  
 
-S/p CT-guided lung biopsy 11/10 shows SCC 
-Chest x-ray unchanged right upper lobe mass, mild pulmonary edema. CXR  shows increasing bibasilar opacities 
-Continue empiric IV antibiotics with vancomycin and Zosyn 
-Leukocytosis noted in setting of IV steroids, worsened 
 
-unable to wean from vent so far 
-cont treatment of COPD exacerbation with bronchodilators, IV steroids 
-50 pack/yr smoking history 
-vent management per intensivist 
  
Hypokalemia -resolved 
  
Nausea and vomiting - resolved - Unclear etiology 
- continue PRN Zofran, compazine, and scopolamine patch 
- Head CT negative for acute finding 
  
Prediabetes and Hyperglycemia 
- No hx of DM. HbA1c 5.8. Likley due to steroid. - Continue sliding scale insulin 
  
Hyperthyrodism 
- TSH less than 0.01, free T4 2.0. Total T3 wnl - Patient had a elevated free T4 in 2016. Had ultrasound which did not show any nodule - Started on methimazole and beta blocker - Will need outpatient follow-up, will give contact information for endocrinologist 
  
Anemia: possible AOCD Thrombocytopenia: reactive History of prescription narcotic abuse 
-Weaned off opioids about 5 years ago 
  
Depression/anxiety Fibromyalgia As Needed Lorazepam for Anxiety, now sedated on vent Continue Cymbalta Continue gabapentin 
  
Hypertension continue amlodipine 
  
History of seizures No seizures for years Continue Keppra 
  
History of substance abuse Denied any use of substances recently 
  
  
Code Status: Full code Surrogate Decision Maker: Luciano Seymour Discussed care with Son over the phone today (11/18). He plans to come by with family tomorrow with some family (to see patient separately) 3-5PM 
 
Appreciate palliative team involvement 
  
Prognosis guarded. Family considering transition to comfort care after visitation 
  
DVT Prophylaxis: SCDs GI Prophylaxis: not indicated 
  
Baseline: Independent in ADLs 
  
Plan for CT-guided biopsy today/Tomorrow Subjective: Chief Complaint / Reason for Physician Visit Patient sedated on ventilator Discussed with RN events overnight. Review of Systems: 
Symptom Y/N Comments  Symptom Y/N Comments Fever/Chills    Chest Pain Poor Appetite    Edema Cough    Abdominal Pain Sputum    Joint Pain SOB/DÍAZ    Pruritis/Rash Nausea/vomit    Tolerating PT/OT Diarrhea    Tolerating Diet Constipation    Other Could NOT obtain due to: Sedated on vent Objective: VITALS:  
Last 24hrs VS reviewed since prior progress note. Most recent are: 
Patient Vitals for the past 24 hrs: 
 Temp Pulse Resp BP SpO2  
11/18/20 2100  99 23 (!) 129/51 91 % 11/18/20 2041  (!) 114     
11/18/20 2000 99.6 °F (37.6 °C) 74 19 (!) 162/66 91 % 11/18/20 1944  68 17  91 % 11/18/20 1900  64 17 (!) 164/55 92 % 11/18/20 1800  68 17 (!) 165/62 91 % 11/18/20 1700  89 19 (!) 151/58 92 % 11/18/20 1600 99.8 °F (37.7 °C) 86 21 (!) 165/66 90 % 11/18/20 1538  71 16  94 % 11/18/20 1500  75 17 (!) 157/55 95 % 11/18/20 1400  79 16 (!) 148/63 94 % 11/18/20 1300  89 18 (!) 156/62 95 % 11/18/20 1200 99.4 °F (37.4 °C) 96 16 (!) 166/78 94 % 11/18/20 1105  85 16  95 % 11/18/20 1104     95 % 11/18/20 1100  69 19 (!) 136/51 95 % 11/18/20 1000  72 16 (!) 125/47 94 % 11/18/20 0900  77 14 (!) 127/49 94 % 11/18/20 0800 98.6 °F (37 °C) (!) 113 14 (!) 150/58 92 % 11/18/20 0728  (!) 113 20  95 % 11/18/20 0727     95 % 11/18/20 0700  (!) 108 19 (!) 167/95 95 % 11/18/20 0600  (!) 112 18 (!) 175/78 94 % 11/18/20 0500  (!) 109 16 (!) 161/70 95 % 11/18/20 0400 98.2 °F (36.8 °C) 72 14  96 % 11/18/20 0351  74 16  96 % 11/18/20 0300  91 25 (!) 158/64 94 % 11/18/20 0200  92 14 (!) 152/68 95 % 11/18/20 0100  99 16 (!) 156/74 96 % 11/18/20 0001 98.4 °F (36.9 °C) (!) 106 19 (!) 165/87 95 % 11/17/20 2330  84 16  97 % 11/17/20 2300  68 14 (!) 137/52 95 % Intake/Output Summary (Last 24 hours) at 11/18/2020 2221 Last data filed at 11/18/2020 2000 Gross per 24 hour Intake 2460.64 ml Output 4070 ml Net -1609.36 ml I had a face to face encounter with this patient and independently examined this patient on 11/18/2020 as outlined below: PHYSICAL EXAM: 
 
General: Adult  female, sedated on vent, appears older than stated age EENT:  EOMI. Anicteric sclerae. MMM Resp:  Diminished air entry, coarse bs, no wheeze/rhonchi CV:  Regular  rhythm,  No edema GI:  Soft, Non distended, Non tender. +Bowel sounds Neurologic:  Limited exam, moves extremities, opens eyes to voice Psych:   deferred Skin:  No rashes. No jaundice Reviewed most current lab test results and cultures  YES Reviewed most current radiology test results   YES Review and summation of old records today    NO Reviewed patient's current orders and MAR    YES 
PMH/SH reviewed - no change compared to H&P 
________________________________________________________________________ Care Plan discussed with: 
  Comments Patient x Family RN x Care Manager Consultant Multidiciplinary team rounds were held today with , nursing, pharmacist and clinical coordinator. Patient's plan of care was discussed; medications were reviewed and discharge planning was addressed. ________________________________________________________________________ Total NON critical care TIME:  35   Minutes Total CRITICAL CARE TIME Spent:   Minutes non procedure based Comments >50% of visit spent in counseling and coordination of care    
________________________________________________________________________ Marques Payan DO  
 
Procedures: see electronic medical records for all procedures/Xrays and details which were not copied into this note but were reviewed prior to creation of Plan. LABS: 
I reviewed today's most current labs and imaging studies. Pertinent labs include: 
Recent Labs 11/18/20 
0556 11/17/20 
0402 11/16/20 
0426 WBC 21.7* 19.4* 19.6* HGB 7.9* 7.8* 8.0*  
HCT 26.8* 26.5* 26.6*  
 413* 456* Recent Labs 11/18/20 
1887 11/17/20 
0402 11/16/20 
3699  141 138  
K 3.5 3.7 3.6  100 98 CO2 38* 36* 35* * 139* 138* BUN 36* 27* 18  
CREA 0.55 0.55 0.56  
CA 8.6 9.1 9.2 MG  --   --  2.0 Signed: Marques Payan DO

## 2020-11-19 NOTE — PROGRESS NOTES
Hospitalist Progress Note NAME: Genaro Knapp :  1950 MRN:  [id number] Assessment / Plan: 
Squamous cell carcinoma of the lung Acute on chronic hypoxic hypercapnic respiratory failure POA AECOPD Aspiration pneumonia and sepsis 
-CT scan: with findings of emphysema with large pleural-based lateral right upper lobe mass, with several other pulmonary nodules in both lungs overall highly suspicious of bronchogenic neoplasm. Largest 5.3x 3.1 cm , cavitary lesion. Likely metastatic with bilateral pulm nodules 
-heme/onc following MRI brain  shows no evidence of intracranial metastatic disease. Atrophy and chronic white matter disease with probable artifactual diffusion signal right temporal lobe. No acute abnormality. Nasal pharyngeal fluid related to nasogastric tube and bilateral mastoid effusions  
 
-S/p CT-guided lung biopsy 11/10 shows SCC 
-Chest x-ray unchanged right upper lobe mass, mild pulmonary edema. CXR  shows increasing bibasilar opacities 
-Continue empiric IV antibiotics with vancomycin and Zosyn 
-Leukocytosis noted in setting of IV steroids, worsened 
 
-unable to wean from vent so far 
-cont treatment of COPD exacerbation with bronchodilators, IV steroids 
-50 pack/yr smoking history 
-vent management per intensivist 
  
Hypokalemia -resolved 
  
Nausea and vomiting - resolved - Unclear etiology 
- continue PRN Zofran, compazine, and scopolamine patch 
- Head CT negative for acute finding 
  
Prediabetes and Hyperglycemia 
- No hx of DM. HbA1c 5.8. Likley due to steroid. - Continue sliding scale insulin 
  
Hyperthyrodism 
- TSH less than 0.01, free T4 2.0. Total T3 wnl - Patient had a elevated free T4 in 2016. Had ultrasound which did not show any nodule - Started on methimazole and beta blocker - Will need outpatient follow-up, will give contact information for endocrinologist 
  
Anemia: possible AOCD Thrombocytopenia: reactive History of prescription narcotic abuse 
-Weaned off opioids about 5 years ago with help of her son/caregiver Adia 
  
Depression/anxiety Fibromyalgia As Needed Lorazepam for Anxiety, now sedated on vent Continue Cymbalta Continue gabapentin 
  
Hypertension continue amlodipine 
  
History of seizures No seizures for years Continue Keppra 
 
  
Code Status: Full code Surrogate Decision Maker: Luciano Seymour Discussed care with Son over the phone today (11/18). He plans to come by with family tomorrow with some family (to see patient separately) 3-5PM 
 
Appreciate palliative team involvement. Group of family including her son/caretaker, Adia, visited with patient today to say goodbyes. [facility] [address] [facility] [facility] of no improvement over the weekend 
  
Prognosis guarded. Family considering transition to comfort care after visitation 
  
DVT Prophylaxis: SCDs GI Prophylaxis: not indicated 
  
Baseline: Independent in ADLs 
  
Plan for CT-guided biopsy today/Tomorrow Subjective: Chief Complaint / Reason for Physician Visit Remains sedated on vent, no change in status Discussed with RN events overnight. Review of Systems: 
Symptom Y/N Comments  Symptom Y/N Comments Fever/Chills    Chest Pain Poor Appetite    Edema Cough    Abdominal Pain Sputum    Joint Pain SOB/DÍAZ    Pruritis/Rash Nausea/vomit    Tolerating PT/OT Diarrhea    Tolerating Diet Constipation    Other Could NOT obtain due to: Sedated on vent Objective: VITALS:  
Last 24hrs VS reviewed since prior progress note. Most recent are: 
Patient Vitals for the past 24 hrs: 
 Temp Pulse Resp BP SpO2  
11/19/20 0930  89 23 (!) 163/70 92 % 11/19/20 0900  87 25 (!) 164/56 91 % 11/19/20 0830  (!) 107 27 (!) 166/73 (!) 88 % 11/19/20 0800 98.4 °F (36.9 °C) (!) 102 26 (!) 147/66 91 % 11/19/20 0729  76 19  92 % 11/19/20 0728     92 % 11/19/20 0700  81 21 (!) 148/67 92 % 11/19/20 0600  90 23 (!) 142/59 91 % 11/19/20 0500  99 25 (!) 144/58 99 % 11/19/20 0400 99.4 °F (37.4 °C) 100 29 (!) 123/59 93 % 11/19/20 0323  (!) 109 29  92 % 11/19/20 0300  77 21 (!) 136/52 93 % 11/19/20 0201    (!) 173/64   
11/19/20 0200  65 20 (!) 173/64 92 % 11/19/20 0100  74 22 (!) 168/65 91 % 11/19/20 0000 99.6 °F (37.6 °C) (!) 59 20 (!) 158/54 93 % 11/18/20 2347  (!) 57 19  93 % 11/18/20 2300  65 19 (!) 146/48 92 % 11/18/20 2200  62 18 (!) 145/53 92 % 11/18/20 2100  99 23 (!) 129/51 91 % 11/18/20 2041  (!) 114     
11/18/20 2000 99.6 °F (37.6 °C) 74 19 (!) 162/66 91 % 11/18/20 1944  68 17  91 % 11/18/20 1900  64 17 (!) 164/55 92 % 11/18/20 1800  68 17 (!) 165/62 91 % 11/18/20 1700  89 19 (!) 151/58 92 % 11/18/20 1600 99.8 °F (37.7 °C) 86 21 (!) 165/66 90 % 11/18/20 1538  71 16  94 % 11/18/20 1500  75 17 (!) 157/55 95 % 11/18/20 1400  79 16 (!) 148/63 94 % 11/18/20 1300  89 18 (!) 156/62 95 % 11/18/20 1200 99.4 °F (37.4 °C) 96 16 (!) 166/78 94 % 11/18/20 1105  85 16  95 % 11/18/20 1104     95 % 11/18/20 1100  69 19 (!) 136/51 95 % Intake/Output Summary (Last 24 hours) at 11/19/2020 1058 Last data filed at 11/19/2020 2215 Gross per 24 hour Intake 2364.55 ml Output 4902 ml Net -2537.45 ml I had a face to face encounter with this patient and independently examined this patient on 11/19/2020 as outlined below: PHYSICAL EXAM: 
 
General: Adult  female, sedated on vent, appears older than stated age EENT:  EOMI. Anicteric sclerae. MMM Resp:  Diminished air entry, coarse bs, no wheeze/rhonchi CV:  Regular  rhythm,  No edema GI:  Soft, Non distended, Non tender. +Bowel sounds Neurologic:  Limited exam, moves extremities, opens eyes to voice Psych:   deferred Skin:  No rashes. No jaundice Reviewed most current lab test results and cultures  YES 
 Reviewed most current radiology test results   YES Review and summation of old records today    NO Reviewed patient's current orders and MAR    YES 
PMH/SH reviewed - no change compared to H&P 
________________________________________________________________________ Care Plan discussed with: 
  Comments Patient x Family RN x Care Manager Consultant Multidiciplinary team rounds were held today with , nursing, pharmacist and clinical coordinator. Patient's plan of care was discussed; medications were reviewed and discharge planning was addressed. ________________________________________________________________________ Total NON critical care TIME:  35   Minutes Total CRITICAL CARE TIME Spent:   Minutes non procedure based Comments >50% of visit spent in counseling and coordination of care    
________________________________________________________________________ Urmila Elizabeth, DO  
 
Procedures: see electronic medical records for all procedures/Xrays and details which were not copied into this note but were reviewed prior to creation of Plan. LABS: 
I reviewed today's most current labs and imaging studies. Pertinent labs include: 
Recent Labs 11/19/20 
0430 11/18/20 
0428 11/17/20 
0402 WBC 24.9* 21.7* 19.4* HGB 9.0* 7.9* 7.8* HCT 30.0* 26.8* 26.5*  
 359 413* Recent Labs 11/19/20 
0430 11/18/20 
0428 11/17/20 
0402  142 141  
K 3.0* 3.5 3.7 CL 97 104 100 CO2 40* 38* 36* * 141* 139* BUN 30* 36* 27* CREA 0.56 0.55 0.55 CA 8.7 8.6 9.1 Signed: Urmila Elizabeth, DO

## 2020-11-19 NOTE — PROGRESS NOTES
Pt's son Adia at pt's bedside. He wanted a few minutes with his mother concerned that the window for such visits may close soon. He expects approximately 10 family members to come today at 3:00pm to say goodbye to the pt. Palliative Medicine  Tomas Wilburn is coordinating that visit with the . Assured Adia of ongoing  support as needed. Chaplain Que, MDiv, MS, [place] [phone] PRAY ([phone])

## 2020-11-19 NOTE — PROGRESS NOTES
Received message from patient's nurse Wily Lo stating : 
 
Good evening. This patient is currently intubated and sedated with propofol and Precedex. Her blood pressure has been trending upwards over the last few hours and has just reached a systolic blood pressure of more than 170 she gets p.o. Lopressor 50 mg every 12 hours which was given at 2030 she also has a as needed order for 10 mg of labetalol every 2 hours for systolic blood pressure more than 170. I just gave half the dose of labetalol (5 mg) because her heart rate is in the low 60s. However this has not helped her pressure. Is there anything else we could give her instead of a beta-blocker? Discussion / orders: 
 
Ordered hydralazine 10 mg IV every 6 hours as needed for systolic blood pressure more than 950 or diastolic blood pressure more than 100 Please note that this note was dictated using Dragon computer voice recognition software. Quite often unanticipated grammatical, syntax, homophones, and other interpretive errors are inadvertently transcribed by the computer software. Please disregard these errors. Please excuse any errors that have escaped final proofreading.

## 2020-11-19 NOTE — INTERDISCIPLINARY ROUNDS
Critical care interdisciplinary rounds held on 11/19/2020. Following members present, Pharmacy, Diabetes Treatment, Case Management, Respiratory Therapy, Physical Therapy, Palliative Care and Nutrition. Led by Boom Gordon RN and Dr. Edinson Batista. Plan of care discussed. See clinical pathway for plan of care and interventions and desired outcomes.

## 2020-11-19 NOTE — PROGRESS NOTES
Patient is in CCU; intubated and becomes agitated when during weaning trials; On IV  Sedation; IV AB's and IV steroids; has NG tube and is receiving tube fedings. DIEGO 
TBD pending patients medical progress and recommendations of MD and therapies; 
Per son Aida, patient was independent PTA with home oxygen provided by [employer]; she has BSC, rolling walker and a cane; she resided with her son Adia in a one level home Home Health [facility] SNF - pending PT/OT evals and recommendations. PT/OT evals were not completed due to patient being intubated and sedated; Will need to re-consult PT/OT when medically stable for therapy evaluations. Transportation TBD; if son is to transport, will need to be reminded to bring portable oxygen tank Preferred Rx is [employer] 2nd IM letter will be needed prior to discharge Follow up appointments will be needed prior to discharge CM will continue to follow for discharge needs and planning. [place] RN Care Manager Ext [phone]

## 2020-11-19 NOTE — PROGRESS NOTES
Several of the pt's family were present to visit the pt briefly, ostensibly to say their last goodbye. Pt's son Adia was present as well as the pt's sister and brother and others. Offered presence and listened as different family members spoke of their relationship with the pt. Assured family of ongoing  support as needed. Augusta Mohs, Chaplain, MDiv, MS, [facility] 
[phone] PRAY [phone])

## 2020-11-19 NOTE — PROGRESS NOTES
Occupational Therapy Note: 
 
Chart reviewed. Patient continues to be intubated and sedated, not medically stable for therapy. Will defer and sign off. Please re-consult if patient becomes medically stable. Thank you.  
 
YADIRA Flor/MANUEL

## 2020-11-19 NOTE — PROGRESS NOTES
1900- Bedside change of shift report received from [place], [facility].  
 
2000- Assessment complete; see flowsheet for details. 0000- Reassessment complete; so new changes to patient's condition. 0215- Patient's SBP >170; patient has PRN order for 10mg of labetalol. Gave half the dose of labetalol (5mg) but the dose only lowered the patient's HR, SBP remains in the 170s. Contacted on call NP to see if there was something different we could give for the pt's elevated BP. New order placed for PRN Hydralazine. 0300- Patients SBP decreased to 130s following prn hydralazine. 0500- Patient had multiple watery stools overnight; on call provider notified and orders placed for fecal management system. 0530- During bath, incontinence care and flexiseal placement the patient became very restless & agitated with HR in the 110s, despite maximum sedation. Did not initiate SAT this morning due to patient's status and agitation at this time. 0700- Bedside report given to Renetta/NICHOLAS Monreal (oncoming nurse).

## 2020-11-20 NOTE — PROGRESS NOTES
[facility] Name: Lenord Cranker MRN: [id number] : 1950 Hospital: [facility] Date: 2020 Subjective: No acute events overnight Sedated, intubated Review of systems not obtained due to patient factors. Impression     Plan 1. Stage IV squamous cell Ca- primary RUL pleurally based caviating mass s/p CT- FNA 11/10/200 + Sq cell ca with GAVIOTA nodule as well- awaiting brain MRI 2. Acute hypoxic resp failure- aspiration PNA 3. Resolved Septic shock- suspect ECFV low- bedside critical care ECHO nml EF > 60% + LVH RV nml sixe not dilated , IVC was fat with > 50% resp phasic variation 4. COPD- one vent no air trapping 5. AMS- need to r/o brain mets 6. N/v 
7. Anemia/thrombocytopenia · Vent support · Off pressor · On abx · Replete K again today · Sedation to RASS 0  
· SAT/SBT as tolerated · Nutrition, TF  
· Palliatve care following · Hold on lung cancer staging for now · PUD/DVT prophylaxis · Jet nebs · IV steroids · Glycemic control · DNR 
· If no improvement after the weekend and unable to wean and extubate family will agree to compassionate extubation and comfort care Objective: 
 
 
Exam 
Vital Signs: 
Visit Vitals BP (!) 138/58 Pulse 89 Temp 99.3 °F (37.4 °C) Resp 23 Ht 5' 2\" (1.575 m) Wt 79.1 kg (174 lb 6.1 oz) SpO2 94% BMI 31.90 kg/m² Temp (24hrs), Av.9 °F (37.2 °C), Min:98.4 °F (36.9 °C), Max:99.4 °F (37.4 °C) Intake/Output Summary (Last 24 hours) at 2020 0302 Last data filed at 2020 0700 Gross per 24 hour Intake 3019.59 ml Output 3210 ml Net -190.41 ml GENERAL: well developed and in mild distress, NECK:  no jugular vein distention, no retractions, no thyromegaly or masses, LUNGS: decreased breath sounds billaterally and with rhonchi , HEART:  Regular rate and rhythm with no MGR; no edema is present, ABDOMEN:  soft with no tenderness, bowel sounds present, EXTREMITIES:  warm with no cyanosis and SKIN:  no jaundice or ecchymosis Labs: 
Recent Labs  
  11/20/20 0407 11/19/20 0430 11/18/20 
3942 WBC 23.5* 24.9* 21.7* HGB 9.1* 9.0* 7.9*  
HCT 30.1* 30.0* 26.8*  
 355 359 Recent Labs  
  11/20/20 0407 11/19/20 0430 11/18/20 
6789  143 142  
K 3.2* 3.0* 3.5  97 104 CO2 38* 40* 38* * 146* 141* BUN 21* 30* 36* CREA 0.46* 0.56 0.55 CA 8.3* 8.7 8.6 MG 1.9  --   -- No results for input(s): PHI, PO2I, PCO2I in the last 72 hours. CXR: no acute changes Rodolfo Padilla MD 
11/20/2020

## 2020-11-20 NOTE — INTERDISCIPLINARY ROUNDS
Interdisciplinary team rounds were held 11/20/2020 with the following team members:Care Management, Diabetes Treatment Specialist, Nursing, Nutrition, Pharmacy, Physical Therapy, Physician and Respiratory Therapy. Plan of care discussed. See clinical pathway and/or care plan for interventions and desired outcomes.

## 2020-11-20 NOTE — PROGRESS NOTES
Oncology Progress Note Patient Name:  Herbert Almanzar Date of Service: 11/20/2020 Admit Date: 11/7/2020 Interval History  
 
- she remains intubated and continues to fail SBT Subjective:  
 
 
 
Current Facility-Administered Medications Medication Dose Route Frequency  methylPREDNISolone (PF) (Solu-MEDROL) injection 30 mg  30 mg IntraVENous Q12H  hydrALAZINE (APRESOLINE) 20 mg/mL injection 10 mg  10 mg IntraVENous Q6H PRN  
 levETIRAcetam (KEPPRA) oral solution 500 mg  500 mg Oral BID  dexmedeTOMidine (PRECEDEX) 400 mcg in 0.9% sodium chloride 100 mL infusion  0.1-1.5 mcg/kg/hr IntraVENous TITRATE  multivit-folic acid-herbal 825 (WELLESSE PLUS) oral liquid 30 mL  30 mL Oral DAILY  fentaNYL citrate (PF) injection 100 mcg  100 mcg IntraVENous Q4H PRN  
 famotidine (PF) (PEPCID) 20 mg in 0.9% sodium chloride 10 mL injection  20 mg IntraVENous Q12H  chlorhexidine (ORAL CARE KIT) 0.12 % mouthwash 15 mL  15 mL Oral Q12H  
 alcohol 62% (NOZIN) nasal  1 Ampule  1 Ampule Topical Q12H  
 metoprolol tartrate (LOPRESSOR) tablet 50 mg  50 mg Oral Q12H  propofol (DIPRIVAN) 10 mg/mL infusion  0-50 mcg/kg/min IntraVENous TITRATE  albuterol-ipratropium (DUO-NEB) 2.5 MG-0.5 MG/3 ML  3 mL Nebulization Q4H RT  
 fentaNYL (PF) 1,500 mcg/30 mL (50 mcg/mL) infusion  0-200 mcg/hr IntraVENous TITRATE  miconazole (SECURA) 2 % extra thick cream   Topical BID  piperacillin-tazobactam (ZOSYN) 3.375 g in 0.9% sodium chloride (MBP/ADV) 100 mL MBP  3.375 g IntraVENous Q8H  
 insulin lispro (HUMALOG) injection   SubCUTAneous Q6H  
 glucose chewable tablet 16 g  4 Tab Oral PRN  
 dextrose (D50W) injection syrg 12.5-25 g  12.5-25 g IntraVENous PRN  
 glucagon (GLUCAGEN) injection 1 mg  1 mg IntraMUSCular PRN  
 scopolamine (TRANSDERM-SCOP) 1 mg over 3 days 1 Patch  1 Patch TransDERmal Q72H  
 HYDROcodone-acetaminophen (NORCO) 5-325 mg per tablet 2 Tab  2 Tab Oral Q4H PRN  
 ALPRAZolam (XANAX) tablet 0.5 mg  0.5 mg Oral QID PRN  prochlorperazine (COMPAZINE) with saline injection 10 mg  10 mg IntraVENous Q6H PRN  zinc oxide-cod liver oil (DESITIN) 40 % paste   Topical BID and QHS  methIMAzole (TAPAZOLE) tablet 10 mg  10 mg Oral DAILY  bacitracin 500 unit/gram packet 1 Packet  1 Packet Topical DAILY  [Held by provider] gabapentin (NEURONTIN) capsule 300 mg  300 mg Oral TID  zinc oxide-cod liver oil (DESITIN) 40 % paste   Topical PRN  
 0.9% sodium chloride infusion  25 mL/hr IntraVENous CONTINUOUS  
 labetaloL (NORMODYNE;TRANDATE) injection 10 mg  10 mg IntraVENous Q2H PRN  
 sodium chloride (NS) flush 5-40 mL  5-40 mL IntraVENous Q8H  
 sodium chloride (NS) flush 5-40 mL  5-40 mL IntraVENous PRN  
 acetaminophen (TYLENOL) tablet 650 mg  650 mg Oral Q4H PRN  
 naloxone (NARCAN) injection 0.4 mg  0.4 mg IntraVENous PRN  
 enoxaparin (LOVENOX) injection 40 mg  40 mg SubCUTAneous Q24H  
 [Held by provider] methocarbamoL (ROBAXIN) tablet 750-1,500 mg  750-1,500 mg Oral BID  ondansetron (ZOFRAN) injection 4 mg  4 mg IntraVENous Q4H PRN Review of Systems: 
ROS not conducted due to intubation and sedation. Objective:  
 
Patient Vitals for the past 8 hrs: 
 BP Temp Pulse Resp SpO2  
20 1129   84 25 94 % 20 0900 (!) 144/60  94 24 94 % 20 0800 (!) 144/49 99 °F (37.2 °C) (!) 115 26 93 % 20 0738   98 23 94 % 20 0700 (!) 138/58  89 23 94 % 20 0600 (!) 145/45  100 29 93 % 20 0500 (!) 119/55  (!) 111 22 92 % 20 0430 (!) 171/68  82   Temp (24hrs), Av °F (37.2 °C), Min:98.4 °F (36.9 °C), Max:99.3 °F (37.4 °C) 
 
 
 07 - 1900 In: 555.8 [I.V.:430.8] Out: 535 [Urine:435; Drains:100] Physical Exam: 
GEN:  Elderly  lady; ETT in place, NG tube HEENT: NCAT Neck: soft, supple, trachea midline PULM: reduced air entry at bases BL. Coarse breath sounds and expiratory wheezes noted. CARDS: RRR, S1S2+, no MRG 
ABD: soft, NT/ND, BS+ EXT: UE/:E edema noted. NEURO: opens eyes to voice PSYCH: unable to assess. Labs:   
Recent Results (from the past 24 hour(s)) GLUCOSE, POC Collection Time: 11/19/20  4:58 PM  
Result Value Ref Range Glucose (POC) 170 (H) 65 - 100 mg/dL Performed by Smiley Nolasco RN   
GLUCOSE, POC Collection Time: 11/19/20 11:59 PM  
Result Value Ref Range Glucose (POC) 144 (H) 65 - 100 mg/dL Performed by Mer Alexander BLOOD GAS, ARTERIAL Collection Time: 11/20/20  3:50 AM  
Result Value Ref Range pH 7.50 (H) 7.35 - 7.45    
 PCO2 46 (H) 35.0 - 45.0 mmHg PO2 77 (L) 80 - 100 mmHg O2 SAT 96 92 - 97 % BICARBONATE 34 (H) 22 - 26 mmol/L  
 BASE EXCESS 9.7 mmol/L  
 O2 METHOD VENTILATOR    
 FIO2 30 % MODE A/C Tidal volume 400 SET RATE 14    
 EPAP/CPAP/PEEP 6 Sample source ARTERIAL    
 SITE LEFT RADIAL SKYE'S TEST YES    
METABOLIC PANEL, BASIC Collection Time: 11/20/20  4:07 AM  
Result Value Ref Range Sodium 141 136 - 145 mmol/L Potassium 3.2 (L) 3.5 - 5.1 mmol/L Chloride 100 97 - 108 mmol/L  
 CO2 38 (H) 21 - 32 mmol/L Anion gap 3 (L) 5 - 15 mmol/L Glucose 166 (H) 65 - 100 mg/dL BUN 21 (H) 6 - 20 MG/DL Creatinine 0.46 (L) 0.55 - 1.02 MG/DL  
 BUN/Creatinine ratio 46 (H) 12 - 20 GFR est AA >60 >60 ml/min/1.73m2 GFR est non-AA >60 >60 ml/min/1.73m2 Calcium 8.3 (L) 8.5 - 10.1 MG/DL  
CBC W/O DIFF Collection Time: 11/20/20  4:07 AM  
Result Value Ref Range WBC 23.5 (H) 3.6 - 11.0 K/uL  
 RBC 3.14 (L) 3.80 - 5.20 M/uL HGB 9.1 (L) 11.5 - 16.0 g/dL HCT 30.1 (L) 35.0 - 47.0 % MCV 95.9 80.0 - 99.0 FL  
 MCH 29.0 26.0 - 34.0 PG  
 MCHC 30.2 30.0 - 36.5 g/dL  
 RDW 13.9 11.5 - 14.5 % PLATELET 437 253 - 550 K/uL MPV 10.5 8.9 - 12.9 FL  
 NRBC 0.1 (H) 0  WBC ABSOLUTE NRBC 0.02 (H) 0.00 - 0.01 K/uL MAGNESIUM Collection Time: 11/20/20  4:07 AM  
Result Value Ref Range Magnesium 1.9 1.6 - 2.4 mg/dL GLUCOSE, POC Collection Time: 11/20/20  5:21 AM  
Result Value Ref Range Glucose (POC) 201 (H) 65 - 100 mg/dL Performed by Euna Cons, POC Collection Time: 11/20/20 11:28 AM  
Result Value Ref Range Glucose (POC) 176 (H) 65 - 100 mg/dL Performed by Carline Mishra Assessment:  
 
Active Problems: COPD exacerbation ([facility] [address] (9/13/2010) Overview: O2 dependent--Dr. Vikram Ward Plan: Ms. Erin Grove is a 73y/o lady with newly diagnosed squamous cell non-small cell lung cancer. Squamous Cell Lung Cancer: 
-patient appears to have metastatic disease based on presence of pulmonary nodules bilaterally, no abdominal imaging done yet. - PDL1 testing requested - MRI brain showed no metastatic disease 
- will need PET as outpatient if she recovers. Respiratory Failure/Shock, Aspriation: 
- patient's condition is complicated by significant COPD with underlying malignancy 
- remains intubated, failed weaning trials for several days now. Family wanting to see how she does over weekend and consider compassionate extubation next week if not off vent. Disposition: 
-patient's situation is quite serious. Missy Johnson MD 
11/20/2020

## 2020-11-20 NOTE — PROGRESS NOTES
Patient is in CCU; intubated and becomes agitated when during weaning trials; On IV  Sedation; IV AB's and IV steroids; has NG tube and is receiving tube fedings. Palliative care is following. 
  
DIEGO 
TBD pending patients medical progress and recommendations of MD and therapies; 
Per son Adia, patient was independent PTA with home oxygen provided by [employer] [facility]; she has BSC, rolling walker and a cane; she resided with her son Adia in a one level home 
[facility] pending PT/OT evals and recommendations. 
  
PT/OT evals were not completed due to patient being intubated and sedated; Will need to re-consult PT/OT when medically stable for therapy evaluations. 
  
Transportation TBD; if son is to transport, will need to be reminded to bring portable oxygen tank 
  
Preferred Rx is [id number] 
  
2nd IM letter will be needed prior to discharge 
  
Follow up appointments will be needed prior to discharge 
  
CM will continue to follow for discharge needs and planning. 
Noel Rodrigez RN Care Manager Ext [phone]

## 2020-11-20 NOTE — PROGRESS NOTES
0730: Report received from Ignacio Givens. 
 
0800: Pt. Assessed. Pt. Responds easily to voice/touch. Does not follow commands. Moves all extremities. Pt. In soft wrist restraints. Breath sounds coarse. Inline suction secretions thick and tan. Vent setting unchanged from previous shifts. Bowel sounds active. TF's infusing at goal. ABD soft. Pt. Sinus tach on the monitor. Blood pressure stable. Pulses palpable. Pt. Has an ETT, NGT, Right IJ CVC, Moon, and Flexiseal. All in use and patent. Pt. Has propofol, Precedex, and NS infusing. See MAR for rates and titrations. 3546: PRN Xanax given as patient's RR is 31, patient is pulling against her restraints and lifting her head up. Precedex now at 1.5 mcgs, and propofol at 50 mcgs. 7194: IDR's held with Dr. Wilver Spears. Was told to start ordered fentanyl infusion for adequate sedation. 1230: Pt. Reassessed. Pt. Resting comfortably. VSS. Fentanyl now at 75 mcgs. Precedex and Propofol infusions remain unchanged from 0918.  
 
1500: Pt. Resting comfortably. 1600: Pt. Reassessed. No changes. Fentanyl now at 100 mcgs. 1900: Report given to Mavis, [facility].

## 2020-11-21 NOTE — PROGRESS NOTES
Slightly tight. Nurse practitioner notes that some uncertainty surrounding her shoulder but range of motion is able to raise a Hospitalist Progress Note NAME: Herbert Almanzar :  1950 MRN:  [id number] Assessment / Plan: 
Squamous cell carcinoma of the lung Acute on chronic hypoxic hypercapnic respiratory failure POA AECOPD Aspiration pneumonia and sepsis 
-CT scan: with findings of emphysema with large pleural-based lateral right upper lobe mass, with several other pulmonary nodules in both lungs overall highly suspicious of bronchogenic neoplasm. Largest 5.3x 3.1 cm , cavitary lesion. Likely metastatic with bilateral pulm nodules 
-heme/onc following MRI brain  shows no evidence of intracranial metastatic disease. Atrophy and chronic white matter disease with probable artifactual diffusion signal right temporal lobe. No acute abnormality. Nasal pharyngeal fluid related to nasogastric tube and bilateral mastoid effusions  
 
-S/p CT-guided lung biopsy 11/10 shows SCC 
-Chest x-ray unchanged right upper lobe mass, mild pulmonary edema. CXR  shows increasing bibasilar opacities 
-Continue empiric IV antibiotics with vancomycin and Zosyn 
-Leukocytosis noted in setting of IV steroids, worsened 
 
-unable to wean from vent so far 
-cont treatment of COPD exacerbation with bronchodilators, IV steroids 
-50 pack/yr smoking history 
-vent management per intensivist 
  
Hypokalemia -resolved 
  
Nausea and vomiting - resolved - Unclear etiology 
- continue PRN Zofran, compazine, and scopolamine patch 
- Head CT negative for acute finding 
  
Prediabetes and Hyperglycemia 
- No hx of DM. HbA1c 5.8. Likley due to steroid. - Continue sliding scale insulin 
  
Hyperthyrodism 
- TSH less than 0.01, free T4 2.0.  Total T3 wnl 
- Patient had a elevated free T4 in 2016.  Had ultrasound which did not show any nodule 
 - Started on methimazole and beta blocker - Will need outpatient follow-up, will give contact information for endocrinologist 
  
Anemia: possible AOCD Thrombocytopenia: reactive History of prescription narcotic abuse 
-Weaned off opioids about 5 years ago with help of her son/caregiver Adia 
  
Depression/anxiety Fibromyalgia As Needed Lorazepam for Anxiety, now sedated on vent Continue Cymbalta Continue gabapentin 
  
Hypertension continue amlodipine 
  
History of seizures No seizures for years Continue Keppra 
 
  
Code Status: Full code Surrogate Decision Maker: Son Lion Discussed care with Son over the phone today (11/18). He plans to come by with family tomorrow with some family (to see patient separately) 3-5PM 
 
Appreciate palliative team involvement. Group of family including her son/caretaker, Adia, visited with patient today to say goodbyes. Anticipating transition to [address] Monday of no improvement over the weekend 
  
Prognosis guarded. Family considering transition to comfort care after visitation 
  
DVT Prophylaxis: SCDs GI Prophylaxis: not indicated 
  
Baseline: Independent in ADLs 
  
Plan for CT-guided biopsy today/Tomorrow Subjective: Chief Complaint / Reason for Physician Visit Sedated on vent, no change in status Discussed with RN events overnight. Review of Systems: 
Symptom Y/N Comments  Symptom Y/N Comments Fever/Chills    Chest Pain Poor Appetite    Edema Cough    Abdominal Pain Sputum    Joint Pain SOB/DÍAZ    Pruritis/Rash Nausea/vomit    Tolerating PT/OT Diarrhea    Tolerating Diet Constipation    Other Could NOT obtain due to: Sedated on vent Objective: VITALS:  
Last 24hrs VS reviewed since prior progress note. Most recent are: 
Patient Vitals for the past 24 hrs: 
 Temp Pulse Resp BP SpO2  
11/21/20 1521  73 20  95 % 11/21/20 1231  75 20  95 % 11/21/20 1200 99.2 °F (37.3 °C) 73 19 (!) 140/64 95 % 11/21/20 1100  76 18 (!) 130/59 95 % 11/21/20 1030  70 16 137/63 96 % 11/21/20 1000  68 16 (!) 130/53 96 % 11/21/20 0930  83 17 (!) 108/56 95 % 11/21/20 0900  85 17 122/61 94 % 11/21/20 0800 99.2 °F (37.3 °C) 85 21 (!) 104/58 96 % 11/21/20 0745  78 21  97 % 11/21/20 0700  82 20 (!) 118/59 96 % 11/21/20 0630  84 19 (!) 91/51 95 % 11/21/20 0625  83 20 (!) 91/45 95 % 11/21/20 0621  86 22 (!) 72/45 95 % 11/21/20 0600  96 25 (!) 86/52 94 % 11/21/20 0500 99.2 °F (37.3 °C) 82 23 (!) 124/55 94 % 11/21/20 0400  90 25 (!) 105/48 93 % 11/21/20 0317  70 21  95 % 11/21/20 0300  71 20 (!) 131/56 95 % 11/21/20 0200  75 24 (!) 123/57 93 % 11/21/20 0100  82 24 (!) 121/54 93 % 11/21/20 0030 98.8 °F (37.1 °C) 82 25 (!) 114/51 93 % 11/21/20 0000  68 19 (!) 147/67 95 % 11/20/20 2356  70 20  95 % 11/20/20 2330  63 17 (!) 173/79 96 % 11/20/20 2300  63 15 (!) 170/80 96 % 11/20/20 2200  76 21 (!) 122/55 95 % 11/20/20 2100 98.2 °F (36.8 °C) 77 19 (!) 139/50 94 % 11/20/20 1919  70 20  96 % 11/20/20 1900  72 21 (!) 144/53 96 % 11/20/20 1800  76 20 (!) 151/60 95 % 11/20/20 1700  76 20 (!) 164/66 94 % Intake/Output Summary (Last 24 hours) at 11/21/2020 1609 Last data filed at 11/21/2020 1200 Gross per 24 hour Intake 1647.04 ml Output 845 ml Net 802.04 ml I had a face to face encounter with this patient and independently examined this patient on 11/21/2020 as outlined below: PHYSICAL EXAM: 
 
General: Adult  female, sedated on vent, appears older than stated age EENT:  EOMI. Anicteric sclerae. MMM Resp:  Diminished air entry, coarse bs, no wheeze/rhonchi CV:  Regular  rhythm,  No edema GI:  Soft, Non distended, Non tender. +Bowel sounds Neurologic:  Limited exam, moves extremities, opens eyes to voice Psych:   deferred Skin:  No rashes. No jaundice Reviewed most current lab test results and cultures  YES 
 Reviewed most current radiology test results   YES Review and summation of old records today    NO Reviewed patient's current orders and MAR    YES 
PMH/SH reviewed - no change compared to H&P 
________________________________________________________________________ Care Plan discussed with: 
  Comments Patient x Family RN x Care Manager Consultant Multidiciplinary team rounds were held today with , nursing, pharmacist and clinical coordinator. Patient's plan of care was discussed; medications were reviewed and discharge planning was addressed. ________________________________________________________________________ Total NON critical care TIME:  35   Minutes Total CRITICAL CARE TIME Spent:   Minutes non procedure based Comments >50% of visit spent in counseling and coordination of care    
________________________________________________________________________ [place], DO  
 
Procedures: see electronic medical records for all procedures/Xrays and details which were not copied into this note but were reviewed prior to creation of Plan. LABS: 
I reviewed today's most current labs and imaging studies. Pertinent labs include: 
Recent Labs  
  11/21/20 0420 11/20/20 
0407 11/19/20 
0430 WBC 18.9* 23.5* 24.9* HGB 8.5* 9.1* 9.0*  
HCT 28.1* 30.1* 30.0*  
 312 355 Recent Labs  
  11/21/20 
0420 11/20/20 
0407 11/19/20 
0430  141 143  
K 3.8 3.2* 3.0*  
CL 99 100 97 CO2 34* 38* 40* * 166* 146* BUN 25* 21* 30* CREA 0.56 0.46* 0.56  
CA 8.3* 8.3* 8.7 MG  --  1.9  --   
 
 
Signed: Dominga Sellers, DO

## 2020-11-21 NOTE — PROGRESS NOTES
11/21/20 0535 ABCDEF Bundle SBT Safety Screen Passed Yes SBT Trial Passed No  
SBT Trial Reason for Failure Respiratory rate > 35;RSBI>105 Weaning Parameters Spontaneous Breathing Trial Complete Yes Resp Rate Observed 38 Ve 15.6  RSBI 105
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
## 2020-11-21 NOTE — PROGRESS NOTES
Hospitalist Progress Note NAME: Celio Durant :  1950 MRN:  [id number] Assessment / Plan: 
Squamous cell carcinoma of the lung Acute on chronic hypoxic hypercapnic respiratory failure POA AECOPD Aspiration pneumonia and sepsis 
-CT scan: with findings of emphysema with large pleural-based lateral right upper lobe mass, with several other pulmonary nodules in both lungs overall highly suspicious of bronchogenic neoplasm. Largest 5.3x 3.1 cm , cavitary lesion. Likely metastatic with bilateral pulm nodules 
-heme/onc following MRI brain  shows no evidence of intracranial metastatic disease. Atrophy and chronic white matter disease with probable artifactual diffusion signal right temporal lobe. No acute abnormality. Nasal pharyngeal fluid related to nasogastric tube and bilateral mastoid effusions  
 
-S/p CT-guided lung biopsy 11/10 shows SCC 
-Chest x-ray unchanged right upper lobe mass, mild pulmonary edema. CXR  shows increasing bibasilar opacities 
-Continue empiric IV antibiotics with vancomycin and Zosyn 
-Leukocytosis noted in setting of IV steroids, worsened 
 
-unable to wean from vent so far 
-cont treatment of COPD exacerbation with bronchodilators, IV steroids 
-50 pack/yr smoking history 
-vent management per intensivist 
  
Hypokalemia -resolved 
  
Nausea and vomiting - resolved - Unclear etiology 
- continue PRN Zofran, compazine, and scopolamine patch 
- Head CT negative for acute finding 
  
Prediabetes and Hyperglycemia 
- No hx of DM. HbA1c 5.8. Likley due to steroid. - Continue sliding scale insulin 
  
Hyperthyrodism 
- TSH less than 0.01, free T4 2.0. Total T3 wnl - Patient had a elevated free T4 in 2016. Had ultrasound which did not show any nodule - Started on methimazole and beta blocker - Will need outpatient follow-up, will give contact information for endocrinologist 
  
Anemia: possible AOCD Thrombocytopenia: reactive History of prescription narcotic abuse 
-Weaned off opioids about 5 years ago with help of her son/caregiver Adia 
  
Depression/anxiety Fibromyalgia As Needed Lorazepam for Anxiety, now sedated on vent Continue Cymbalta Continue gabapentin 
  
Hypertension continue amlodipine 
  
History of seizures No seizures for years Continue Keppra 
 
  
Code Status: Full code Surrogate Decision Maker: Luciano Seymour Discussed care with Son over the phone today (11/18). He plans to come by with family tomorrow with some family (to see patient separately) 3-5PM 
 
Appreciate palliative team involvement. Group of family including her son/caretaker, Adia, visited with patient today to say goodbyes. Anticipating transition to [address] Monday of no improvement over the weekend 
  
Prognosis guarded. Family considering transition to comfort care after visitation 
  
DVT Prophylaxis: SCDs GI Prophylaxis: not indicated 
  
Baseline: Independent in ADLs 
  
Plan for CT-guided biopsy today/Tomorrow Subjective: Chief Complaint / Reason for Physician Visit No change in status Discussed with RN events overnight. Review of Systems: 
Symptom Y/N Comments  Symptom Y/N Comments Fever/Chills    Chest Pain Poor Appetite    Edema Cough    Abdominal Pain Sputum    Joint Pain SOB/DÍAZ    Pruritis/Rash Nausea/vomit    Tolerating PT/OT Diarrhea    Tolerating Diet Constipation    Other Could NOT obtain due to: Sedated on vent Objective: VITALS:  
Last 24hrs VS reviewed since prior progress note. Most recent are: 
Patient Vitals for the past 24 hrs: 
 Temp Pulse Resp BP SpO2  
11/21/20 1521  73 20  95 % 11/21/20 1231  75 20  95 % 11/21/20 1200 99.2 °F (37.3 °C) 73 19 (!) 140/64 95 % 11/21/20 1100  76 18 (!) 130/59 95 % 11/21/20 1030  70 16 137/63 96 % 11/21/20 1000  68 16 (!) 130/53 96 % 11/21/20 0930  83 17 (!) 108/56 95 % 11/21/20 0900  85 17 122/61 94 % 11/21/20 0800 99.2 °F (37.3 °C) 85 21 (!) 104/58 96 % 11/21/20 0745  78 21  97 % 11/21/20 0700  82 20 (!) 118/59 96 % 11/21/20 0630  84 19 (!) 91/51 95 % 11/21/20 0625  83 20 (!) 91/45 95 % 11/21/20 0621  86 22 (!) 72/45 95 % 11/21/20 0600  96 25 (!) 86/52 94 % 11/21/20 0500 99.2 °F (37.3 °C) 82 23 (!) 124/55 94 % 11/21/20 0400  90 25 (!) 105/48 93 % 11/21/20 0317  70 21  95 % 11/21/20 0300  71 20 (!) 131/56 95 % 11/21/20 0200  75 24 (!) 123/57 93 % 11/21/20 0100  82 24 (!) 121/54 93 % 11/21/20 0030 98.8 °F (37.1 °C) 82 25 (!) 114/51 93 % 11/21/20 0000  68 19 (!) 147/67 95 % 11/20/20 2356  70 20  95 % 11/20/20 2330  63 17 (!) 173/79 96 % 11/20/20 2300  63 15 (!) 170/80 96 % 11/20/20 2200  76 21 (!) 122/55 95 % 11/20/20 2100 98.2 °F (36.8 °C) 77 19 (!) 139/50 94 % 11/20/20 1919  70 20  96 % 11/20/20 1900  72 21 (!) 144/53 96 % 11/20/20 1800  76 20 (!) 151/60 95 % 11/20/20 1700  76 20 (!) 164/66 94 % Intake/Output Summary (Last 24 hours) at 11/21/2020 1609 Last data filed at 11/21/2020 1200 Gross per 24 hour Intake 1647.04 ml Output 845 ml Net 802.04 ml I had a face to face encounter with this patient and independently examined this patient on 11/21/2020 as outlined below: PHYSICAL EXAM: 
 
General: Adult  female, sedated on vent, appears older than stated age EENT:  EOMI. Anicteric sclerae. MMM Resp:  Diminished air entry, coarse bs, no wheeze/rhonchi CV:  Regular  rhythm,  No edema GI:  Soft, Non distended, Non tender. +Bowel sounds Neurologic:  Limited exam, moves extremities, opens eyes to voice Psych:   deferred Skin:  No rashes. No jaundice Reviewed most current lab test results and cultures  YES Reviewed most current radiology test results   YES Review and summation of old records today    NO Reviewed patient's current orders and MAR    YES 
PMH/SH reviewed - no change compared to H&P 
 ________________________________________________________________________ Care Plan discussed with: 
  Comments Patient x Family RN x Care Manager Consultant Multidiciplinary team rounds were held today with , nursing, pharmacist and clinical coordinator. Patient's plan of care was discussed; medications were reviewed and discharge planning was addressed. ________________________________________________________________________ Total NON critical care TIME:  35   Minutes Total CRITICAL CARE TIME Spent:   Minutes non procedure based Comments >50% of visit spent in counseling and coordination of care    
________________________________________________________________________ Pedro Luis Arroyo DO  
 
Procedures: see electronic medical records for all procedures/Xrays and details which were not copied into this note but were reviewed prior to creation of Plan. LABS: 
I reviewed today's most current labs and imaging studies. Pertinent labs include: 
Recent Labs  
  11/21/20 0420 11/20/20 0407 11/19/20 
0430 WBC 18.9* 23.5* 24.9* HGB 8.5* 9.1* 9.0*  
HCT 28.1* 30.1* 30.0*  
 312 355 Recent Labs  
  11/21/20 0420 11/20/20 0407 11/19/20 
0430  141 143  
K 3.8 3.2* 3.0*  
CL 99 100 97 CO2 34* 38* 40* * 166* 146* BUN 25* 21* 30* CREA 0.56 0.46* 0.56  
CA 8.3* 8.3* 8.7 MG  --  1.9  --   
 
 
Signed: Pedro Luis Arroyo DO

## 2020-11-21 NOTE — PROGRESS NOTES
Pulmonary See Dr De Luna Po note from  for details Remains intubated. Being treated for resp failure and copd exacerbations. Has failed SBTs Oncology following for new dx of squamous cell lung cancer. On 30% FiO2. Breathing 20 bpm with vent rate of 14 Patient Vitals for the past 4 hrs: 
 BP Temp Pulse Resp SpO2  
20 0630 (!) 91/51  84 19 95 % 20 0625 (!) 91/45  83 20 95 % 20 0621 (!) 72/45  86 22 95 % 20 0600 (!) 86/52  96 25 94 % 20 0500 (!) 124/55 99.2 °F (37.3 °C) 82 23 94 % 20 0400 (!) 105/48  90 25 93 % Temp (24hrs), Av.8 °F (37.1 °C), Min:98.2 °F (36.8 °C), Max:99.5 °F (37.5 °C) Intake/Output Summary (Last 24 hours) at 2020 [facility] Last data filed at 2020 0600 Gross per 24 hour Intake 2400.28 ml Output 1280 ml Net 1120.28 ml No distress Sedated Distant bs CXR - ETT, small L effusion. RUL mass unchanged Lab: 
Recent Labs  
  20 
0420 20 
0407 20 
0430 WBC 18.9* 23.5* 24.9* HGB 8.5* 9.1* 9.0*  
 312 355  141 143  
K 3.8 3.2* 3.0*  
CL 99 100 97 CO2 34* 38* 40* BUN 25* 21* 30* CREA 0.56 0.46* 0.56 * 166* 146* CA 8.3* 8.3* 8.7 MG  --  1.9  --   
 
Results for Erman Lumbee (MRN [id number]) as of 2020 07:33 Ref. Range 2020 04:20  
pH Latest Ref Range: 7.35 - 7.45   7.46 (H) PCO2 Latest Ref Range: 35.0 - 45.0 mmHg 45 PO2 Latest Ref Range: 80 - 100 mmHg 81 BICARBONATE Latest Ref Range: 22 - 26 mmol/L 32 (H) O2 SAT Latest Ref Range: 92 - 97 % 96 BASE EXCESS Latest Units: mmol/L 7.0 Sample source Latest Units:   ARTERIAL  
SITE Latest Units:   LEFT RADIAL SKYE'S TEST Unknown PENDING  
MODE Latest Units:   A/C  
FIO2 Latest Units: % 30 A/P: 
Acute on chronic resp failure - on vent - failing SBT on 30% FiO2 - COPD with aspiration Stage IV squamous cell lung cancer Resolved shock --Vent support - daily SAT / SBT --nebs / solumedrol --on zosyn --chlorhexidine / lovenonx / Gary Link --partial code --oncology following - consideration of transition to comfort care next week if unable to wean off of vent Mary Pat MD

## 2020-11-22 NOTE — PROGRESS NOTES
Pulmonary See Dr Marguerite Thomas note from  for details Remains intubated. Being treated for resp failure and copd exacerbations. Has continued to  fail SBTs Oncology following for new dx of squamous cell lung cancer. On 30% FiO2. Breathing 20 bpm with vent rate of 14 Patient Vitals for the past 4 hrs: 
 BP Pulse Resp SpO2  
20 0745  66 14 96 % 20 0700 (!) 132/54 65 14 95 % 20 0600 (!) 104/45 82 20 94 % 20 0500 133/63 88 20 94 % Temp (24hrs), Av.8 °F (37.1 °C), Min:98.4 °F (36.9 °C), Max:99.2 °F (37.3 °C) Intake/Output Summary (Last 24 hours) at 2020 3280 Last data filed at 2020 0700 Gross per 24 hour Intake 3010.66 ml Output 1150 ml Net 1860.66 ml No distress Sedated Distant bs Lab: 
Recent Labs  
  20 
0447 20 
0420 20 
0407 WBC 17.4* 18.9* 23.5* HGB 8.5* 8.5* 9.1*  
 260 312  140 141  
K 3.9 3.8 3.2*  
 99 100 CO2 33* 34* 38* BUN 21* 25* 21* CREA 0.51* 0.56 0.46* * 188* 166* CA 8.7 8.3* 8.3*  
MG 2.3  --  1.9 PHOS 3.3  --   --   
TBILI 0.6  --   --   
 
 
A/P: 
Acute on chronic resp failure - on vent - failing SBT on 30% FiO2 - COPD with aspiration Stage IV squamous cell lung cancer Resolved shock --Vent support - daily SAT / SBT 
--nebs / solumedrol --on zosyn --chlorhexidine / lovenonx / Rachael Pion --partial code --oncology following - consideration of transition to comfort care next week if unable to wean off of vent [facility] will sign off Darryl Dey MD
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
## 2020-11-22 NOTE — PROGRESS NOTES
0700  Report from [facility] RN 
 
0800  Assessment complete  Patient will open eyes to voice decreased command following. On vent tolerating Right IJ infusing with Propofol 30 mcg Fentanyl 100 Mcg Precedex 1.5 mcg NS 25 ml/hr. Moon in place draining yellow urine. Flexi seal in place new bag no measurement at this time. NGT in place with Osmolite 1.2 at goal  
 
1045  Bath complete 1055  Brother into see patient 36  Brother gone 1200  Assessment complete no changes 1220  Patient restless titrated Propofol see MAR 
 
1300  Patient calm resting at this time 1600  Assessment complete no changes resting comfortably  VSS 
 
1900  Report to [facility] RN

## 2020-11-23 NOTE — PROGRESS NOTES
0715: Report received from NICHOLAS Paez. Ethics now consulted per Risk Management. 0800: Pt. Assessed. Pt. intubated and sedated. Restraints in place for patient safety. Pupils equal and reactive. Pt. Opens eyes to voice. Breath sounds coarse, diminished in the bases. Vent settings unchanged from previous shift. Bowel sounds hypoactive. TF's infusing at trickle. Residual 100 mls. Pt. Has an ETT, NGT, Right IJ CVC, Moon, and flexiseal. Propofol at 25 mcgs, Precedex at 1.5 mcgs, and fentanyl at 100 mg's. See MAR for all rates and titrations. Excoriation to groin and sacrum. Will turn Q2 hours and apply Desitin as ordered. 1000: IDR's held with Dr. Traec Lang. D/C blood checks for now. Keep patient comfortable until compassionate extubation. 1032: Dr. Taisha Patel at the bedside. Ethics consulted. 1215: Pt. Reassessed. NO changes. Per Dr. Milena Madrigal, she has been in contact with Adia (Son) and ethics. Tentatively,compassionate extubation scheduled for 4 PM with son at the bedside. 1400: Pt. Resting comfortably. 1541: Pt.'s family at the bedside. Dr. Anali Hebert will be here shortly. 1609: spoke to [address] [place] [address]. OK to extubate now. Call at time of death. 1615: Pt. Now comfort measures only. 1632: PRN morphine and Ativan given per Dr. Trace Lang. Resp. To come compassionately extubate pt. Now. Paging  now. See MAR for all PRN given from this time on. Will give PRN Morphine and Ativan as needed as ordered. 1717:  at the bedside. Call when patient passes. 1830: Pt.'s son and Daughter in law remain at the bedside. PRN's given as needed, See MAR.  
 
1900: Report given to NICHOLAS Paez.

## 2020-11-23 NOTE — PROGRESS NOTES
Oncology Progress Note Patient Name:  Jodi Jett Date of Service: 11/23/2020 Admit Date: 11/7/2020 Interval History  
 
- she remains intubated and continues to fail SBT. No response to verbal or tactile stimulation at present. Chart notes reviewed - Ethics consult, palliative care notes, and plans for compassionate extubation noted. Subjective:  
 
 
 
Current Facility-Administered Medications Medication Dose Route Frequency  albuterol-ipratropium (DUO-NEB) 2.5 MG-0.5 MG/3 ML  3 mL Nebulization Q6H RT  
 [START ON 11/24/2020] famotidine (PF) (PEPCID) 20 mg in 0.9% sodium chloride 10 mL injection  20 mg IntraVENous DAILY  levETIRAcetam (KEPPRA) oral solution 500 mg  500 mg Per NG tube BID  hydrALAZINE (APRESOLINE) 20 mg/mL injection 10 mg  10 mg IntraVENous Q6H PRN  
 dexmedeTOMidine (PRECEDEX) 400 mcg in 0.9% sodium chloride 100 mL infusion  0.1-1.5 mcg/kg/hr IntraVENous TITRATE  fentaNYL citrate (PF) injection 100 mcg  100 mcg IntraVENous Q4H PRN  chlorhexidine (ORAL CARE KIT) 0.12 % mouthwash 15 mL  15 mL Oral Q12H  
 alcohol 62% (NOZIN) nasal  1 Ampule  1 Ampule Topical Q12H  propofol (DIPRIVAN) 10 mg/mL infusion  0-50 mcg/kg/min IntraVENous TITRATE  fentaNYL (PF) 1,500 mcg/30 mL (50 mcg/mL) infusion  0-200 mcg/hr IntraVENous TITRATE  miconazole (SECURA) 2 % extra thick cream   Topical BID  
 glucose chewable tablet 16 g  4 Tab Oral PRN  
 dextrose (D50W) injection syrg 12.5-25 g  12.5-25 g IntraVENous PRN  
 glucagon (GLUCAGEN) injection 1 mg  1 mg IntraMUSCular PRN  
 scopolamine (TRANSDERM-SCOP) 1 mg over 3 days 1 Patch  1 Patch TransDERmal Q72H  
 HYDROcodone-acetaminophen (NORCO) 5-325 mg per tablet 2 Tab  2 Tab Oral Q4H PRN  prochlorperazine (COMPAZINE) with saline injection 10 mg  10 mg IntraVENous Q6H PRN  zinc oxide-cod liver oil (DESITIN) 40 % paste   Topical BID and QHS  bacitracin 500 unit/gram packet 1 Packet  1 Packet Topical DAILY  zinc oxide-cod liver oil (DESITIN) 40 % paste   Topical PRN  
 0.9% sodium chloride infusion  25 mL/hr IntraVENous CONTINUOUS  
 labetaloL (NORMODYNE;TRANDATE) injection 10 mg  10 mg IntraVENous Q2H PRN  
 sodium chloride (NS) flush 5-40 mL  5-40 mL IntraVENous Q8H  
 sodium chloride (NS) flush 5-40 mL  5-40 mL IntraVENous PRN  
 acetaminophen (TYLENOL) tablet 650 mg  650 mg Oral Q4H PRN  
 naloxone (NARCAN) injection 0.4 mg  0.4 mg IntraVENous PRN  
 ondansetron (ZOFRAN) injection 4 mg  4 mg IntraVENous Q4H PRN Review of Systems: 
ROS not conducted due to intubation and sedation. Objective:  
 
Patient Vitals for the past 8 hrs: 
 BP Temp Pulse Resp SpO2  
20 1500 (!) 159/62  62 14 93 % 20 1400 (!) 166/63  (!) 55 14 95 % 20 1353     95 % 20 1300 (!) 164/63  (!) 57 14 95 % 20 1200 (!) 160/66 98.5 °F (36.9 °C) 61 14 94 % 20 1139   64 16 94 % 20 1100 (!) 165/66  (!) 59 14 98 % 20 1000 (!) 156/68  63 13 95 % 20 0900 (!) 144/63  76 17 94 % 20 0800 (!) 144/63 98.8 °F (37.1 °C) 64 14 96 % 20 0758   65 14 96 % 20 0757     96 % Temp (24hrs), Av.6 °F (37 °C), Min:98.2 °F (36.8 °C), Max:99 °F (37.2 °C) 
 
 
 07 -  1900 In: 687.9 [I.V.:507.9] Out: 492 [Urine:750; Drains:125] Physical Exam: 
GEN:  Elderly  lady; ETT in place HEENT: NC, AT Neck: soft,  trachea midline PULM: reduced air entry at bases BL. Coarse breath sounds anterolaterally CV: RRR, S1S2+, no MRG 
ABD: soft, NT/ND, BS+ EXTR:  edema noted. NEURO: does not open eyes to voice PSYCH: unable to assess. Labs:   
Recent Results (from the past 24 hour(s)) GLUCOSE, POC Collection Time: 20  5:33 PM  
Result Value Ref Range Glucose (POC) 225 (H) 65 - 100 mg/dL  Performed by Alec Pino RN   
GLUCOSE, POC  
 Collection Time: 11/23/20  1:12 AM  
Result Value Ref Range Glucose (POC) 172 (H) 65 - 100 mg/dL Performed by Cathryne Palestine RN   
GLUCOSE, POC Collection Time: 11/23/20  5:33 AM  
Result Value Ref Range Glucose (POC) 167 (H) 65 - 100 mg/dL Performed by Cathryne Palestine RN   
 
CT chest 11/7/20: IMPRESSION: 
Emphysema with large pleural-based necrotic lateral right upper lobe mass, with 
several other pulmonary nodules in both lungs as described all highly suspicious 
for bronchogenic neoplasm. Atypical/mycobacterial infection may result in 
cavitary upper lobe lesions though felt to be less likely. Correlate with 
pertinent clinical findings. Assessment:  
 
Active Problems: COPD exacerbation ([facility] [address] (9/13/2010) Overview: O2 dependent--Dr. Darío Granados Plan: Ms. Trina Mena is a 73y/o lady with newly diagnosed squamous cell non-small cell lung cancer. Squamous Cell Lung Cancer: 
-patient appears to have metastatic disease based on presence of pulmonary nodules bilaterally, no abdominal imaging done yet. - MRI brain showed no metastatic disease Respiratory Failure/Shock, Aspriation: 
- patient's condition is complicated by significant COPD with underlying malignancy 
- remains intubated, failed weaning trials for several days now. Son and ICU team have decided on compassionate extubation which is to proceed later this afternoon. Prognosis grim. Hilario Campos MD 
11/23/2020

## 2020-11-23 NOTE — PALLIATIVE CARE
Brief note,  full note will be placed. Son had spoken to Dr Jaylon Lopez , he wants to proceed with compassionate extubation around 4 pm thsi afternoon . I have coordinated with Dr Stanley Uriostegui, who is going to manage compassionate extubation , I will be there fro son's support . Will appreciate  presence . I have communicated plan with bed side Rn Shey.

## 2020-11-23 NOTE — PROGRESS NOTES
Referral from  Alvin Myers at the close of day shift, notifying withdrawal of life support. Met with patient's son, Adia and his wife at bedside and offered pastoral support as they shared their feelings and did a life review. Adia requested for a prayer, which was offered. Provided supportive listening and affirmed their feelings of grief as Adia reminisced several memories. Offered words of comfort and advised them of  availability. Rev. Margarita Leger Paging Service: [phone])

## 2020-11-23 NOTE — CONSULTS
Clinical Ethics Consultation Note Ethics Follow up from this morninyo female with COPD and lung cancer, now end-stage. Son closely involved with care plan and wishes to proceed with compassionate extubation. Ethics consultant discussed at length with Dr. Shaila Guidry, Dr. Liz Regalado, care management, forensics and requested Maddi search. Ethics consultant did not engage son of patient due to sensitivity of the matter and consistency of information gleaned from clinical staff, forensics and, and public and private data bases. The patient's son Darrol Brittle) is the appropriate decision maker, and, according to the patient's long-standing pulmonary physician, Dr. Chantel Monroy has always been attentive and caring for his mother throughout this illness. Confirmed through obituary of patient's daughter and Maddi search that there are no other living children of the patient. Forensics team also agreed that their investigation and exam did not raise any concerns about the patient's son. The patient's son and the medical team have established a plan of care that is consistent with substituted judgement for this patient and medical and ethical standards of care, and that plan should be respected and followed. For questions or concerns about this consultation please call me directly, Lili Mattson MD, MPH, MTS, MSNDR, ABEBA-C 
, [facility] 
[phone]

## 2020-11-23 NOTE — INTERDISCIPLINARY ROUNDS
Yulia Flatter, RN Registered Nurse INTERDISCIPLINARY Interdisciplinary Rounds Signed Date of Service:  11/23/20 0379 Critical care interdisciplinary rounds held on 11/23/2020. Following members present, Pharmacy, Diabetes Treatment, Case Management, Physical Therapy,Clinical Lead, Palliative Care and Nutrition. Led by WALTER Winter RN and Dr. Rashawn Infante. Plan of care discussed. See clinical pathway for plan of care and interventions and desired outcomes.

## 2020-11-23 NOTE — PROGRESS NOTES
Patient is in CCU; intubated; on IV Sedation; has NG tube and is receiving tube fedings. Restraints in place for patient safety. Palliative care is following. Ethics is arranging to search for NOK. 
 
  
[place] 
TBD pending patients medical progress and recommendations of MD and therapies; 
Per son Adia, patient was independent PTA with home oxygen provided by Awilda Rodriguez; she has BSC, rolling walker and a cane; she resided with her son Adia in a one level home 
[facility] - pending PT/OT evals and recommendations. 
  
PT/OT evals were not completed due to patient being intubated and sedated;  Will need to re-consult PT/OT when medically stable for therapy evaluations. 
  
Transportation TBD; if son is to transport, will need to be reminded to bring portable oxygen tank 
  
Preferred [address] [place] [address] letter will be needed prior to discharge 
  
Follow up appointments will be needed prior to discharge 
  
CM will continue to follow for discharge needs and planning. 
[address] RN Care Manager Ext [phone]

## 2020-11-23 NOTE — CONSULTS
Palliative Medicine Consult Sarthak: [phone]) Patient Name: Sofia Diehl YOB: 1950 Date of Initial Consult: 11/9/20 Reason for Consult: care decisions Requesting Provider: Gabriela Coleman MD  
Primary Care Physician: Joseline Wright MD 
 
 SUMMARY:  
Sofia Diehl is a 78 y.o. Female with a past history of COPD on 2 litres home oxygen , depression , anxiety , Fibromylagia, Hx of seizures , hx of substance abuse , chronic smoker , Recent fall with right foot fracture ,  who was admitted on 11/7/2020 from  Home with a diagnosis of acute COPD exacerbation , work  Up , CT chest showed emphysema with large pleural-based lateral right upper lobe mass, with bilateral  pulmonary nodules in both lungs,  highly suspicious of bronchogenic neoplasm. Pulmonary is following suggested CT guided bx of right lung lesion . Current medical issues leading to Palliative Medicine involvement include: care decisions for suspected lung cancer , awaiting biopsy . Last admission for COPD in our system is 2018. Social :  , lives with her only child Son Alvarado Davalos, chronic smoker, quit few weeks ago , decline in function to basically mostly in bed for past few months due to weakness . Faith maggie . Interim hx : CT guided biopsy of lung mass on 11/10 consistant with squamous cell cancer . 11/14 : she was intubated for respiratory arrest due aspiration and sepsis,  nursing suction eggs from her airways . 11/17: MRI Brain pending , off pressors, plan to wean sedation prior to SBT, she was apenic on trail of sedation on 11/16/20 11/23: on FIO2 30%, failing SBT . PALLIATIVE DIAGNOSES:  
1. Respiratory failure ( vent dependant ). 2. Aspiration pneumonia 3. COPD exacerbation 4. Sepsis resolved 5. Goals of care 6. DNR discussion 7. New diagnosis of lung cancer 8. Weakness , fatigue 9. Fibromyalgia 10. Hx of substance abuse 11. COPD  PLAN:  
 1. Chart reviewed, prior to follow up visit , Interim events noted , including forensic and and ethics consult. Patient is ventilator dependant , failed SBT. 2. I spoke to bed side Ai Hughes, who shared there is a patient relative who called over the weekend reporting patient has a living daughter . 3. I spoke to patient son Riley, who is consistent in informing me,  patient had a daughter ( keesha's sister ), who passed away in 2018, and Adia is only living child/ legal next of kin . 4. I spoke to SNTMNT from ethics refer to her note . 4. Goals of care : several conversation with patient son today . · I spoke to patient son Adia, he is awaiting to talk to Dr Yaquelin Garcia before proceeding with compassionate extubation , he notes most of the family members have visited last week for good byes . · I coordinated with Dr Yaquelin Garcia to speak to the son, of note patient and his son is well known to Dr Yaquelin Garcia . · Later on I spoke to his son who notes , he will come around 4 pm with his wife for compassionate extubation . · I coordinated with intensive care specialist Dr Nik Silver who would manage the terminal extubation , we agreed I will come back to support family. · Around 4 pm , I received a call from ICU RN Brenda Fuentes for son is at bed side , I met with the son Adia and his wife , explained  extubation process with transition to comfort focus care explained , Dr Nik Silver met with the family afterward and comfort care orders placed by him . ·  services called . · Son seems to be calm , he appreciated medical team assistance with difficult decisions making process . 5. Initial consult note routed to primary continuity provider and/or primary health care team members 6. Communicated plan of care with: Palliative IDT, [address] Team 
 
 GOALS OF CARE / TREATMENT PREFERENCES:  
 
GOALS OF CARE: 
Patient/Health Care Proxy Stated Goals: (Compassionate extubation this afternoon .) TREATMENT PREFERENCES:  
Code Status: DNR Advance Care Planning: 
[x] The [facility] Interdisciplinary Team has updated the ACP Navigator with [address] and Patient Capacity Primary Decision Maker (Active): Olga Long Son - [phone] Advance Care Planning 3/3/2018 Patient's Healthcare Decision Maker is: Legal Next of Kin Primary Decision Maker Name Betty Figueroa Primary Decision Maker Phone Number [phone] Primary Decision Maker Relationship to Patient Adult child Secondary Decision Maker Name - Secondary Decision Maker Phone Number - Secondary Decision Maker Relationship to Patient -  
Confirm Advance Directive None Patient Would Like to Complete Advance Directive No  
Does the patient have other document types - Medical Interventions: Comfort measures Other Instructions: Other: As far as possible, the palliative care team has discussed with patient / health care proxy about goals of care / treatment preferences for patient. HISTORY:  
 
History obtained from: chart, Rn and patient . CHIEF COMPLAINT: Sedated , intubated HPI/SUBJECTIVE: The patient is:  
[] Verbal and participatory Patient has chronic pain issues/fibromyalgia , worse in last few days , she is extremely weak , c/o nausea on and off . 
 
11/23/20 : patient is vent dependant off pressors . Clinical Pain Assessment (nonverbal scale for severity on nonverbal patients):  
Clinical Pain Assessment Severity: 0 Location: back , shoulder , allover Character: dull Duration: few months worse in last 4 days. Effect: weak . Factors: dilaudid and morphine helps with pain . Activity (Movement): Lying quietly, normal position Duration: for how long has pt been experiencing pain (e.g., 2 days, 1 month, years) Frequency: how often pain is an issue (e.g., several times per day, once every few days, constant)  FUNCTIONAL ASSESSMENT:  
 
 Palliative Performance Scale (PPS): PPS: 20 
 
 
 PSYCHOSOCIAL/SPIRITUAL SCREENING:  
 
Palliative IDT has assessed this patient for cultural preferences / practices and a referral made as appropriate to needs (Cultural Services, Patient Advocacy, Ethics, etc.) Any spiritual / Yazidism concerns: 
[] Yes /  [x] No 
 
Caregiver Burnout: 
[] Yes /  [x] No /  [] No Caregiver Present Anticipatory grief assessment:  
[x] Normal  / [] Maladaptive ESAS Anxiety: Anxiety: 1 ESAS Depression: Depression: 0 REVIEW OF SYSTEMS:  
 
Positive and pertinent negative findings in ROS are noted above in HPI. The following systems were [] reviewed / [x] unable to be reviewed as noted in HPI Other findings are noted below. Systems: constitutional, ears/nose/mouth/throat, respiratory, gastrointestinal, genitourinary, musculoskeletal, integumentary, neurologic, psychiatric, endocrine. Positive findings noted below. Modified ESAS Completed by: provider Fatigue: 8 Drowsiness: 0 Depression: 0 Pain: 0 Anxiety: 1 Nausea: 0 Anorexia: 7 Dyspnea: 0 Stool Occurrence(s): 1 PHYSICAL EXAM:  
 
From RN flowsheet: 
Wt Readings from Last 3 Encounters:  
11/19/20 174 lb 6.1 oz (79.1 kg)  
11/12/20 169 lb 15.6 oz (77.1 kg) 10/26/20 170 lb (77.1 kg) Blood pressure (!) 125/47, pulse 77, temperature 98.5 °F (36.9 °C), resp. rate 16, height 5' 2\" (1.575 m), weight 174 lb 6.1 oz (79.1 kg), SpO2 (!) 68 %. Pain Scale 1: Behavioral Pain Scale (BPS) Pain Intensity 1: 3 Pain Onset 1: chronic Pain Location 1: Back, Shoulder Pain Orientation 1: Right Pain Description 1: Constant Pain Intervention(s) 1: Rest 
Last bowel movement, if known:  
 
Constitutional:intubated, sedated Eyes: pupils equal, anicteric ENMT: ET tube in place . Cardiovascular: regular rhythm. Respiratory: breathing not labored, symmetric Gastrointestinal: soft non-tender, +bowel sounds Musculoskeletal: no deformity, no tenderness to palpation Skin: warm, dry Neurologic: intubated , sedated Psychiatric: unable to evaluate because of patient factors . Other: 
 
 
 HISTORY:  
 
Active Problems: COPD exacerbation ([facility] [address] (2010) Overview: O2 dependent--Dr. Daquan De La Garza Past Medical History:  
Diagnosis Date  Arthritis  Asthma  COPD   
 2 L NC  
 Hypertension  Ischemic colitis ([facility] [address] 2012  Migraines  Neurological disorder   
 migraines  Psychiatric disorder   
 depression  [facility] Last seizure 4 years ago  Vaginal candidiasis 2012 Past Surgical History:  
Procedure Laterality Date  HX APPENDECTOMY  HX  SECTION    
 HX CHOLECYSTECTOMY  HX HYSTERECTOMY  HX ORTHOPAEDIC    
 lumbar disc sx  HX ORTHOPAEDIC    
 left knee sx  HX OTHER SURGICAL    
 colonoscopy-recent colitis  AR COLONOSCOPY W/BIOPSY SINGLE/MULTIPLE  3/1/2012 Family History Problem Relation Age of Onset  Stroke Mother  Heart Disease Mother  Lung Disease Father   
     copd History reviewed, no pertinent family history. Social History Tobacco Use  Smoking status: Current Every Day Smoker Packs/day: 0.25 Years: 30.00 Pack years: 7.50  Smokeless tobacco: Never Used Substance Use Topics  Alcohol use: No  
 
Allergies Allergen Reactions  Codeine Other (comments)  Erythromycin Nausea Only  Other Medication Other (comments) No sedative or narcotic per son due to hx addiction Current Facility-Administered Medications Medication Dose Route Frequency  LORazepam (ATIVAN) injection 1 mg  1 mg IntraVENous Q15MIN PRN  
 morphine injection 4 mg  4 mg IntraVENous Q15MIN PRN  propofol (DIPRIVAN) 10 mg/mL infusion  0-50 mcg/kg/min IntraVENous TITRATE  fentaNYL (PF) 1,500 mcg/30 mL (50 mcg/mL) infusion  0-200 mcg/hr IntraVENous TITRATE  dextrose (D50W) injection syrg 12.5-25 g  12.5-25 g IntraVENous PRN  
 scopolamine (TRANSDERM-SCOP) 1 mg over 3 days 1 Patch  1 Patch TransDERmal Q72H  
 0.9% sodium chloride infusion  25 mL/hr IntraVENous CONTINUOUS  
 
 
 
 LAB AND IMAGING FINDINGS:  
 
Lab Results Component Value Date/Time WBC 17.4 (H) 11/22/2020 04:47 AM  
 HGB 8.5 (L) 11/22/2020 04:47 AM  
 PLATELET 730 31/29/8726 04:47 AM  
 
Lab Results Component Value Date/Time Sodium 139 11/22/2020 04:47 AM  
 Potassium 3.9 11/22/2020 04:47 AM  
 Chloride 102 11/22/2020 04:47 AM  
 CO2 33 (H) 11/22/2020 04:47 AM  
 BUN 21 (H) 11/22/2020 04:47 AM  
 Creatinine 0.51 (L) 11/22/2020 04:47 AM  
 Calcium 8.7 11/22/2020 04:47 AM  
 Magnesium 2.3 11/22/2020 04:47 AM  
 Phosphorus 3.3 11/22/2020 04:47 AM  
  
Lab Results Component Value Date/Time Alk. phosphatase 69 11/22/2020 04:47 AM  
 Protein, total 5.6 (L) 11/22/2020 04:47 AM  
 Albumin 2.0 (L) 11/22/2020 04:47 AM  
 Globulin 3.6 11/22/2020 04:47 AM  
 
Lab Results Component Value Date/Time INR 1.1 11/15/2020 03:07 AM  
 Prothrombin time 11.9 (H) 11/15/2020 03:07 AM  
 aPTT 31.5 (H) 01/28/2014 08:40 AM  
  
No results found for: IRON, FE, TIBC, IBCT, PSAT, FERR Lab Results Component Value Date/Time pH 7.42 11/22/2020 04:10 AM  
 PCO2 52 (H) 11/22/2020 04:10 AM  
 PO2 60 (L) 11/22/2020 04:10 AM  
 
No components found for: [place] Lab Results Component Value Date/Time CK 21 (L) 11/08/2020 06:23 AM  
 CK - MB 2.2 03/04/2018 03:34 AM  
  
 
 
   
 
Total time: 45 mins Counseling / coordination time, spent as noted above:  
> 50% counseling / coordination?: yes Prolonged service was provided for  []30 min   []75 min in face to face time in the presence of the patient, spent as noted above. Time Start:  
Time End:  
Note: this can only be billed with 02206 (initial) or 40006 (follow up). If multiple start / stop times, list each separately.

## 2020-11-23 NOTE — DISCHARGE SUMMARY
[facility] Name: Shanti Cuadra : 1950 MRN: [id number] Date: 2020 DATE of ADMISSION: 20 DATE OF DISCHARGE/DEATH: 
 
 
ADMISSION DIAGNOSES: 
Acute on chronic hypoxic respiratory failure COPD exacerbation Bilateral lung masses Sinus tachycardia DISCHARGE DIAGNOSES: 
Acute on chronic hypoxic respiratory failure COPD exacerbation Stage 4 squamous cell carcinoma of the lung Sinus tachycardia Shock, resolved BRIEF ADMISSION SUMMARY: 
She was admitted to the hospitalist service  with the following HPI and the above diagnoses: 
 
 Pearl Mcduffie is a 81 y.o.  female who presents with complaints of worsening shortness of breath over the course of the past few days. Worsening backaches and right shoulder pain for the last day described as a continuous nagging pain. Worsening cough dry. Denies any fever or chills, no ill contacts. Please explained having chronic diarrhea 3-4 loose bowel movements a day. For the past few weeks. She is usually on 2 L of oxygen at home but has felt that she needed to increase the oxygen flow to manage her shortness of breath but without significant relief. Has recently had forearm and right foot fractures after mechanical falls. Admission plan was as follows: 
Treated as COPD exacerbation with systemic steroids and nebulized bronchodilators. HOSPITAL COURSE: 
11/10 FNA of R lung mass - cytology revealed squamous cell carcinoma  required intubation for worsening respiratory failure - remained ventilator dependent with no progress weaning  Patient's son opted for terminal extubation and full comfort measures. Pt was extubated and remained on fentanyl infusion for comfort. She passed away peacefully in the early morning hours of . No autopsy was requested CONSULTANTS: 
 Pulmonary medicine  Orthopedic Surgery  Palliative Care 
 Oncology TEST RESULTS: 
11/07 CT chest: Emphysema with large pleural-based necrotic lateral right upper lobe mass, with several other pulmonary nodules in both lungs as described all highly suspicious for bronchogenic neoplasm 11/09 Echocardiogram: LVEF 70%. No major findings 11/10 CT guided bx of RUL mass 11/14 CT head: No evidence of acute infarct or intracranial hemorrhage 11/17 MRI brain: No evidence of intracranial metastatic disease CAUSE OF DEATH: 
Acute on chronic hypercarbic and hypoxemic respiratory failure Due to COPD exacerbation OTHER DIAGNOSES: 
Smoker Lung cancer Chronic anemia, NOS Jazmin Duenas, [address] 
11/24/2020 7:28 AM

## 2020-11-23 NOTE — PROGRESS NOTES
11/23/20 0534 ABCDEF Bundle SBT Trial Passed No  
SBT Trial Reason for Failure Respiratory rate > 35;RSBI>105 
(agitation) Weaning Parameters Resp Rate Observed 41  
RSBI 281 SBT failed, patient back on AC mode.

## 2020-11-23 NOTE — PROGRESS NOTES
Slightly tight. Nurse practitioner notes that some uncertainty surrounding her shoulder but range of motion is able to raise a Hospitalist Progress Note NAME: Sony Bailey :  1950 MRN:  [id number] Assessment / Plan: 
Squamous cell carcinoma of the lung Acute on chronic hypoxic hypercapnic respiratory failure POA AECOPD Aspiration pneumonia and sepsis 
-CT scan: with findings of emphysema with large pleural-based lateral right upper lobe mass, with several other pulmonary nodules in both lungs overall highly suspicious of bronchogenic neoplasm. Largest 5.3x 3.1 cm , cavitary lesion. Likely metastatic with bilateral pulm nodules 
-heme/onc following MRI brain  shows no evidence of intracranial metastatic disease. Atrophy and chronic white matter disease with probable artifactual diffusion signal right temporal lobe. No acute abnormality. Nasal pharyngeal fluid related to nasogastric tube and bilateral mastoid effusions  
 
-S/p CT-guided lung biopsy 11/10 shows SCC 
-Chest x-ray unchanged right upper lobe mass, mild pulmonary edema. CXR  shows increasing bibasilar opacities 
-Continue empiric IV antibiotics with vancomycin and Zosyn 
-Leukocytosis noted in setting of IV steroids, worsened 
 
-unable to wean from vent so far 
-cont treatment of COPD exacerbation with bronchodilators, IV steroids 
-50 pack/yr smoking history 
-vent management per intensivist 
  
Hypokalemia -resolved 
  
Nausea and vomiting - resolved - Unclear etiology 
- continue PRN Zofran, compazine, and scopolamine patch 
- Head CT negative for acute finding 
  
Prediabetes and Hyperglycemia 
- No hx of DM. HbA1c 5.8. Likley due to steroid. - Continue sliding scale insulin 
  
Hyperthyrodism 
- TSH less than 0.01, free T4 2.0.  Total T3 wnl 
- Patient had a elevated free T4 in 2016.  Had ultrasound which did not show any nodule 
 - Started on methimazole and beta blocker - Will need outpatient follow-up, will give contact information for endocrinologist 
  
Anemia: possible AOCD Thrombocytopenia: reactive History of prescription narcotic abuse 
-Weaned off opioids about 5 years ago with help of her son/caregiver Adia 
  
Depression/anxiety Fibromyalgia As Needed Lorazepam for Anxiety, now sedated on vent Continue Cymbalta Continue gabapentin 
  
Hypertension continue amlodipine 
  
History of seizures No seizures for years Continue Keppra 
 
  
Code Status: Full code Surrogate Decision Maker: Son Lion Discussed care with Son over the phone today (11/18). He plans to come by with family tomorrow with some family (to see patient separately) 3-5PM 
 
Appreciate palliative team involvement. Group of family including her son/caretaker, Adia, visited with patient today to say goodbyes. [facility] [facility] of no improvement over the weekend 
  
Prognosis guarded. Family considering transition to comfort care after visitation 
  
DVT Prophylaxis: SCDs GI Prophylaxis: not indicated 
  
Baseline: Independent in ADLs 
  
Plan for CT-guided biopsy today/Tomorrow Subjective: Chief Complaint / Reason for Physician Visit Remains sedated on vent Discussed with RN events overnight. Review of Systems: 
Symptom Y/N Comments  Symptom Y/N Comments Fever/Chills    Chest Pain Poor Appetite    Edema Cough    Abdominal Pain Sputum    Joint Pain SOB/DÍAZ    Pruritis/Rash Nausea/vomit    Tolerating PT/OT Diarrhea    Tolerating Diet Constipation    Other Could NOT obtain due to: Sedated on vent Objective: VITALS:  
Last 24hrs VS reviewed since prior progress note. Most recent are: 
Patient Vitals for the past 24 hrs: 
 Temp Pulse Resp BP SpO2  
11/22/20 2004  68 14  96 % 11/22/20 2003     96 % 11/22/20 2000 99 °F (37.2 °C)      
11/22/20 1900  71 14 (!) 132/59 95 % 11/22/20 1800  71 14 137/64 95 % 11/22/20 1700  86 17 (!) 134/55 92 % 11/22/20 1600 98.5 °F (36.9 °C) 80 13 (!) 143/58 93 % 11/22/20 1522  68 15  94 % 11/22/20 1500  66 16 (!) 156/63 94 % 11/22/20 1400  78 17 (!) 139/57 92 % 11/22/20 1300  70 16 (!) 147/68 94 % 11/22/20 1200 97.9 °F (36.6 °C) 72 13 (!) 154/67 95 % 11/22/20 1137  68 16  96 % 11/22/20 1100  62 14 (!) 138/58 96 % 11/22/20 1000  84 18 (!) 102/48 96 % 11/22/20 0945  82 14 (!) 96/46 94 % 11/22/20 0930  83 16 (!) 111/55 94 % 11/22/20 0900  85 17 (!) 121/47 94 % 11/22/20 0830  83 17 (!) 118/52 94 % 11/22/20 0800 98.7 °F (37.1 °C) 73 14 (!) 128/47 95 % 11/22/20 0745  66 14  96 % 11/22/20 0700  65 14 (!) 132/54 95 % 11/22/20 0600  82 20 (!) 104/45 94 % 11/22/20 0500  88 20 133/63 94 % 11/22/20 0400 98.4 °F (36.9 °C) (!) 102 19 (!) 150/58 93 % 11/22/20 0328     95 % 11/22/20 0323  66 14  94 % 11/22/20 0300  65 14 (!) 150/58 94 % 11/22/20 0200  69 16 (!) 147/61 94 % 11/22/20 0100  87 18 (!) 129/47 92 % 11/22/20 0000 98.4 °F (36.9 °C) 80 19  92 % 11/21/20 2331  64 15  95 % Intake/Output Summary (Last 24 hours) at 11/22/2020 2330 Last data filed at 11/22/2020 2100 Gross per 24 hour Intake 2936.83 ml Output 1590 ml Net 1346.83 ml I had a face to face encounter with this patient and independently examined this patient on 11/22/2020 as outlined below: PHYSICAL EXAM: 
 
General: Adult  female, sedated on vent, appears older than stated age EENT:  EOMI. Anicteric sclerae. MMM Resp:  Diminished air entry, coarse bs, no wheeze/rhonchi CV:  Regular  rhythm,  No edema GI:  Soft, Non distended, Non tender. +Bowel sounds Neurologic:  Limited exam, moves extremities, opens eyes to voice Psych:   deferred Skin:  No rashes. No jaundice Reviewed most current lab test results and cultures  YES Reviewed most current radiology test results   YES 
 Review and summation of old records today    NO Reviewed patient's current orders and MAR    YES 
PMH/SH reviewed - no change compared to H&P 
________________________________________________________________________ Care Plan discussed with: 
  Comments Patient x Family RN x Care Manager Consultant Multidiciplinary team rounds were held today with , nursing, pharmacist and clinical coordinator. Patient's plan of care was discussed; medications were reviewed and discharge planning was addressed. ________________________________________________________________________ Total NON critical care TIME:  35   Minutes Total CRITICAL CARE TIME Spent:   Minutes non procedure based Comments >50% of visit spent in counseling and coordination of care    
________________________________________________________________________ Tata Swenson DO  
 
Procedures: see electronic medical records for all procedures/Xrays and details which were not copied into this note but were reviewed prior to creation of Plan. LABS: 
I reviewed today's most current labs and imaging studies. Pertinent labs include: 
Recent Labs  
  11/22/20 0447 11/21/20 
0420 11/20/20 
0407 WBC 17.4* 18.9* 23.5* HGB 8.5* 8.5* 9.1*  
HCT 27.9* 28.1* 30.1*  
 260 312 Recent Labs  
  11/22/20 
0447 11/21/20 
0420 11/20/20 
0407  140 141  
K 3.9 3.8 3.2*  
 99 100 CO2 33* 34* 38* * 188* 166* BUN 21* 25* 21* CREA 0.51* 0.56 0.46* CA 8.7 8.3* 8.3*  
MG 2.3  --  1.9 PHOS 3.3  --   --   
ALB 2.0*  --   --   
TBILI 0.6  --   --   
ALT 15  --   --   
 
 
Signed: Tata Swenson DO

## 2020-11-23 NOTE — CONSULTS
Clinical Ethics Consultation Note Called by Dr. Erik Dorsey regarding this 67yo woman with end stage COPD. Son is currently only known living child, and therefore legal decision maker, for this woman without an AMD. Another relative claims that there is another living child of this patient. Will arrange for a search of NOK. Emma Velasco MD, MPH. MTS, MSNDR, ABEBA-C 
, Ethics, [facility]

## 2020-11-23 NOTE — PROGRESS NOTES
Pt was seen on rounds this AM and care plan was discussed on multidisciplinary rounds. Palliative Care Service has been involved with this woman who has end stage COPD, prolonged ventilator dependence and a new diagnosis of lung cancer. Pt's son who has been her care provider for some time has made a decision to proceed with terminal extubation and comfort measures only. I support this decision as medically appropriate. More importantly, Dr Lorraine Almeida who has been her pulmonolgist for years agrees that this is the appropriate course of action. I have ordered the comfort care order set and placed orders for extubation. I have apprised the pt's son of the process of terminal extubation CURRENT DIAGNOSES:  
Acute on chronic respiratory failure with hypercarbia and hypoxemia COPD exacerbation Prolonged ventilator dependence Smoker Sq cell lung cancer - new diagnosis Shock, resolved Rowan Weinberg, [address] [facility] 
[phone] 
11/23/2020 4:04 PM

## 2020-11-23 NOTE — PROGRESS NOTES
Physician Progress Note Aracely Sellers 
CSN #:                  [id number] :                       1950 ADMIT DATE:       2020 12:04 AM 
DISCH DATE: 
RESPONDING 
PROVIDER #:        Celine Michael DO 
 
 
 
 
QUERY TEXT: 
 
Dr Cady Duque Pt admitted with Squamous cell carcinoma of the lung. Pt noted to have sepsis. If possible, please document in progress notes and discharge summary the present on admission status of sepsis: The medical record reflects the following: 
Risk Factors: Squamous cell carcinoma of the lung, Clinical Indicators: now with Aspiration pneumonia and sepsis; WBC on admission 12, with left shift, now 25-23-18, ED VS:  ED ; [id number]; resp 30-24; bp 159/73--142/81; sats 100-97% on 5 liters Treatment:  Laith-Synephrine gtt(); zosyn; Vancomycin, Levaquin, Albumin, Augmentin, Levophed gtt Options provided: 
-- Yes, Sepsis was present at the time of the order to admit to the hospital 
-- No, sepsis  was not present on admission and developed during the inpatient stay 
-- Other - I will add my own diagnosis -- Disagree - Not applicable / Not valid -- Disagree - Clinically unable to determine / Unknown 
-- Refer to Clinical Documentation Reviewer PROVIDER RESPONSE TEXT: 
 
Yes, sepsis was present at the time of the order to admit to the hospital. 
 
Query created by:  Ariane Lagunas on 2020 4:33 PM 
 
 
Electronically signed by:  Celine Michael DO 2020 8:27 AM

## 2020-11-23 NOTE — PALLIATIVE CARE
I had spoken to bed side Rn , who reports , there is a patient relative who called over the weekend, saying, that patient has a daughter . I spoke to patient son La Nena Silva who confirmed , patient had a daughter (Jg's sister ), who passed away in  2018. Adia is the only living child and legal next of kin . Son is waiting for Dr Yaquelin Garcia to talk to him , before proceeding with decision for compassionate extubation . Ethics consult placed by ICU team , I spoke to Queryday from ethics, refer to her note .

## 2020-11-24 NOTE — DEATH NOTE
S: 
Called to bedside by RN to pronounce death. Withdrawal of intensive care support on afternoon of 11/23/20. O: 
Unresponsive No cardiac sounds. No respirations. A/P: 
No signs of life. Patient pronounced at 02:35. NOLVIA L[place]

## 2020-11-24 NOTE — PROGRESS NOTES
1900- Report received. Family at bedside. Patient receiving comfort care only. 2000- Assessment mostly deferred secondary to compassionate care only and family at bedside. 0235-Patient asystole. [facility], NP and  notified. Death form completed. 0330- Post mortem care completed. 0400- patient transferred to [facility].

## 2020-12-02 NOTE — PROGRESS NOTES
Quality: Chart reviewed for death and restraints. Bilateral soft wrist restraints noted during hospitalization. Restraints not a contributing factor in patient death. Documented for tracking according to CMS guidelines at 1908 on 12/1/20.

## 2021-01-12 LAB
BASE EXCESS BLDA CALC-SCNC: 7 MMOL/L
BDY SITE: ABNORMAL
FIO2 ON VENT: 30 %
GAS FLOW.O2 SETTING OXYMISER: 14 L/MIN
HCO3 BLDA-SCNC: 32 MMOL/L (ref 22–26)
PCO2 BLDA: 45 MMHG (ref 35–45)
PH BLDA: 7.46 [PH] (ref 7.35–7.45)
PO2 BLDA: 81 MMHG (ref 80–100)
SAO2 % BLD: 96 % (ref 92–97)
SAO2% DEVICE SAO2% SENSOR NAME: ABNORMAL
SPECIMEN SITE: ABNORMAL
VENTILATION MODE VENT: ABNORMAL